# Patient Record
Sex: MALE | Race: WHITE
[De-identification: names, ages, dates, MRNs, and addresses within clinical notes are randomized per-mention and may not be internally consistent; named-entity substitution may affect disease eponyms.]

---

## 2017-01-13 ENCOUNTER — RX RENEWAL (OUTPATIENT)
Age: 82
End: 2017-01-13

## 2017-02-03 ENCOUNTER — APPOINTMENT (OUTPATIENT)
Dept: SURGERY | Facility: CLINIC | Age: 82
End: 2017-02-03

## 2017-02-03 VITALS
HEART RATE: 83 BPM | TEMPERATURE: 98 F | OXYGEN SATURATION: 95 % | HEIGHT: 72 IN | WEIGHT: 195 LBS | SYSTOLIC BLOOD PRESSURE: 127 MMHG | RESPIRATION RATE: 15 BRPM | DIASTOLIC BLOOD PRESSURE: 65 MMHG | BODY MASS INDEX: 26.41 KG/M2

## 2017-02-03 DIAGNOSIS — A04.7 ENTEROCOLITIS DUE TO CLOSTRIDIUM DIFFICILE: ICD-10-CM

## 2017-02-03 DIAGNOSIS — Z82.3 FAMILY HISTORY OF STROKE: ICD-10-CM

## 2017-02-03 RX ORDER — CHROMIUM 200 MCG
TABLET ORAL
Refills: 0 | Status: ACTIVE | COMMUNITY

## 2017-02-21 ENCOUNTER — CHART COPY (OUTPATIENT)
Age: 82
End: 2017-02-21

## 2017-03-30 ENCOUNTER — APPOINTMENT (OUTPATIENT)
Dept: SURGERY | Facility: AMBULATORY MEDICAL SERVICES | Age: 82
End: 2017-03-30

## 2017-04-01 ENCOUNTER — INPATIENT (INPATIENT)
Facility: HOSPITAL | Age: 82
LOS: 1 days | Discharge: HOME | End: 2017-04-03
Attending: HOSPITALIST | Admitting: INTERNAL MEDICINE
Payer: MEDICARE

## 2017-04-01 VITALS
OXYGEN SATURATION: 95 % | DIASTOLIC BLOOD PRESSURE: 70 MMHG | WEIGHT: 184.97 LBS | TEMPERATURE: 98 F | HEIGHT: 69 IN | HEART RATE: 90 BPM | SYSTOLIC BLOOD PRESSURE: 144 MMHG | RESPIRATION RATE: 20 BRPM

## 2017-04-01 DIAGNOSIS — H40.9 UNSPECIFIED GLAUCOMA: ICD-10-CM

## 2017-04-01 DIAGNOSIS — Z95.5 PRESENCE OF CORONARY ANGIOPLASTY IMPLANT AND GRAFT: Chronic | ICD-10-CM

## 2017-04-01 DIAGNOSIS — Z90.89 ACQUIRED ABSENCE OF OTHER ORGANS: Chronic | ICD-10-CM

## 2017-04-01 DIAGNOSIS — Z96.642 PRESENCE OF LEFT ARTIFICIAL HIP JOINT: Chronic | ICD-10-CM

## 2017-04-01 DIAGNOSIS — J44.9 CHRONIC OBSTRUCTIVE PULMONARY DISEASE, UNSPECIFIED: ICD-10-CM

## 2017-04-01 DIAGNOSIS — I10 ESSENTIAL (PRIMARY) HYPERTENSION: ICD-10-CM

## 2017-04-01 DIAGNOSIS — N39.0 URINARY TRACT INFECTION, SITE NOT SPECIFIED: ICD-10-CM

## 2017-04-01 DIAGNOSIS — I25.10 ATHEROSCLEROTIC HEART DISEASE OF NATIVE CORONARY ARTERY WITHOUT ANGINA PECTORIS: ICD-10-CM

## 2017-04-01 DIAGNOSIS — Z98.890 OTHER SPECIFIED POSTPROCEDURAL STATES: Chronic | ICD-10-CM

## 2017-04-01 DIAGNOSIS — K52.9 NONINFECTIVE GASTROENTERITIS AND COLITIS, UNSPECIFIED: ICD-10-CM

## 2017-04-01 DIAGNOSIS — E78.5 HYPERLIPIDEMIA, UNSPECIFIED: ICD-10-CM

## 2017-04-01 LAB
ALBUMIN SERPL ELPH-MCNC: 2.9 G/DL — LOW (ref 3.3–5)
ALP SERPL-CCNC: 78 U/L — SIGNIFICANT CHANGE UP (ref 40–120)
ALT FLD-CCNC: 16 U/L — SIGNIFICANT CHANGE UP (ref 12–78)
ANION GAP SERPL CALC-SCNC: 11 MMOL/L — SIGNIFICANT CHANGE UP (ref 5–17)
APPEARANCE UR: CLEAR — SIGNIFICANT CHANGE UP
AST SERPL-CCNC: 32 U/L — SIGNIFICANT CHANGE UP (ref 15–37)
BACTERIA # UR AUTO: (no result)
BASOPHILS # BLD AUTO: 0.2 K/UL — SIGNIFICANT CHANGE UP (ref 0–0.2)
BASOPHILS NFR BLD AUTO: 0.6 % — SIGNIFICANT CHANGE UP (ref 0–2)
BILIRUB SERPL-MCNC: 1.2 MG/DL — SIGNIFICANT CHANGE UP (ref 0.2–1.2)
BILIRUB UR-MCNC: (no result)
BUN SERPL-MCNC: 23 MG/DL — SIGNIFICANT CHANGE UP (ref 7–23)
CALCIUM SERPL-MCNC: 9.3 MG/DL — SIGNIFICANT CHANGE UP (ref 8.5–10.1)
CHLORIDE SERPL-SCNC: 102 MMOL/L — SIGNIFICANT CHANGE UP (ref 96–108)
CO2 SERPL-SCNC: 22 MMOL/L — SIGNIFICANT CHANGE UP (ref 22–31)
COLOR SPEC: YELLOW — SIGNIFICANT CHANGE UP
CREAT SERPL-MCNC: 1.13 MG/DL — SIGNIFICANT CHANGE UP (ref 0.5–1.3)
DIFF PNL FLD: (no result)
EOSINOPHIL # BLD AUTO: 0 K/UL — SIGNIFICANT CHANGE UP (ref 0–0.5)
EOSINOPHIL NFR BLD AUTO: 0 % — SIGNIFICANT CHANGE UP (ref 0–6)
EPI CELLS # UR: SIGNIFICANT CHANGE UP
GLUCOSE SERPL-MCNC: 137 MG/DL — HIGH (ref 70–99)
GLUCOSE UR QL: NEGATIVE MG/DL — SIGNIFICANT CHANGE UP
HCT VFR BLD CALC: 47.9 % — SIGNIFICANT CHANGE UP (ref 39–50)
HGB BLD-MCNC: 15.7 G/DL — SIGNIFICANT CHANGE UP (ref 13–17)
INR BLD: 1.26 RATIO — HIGH (ref 0.88–1.16)
KETONES UR-MCNC: (no result)
LACTATE SERPL-SCNC: 1.8 MMOL/L — SIGNIFICANT CHANGE UP (ref 0.7–2)
LEUKOCYTE ESTERASE UR-ACNC: (no result)
LYMPHOCYTES # BLD AUTO: 0.6 K/UL — LOW (ref 1–3.3)
LYMPHOCYTES # BLD AUTO: 2.3 % — LOW (ref 13–44)
MAGNESIUM SERPL-MCNC: 2 MG/DL — SIGNIFICANT CHANGE UP (ref 1.8–2.4)
MANUAL DIF COMMENT BLD-IMP: SIGNIFICANT CHANGE UP
MCHC RBC-ENTMCNC: 27.3 PG — SIGNIFICANT CHANGE UP (ref 27–34)
MCHC RBC-ENTMCNC: 32.8 GM/DL — SIGNIFICANT CHANGE UP (ref 32–36)
MCV RBC AUTO: 83.2 FL — SIGNIFICANT CHANGE UP (ref 80–100)
MONOCYTES # BLD AUTO: 2.5 K/UL — HIGH (ref 0–0.9)
MONOCYTES NFR BLD AUTO: 9.7 % — SIGNIFICANT CHANGE UP (ref 2–14)
NEUTROPHILS # BLD AUTO: 22.6 K/UL — HIGH (ref 1.8–7.4)
NEUTROPHILS NFR BLD AUTO: 87.3 % — HIGH (ref 43–77)
NEUTS HYPERSEG # BLD: PRESENT — SIGNIFICANT CHANGE UP
NITRITE UR-MCNC: NEGATIVE — SIGNIFICANT CHANGE UP
PH UR: 5 — SIGNIFICANT CHANGE UP (ref 4.8–8)
PLAT MORPH BLD: NORMAL — SIGNIFICANT CHANGE UP
PLATELET # BLD AUTO: 190 K/UL — SIGNIFICANT CHANGE UP (ref 150–400)
POTASSIUM SERPL-MCNC: 4.6 MMOL/L — SIGNIFICANT CHANGE UP (ref 3.5–5.3)
POTASSIUM SERPL-SCNC: 4.6 MMOL/L — SIGNIFICANT CHANGE UP (ref 3.5–5.3)
PROT SERPL-MCNC: 7.2 GM/DL — SIGNIFICANT CHANGE UP (ref 6–8.3)
PROT UR-MCNC: 30 MG/DL
PROTHROM AB SERPL-ACNC: 13.7 SEC — HIGH (ref 9.8–12.7)
RBC # BLD: 5.76 M/UL — SIGNIFICANT CHANGE UP (ref 4.2–5.8)
RBC # FLD: 14.6 % — HIGH (ref 10.3–14.5)
RBC BLD AUTO: NORMAL — SIGNIFICANT CHANGE UP
RBC CASTS # UR COMP ASSIST: SIGNIFICANT CHANGE UP /HPF (ref 0–4)
SODIUM SERPL-SCNC: 135 MMOL/L — SIGNIFICANT CHANGE UP (ref 135–145)
SP GR SPEC: 1.02 — SIGNIFICANT CHANGE UP (ref 1.01–1.02)
TOXIC GRANULES BLD QL SMEAR: PRESENT — SIGNIFICANT CHANGE UP
TROPONIN I SERPL-MCNC: <0.015 NG/ML — SIGNIFICANT CHANGE UP (ref 0.01–0.04)
UROBILINOGEN FLD QL: 4 MG/DL
WBC # BLD: 25.9 K/UL — HIGH (ref 3.8–10.5)
WBC # FLD AUTO: 25.9 K/UL — HIGH (ref 3.8–10.5)
WBC UR QL: (no result)

## 2017-04-01 PROCEDURE — 99285 EMERGENCY DEPT VISIT HI MDM: CPT

## 2017-04-01 PROCEDURE — 74177 CT ABD & PELVIS W/CONTRAST: CPT | Mod: 26

## 2017-04-01 PROCEDURE — 71010: CPT | Mod: 26

## 2017-04-01 PROCEDURE — 93010 ELECTROCARDIOGRAM REPORT: CPT

## 2017-04-01 RX ORDER — METRONIDAZOLE 500 MG
500 TABLET ORAL ONCE
Qty: 0 | Refills: 0 | Status: COMPLETED | OUTPATIENT
Start: 2017-04-01 | End: 2017-04-01

## 2017-04-01 RX ORDER — TAMSULOSIN HYDROCHLORIDE 0.4 MG/1
0.4 CAPSULE ORAL AT BEDTIME
Qty: 0 | Refills: 0 | Status: DISCONTINUED | OUTPATIENT
Start: 2017-04-01 | End: 2017-04-03

## 2017-04-01 RX ORDER — ATORVASTATIN CALCIUM 80 MG/1
20 TABLET, FILM COATED ORAL AT BEDTIME
Qty: 0 | Refills: 0 | Status: DISCONTINUED | OUTPATIENT
Start: 2017-04-01 | End: 2017-04-03

## 2017-04-01 RX ORDER — TICAGRELOR 90 MG/1
90 TABLET ORAL
Qty: 0 | Refills: 0 | Status: DISCONTINUED | OUTPATIENT
Start: 2017-04-02 | End: 2017-04-03

## 2017-04-01 RX ORDER — METRONIDAZOLE 500 MG
500 TABLET ORAL EVERY 8 HOURS
Qty: 0 | Refills: 0 | Status: DISCONTINUED | OUTPATIENT
Start: 2017-04-01 | End: 2017-04-03

## 2017-04-01 RX ORDER — AMLODIPINE BESYLATE 2.5 MG/1
5 TABLET ORAL DAILY
Qty: 0 | Refills: 0 | Status: DISCONTINUED | OUTPATIENT
Start: 2017-04-01 | End: 2017-04-03

## 2017-04-01 RX ORDER — FAMOTIDINE 10 MG/ML
20 INJECTION INTRAVENOUS DAILY
Qty: 0 | Refills: 0 | Status: DISCONTINUED | OUTPATIENT
Start: 2017-04-01 | End: 2017-04-03

## 2017-04-01 RX ORDER — TIOTROPIUM BROMIDE 18 UG/1
1 CAPSULE ORAL; RESPIRATORY (INHALATION) DAILY
Qty: 0 | Refills: 0 | Status: DISCONTINUED | OUTPATIENT
Start: 2017-04-01 | End: 2017-04-03

## 2017-04-01 RX ORDER — CIPROFLOXACIN LACTATE 400MG/40ML
400 VIAL (ML) INTRAVENOUS ONCE
Qty: 0 | Refills: 0 | Status: COMPLETED | OUTPATIENT
Start: 2017-04-01 | End: 2017-04-01

## 2017-04-01 RX ORDER — LATANOPROST 0.05 MG/ML
1 SOLUTION/ DROPS OPHTHALMIC; TOPICAL AT BEDTIME
Qty: 0 | Refills: 0 | Status: DISCONTINUED | OUTPATIENT
Start: 2017-04-01 | End: 2017-04-03

## 2017-04-01 RX ORDER — CEFTRIAXONE 500 MG/1
1 INJECTION, POWDER, FOR SOLUTION INTRAMUSCULAR; INTRAVENOUS ONCE
Qty: 0 | Refills: 0 | Status: COMPLETED | OUTPATIENT
Start: 2017-04-01 | End: 2017-04-01

## 2017-04-01 RX ORDER — ASPIRIN/CALCIUM CARB/MAGNESIUM 324 MG
81 TABLET ORAL DAILY
Qty: 0 | Refills: 0 | Status: DISCONTINUED | OUTPATIENT
Start: 2017-04-01 | End: 2017-04-01

## 2017-04-01 RX ORDER — ASPIRIN/CALCIUM CARB/MAGNESIUM 324 MG
81 TABLET ORAL DAILY
Qty: 0 | Refills: 0 | Status: DISCONTINUED | OUTPATIENT
Start: 2017-04-01 | End: 2017-04-03

## 2017-04-01 RX ORDER — TICAGRELOR 90 MG/1
90 TABLET ORAL ONCE
Qty: 0 | Refills: 0 | Status: DISCONTINUED | OUTPATIENT
Start: 2017-04-01 | End: 2017-04-03

## 2017-04-01 RX ORDER — ONDANSETRON 8 MG/1
4 TABLET, FILM COATED ORAL EVERY 6 HOURS
Qty: 0 | Refills: 0 | Status: DISCONTINUED | OUTPATIENT
Start: 2017-04-01 | End: 2017-04-03

## 2017-04-01 RX ORDER — CIPROFLOXACIN LACTATE 400MG/40ML
400 VIAL (ML) INTRAVENOUS EVERY 12 HOURS
Qty: 0 | Refills: 0 | Status: DISCONTINUED | OUTPATIENT
Start: 2017-04-01 | End: 2017-04-03

## 2017-04-01 RX ORDER — SODIUM CHLORIDE 9 MG/ML
1000 INJECTION INTRAMUSCULAR; INTRAVENOUS; SUBCUTANEOUS
Qty: 0 | Refills: 0 | Status: DISCONTINUED | OUTPATIENT
Start: 2017-04-01 | End: 2017-04-03

## 2017-04-01 RX ORDER — SODIUM CHLORIDE 9 MG/ML
1750 INJECTION INTRAMUSCULAR; INTRAVENOUS; SUBCUTANEOUS ONCE
Qty: 0 | Refills: 0 | Status: COMPLETED | OUTPATIENT
Start: 2017-04-01 | End: 2017-04-01

## 2017-04-01 RX ORDER — SODIUM CHLORIDE 9 MG/ML
1000 INJECTION INTRAMUSCULAR; INTRAVENOUS; SUBCUTANEOUS ONCE
Qty: 0 | Refills: 0 | Status: COMPLETED | OUTPATIENT
Start: 2017-04-01 | End: 2017-04-01

## 2017-04-01 RX ADMIN — CEFTRIAXONE 100 GRAM(S): 500 INJECTION, POWDER, FOR SOLUTION INTRAMUSCULAR; INTRAVENOUS at 17:15

## 2017-04-01 RX ADMIN — SODIUM CHLORIDE 875 MILLILITER(S): 9 INJECTION INTRAMUSCULAR; INTRAVENOUS; SUBCUTANEOUS at 17:05

## 2017-04-01 RX ADMIN — SODIUM CHLORIDE 150 MILLILITER(S): 9 INJECTION INTRAMUSCULAR; INTRAVENOUS; SUBCUTANEOUS at 23:35

## 2017-04-01 RX ADMIN — Medication 200 MILLIGRAM(S): at 20:36

## 2017-04-01 RX ADMIN — SODIUM CHLORIDE 1000 MILLILITER(S): 9 INJECTION INTRAMUSCULAR; INTRAVENOUS; SUBCUTANEOUS at 16:05

## 2017-04-01 RX ADMIN — Medication 100 MILLIGRAM(S): at 20:36

## 2017-04-01 NOTE — H&P ADULT - NSHPLABSRESULTS_GEN_ALL_CORE
CARDIAC MARKERS ( 2017 16:07 )  <0.015 ng/mL / x     / x     / x     / x                    15.7   25.9  )-----------( 190      ( 2017 16:07 )             47.9     2017 16:07    135    |  102    |  23     ----------------------------<  137    4.6     |  22     |  1.13     Ca    9.3        2017 16:  Mg     2.0       2017 16:07    TPro  7.2    /  Alb  2.9    /  TBili  1.2    /  DBili  x      /  AST  32     /  ALT  16     /  AlkPhos  78     2017 16:07    PT/INR - ( 2017 16: )   PT: 13.7 sec;   INR: 1.26 ratio        CAPILLARY BLOOD GLUCOSE  132 (2017 16:00)    LIVER FUNCTIONS - ( 2017 16:07 )  Alb: 2.9 g/dL / Pro: 7.2 gm/dL / ALK PHOS: 78 U/L / ALT: 16 U/L / AST: 32 U/L / GGT: x           Urinalysis Basic - ( 2017 16:07 )  Color: Yellow / Appearance: Clear / S.020 / pH: x  Gluc: x / Ketone: Trace  / Bili: Small / Urobili: 4 mg/dL   Blood: x / Protein: 30 mg/dL / Nitrite: Negative   Leuk Esterase: Moderate / RBC: 0-2 /HPF / WBC 6-10   Sq Epi: x / Non Sq Epi: Few / Bacteria: Moderate    CXR unofficial by me, no acute infiltrate and no PVC    EKG (1 + 2) SR   RBBB q III, aVF  + bifasc. block    Abd/pelvic CT: +Fluid and gas seen in the bilateral inguinal canals, tracking superiorly along the anterior abdominal wall. Hemorrhage in the left inguinal canal tracking into the scrotum. Correlation with timing of surgery is suggested to evaluate whether these reflect expected postoperative changes. No drainable collection is identified.       Mild inflammatory stranding around the splenic flexure and proximal   descending colon may reflect focal colitis in the appropriate clinical   setting. Given the watershed region distribution, as well as the   patient's age and vascular disease, the possibility of ischemia should be considered. Correlation with serum lactate may be obtained.       Mild rectal wall thickening is also seen, may reflect proctitis    JADEN HANSON   This document has been electronically signed. 2017  6:54PM

## 2017-04-01 NOTE — INPATIENT CERTIFICATION FOR MEDICARE PATIENTS - RISKS OF ADVERSE EVENTS
Concern for worsening infectious process/Concern for renal deterioration/Concern for cardiopulmonary deterioration

## 2017-04-01 NOTE — H&P ADULT - PROBLEM SELECTOR PLAN 1
A) Inflammatory vs infectious focal colitis  less likely ischemic due to age and some vascular calcification but no pain or difficulty voiding; has normal lactate  wife reported to nurse that pt had a hx of c diff in past but did not specify when  B) marked leukocytosis w L shift  1. admit for IVF  2. continue cipro/flagyl  3. diet as clears  4. serial exams  5. anti-nausea meds prn  6. pain meds not ordered as patient has no pain  7. follow up BMP and CBC

## 2017-04-01 NOTE — ED PROVIDER NOTE - MEDICAL DECISION MAKING DETAILS
Pt with UTI, colitis. Pt given IV abx, admitted to medicine service for further evaluation and management.

## 2017-04-01 NOTE — H&P ADULT - PSH
H/O bilateral inguinal hernia repair    History of left hip replacement    History of tonsillectomy    Status post coronary artery stent placement  2014 cath LAD w diffuse irregularity; patent stent; LCx w diffuse irreg patent stent [id number]    70% stenosis prox RCA     EF 55%

## 2017-04-01 NOTE — PATIENT PROFILE ADULT. - VISION (WITH CORRECTIVE LENSES IF THE PATIENT USUALLY WEARS THEM):
wears glasses for reading/Partially impaired: cannot see medication labels or newsprint, but can see obstacles in path, and the surrounding layout; can count fingers at arm's length

## 2017-04-01 NOTE — H&P ADULT - PROBLEM SELECTOR PLAN 2
no hydro or pyelo or bladder abnormalities seen on CT  ABN UA  1. follow up culture results  2. received 1 dose ceftriaxone  3. ABN urine may be covered by cipro  4. ID consulted  5. continue flomax

## 2017-04-01 NOTE — H&P ADULT - PMH
CAD (coronary artery disease)    COPD (chronic obstructive pulmonary disease)    Glaucoma of both eyes, unspecified glaucoma    HTN (hypertension)    Hyperlipidemia

## 2017-04-01 NOTE — H&P ADULT - NSHPPHYSICALEXAM_GEN_ALL_CORE
Vital Signs Last 24 Hrs  T(C): 36.9, Max: 37.4 (04-01 @ 17:00)  T(F): 98.5, Max: 99.4 (04-01 @ 17:00)  HR: 84 (84 - 107)  BP: 100/53 (100/53 - 145/41)  BP(mean): --  RR: 18 (15 - 28)  SpO2: 94% (91% - 98%)    Pleasant 82 yr old male wearing O2, w IV infusing  Gen: appears comfortable, and is cooperative  Neuro  alert, Ox3 w normal speech [place], gait not tested  HEENT: dentures removed, pupils reactive, anicteric sclera  Neck nontender, no bruits  Chest clear = breath sounds  CW/breast not examined as not indicated for current complaint  Cor RR 90 S1 + S2  Abd + BS mildly distended, nontender to palpation and to percussion  Groin + induration L>R; ice pack on L  /scrotum not evaluated (no complaints)  musculoskeletal nontender, symmetric   Skin: old ecchymosis both upper extremities over forearms  Pulses 1+ DP and PT bilateral  Rectal not done as not indicated for current complaint

## 2017-04-01 NOTE — H&P ADULT - PROBLEM SELECTOR PLAN 3
1. continue ASA, Brilinta, ACE inh, statin  2. not on beta blocker likely secondary to COPD  3. no signif changes on current EKG

## 2017-04-01 NOTE — H&P ADULT - HISTORY OF PRESENT ILLNESS
82 yr old male w CAD, hx stents, HTN, HLD, COPD 82 yr old male w CAD, hx stents + hx MI w EF 55% (2014), HTN, HLD, COPD, POD #2 after ambulatory surgery for bilateral inguinal hernia repairs,  presented to  ED by ambulance complaining of chills and sudden generalized weakness today.       Patient reported feeling like his usual self post-operative.  Reported having eaten breakfast this morning, with no other intake.   Had felt +chills once, then generalized weakness. His wife assisted him w lying down.  Neighbors assisted getting him up and to bed as 911 was called.         He denied chest, back or abdominal pain.  Denied nausea and vomiting.       Urinated without difficulty or dysuria.  Had last bowel movement (described as large) this morning.    In the ED received >2 L IVF.  Normal lactate despite leukocytosis 25,900.   Cultured blood and urine.  CXR neg for PNA. Abn U/A: he received Rocephin 1 gram.  Ab/pelvic CT showed focal colitis at splenic flexure and prox descending colon.  May be ischemic in nature.  For colitis he was given cipro/flagyl IV.  Wife and patient were concerned w getting C. diff colitis.    Since above treatment, patient feels better.

## 2017-04-01 NOTE — ED PROVIDER NOTE - OBJECTIVE STATEMENT
81 y/o male s/p recent bilat inguinal hernia repair a few days ago at [facility] by Dr. Garcia, with a h/o HTN, HLD, COPD, CAD with stents, c/o mild fatigue and generalized weakness which began at approximately 12:00 PM today. No N/V/D or decreased appetite. 81 y/o male s/p recent bilat inguinal hernia repair a few days ago at [facility] by Dr. Garcia, with a h/o HTN, HLD, COPD, CAD with stents, c/o mild fatigue and generalized weakness which began at approximately 12:00 PM today. No nausea or vomiting. or decreased appetite.

## 2017-04-01 NOTE — ED PROVIDER NOTE - ENMT, MLM
Airway patent, Nasal mucosa clear. Dry mucous membranes. Throat has no vesicles, no oropharyngeal exudates and uvula is midline.

## 2017-04-02 LAB
ANION GAP SERPL CALC-SCNC: 7 MMOL/L — SIGNIFICANT CHANGE UP (ref 5–17)
BASOPHILS # BLD AUTO: 0.1 K/UL — SIGNIFICANT CHANGE UP (ref 0–0.2)
BASOPHILS NFR BLD AUTO: 1 % — SIGNIFICANT CHANGE UP (ref 0–2)
BUN SERPL-MCNC: 15 MG/DL — SIGNIFICANT CHANGE UP (ref 7–23)
CALCIUM SERPL-MCNC: 7.5 MG/DL — LOW (ref 8.5–10.1)
CHLORIDE SERPL-SCNC: 109 MMOL/L — HIGH (ref 96–108)
CO2 SERPL-SCNC: 26 MMOL/L — SIGNIFICANT CHANGE UP (ref 22–31)
CREAT SERPL-MCNC: 0.89 MG/DL — SIGNIFICANT CHANGE UP (ref 0.5–1.3)
EOSINOPHIL # BLD AUTO: 0.2 K/UL — SIGNIFICANT CHANGE UP (ref 0–0.5)
EOSINOPHIL NFR BLD AUTO: 1.6 % — SIGNIFICANT CHANGE UP (ref 0–6)
GLUCOSE SERPL-MCNC: 98 MG/DL — SIGNIFICANT CHANGE UP (ref 70–99)
HCT VFR BLD CALC: 34.1 % — LOW (ref 39–50)
HGB BLD-MCNC: 11.4 G/DL — LOW (ref 13–17)
LYMPHOCYTES # BLD AUTO: 1.1 K/UL — SIGNIFICANT CHANGE UP (ref 1–3.3)
LYMPHOCYTES # BLD AUTO: 8.3 % — LOW (ref 13–44)
MCHC RBC-ENTMCNC: 27.9 PG — SIGNIFICANT CHANGE UP (ref 27–34)
MCHC RBC-ENTMCNC: 33.4 GM/DL — SIGNIFICANT CHANGE UP (ref 32–36)
MCV RBC AUTO: 83.5 FL — SIGNIFICANT CHANGE UP (ref 80–100)
MONOCYTES # BLD AUTO: 1.3 K/UL — HIGH (ref 0–0.9)
MONOCYTES NFR BLD AUTO: 10 % — SIGNIFICANT CHANGE UP (ref 2–14)
NEUTROPHILS # BLD AUTO: 10.6 K/UL — HIGH (ref 1.8–7.4)
NEUTROPHILS NFR BLD AUTO: 79.1 % — HIGH (ref 43–77)
PLATELET # BLD AUTO: 157 K/UL — SIGNIFICANT CHANGE UP (ref 150–400)
POTASSIUM SERPL-MCNC: 4.3 MMOL/L — SIGNIFICANT CHANGE UP (ref 3.5–5.3)
POTASSIUM SERPL-SCNC: 4.3 MMOL/L — SIGNIFICANT CHANGE UP (ref 3.5–5.3)
RBC # BLD: 4.09 M/UL — LOW (ref 4.2–5.8)
RBC # FLD: 14.2 % — SIGNIFICANT CHANGE UP (ref 10.3–14.5)
SODIUM SERPL-SCNC: 142 MMOL/L — SIGNIFICANT CHANGE UP (ref 135–145)
WBC # BLD: 13.4 K/UL — HIGH (ref 3.8–10.5)
WBC # FLD AUTO: 13.4 K/UL — HIGH (ref 3.8–10.5)

## 2017-04-02 RX ORDER — FLUTICASONE PROPIONATE AND SALMETEROL 50; 250 UG/1; UG/1
1 POWDER ORAL; RESPIRATORY (INHALATION)
Qty: 0 | Refills: 0 | Status: DISCONTINUED | OUTPATIENT
Start: 2017-04-02 | End: 2017-04-03

## 2017-04-02 RX ADMIN — Medication 10 MILLIGRAM(S): at 17:26

## 2017-04-02 RX ADMIN — TAMSULOSIN HYDROCHLORIDE 0.4 MILLIGRAM(S): 0.4 CAPSULE ORAL at 21:16

## 2017-04-02 RX ADMIN — Medication 100 MILLIGRAM(S): at 21:16

## 2017-04-02 RX ADMIN — TIOTROPIUM BROMIDE 1 CAPSULE(S): 18 CAPSULE ORAL; RESPIRATORY (INHALATION) at 11:01

## 2017-04-02 RX ADMIN — SODIUM CHLORIDE 150 MILLILITER(S): 9 INJECTION INTRAMUSCULAR; INTRAVENOUS; SUBCUTANEOUS at 12:19

## 2017-04-02 RX ADMIN — TICAGRELOR 90 MILLIGRAM(S): 90 TABLET ORAL at 17:26

## 2017-04-02 RX ADMIN — Medication 81 MILLIGRAM(S): at 12:16

## 2017-04-02 RX ADMIN — Medication 10 MILLIGRAM(S): at 06:38

## 2017-04-02 RX ADMIN — Medication 200 MILLIGRAM(S): at 17:26

## 2017-04-02 RX ADMIN — Medication 200 MILLIGRAM(S): at 08:48

## 2017-04-02 RX ADMIN — Medication 100 MILLIGRAM(S): at 14:31

## 2017-04-02 RX ADMIN — FAMOTIDINE 20 MILLIGRAM(S): 10 INJECTION INTRAVENOUS at 12:16

## 2017-04-02 RX ADMIN — FLUTICASONE PROPIONATE AND SALMETEROL 1 DOSE(S): 50; 250 POWDER ORAL; RESPIRATORY (INHALATION) at 19:52

## 2017-04-02 RX ADMIN — Medication 100 MILLIGRAM(S): at 06:35

## 2017-04-02 RX ADMIN — TICAGRELOR 90 MILLIGRAM(S): 90 TABLET ORAL at 06:38

## 2017-04-02 RX ADMIN — SODIUM CHLORIDE 150 MILLILITER(S): 9 INJECTION INTRAMUSCULAR; INTRAVENOUS; SUBCUTANEOUS at 21:13

## 2017-04-02 RX ADMIN — LATANOPROST 1 DROP(S): 0.05 SOLUTION/ DROPS OPHTHALMIC; TOPICAL at 22:14

## 2017-04-02 RX ADMIN — ATORVASTATIN CALCIUM 20 MILLIGRAM(S): 80 TABLET, FILM COATED ORAL at 21:16

## 2017-04-02 RX ADMIN — SODIUM CHLORIDE 150 MILLILITER(S): 9 INJECTION INTRAMUSCULAR; INTRAVENOUS; SUBCUTANEOUS at 06:34

## 2017-04-02 RX ADMIN — AMLODIPINE BESYLATE 5 MILLIGRAM(S): 2.5 TABLET ORAL at 06:38

## 2017-04-02 NOTE — PROGRESS NOTE ADULT - ASSESSMENT
82 yr old male w CAD, hx stents + hx MI w EF 55% (2014), HTN, HLD, COPD, POD #2 after ambulatory surgery for bilateral inguinal hernia repairs,  presented to  ED by ambulance complaining of chills and sudden generalized weakness today.Patient reported feeling like his usual self post-operative.  Reported having eaten breakfast this morning, with no other intake.   Had felt +chills once, then generalized weakness. His wife assisted him w lying down.  Neighbors assisted getting him up and to bed as 911 was called.He denied chest, back or abdominal pain.  Denied nausea and vomiting. Urinated without difficulty or dysuria.  Had last bowel movement (described as large) this morning.    In the ED received >2 L IVF.  Normal lactate despite leukocytosis 25,900.   Cultured blood and urine.  CXR neg for PNA. Abn U/A: he received Rocephin 1 gram.  Ab/pelvic CT showed focal colitis at splenic flexure and prox descending colon.  May be ischemic in nature.  For colitis he was given cipro/flagyl IV.  Wife and patient were concerned w getting C. diff colitis.      *Colitis; Inflammatory vs infectious focal colitis  - Less likely ischemic due to age and some vascular calcification but no pain or difficulty voiding; has normal lactate  - wife reported that had a hx of c diff for 4 months, 5 years agho after hip surgery  - ID note appreciated; if pt developed diarrhea, send for C diff    *Marked leukocytosis w L shift; resolving (13.4)  - IVF  - Cipro and Flagy day #2  - Clears  - serial exams  - anti-nausea meds prn      *UTI   - s/pose ceftriaxone  - No hydro or pyelo or bladder abnormalities seen on CT  - ABN UA  - urine cultures pending  - Id note appreciated; continue cipro and metro  - continue flomax.     *Coronary artery disease involving native coronary artery without angina pectoris, unspecified whether native or transplanted heart.    - continue ASA, Brilinta, ACE inh, statin  - not on beta blocker likely secondary to COPD  - no significant changes on current EKG.     *Essential hypertension.    - amlodipine and enalapril.     *Hyperlipidemia  - statin.     *COPD  - BREO cannot order in system  - continue spiriva  - O2 prn.    *Glaucoma of both eyes  - eye drops ordered.     *Dispo: possible d/c tomorrow 82 yr old male w CAD, hx stents + hx MI w EF 55% (2014), HTN, HLD, COPD, POD #2 after ambulatory surgery for bilateral inguinal hernia repairs,  presented to  ED by ambulance complaining of chills and sudden generalized weakness today.Patient reported feeling like his usual self post-operative.  Reported having eaten breakfast this morning, with no other intake.   Had felt +chills once, then generalized weakness. His wife assisted him w lying down.  Neighbors assisted getting him up and to bed as 911 was called.He denied chest, back or abdominal pain.  Denied nausea and vomiting. Urinated without difficulty or dysuria.  Had last bowel movement (described as large) this morning.    In the ED received >2 L IVF.  Normal lactate despite leukocytosis 25,900.   Cultured blood and urine.  CXR neg for PNA. Abn U/A: he received Rocephin 1 gram.  Ab/pelvic CT showed focal colitis at splenic flexure and prox descending colon.  May be ischemic in nature.  For colitis he was given cipro/flagyl IV.  Wife and patient were concerned w getting C. diff colitis.      *Colitis; Inflammatory vs infectious focal colitis  - Less likely ischemic due to age and some vascular calcification but no pain or difficulty voiding; has normal lactate  - wife reported that had a hx of c diff for 4 months, 5 years agho after hip surgery  - ID note appreciated; if pt develops diarrhea, send for C diff    *Marked leukocytosis w L shift; resolving (13.4)  - IVF  - Cipro and Flagy day #2  - Clears  - serial exams  - anti-nausea meds prn      *UTI   - s/pose ceftriaxone  - No hydro or pyelo or bladder abnormalities seen on CT  - ABN UA  - urine cultures pending  - Id note appreciated; continue cipro and metro  - continue flomax.     *Coronary artery disease involving native coronary artery without angina pectoris, unspecified whether native or transplanted heart.    - continue ASA, Brilinta, ACE inh, statin  - not on beta blocker likely secondary to COPD  - no significant changes on current EKG.     *Essential hypertension.    - amlodipine and enalapril.     *Hyperlipidemia  - statin.     *COPD  - BREO cannot order in system  - continue spiriva  - O2 prn.    *Glaucoma of both eyes  - eye drops ordered.     *Dispo: possible d/c tomorrow if no diarhea.

## 2017-04-02 NOTE — CONSULT NOTE ADULT - ASSESSMENT
82 yr old male w CAD, hx stents + hx MI w EF 55% (2014), HTN, HLD, COPD, glaucoma, s/p bilateral hernia repair on 4/30 at [facility] now admitted on 4/1 for evaluation of acute onset weakness; patient was doing fine post op; was home, ate breakfast on day of admission, had large bowel movement, then collapsed down (no syncope) and was so weak he could not get up, EMS was called. He denies any other complaints and is afebrile.   1. Patient admitted with colitis, unclear if infectious versus ischemic  - follow up cultures   - agree with flaygl and ciprofloxacin as ordered  - iv hydration and supportive care  -if diarrhea will send stool for cdiff; patient had cdiff 5 years ago after hip surgery  2. other issues; CAD, hx stents + hx MI w EF 55% (2014), HTN, HLD, COPD, glaucoma  - per medicine

## 2017-04-02 NOTE — CONSULT NOTE ADULT - SUBJECTIVE AND OBJECTIVE BOX
HPI:  82 yr old male w CAD, hx stents + hx MI w EF 55% (), HTN, HLD, COPD, glaucoma, s/p bilateral hernia repair on  at [facility] now admitted on  for evaluation of acute onset weakness; patient was doing fine post op; was home, ate breakfast on day of admission, had large bowel movement, then collapsed down (no syncope) and was so weak he could not get up, EMS was called. He denies any other complaints and is afebrile.                 PMH: as above  PSH: as above  Meds: per reconcilation sheet, noted below  MEDICATIONS  (STANDING):  ciprofloxacin   IVPB 400milliGRAM(s) IV Intermittent every 12 hours  metroNIDAZOLE  IVPB 500milliGRAM(s) IV Intermittent every 8 hours  sodium chloride 0.9%. 1000milliLiter(s) IV Continuous <Continuous>  ticagrelor 90milliGRAM(s) Oral once  enalapril Oral Tab/Cap - Peds 10milliGRAM(s) Oral two times a day  tamsulosin 0.4milliGRAM(s) Oral at bedtime  atorvastatin 20milliGRAM(s) Oral at bedtime  ticagrelor 90milliGRAM(s) Oral two times a day  tiotropium 18 MICROgram(s) Capsule 1Capsule(s) Inhalation daily  amLODIPine   Tablet 5milliGRAM(s) Oral daily  famotidine    Tablet 20milliGRAM(s) Oral daily  latanoprost 0.005% Ophthalmic Solution 1Drop(s) Both EYES at bedtime  aspirin  chewable 81milliGRAM(s) Oral daily    MEDICATIONS  (PRN):  ondansetron Injectable 4milliGRAM(s) IV Push every 6 hours PRN Nausea    Allergies    No Known Allergies    Intolerances      Social: no smoking, no alcohol, no illegal drugs; no recent travel, no exposure to TB  FAMILY HISTORY:  No pertinent family history in first degree relatives    ROS: the patient has no fever, no chills, no HA, no dizziness, no sore throat, no blurry vision, no CP, no palpitations, no SOB, no cough, no abdominal pain, no diarrhea, no N/V, no dysuria, no leg pain, no claudication, no rash, no joint aches, no rectal pain or bleeding, no night sweats  Vital Signs Last 24 Hrs  T(C): 36.9, Max: 37.4 ( @ 17:00)  T(F): 98.4, Max: 99.4 ( @ 17:00)  HR: 72 (72 - 107)  BP: 110/48 (100/53 - 145/41)  BP(mean): --  RR: 20 (15 - 28)  SpO2: 95% (91% - 98%)  Daily Height in cm: 175.26 (2017 15:51)    Daily   Constitutional: frail looking  HEENT: NC/AT, EOMI, PERRLA  Neck: supple  Respiratory: clear  Cardiovascular: S1S2 regular, no murmurs  Abdomen: soft, not tender, not distended, positive BS, surgical dressings intact over groin  Genitourinary: deferred  Rectal: deferred  Musculoskeletal: no muscle tenderness, no joint swelling or tenderness  Neurological: AxOx3, moving all extremities, no focal deficits  Skin: no rashes                          11.4   13.4  )-----------( 157      ( 2017 06:24 )             34.1     2017 06:24    142    |  109    |  15     ----------------------------<  98     4.3     |  26     |  0.89     Ca    7.5        2017 06:24  Mg     2.0       2017 16:07    TPro  7.2    /  Alb  2.9    /  TBili  1.2    /  DBili  x      /  AST  32     /  ALT  16     /  AlkPhos  78     2017 16:07     LIVER FUNCTIONS - ( 2017 16:07 )  Alb: 2.9 g/dL / Pro: 7.2 gm/dL / ALK PHOS: 78 U/L / ALT: 16 U/L / AST: 32 U/L / GGT: x           Urinalysis Basic - ( 2017 16:07 )    Color: Yellow / Appearance: Clear / S.020 / pH: x  Gluc: x / Ketone: Trace  / Bili: Small / Urobili: 4 mg/dL   Blood: x / Protein: 30 mg/dL / Nitrite: Negative   Leuk Esterase: Moderate / RBC: 0-2 /HPF / WBC 6-10   Sq Epi: x / Non Sq Epi: Few / Bacteria: Moderate          Radiology:  EXAM:  CT ABDOMEN AND PELVIS IC                            PROCEDURE DATE:  2017        INTERPRETATION:  CT ABDOMEN AND PELVIS WITH CONTRAST    INDICATION: Leukocytosis, recent bilateral inguinal hernia surgery.    TECHNIQUE: Contrast enhanced CT of the  abdomen and pelvis.      90 mL of Omnipaque 350 contrast material was injected IV.    COMPARISON: None.    FINDINGS:    Abdomen/Pelvis:  Liver: No focal lesion.      Biliary: No dilatation. Cholelithiasis.  Spleen: No focal lesion.      Pancreas: No inflammatory changes or ductal dilatation.      Adrenals: Normal.      Kidneys: No hydronephrosis or solid mass. Right lower pole renal cyst.      Vessels: Normal caliber. Moderate atherosclerotic disease of the aorta   and its branches.    GI tract: No evidence of small bowel obstruction. There is mild wall   thickening and inflammatory stranding around the descending colon   predominantly around the splenic flexure. Mild rectal wall thickening is   also seen.    Peritoneum/retroperitoneum and mesentery: No free air. No hemoperitoneum.     Pelvic organs/Bladder: No pelvic masses. Bladder is normal.        Abdominal wall: There is fluid and gas seen in the bilateral inguinal   canals, with hyperdense material seen in the left inguinal canal. No   significant collection is seen. Gas is seen tracking upward along the   anterior abdominal wall along the subcutaneous tissues.    Bones and soft tissues: No displaced fractures. Status post left femoral   ORIF. Degenerative changes ofthe spine.    IMPRESSION:    Fluid and gas seen in the bilateral inguinal canals, tracking superiorly   along the anterior abdominal wall. Hemorrhage in the left inguinal canal   tracking into the scrotum. Correlation with timing of surgery is   suggested to evaluate whether these reflect expected postoperative   changes. No drainable collection is identified.    Mild inflammatory stranding around the splenic flexure and proximal   descending colon may reflect focal colitis in the appropriate clinical   setting. Given the watershed region distribution, as well as the   patient's age and vascular disease, the possibility of ischemia should be   considered. Correlation with serum lactate may be obtained.    Mild rectal wall thickening is also seen, may reflect proctitis.        Advanced directive addressed: full resuscitation

## 2017-04-03 ENCOUNTER — TRANSCRIPTION ENCOUNTER (OUTPATIENT)
Age: 82
End: 2017-04-03

## 2017-04-03 VITALS
DIASTOLIC BLOOD PRESSURE: 51 MMHG | HEART RATE: 77 BPM | RESPIRATION RATE: 18 BRPM | OXYGEN SATURATION: 96 % | SYSTOLIC BLOOD PRESSURE: 112 MMHG

## 2017-04-03 LAB
-  AMIKACIN: SIGNIFICANT CHANGE UP
-  AMPICILLIN/SULBACTAM: SIGNIFICANT CHANGE UP
-  AMPICILLIN: SIGNIFICANT CHANGE UP
-  AZTREONAM: SIGNIFICANT CHANGE UP
-  CEFAZOLIN: SIGNIFICANT CHANGE UP
-  CEFEPIME: SIGNIFICANT CHANGE UP
-  CEFOXITIN: SIGNIFICANT CHANGE UP
-  CEFTAZIDIME: SIGNIFICANT CHANGE UP
-  CEFTRIAXONE: SIGNIFICANT CHANGE UP
-  CIPROFLOXACIN: SIGNIFICANT CHANGE UP
-  ERTAPENEM: SIGNIFICANT CHANGE UP
-  GENTAMICIN: SIGNIFICANT CHANGE UP
-  LEVOFLOXACIN: SIGNIFICANT CHANGE UP
-  MEROPENEM: SIGNIFICANT CHANGE UP
-  NITROFURANTOIN: SIGNIFICANT CHANGE UP
-  PIPERACILLIN/TAZOBACTAM: SIGNIFICANT CHANGE UP
-  TOBRAMYCIN: SIGNIFICANT CHANGE UP
-  TRIMETHOPRIM/SULFAMETHOXAZOLE: SIGNIFICANT CHANGE UP
CULTURE RESULTS: SIGNIFICANT CHANGE UP
HCT VFR BLD CALC: 35 % — LOW (ref 39–50)
HGB BLD-MCNC: 11.5 G/DL — LOW (ref 13–17)
MCHC RBC-ENTMCNC: 27.2 PG — SIGNIFICANT CHANGE UP (ref 27–34)
MCHC RBC-ENTMCNC: 32.8 GM/DL — SIGNIFICANT CHANGE UP (ref 32–36)
MCV RBC AUTO: 82.9 FL — SIGNIFICANT CHANGE UP (ref 80–100)
METHOD TYPE: SIGNIFICANT CHANGE UP
ORGANISM # SPEC MICROSCOPIC CNT: SIGNIFICANT CHANGE UP
ORGANISM # SPEC MICROSCOPIC CNT: SIGNIFICANT CHANGE UP
PLATELET # BLD AUTO: 176 K/UL — SIGNIFICANT CHANGE UP (ref 150–400)
RBC # BLD: 4.22 M/UL — SIGNIFICANT CHANGE UP (ref 4.2–5.8)
RBC # FLD: 14.5 % — SIGNIFICANT CHANGE UP (ref 10.3–14.5)
SPECIMEN SOURCE: SIGNIFICANT CHANGE UP
WBC # BLD: 13 K/UL — HIGH (ref 3.8–10.5)
WBC # FLD AUTO: 13 K/UL — HIGH (ref 3.8–10.5)

## 2017-04-03 RX ORDER — LACTOBACILLUS ACIDOPHILUS 100MM CELL
2 CAPSULE ORAL
Qty: 120 | Refills: 0
Start: 2017-04-03 | End: 2017-05-03

## 2017-04-03 RX ORDER — MOXIFLOXACIN HYDROCHLORIDE TABLETS, 400 MG 400 MG/1
1 TABLET, FILM COATED ORAL
Qty: 24 | Refills: 0
Start: 2017-04-03 | End: 2017-04-15

## 2017-04-03 RX ORDER — ASPIRIN/CALCIUM CARB/MAGNESIUM 324 MG
1 TABLET ORAL
Qty: 0 | Refills: 0 | DISCHARGE
Start: 2017-04-03

## 2017-04-03 RX ORDER — ATORVASTATIN CALCIUM 80 MG/1
1 TABLET, FILM COATED ORAL
Qty: 0 | Refills: 0 | DISCHARGE
Start: 2017-04-03

## 2017-04-03 RX ORDER — TAMSULOSIN HYDROCHLORIDE 0.4 MG/1
1 CAPSULE ORAL
Qty: 0 | Refills: 0 | COMMUNITY

## 2017-04-03 RX ORDER — TAMSULOSIN HYDROCHLORIDE 0.4 MG/1
1 CAPSULE ORAL
Qty: 0 | Refills: 0 | DISCHARGE
Start: 2017-04-03

## 2017-04-03 RX ORDER — LATANOPROST 0.05 MG/ML
1 SOLUTION/ DROPS OPHTHALMIC; TOPICAL
Qty: 0 | Refills: 0 | DISCHARGE
Start: 2017-04-03

## 2017-04-03 RX ORDER — METRONIDAZOLE 500 MG
1 TABLET ORAL
Qty: 48 | Refills: 0
Start: 2017-04-03 | End: 2017-04-19

## 2017-04-03 RX ADMIN — TIOTROPIUM BROMIDE 1 CAPSULE(S): 18 CAPSULE ORAL; RESPIRATORY (INHALATION) at 08:05

## 2017-04-03 RX ADMIN — FLUTICASONE PROPIONATE AND SALMETEROL 1 DOSE(S): 50; 250 POWDER ORAL; RESPIRATORY (INHALATION) at 08:05

## 2017-04-03 RX ADMIN — Medication 10 MILLIGRAM(S): at 05:37

## 2017-04-03 RX ADMIN — Medication 81 MILLIGRAM(S): at 12:01

## 2017-04-03 RX ADMIN — Medication 200 MILLIGRAM(S): at 06:15

## 2017-04-03 RX ADMIN — SODIUM CHLORIDE 150 MILLILITER(S): 9 INJECTION INTRAMUSCULAR; INTRAVENOUS; SUBCUTANEOUS at 04:00

## 2017-04-03 RX ADMIN — FAMOTIDINE 20 MILLIGRAM(S): 10 INJECTION INTRAVENOUS at 12:01

## 2017-04-03 RX ADMIN — TICAGRELOR 90 MILLIGRAM(S): 90 TABLET ORAL at 05:37

## 2017-04-03 RX ADMIN — Medication 100 MILLIGRAM(S): at 05:10

## 2017-04-03 RX ADMIN — AMLODIPINE BESYLATE 5 MILLIGRAM(S): 2.5 TABLET ORAL at 05:37

## 2017-04-03 NOTE — DISCHARGE NOTE ADULT - HOSPITAL COURSE
82 yr old male w CAD, hx stents + hx MI w EF 55% (2014), HTN, HLD, COPD, POD #2 after ambulatory surgery for bilateral inguinal hernia repairs, presented to  ED by ambulance complaining of chills and sudden generalized weakness -  workup showed colitis and leukocytosis- patient was started on IV Flagyl/ Cipro- leukocytosis has improved since. No diarrhea- plan is for discharge home on oral antibiotic.

## 2017-04-03 NOTE — PROGRESS NOTE ADULT - SUBJECTIVE AND OBJECTIVE BOX
82 yr old male w CAD, hx stents + hx MI w EF 55% (), HTN, HLD, COPD, POD #2 after ambulatory surgery for bilateral inguinal hernia repairs,  presented to  ED by ambulance complaining of chills and sudden generalized weakness today.Patient reported feeling like his usual self post-operative.  Reported having eaten breakfast this morning, with no other intake.   Had felt +chills once, then generalized weakness. His wife assisted him w lying down.  Neighbors assisted getting him up and to bed as 911 was called.He denied chest, back or abdominal pain.  Denied nausea and vomiting. Urinated without difficulty or dysuria.  Had last bowel movement (described as large) this morning.    In the ED received >2 L IVF.  Normal lactate despite leukocytosis 25,900.   Cultured blood and urine.  CXR neg for PNA. Abn U/A: he received Rocephin 1 gram.  Ab/pelvic CT showed focal colitis at splenic flexure and prox descending colon.  May be ischemic in nature.  For colitis he was given cipro/flagyl IV.  Wife and patient were concerned w getting C. diff colitis.    4/2: Pt laying comfortably in bed. No overnight events. No complaints at this time.  4/3: Pt laying comfortably in bed. Anticipating d/c. No overnight complaints. Pt denies fever/chills, diarrhea, CP or SOB.     All other ROS negative    Vital Signs Last 24 Hrs  T(C): 37.4, Max: 37.4 ( @ 21:45)  T(F): 99.3, Max: 99.3 ( @ 21:45)  HR: 75 (73 - 77)  BP: 112/51 (106/46 - 118/55)  RR: 18 (18 - 18)  SpO2: 96% (94% - 96%)Vital Signs Last 24 Hrs  T(C): 36.9, Max: 37.4 (- @ 17:00)  T(F): 98.4, Max: 99.4 ( @ 17:00)  HR: 72 (72 - 107)  BP: 110/48 (100/53 - 145/41)  RR: 20 (15 - 28)  SpO2: 95% (91% - 98%)    PE:  Constitutional: Well developed, well nourished M in no acute distress.   HEENT: Normocephalic, atraumatic  Respiratory: Breath sounds clear to auscultation. No rales, rhonchi or rubs  Cardiovascular: Regular rate and rhythm. S1 and S2 clear. No murmurs, gallops or rubs.  Abdomen: Incision site healing well, no active bleeding, d/c or erythema. Mild tenderness to palpation.  Soft.   Extremities: No peripheral edema or cyanosis  Neuro: A&Ox3. No focal deficits     Labs:                          11.5   13.0  )-----------( 176      ( 2017 10:33 )             35.0     2017 06:24    142    |  109    |  15     ----------------------------<  98     4.3     |  26     |  0.89     Ca    7.5        2017 06:24  Mg     2.0       2017 16:07    TPro  7.2    /  Alb  2.9    /  TBili  1.2    /  DBili  x      /  AST  32     /  ALT  16     /  AlkPhos  78     2017 16:07    CARDIAC MARKERS ( 2017 16:07 )  <0.015 ng/mL / x     / x     / x     / x          LIVER FUNCTIONS - ( 2017 16:07 )  Alb: 2.9 g/dL / Pro: 7.2 gm/dL / ALK PHOS: 78 U/L / ALT: 16 U/L / AST: 32 U/L / GGT: x           PT/INR - ( 2017 16:07 )   PT: 13.7 sec;   INR: 1.26 ratio           Urinalysis Basic - ( 2017 16:07 )    Color: Yellow / Appearance: Clear / S.020 / pH: x  Gluc: x / Ketone: Trace  / Bili: Small / Urobili: 4 mg/dL   Blood: x / Protein: 30 mg/dL / Nitrite: Negative   Leuk Esterase: Moderate / RBC: 0-2 /HPF / WBC 6-10   Sq Epi: x / Non Sq Epi: Few / Bacteria: Moderate      CT Abd/Pelvis:  IMPRESSION:    Fluid and gas seen in the bilateral inguinal canals, tracking superiorly   along the anterior abdominal wall. Hemorrhage in the left inguinal canal   tracking into the scrotum. Correlation with timing of surgery is   suggested to evaluate whether these reflect expected postoperative   changes. No drainable collection is identified.    Mild inflammatory stranding around the splenic flexure and proximal   descending colon may reflect focal colitis in the appropriate clinical   setting. Given the watershed region distribution, as well as the   patient's age and vascular disease, the possibility of ischemia should be   considered. Correlation with serum lactate may be obtained.    Mild rectal wall thickening is also seen, may reflect proctitis.    CXR:   Impression:    No acute disease  MEDICATIONS  (STANDING):  ciprofloxacin   IVPB 400milliGRAM(s) IV Intermittent every 12 hours  metroNIDAZOLE  IVPB 500milliGRAM(s) IV Intermittent every 8 hours  ticagrelor 90milliGRAM(s) Oral once  enalapril Oral Tab/Cap - Peds 10milliGRAM(s) Oral two times a day  tamsulosin 0.4milliGRAM(s) Oral at bedtime  atorvastatin 20milliGRAM(s) Oral at bedtime  ticagrelor 90milliGRAM(s) Oral two times a day  tiotropium 18 MICROgram(s) Capsule 1Capsule(s) Inhalation daily  amLODIPine   Tablet 5milliGRAM(s) Oral daily  famotidine    Tablet 20milliGRAM(s) Oral daily  latanoprost 0.005% Ophthalmic Solution 1Drop(s) Both EYES at bedtime  aspirin  chewable 81milliGRAM(s) Oral daily  fluticasone / salmeterol 250-50 MICROgram(s) Diskus 1Dose(s) Inhalation two times a day    MEDICATIONS  (PRN):  ondansetron Injectable 4milliGRAM(s) IV Push every 6 hours PRN Nausea    MEDICATIONS  (STANDING):  ciprofloxacin   IVPB 400milliGRAM(s) IV Intermittent every 12 hours  metroNIDAZOLE  IVPB 500milliGRAM(s) IV Intermittent every 8 hours  sodium chloride 0.9%. 1000milliLiter(s) IV Continuous <Continuous>  ticagrelor 90milliGRAM(s) Oral once  enalapril Oral Tab/Cap - Peds 10milliGRAM(s) Oral two times a day  tamsulosin 0.4milliGRAM(s) Oral at bedtime  atorvastatin 20milliGRAM(s) Oral at bedtime  ticagrelor 90milliGRAM(s) Oral two times a day  tiotropium 18 MICROgram(s) Capsule 1Capsule(s) Inhalation daily  amLODIPine   Tablet 5milliGRAM(s) Oral daily  famotidine    Tablet 20milliGRAM(s) Oral daily  latanoprost 0.005% Ophthalmic Solution 1Drop(s) Both EYES at bedtime  aspirin  chewable 81milliGRAM(s) Oral daily    MEDICATIONS  (PRN):  ondansetron Injectable 4milliGRAM(s) IV Push every 6 hours PRN Nausea        Assessment and Plan:   · Assessment		  82 yr old male w CAD, hx stents + hx MI w EF 55% (), HTN, HLD, COPD, POD #2 after ambulatory surgery for bilateral inguinal hernia repairs,  presented to  ED by ambulance complaining of chills and sudden generalized weakness today.Patient reported feeling like his usual self post-operative.  Reported having eaten breakfast this morning, with no other intake.   Had felt +chills once, then generalized weakness. His wife assisted him w lying down.  Neighbors assisted getting him up and to bed as 911 was called.He denied chest, back or abdominal pain.  Denied nausea and vomiting. Urinated without difficulty or dysuria.  Had last bowel movement (described as large) this morning.    In the ED received >2 L IVF.  Normal lactate despite leukocytosis 25,900.   Cultured blood and urine.  CXR neg for PNA. Abn U/A: he received Rocephin 1 gram.  Ab/pelvic CT showed focal colitis at splenic flexure and prox descending colon.  May be ischemic in nature.  For colitis he was given cipro/flagyl IV.  Wife and patient were concerned w getting C. diff colitis.      *Colitis; Inflammatory vs infectious focal colitis  - Less likely ischemic due to age and some vascular calcification but no pain or difficulty voiding; has normal lactate  - Wife reported that had a hx of c diff for 4 months, 5 years agho after hip surgery  - ID note appreciated; if pt develops diarrhea, send for C diff  - Continue cipro for 12 more days, continue metro for 16 more days    *Marked leukocytosis w L shift; resolving (13.0)  - IVF  - Cipro and Flagy day #3, Continue cipro for 12 more days, continue metro for 16 more days  - Clears  - serial exams  - anti-nausea meds prn  - blood and urine negative  - f/u CBC showing resolving leukocytosis       *UTI   - s/pose ceftriaxone  - No hydro or pyelo or bladder abnormalities seen on CT  - ABN UA  - urine cultures negative  - Id note appreciated; continue cipro and metro  - continue flomax.     *Coronary artery disease involving native coronary artery without angina pectoris, unspecified whether native or transplanted heart.    - continue ASA, Brilinta, ACE inh, statin  - not on beta blocker likely secondary to COPD  - no significant changes on current EKG.     *Essential hypertension.    - amlodipine and enalapril.     *Hyperlipidemia  - statin.     *COPD  - BREO cannot order in system  - continue spiriva  - O2 prn.    *Glaucoma of both eyes  - eye drops ordered.     *Dispo: plan for d/c home today
82 yr old male w CAD, hx stents + hx MI w EF 55% (), HTN, HLD, COPD, POD #2 after ambulatory surgery for bilateral inguinal hernia repairs,  presented to  ED by ambulance complaining of chills and sudden generalized weakness today.Patient reported feeling like his usual self post-operative.  Reported having eaten breakfast this morning, with no other intake.   Had felt +chills once, then generalized weakness. His wife assisted him w lying down.  Neighbors assisted getting him up and to bed as 911 was called.He denied chest, back or abdominal pain.  Denied nausea and vomiting. Urinated without difficulty or dysuria.  Had last bowel movement (described as large) this morning.    In the ED received >2 L IVF.  Normal lactate despite leukocytosis 25,900.   Cultured blood and urine.  CXR neg for PNA. Abn U/A: he received Rocephin 1 gram.  Ab/pelvic CT showed focal colitis at splenic flexure and prox descending colon.  May be ischemic in nature.  For colitis he was given cipro/flagyl IV.  Wife and patient were concerned w getting C. diff colitis.    : Pt laying comfotably in bed. No overnight events. No complaints at this time.    All other ROS negative    Vital Signs Last 24 Hrs  T(C): 36.9, Max: 37.4 ( @ 17:00)  T(F): 98.4, Max: 99.4 ( @ 17:00)  HR: 72 (72 - 107)  BP: 110/48 (100/53 - 145/41)  RR: 20 (15 - 28)  SpO2: 95% (91% - 98%)    PE:  Constitutional: Well developed, well nourished M in no acute distress.   HEENT: Normocephalic, atraumatic  Respiratory: Breath sounds clear to auscultation. No rales, rhonchi or rubs  Cardiovascular: Regular rate and rhythm. S1 and S2 clear. No murmurs, gallops or rubs.  Abdomen: Incision site healing well, no active bleeding, d/c or erythema. Mild tenderness to palpation.  Soft.   Extremities: No peripheral edema or cyanosis  Neuro: A&Ox3. No focal deficits     Labs:               11.4   13.4  )-----------( 157      ( 2017 06:24 )             34.1     2017 06:24    142    |  109    |  15     ----------------------------<  98     4.3     |  26     |  0.89     Ca    7.5        2017 06:24  Mg     2.0       2017 16:07    TPro  7.2    /  Alb  2.9    /  TBili  1.2    /  DBili  x      /  AST  32     /  ALT  16     /  AlkPhos  78     2017 16:07    CARDIAC MARKERS ( 2017 16:07 )  <0.015 ng/mL / x     / x     / x     / x          LIVER FUNCTIONS - ( 2017 16:07 )  Alb: 2.9 g/dL / Pro: 7.2 gm/dL / ALK PHOS: 78 U/L / ALT: 16 U/L / AST: 32 U/L / GGT: x           PT/INR - ( 2017 16:07 )   PT: 13.7 sec;   INR: 1.26 ratio        Urinalysis Basic - ( 2017 16:07 )    Color: Yellow / Appearance: Clear / S.020 / pH: x  Gluc: x / Ketone: Trace  / Bili: Small / Urobili: 4 mg/dL   Blood: x / Protein: 30 mg/dL / Nitrite: Negative   Leuk Esterase: Moderate / RBC: 0-2 /HPF / WBC 6-10   Sq Epi: x / Non Sq Epi: Few / Bacteria: Moderate    Lactate, Blood: 1.8 mmol/L ( @ 16:48)      CT Abd/Pelvis:  IMPRESSION:    Fluid and gas seen in the bilateral inguinal canals, tracking superiorly   along the anterior abdominal wall. Hemorrhage in the left inguinal canal   tracking into the scrotum. Correlation with timing of surgery is   suggested to evaluate whether these reflect expected postoperative   changes. No drainable collection is identified.    Mild inflammatory stranding around the splenic flexure and proximal   descending colon may reflect focal colitis in the appropriate clinical   setting. Given the watershed region distribution, as well as the   patient's age and vascular disease, the possibility of ischemia should be   considered. Correlation with serum lactate may be obtained.    Mild rectal wall thickening is also seen, may reflect proctitis.    CXR:   Impression:    No acute disease    MEDICATIONS  (STANDING):  ciprofloxacin   IVPB 400milliGRAM(s) IV Intermittent every 12 hours  metroNIDAZOLE  IVPB 500milliGRAM(s) IV Intermittent every 8 hours  sodium chloride 0.9%. 1000milliLiter(s) IV Continuous <Continuous>  ticagrelor 90milliGRAM(s) Oral once  enalapril Oral Tab/Cap - Peds 10milliGRAM(s) Oral two times a day  tamsulosin 0.4milliGRAM(s) Oral at bedtime  atorvastatin 20milliGRAM(s) Oral at bedtime  ticagrelor 90milliGRAM(s) Oral two times a day  tiotropium 18 MICROgram(s) Capsule 1Capsule(s) Inhalation daily  amLODIPine   Tablet 5milliGRAM(s) Oral daily  famotidine    Tablet 20milliGRAM(s) Oral daily  latanoprost 0.005% Ophthalmic Solution 1Drop(s) Both EYES at bedtime  aspirin  chewable 81milliGRAM(s) Oral daily    MEDICATIONS  (PRN):  ondansetron Injectable 4milliGRAM(s) IV Push every 6 hours PRN Nausea
HPI:  82 yr old male w CAD, hx stents + hx MI w EF 55% (), HTN, HLD, COPD, glaucoma, s/p bilateral hernia repair on  at [facility] now admitted on  for evaluation of acute onset weakness; patient was doing fine post op; was home, ate breakfast on day of admission, had large bowel movement, then collapsed down (no syncope) and was so weak he could not get up, EMS was called. He denies any other complaints and is afebrile.   Today 4/3 patient well; no fever overall feeling better      MEDICATIONS  (STANDING):  ciprofloxacin   IVPB 400milliGRAM(s) IV Intermittent every 12 hours  metroNIDAZOLE  IVPB 500milliGRAM(s) IV Intermittent every 8 hours  ticagrelor 90milliGRAM(s) Oral once  enalapril Oral Tab/Cap - Peds 10milliGRAM(s) Oral two times a day  tamsulosin 0.4milliGRAM(s) Oral at bedtime  atorvastatin 20milliGRAM(s) Oral at bedtime  ticagrelor 90milliGRAM(s) Oral two times a day  tiotropium 18 MICROgram(s) Capsule 1Capsule(s) Inhalation daily  amLODIPine   Tablet 5milliGRAM(s) Oral daily  famotidine    Tablet 20milliGRAM(s) Oral daily  latanoprost 0.005% Ophthalmic Solution 1Drop(s) Both EYES at bedtime  aspirin  chewable 81milliGRAM(s) Oral daily  fluticasone / salmeterol 250-50 MICROgram(s) Diskus 1Dose(s) Inhalation two times a day    MEDICATIONS  (PRN):  ondansetron Injectable 4milliGRAM(s) IV Push every 6 hours PRN Nausea      Vital Signs Last 24 Hrs  T(C): 37.4, Max: 37.4 (-02 @ 21:45)  T(F): 99.3, Max: 99.3 (04-02 @ 21:45)  HR: 75 (73 - 77)  BP: 112/51 (106/46 - 118/55)  BP(mean): --  RR: 18 (18 - 18)  SpO2: 96% (94% - 96%)    Physical Exam:            Daily Height in cm: 175.26 (2017 15:51)    Daily   Constitutional: frail looking  HEENT: NC/AT, EOMI, PERRLA  Neck: supple  Respiratory: clear  Cardiovascular: S1S2 regular, no murmurs  Abdomen: soft, not tender, not distended, positive BS, surgical dressings intact over groin  Genitourinary: deferred  Rectal: deferred  Musculoskeletal: no muscle tenderness, no joint swelling or tenderness  Neurological: AxOx3, moving all extremities, no focal deficits  Skin: no rashes  Labs:                        11.5   13.0  )-----------( 176      ( 2017 10:33 )             35.0     2017 06:24    142    |  109    |  15     ----------------------------<  98     4.3     |  26     |  0.89     Ca    7.5        2017 06:24  Mg     2.0       2017 16:07    TPro  7.2    /  Alb  2.9    /  TBili  1.2    /  DBili  x      /  AST  32     /  ALT  16     /  AlkPhos  78     2017 16:07           Cultures:                         11.4   13.4  )-----------( 157      ( 2017 06:24 )             34.1     2017 06:24    142    |  109    |  15     ----------------------------<  98     4.3     |  26     |  0.89     Ca    7.5        2017 06:24  Mg     2.0       2017 16:07    TPro  7.2    /  Alb  2.9    /  TBili  1.2    /  DBili  x      /  AST  32     /  ALT  16     /  AlkPhos  78     2017 16:07     LIVER FUNCTIONS - ( 2017 16:07 )  Alb: 2.9 g/dL / Pro: 7.2 gm/dL / ALK PHOS: 78 U/L / ALT: 16 U/L / AST: 32 U/L / GGT: x           Urinalysis Basic - ( 2017 16:07 )    Color: Yellow / Appearance: Clear / S.020 / pH: x  Gluc: x / Ketone: Trace  / Bili: Small / Urobili: 4 mg/dL   Blood: x / Protein: 30 mg/dL / Nitrite: Negative   Leuk Esterase: Moderate / RBC: 0-2 /HPF / WBC 6-10   Sq Epi: x / Non Sq Epi: Few / Bacteria: Moderate          Radiology:  EXAM:  CT ABDOMEN AND PELVIS IC                            PROCEDURE DATE:  2017        INTERPRETATION:  CT ABDOMEN AND PELVIS WITH CONTRAST    INDICATION: Leukocytosis, recent bilateral inguinal hernia surgery.    TECHNIQUE: Contrast enhanced CT of the  abdomen and pelvis.      90 mL of Omnipaque 350 contrast material was injected IV.    COMPARISON: None.    FINDINGS:    Abdomen/Pelvis:  Liver: No focal lesion.      Biliary: No dilatation. Cholelithiasis.  Spleen: No focal lesion.      Pancreas: No inflammatory changes or ductal dilatation.      Adrenals: Normal.      Kidneys: No hydronephrosis or solid mass. Right lower pole renal cyst.      Vessels: Normal caliber. Moderate atherosclerotic disease of the aorta   and its branches.    GI tract: No evidence of small bowel obstruction. There is mild wall   thickening and inflammatory stranding around the descending colon   predominantly around the splenic flexure. Mild rectal wall thickening is   also seen.    Peritoneum/retroperitoneum and mesentery: No free air. No hemoperitoneum.     Pelvic organs/Bladder: No pelvic masses. Bladder is normal.        Abdominal wall: There is fluid and gas seen in the bilateral inguinal   canals, with hyperdense material seen in the left inguinal canal. No   significant collection is seen. Gas is seen tracking upward along the   anterior abdominal wall along the subcutaneous tissues.    Bones and soft tissues: No displaced fractures. Status post left femoral   ORIF. Degenerative changes ofthe spine.    IMPRESSION:    Fluid and gas seen in the bilateral inguinal canals, tracking superiorly   along the anterior abdominal wall. Hemorrhage in the left inguinal canal   tracking into the scrotum. Correlation with timing of surgery is   suggested to evaluate whether these reflect expected postoperative   changes. No drainable collection is identified.    Mild inflammatory stranding around the splenic flexure and proximal   descending colon may reflect focal colitis in the appropriate clinical   setting. Given the watershed region distribution, as well as the   patient's age and vascular disease, the possibility of ischemia should be   considered. Correlation with serum lactate may be obtained.    Mild rectal wall thickening is also seen, may reflect proctitis.        Advanced directive addressed: full resuscitation

## 2017-04-03 NOTE — DISCHARGE NOTE ADULT - PLAN OF CARE
prevent worsening continue cipro and flagyl- do not skip or miss doses even if you start feeling better  F/u with your pcp in the community in 1-2  week or as needed  continue recommended diet  notify your PCP if you notice worsening diarrhea, fever, chills or shortness of breath prevent exacerbation continue nebulizer treatments  f/u with your PCP in 1 week continue antihypertensive regimen  check your BP at home and keep a log- bring this with you on your next appointment with your PCP  continue low salt, low fat diet continue brillinta  and aspirun continue statins continue cipro and flagyl- do not skip or miss doses even if you start feeling better  F/u with your pcp in the community in 1-2  week or as needed  continue recommended diet  notify your PCP if you notice worsening diarrhea, fever, chills or shortness of breath  s/p Inguinal hernia repair - dressing change to area with DSD once daily

## 2017-04-03 NOTE — DISCHARGE NOTE ADULT - INSTRUCTIONS
low residue diet Follow up with primary physician in 1-2 weeks. Complete full course of antibiotics. Continue low residue diet. Return to hospital if you develop increased abdominal pain or swelling at sites, increased weakness or fatigue, fever >101.

## 2017-04-03 NOTE — DISCHARGE NOTE ADULT - SECONDARY DIAGNOSIS.
Chronic obstructive pulmonary disease, unspecified COPD type Essential hypertension Coronary artery disease without angina pectoris, unspecified vessel or lesion type, unspecified whether native or transplanted heart Hyperlipidemia, unspecified hyperlipidemia type

## 2017-04-03 NOTE — DISCHARGE NOTE ADULT - MEDICATION SUMMARY - MEDICATIONS TO STOP TAKING
I will STOP taking the medications listed below when I get home from the hospital:    Breo Ellipta 200 mcg-25 mcg/inh inhalation powder  -- 1 puff(s) inhaled once a day

## 2017-04-03 NOTE — PROGRESS NOTE ADULT - ASSESSMENT
82 yr old male w CAD, hx stents + hx MI w EF 55% (2014), HTN, HLD, COPD, glaucoma, s/p bilateral hernia repair on 4/30 at [facility] now admitted on 4/1 for evaluation of acute onset weakness; patient was doing fine post op; was home, ate breakfast on day of admission, had large bowel movement, then collapsed down (no syncope) and was so weak he could not get up, EMS was called. He denies any other complaints and is afebrile.   1. Patient admitted with colitis, unclear if infectious versus ischemic  - follow up cultures   - agree with discharge home on po cipro and flagyl  - encouraged patient to have outpatient colonoscopy as never had follow up and has history of colonic polyps  2. other issues; CAD, hx stents + hx MI w EF 55% (2014), HTN, HLD, COPD, glaucoma  - per medicine

## 2017-04-03 NOTE — DISCHARGE NOTE ADULT - CARE PLAN
Principal Discharge DX:	Colitis  Goal:	prevent worsening  Instructions for follow-up, activity and diet:	continue cipro and flagyl- do not skip or miss doses even if you start feeling better  F/u with your pcp in the community in 1-2  week or as needed  continue recommended diet  notify your PCP if you notice worsening diarrhea, fever, chills or shortness of breath  Secondary Diagnosis:	Chronic obstructive pulmonary disease, unspecified COPD type  Goal:	prevent exacerbation  Instructions for follow-up, activity and diet:	continue nebulizer treatments  f/u with your PCP in 1 week  Secondary Diagnosis:	Essential hypertension  Goal:	prevent worsening  Instructions for follow-up, activity and diet:	continue antihypertensive regimen  check your BP at home and keep a log- bring this with you on your next appointment with your PCP  continue low salt, low fat diet  Secondary Diagnosis:	Coronary artery disease without angina pectoris, unspecified vessel or lesion type, unspecified whether native or transplanted heart  Goal:	prevent worsening  Instructions for follow-up, activity and diet:	continue brillinta  and aspirun  Secondary Diagnosis:	Hyperlipidemia, unspecified hyperlipidemia type  Goal:	prevent worsening  Instructions for follow-up, activity and diet:	continue statins Principal Discharge DX:	Colitis  Goal:	prevent worsening  Instructions for follow-up, activity and diet:	continue cipro and flagyl- do not skip or miss doses even if you start feeling better  F/u with your pcp in the community in 1-2  week or as needed  continue recommended diet  notify your PCP if you notice worsening diarrhea, fever, chills or shortness of breath  s/p Inguinal hernia repair - dressing change to area with DSD once daily  Secondary Diagnosis:	Chronic obstructive pulmonary disease, unspecified COPD type  Goal:	prevent exacerbation  Instructions for follow-up, activity and diet:	continue nebulizer treatments  f/u with your PCP in 1 week  Secondary Diagnosis:	Essential hypertension  Goal:	prevent worsening  Instructions for follow-up, activity and diet:	continue antihypertensive regimen  check your BP at home and keep a log- bring this with you on your next appointment with your PCP  continue low salt, low fat diet  Secondary Diagnosis:	Coronary artery disease without angina pectoris, unspecified vessel or lesion type, unspecified whether native or transplanted heart  Goal:	prevent worsening  Instructions for follow-up, activity and diet:	continue brillinta  and aspirun  Secondary Diagnosis:	Hyperlipidemia, unspecified hyperlipidemia type  Goal:	prevent worsening  Instructions for follow-up, activity and diet:	continue statins

## 2017-04-03 NOTE — DISCHARGE NOTE ADULT - MEDICATION SUMMARY - MEDICATIONS TO TAKE
I will START or STAY ON the medications listed below when I get home from the hospital:    Flagyl 500 mg oral tablet  -- 1 tab(s) by mouth every 8 hours  -- Do not drink alcoholic beverages when taking this medication.  Finish all this medication unless otherwise directed by prescriber.  May discolor urine or feces.    -- Indication: For Colitis    aspirin 81 mg oral tablet, chewable  -- 1 tab(s) by mouth once a day  -- Indication: For CAD (coronary artery disease)    enalapril 10 mg oral tablet  -- 1 tab(s) by mouth 2 times a day  -- Indication: For HTN (hypertension)    tamsulosin 0.4 mg oral capsule  -- 1 cap(s) by mouth once a day (at bedtime)  -- Indication: For BPH    atorvastatin 20 mg oral tablet  -- 1 tab(s) by mouth once a day (at bedtime)  -- Indication: For HLD    Brilinta (ticagrelor) 90 mg oral tablet  -- 1 tab(s) by mouth 2 times a day  -- Indication: For CAD (coronary artery disease)    Spiriva 18 mcg inhalation capsule  -- 1 cap(s) inhaled once a day  -- Indication: For COPD (chronic obstructive pulmonary disease)    Breo Ellipta 200 mcg-25 mcg/inh inhalation powder  -- 1 puff(s) inhaled once a day  -- Indication: For COPD (chronic obstructive pulmonary disease)    amLODIPine 5 mg oral tablet  -- 1 tab(s) by mouth once a day  -- Indication: For HTN (hypertension)    famotidine 20 mg oral tablet  --  by mouth once a day  -- Indication: For GERD    latanoprost 0.005% ophthalmic solution  -- 1 drop(s) to each affected eye once a day (at bedtime)  -- Indication: For Glaucoma of both eyes, unspecified glaucoma    Cipro 500 mg oral tablet  -- 1 tab(s) by mouth every 12 hours  -- Avoid prolonged or excessive exposure to direct and/or artificial sunlight while taking this medication.  Check with your doctor before becoming pregnant.  Do not take dairy products, antacids, or iron preparations within one hour of this medication.  Finish all this medication unless otherwise directed by prescriber.  Medication should be taken with plenty of water.    -- Indication: For Colitis I will START or STAY ON the medications listed below when I get home from the hospital:    Flagyl 500 mg oral tablet  -- 1 tab(s) by mouth every 8 hours  -- Do not drink alcoholic beverages when taking this medication.  Finish all this medication unless otherwise directed by prescriber.  May discolor urine or feces.    -- Indication: For Colitis    aspirin 81 mg oral tablet, chewable  -- 1 tab(s) by mouth once a day  -- Indication: For CAD (coronary artery disease)    enalapril 10 mg oral tablet  -- 1 tab(s) by mouth 2 times a day  -- Indication: For HTN (hypertension)    tamsulosin 0.4 mg oral capsule  -- 1 cap(s) by mouth once a day (at bedtime)  -- Indication: For BPH    atorvastatin 20 mg oral tablet  -- 1 tab(s) by mouth once a day (at bedtime)  -- Indication: For HLD    Brilinta (ticagrelor) 90 mg oral tablet  -- 1 tab(s) by mouth 2 times a day  -- Indication: For CAD (coronary artery disease)    Spiriva 18 mcg inhalation capsule  -- 1 cap(s) inhaled once a day  -- Indication: For COPD (chronic obstructive pulmonary disease)    Breo Ellipta 200 mcg-25 mcg/inh inhalation powder  -- 1 puff(s) inhaled once a day  -- Indication: For COPD (chronic obstructive pulmonary disease)    amLODIPine 5 mg oral tablet  -- 1 tab(s) by mouth once a day  -- Indication: For HTN (hypertension)    famotidine 20 mg oral tablet  --  by mouth once a day  -- Indication: For GERD    latanoprost 0.005% ophthalmic solution  -- 1 drop(s) to each affected eye once a day (at bedtime)  -- Indication: For Glaucoma of both eyes, unspecified glaucoma    Acidophilus - oral capsule  -- 2 cap(s) by mouth 2 times a day  -- Indication: For probiotic    Cipro 500 mg oral tablet  -- 1 tab(s) by mouth every 12 hours  -- Avoid prolonged or excessive exposure to direct and/or artificial sunlight while taking this medication.  Check with your doctor before becoming pregnant.  Do not take dairy products, antacids, or iron preparations within one hour of this medication.  Finish all this medication unless otherwise directed by prescriber.  Medication should be taken with plenty of water.    -- Indication: For Colitis

## 2017-04-03 NOTE — DISCHARGE NOTE ADULT - PROVIDER TOKENS
TOKEN:'[id number]',FREE:[LAST:[PCP in the community],PHONE:[(   )    -],FAX:[(   )    -],ADDRESS:[in 1 week]]

## 2017-04-03 NOTE — DISCHARGE NOTE ADULT - CARE PROVIDER_API CALL
Trey Watts), Cardiovascular Disease; Internal Medicine; Nuclear Cardiology  [address] [address]  Phone: [phone]  Fax: [phone]    PCP in the community,   in 1 week  Phone: (   )    -  Fax: (   )    -

## 2017-04-03 NOTE — PROGRESS NOTE ADULT - ATTENDING COMMENTS
Patient seen and examined with RITA López.  Agree with physical exam and assessment and plan.  -  case d/w pt and wife at length  - pt aware to call dr braun if diarrhea recurs given hx of cdiff

## 2017-04-03 NOTE — DISCHARGE NOTE ADULT - PATIENT PORTAL LINK FT
“You can access the [facility] Patient Portal, offered by [facility], by registering with the following website: [email or web address]”

## 2017-04-04 DIAGNOSIS — K52.9 NONINFECTIVE GASTROENTERITIS AND COLITIS, UNSPECIFIED: ICD-10-CM

## 2017-04-04 DIAGNOSIS — I25.10 ATHEROSCLEROTIC HEART DISEASE OF NATIVE CORONARY ARTERY WITHOUT ANGINA PECTORIS: ICD-10-CM

## 2017-04-04 DIAGNOSIS — H40.9 UNSPECIFIED GLAUCOMA: ICD-10-CM

## 2017-04-04 DIAGNOSIS — I25.2 OLD MYOCARDIAL INFARCTION: ICD-10-CM

## 2017-04-04 DIAGNOSIS — R53.1 WEAKNESS: ICD-10-CM

## 2017-04-04 DIAGNOSIS — E78.5 HYPERLIPIDEMIA, UNSPECIFIED: ICD-10-CM

## 2017-04-04 DIAGNOSIS — I10 ESSENTIAL (PRIMARY) HYPERTENSION: ICD-10-CM

## 2017-04-04 DIAGNOSIS — N39.0 URINARY TRACT INFECTION, SITE NOT SPECIFIED: ICD-10-CM

## 2017-04-04 DIAGNOSIS — J44.9 CHRONIC OBSTRUCTIVE PULMONARY DISEASE, UNSPECIFIED: ICD-10-CM

## 2017-04-06 LAB
CULTURE RESULTS: SIGNIFICANT CHANGE UP
CULTURE RESULTS: SIGNIFICANT CHANGE UP
SPECIMEN SOURCE: SIGNIFICANT CHANGE UP
SPECIMEN SOURCE: SIGNIFICANT CHANGE UP

## 2017-04-07 ENCOUNTER — APPOINTMENT (OUTPATIENT)
Dept: SURGERY | Facility: CLINIC | Age: 82
End: 2017-04-07

## 2017-04-07 VITALS
OXYGEN SATURATION: 95 % | HEART RATE: 92 BPM | TEMPERATURE: 98.2 F | RESPIRATION RATE: 16 BRPM | DIASTOLIC BLOOD PRESSURE: 66 MMHG | SYSTOLIC BLOOD PRESSURE: 108 MMHG

## 2017-04-07 DIAGNOSIS — N39.0 URINARY TRACT INFECTION, SITE NOT SPECIFIED: ICD-10-CM

## 2017-04-07 DIAGNOSIS — A49.9 URINARY TRACT INFECTION, SITE NOT SPECIFIED: ICD-10-CM

## 2017-04-07 PROBLEM — I10 ESSENTIAL (PRIMARY) HYPERTENSION: Chronic | Status: ACTIVE | Noted: 2017-04-01

## 2017-04-11 RX ORDER — CIPROFLOXACIN HYDROCHLORIDE 500 MG/1
TABLET, FILM COATED ORAL
Refills: 0 | Status: DISCONTINUED | COMMUNITY
End: 2017-04-11

## 2017-04-11 RX ORDER — METRONIDAZOLE 500 MG/1
500 TABLET, FILM COATED ORAL
Refills: 0 | Status: DISCONTINUED | COMMUNITY
End: 2017-04-11

## 2017-04-12 ENCOUNTER — APPOINTMENT (OUTPATIENT)
Dept: SURGERY | Facility: CLINIC | Age: 82
End: 2017-04-12

## 2017-04-12 VITALS
TEMPERATURE: 97.7 F | RESPIRATION RATE: 15 BRPM | SYSTOLIC BLOOD PRESSURE: 119 MMHG | OXYGEN SATURATION: 95 % | DIASTOLIC BLOOD PRESSURE: 79 MMHG | HEART RATE: 83 BPM

## 2017-04-12 LAB — BACTERIA UR CULT: NORMAL

## 2017-04-12 RX ORDER — NYSTATIN 100000 1/G
100000 POWDER TOPICAL TWICE DAILY
Qty: 30 | Refills: 0 | Status: COMPLETED | COMMUNITY
Start: 2017-04-12

## 2017-04-15 ENCOUNTER — INPATIENT (INPATIENT)
Facility: HOSPITAL | Age: 82
LOS: 2 days | Discharge: HOME | DRG: 863 | End: 2017-04-18
Attending: SURGERY | Admitting: SURGERY
Payer: MEDICARE

## 2017-04-15 VITALS
OXYGEN SATURATION: 97 % | RESPIRATION RATE: 18 BRPM | DIASTOLIC BLOOD PRESSURE: 65 MMHG | SYSTOLIC BLOOD PRESSURE: 101 MMHG | HEART RATE: 77 BPM | TEMPERATURE: 98 F

## 2017-04-15 DIAGNOSIS — Z95.5 PRESENCE OF CORONARY ANGIOPLASTY IMPLANT AND GRAFT: Chronic | ICD-10-CM

## 2017-04-15 DIAGNOSIS — Z90.89 ACQUIRED ABSENCE OF OTHER ORGANS: Chronic | ICD-10-CM

## 2017-04-15 DIAGNOSIS — Z98.890 OTHER SPECIFIED POSTPROCEDURAL STATES: Chronic | ICD-10-CM

## 2017-04-15 DIAGNOSIS — L02.91 CUTANEOUS ABSCESS, UNSPECIFIED: ICD-10-CM

## 2017-04-15 DIAGNOSIS — Z96.642 PRESENCE OF LEFT ARTIFICIAL HIP JOINT: Chronic | ICD-10-CM

## 2017-04-15 DIAGNOSIS — T88.8XXA OTHER SPECIFIED COMPLICATIONS OF SURGICAL AND MEDICAL CARE, NOT ELSEWHERE CLASSIFIED, INITIAL ENCOUNTER: ICD-10-CM

## 2017-04-15 LAB
ALBUMIN SERPL ELPH-MCNC: 2.7 G/DL — LOW (ref 3.3–5)
ALP SERPL-CCNC: 53 U/L — SIGNIFICANT CHANGE UP (ref 40–120)
ALT FLD-CCNC: 12 U/L RC — SIGNIFICANT CHANGE UP (ref 10–45)
ANION GAP SERPL CALC-SCNC: 13 MMOL/L — SIGNIFICANT CHANGE UP (ref 5–17)
APPEARANCE UR: CLEAR — SIGNIFICANT CHANGE UP
APTT BLD: 31.8 SEC — SIGNIFICANT CHANGE UP (ref 27.5–37.4)
AST SERPL-CCNC: 16 U/L — SIGNIFICANT CHANGE UP (ref 10–40)
BASOPHILS # BLD AUTO: 0 K/UL — SIGNIFICANT CHANGE UP (ref 0–0.2)
BASOPHILS NFR BLD AUTO: 0.2 % — SIGNIFICANT CHANGE UP (ref 0–2)
BILIRUB SERPL-MCNC: 0.9 MG/DL — SIGNIFICANT CHANGE UP (ref 0.2–1.2)
BILIRUB UR-MCNC: NEGATIVE — SIGNIFICANT CHANGE UP
BLD GP AB SCN SERPL QL: NEGATIVE — SIGNIFICANT CHANGE UP
BUN SERPL-MCNC: 8 MG/DL — SIGNIFICANT CHANGE UP (ref 7–23)
CALCIUM SERPL-MCNC: 8.6 MG/DL — SIGNIFICANT CHANGE UP (ref 8.4–10.5)
CHLORIDE SERPL-SCNC: 98 MMOL/L — SIGNIFICANT CHANGE UP (ref 96–108)
CO2 SERPL-SCNC: 26 MMOL/L — SIGNIFICANT CHANGE UP (ref 22–31)
COLOR SPEC: YELLOW — SIGNIFICANT CHANGE UP
CREAT SERPL-MCNC: 0.92 MG/DL — SIGNIFICANT CHANGE UP (ref 0.5–1.3)
DIFF PNL FLD: ABNORMAL
EOSINOPHIL # BLD AUTO: 0.1 K/UL — SIGNIFICANT CHANGE UP (ref 0–0.5)
EOSINOPHIL NFR BLD AUTO: 0.6 % — SIGNIFICANT CHANGE UP (ref 0–6)
EPI CELLS # UR: SIGNIFICANT CHANGE UP /HPF
GLUCOSE SERPL-MCNC: 108 MG/DL — HIGH (ref 70–99)
GLUCOSE UR QL: NEGATIVE — SIGNIFICANT CHANGE UP
HCT VFR BLD CALC: 38.1 % — LOW (ref 39–50)
HGB BLD-MCNC: 12.4 G/DL — LOW (ref 13–17)
INR BLD: 1.34 RATIO — HIGH (ref 0.88–1.16)
KETONES UR-MCNC: NEGATIVE — SIGNIFICANT CHANGE UP
LEUKOCYTE ESTERASE UR-ACNC: ABNORMAL
LYMPHOCYTES # BLD AUTO: 0.9 K/UL — LOW (ref 1–3.3)
LYMPHOCYTES # BLD AUTO: 8.5 % — LOW (ref 13–44)
MCHC RBC-ENTMCNC: 27.5 PG — SIGNIFICANT CHANGE UP (ref 27–34)
MCHC RBC-ENTMCNC: 32.4 GM/DL — SIGNIFICANT CHANGE UP (ref 32–36)
MCV RBC AUTO: 85 FL — SIGNIFICANT CHANGE UP (ref 80–100)
MONOCYTES # BLD AUTO: 1.2 K/UL — HIGH (ref 0–0.9)
MONOCYTES NFR BLD AUTO: 10.4 % — SIGNIFICANT CHANGE UP (ref 2–14)
NEUTROPHILS # BLD AUTO: 8.9 K/UL — HIGH (ref 1.8–7.4)
NEUTROPHILS NFR BLD AUTO: 80.3 % — HIGH (ref 43–77)
NITRITE UR-MCNC: NEGATIVE — SIGNIFICANT CHANGE UP
PH UR: 6 — SIGNIFICANT CHANGE UP (ref 4.8–8)
PLATELET # BLD AUTO: 261 K/UL — SIGNIFICANT CHANGE UP (ref 150–400)
POTASSIUM SERPL-MCNC: 3.8 MMOL/L — SIGNIFICANT CHANGE UP (ref 3.5–5.3)
POTASSIUM SERPL-SCNC: 3.8 MMOL/L — SIGNIFICANT CHANGE UP (ref 3.5–5.3)
PROT SERPL-MCNC: 6.7 G/DL — SIGNIFICANT CHANGE UP (ref 6–8.3)
PROT UR-MCNC: 30 MG/DL
PROTHROM AB SERPL-ACNC: 14.6 SEC — HIGH (ref 9.8–12.7)
RBC # BLD: 4.49 M/UL — SIGNIFICANT CHANGE UP (ref 4.2–5.8)
RBC # FLD: 14.8 % — HIGH (ref 10.3–14.5)
RBC CASTS # UR COMP ASSIST: SIGNIFICANT CHANGE UP /HPF (ref 0–2)
RH IG SCN BLD-IMP: POSITIVE — SIGNIFICANT CHANGE UP
SODIUM SERPL-SCNC: 137 MMOL/L — SIGNIFICANT CHANGE UP (ref 135–145)
SP GR SPEC: 1.02 — SIGNIFICANT CHANGE UP (ref 1.01–1.02)
UROBILINOGEN FLD QL: NEGATIVE — SIGNIFICANT CHANGE UP
WBC # BLD: 11.1 K/UL — HIGH (ref 3.8–10.5)
WBC # FLD AUTO: 11.1 K/UL — HIGH (ref 3.8–10.5)
WBC UR QL: SIGNIFICANT CHANGE UP /HPF (ref 0–5)

## 2017-04-15 PROCEDURE — 99285 EMERGENCY DEPT VISIT HI MDM: CPT

## 2017-04-15 PROCEDURE — 76870 US EXAM SCROTUM: CPT | Mod: 26

## 2017-04-15 PROCEDURE — 72193 CT PELVIS W/DYE: CPT | Mod: 26

## 2017-04-15 PROCEDURE — 93976 VASCULAR STUDY: CPT | Mod: 26

## 2017-04-15 RX ORDER — MORPHINE SULFATE 50 MG/1
2 CAPSULE, EXTENDED RELEASE ORAL EVERY 4 HOURS
Qty: 0 | Refills: 0 | Status: DISCONTINUED | OUTPATIENT
Start: 2017-04-15 | End: 2017-04-17

## 2017-04-15 RX ORDER — NYSTATIN CREAM 100000 [USP'U]/G
1 CREAM TOPICAL DAILY
Qty: 0 | Refills: 0 | Status: DISCONTINUED | OUTPATIENT
Start: 2017-04-15 | End: 2017-04-18

## 2017-04-15 RX ORDER — VANCOMYCIN HCL 1 G
VIAL (EA) INTRAVENOUS
Qty: 0 | Refills: 0 | Status: DISCONTINUED | OUTPATIENT
Start: 2017-04-16 | End: 2017-04-17

## 2017-04-15 RX ORDER — VANCOMYCIN HCL 1 G
1000 VIAL (EA) INTRAVENOUS ONCE
Qty: 0 | Refills: 0 | Status: COMPLETED | OUTPATIENT
Start: 2017-04-15 | End: 2017-04-16

## 2017-04-15 RX ORDER — ASPIRIN/CALCIUM CARB/MAGNESIUM 324 MG
81 TABLET ORAL DAILY
Qty: 0 | Refills: 0 | Status: DISCONTINUED | OUTPATIENT
Start: 2017-04-15 | End: 2017-04-18

## 2017-04-15 RX ORDER — PIPERACILLIN AND TAZOBACTAM 4; .5 G/20ML; G/20ML
3.38 INJECTION, POWDER, LYOPHILIZED, FOR SOLUTION INTRAVENOUS EVERY 8 HOURS
Qty: 0 | Refills: 0 | Status: DISCONTINUED | OUTPATIENT
Start: 2017-04-15 | End: 2017-04-17

## 2017-04-15 RX ORDER — AMLODIPINE BESYLATE 2.5 MG/1
5 TABLET ORAL DAILY
Qty: 0 | Refills: 0 | Status: DISCONTINUED | OUTPATIENT
Start: 2017-04-15 | End: 2017-04-18

## 2017-04-15 RX ORDER — PIPERACILLIN AND TAZOBACTAM 4; .5 G/20ML; G/20ML
3.38 INJECTION, POWDER, LYOPHILIZED, FOR SOLUTION INTRAVENOUS ONCE
Qty: 0 | Refills: 0 | Status: COMPLETED | OUTPATIENT
Start: 2017-04-15 | End: 2017-04-15

## 2017-04-15 RX ORDER — CITALOPRAM 10 MG/1
20 TABLET, FILM COATED ORAL DAILY
Qty: 0 | Refills: 0 | Status: DISCONTINUED | OUTPATIENT
Start: 2017-04-15 | End: 2017-04-18

## 2017-04-15 RX ORDER — DEXTROSE MONOHYDRATE, SODIUM CHLORIDE, AND POTASSIUM CHLORIDE 50; .745; 4.5 G/1000ML; G/1000ML; G/1000ML
1000 INJECTION, SOLUTION INTRAVENOUS
Qty: 0 | Refills: 0 | Status: DISCONTINUED | OUTPATIENT
Start: 2017-04-15 | End: 2017-04-16

## 2017-04-15 RX ORDER — VANCOMYCIN HCL 1 G
1000 VIAL (EA) INTRAVENOUS EVERY 12 HOURS
Qty: 0 | Refills: 0 | Status: DISCONTINUED | OUTPATIENT
Start: 2017-04-16 | End: 2017-04-17

## 2017-04-15 RX ORDER — ATORVASTATIN CALCIUM 80 MG/1
20 TABLET, FILM COATED ORAL AT BEDTIME
Qty: 0 | Refills: 0 | Status: DISCONTINUED | OUTPATIENT
Start: 2017-04-15 | End: 2017-04-18

## 2017-04-15 RX ORDER — LATANOPROST 0.05 MG/ML
1 SOLUTION/ DROPS OPHTHALMIC; TOPICAL AT BEDTIME
Qty: 0 | Refills: 0 | Status: DISCONTINUED | OUTPATIENT
Start: 2017-04-15 | End: 2017-04-18

## 2017-04-15 RX ORDER — TIOTROPIUM BROMIDE 18 UG/1
1 CAPSULE ORAL; RESPIRATORY (INHALATION) DAILY
Qty: 0 | Refills: 0 | Status: DISCONTINUED | OUTPATIENT
Start: 2017-04-15 | End: 2017-04-18

## 2017-04-15 RX ORDER — MORPHINE SULFATE 50 MG/1
4 CAPSULE, EXTENDED RELEASE ORAL EVERY 4 HOURS
Qty: 0 | Refills: 0 | Status: DISCONTINUED | OUTPATIENT
Start: 2017-04-15 | End: 2017-04-17

## 2017-04-15 RX ORDER — HEPARIN SODIUM 5000 [USP'U]/ML
5000 INJECTION INTRAVENOUS; SUBCUTANEOUS EVERY 8 HOURS
Qty: 0 | Refills: 0 | Status: DISCONTINUED | OUTPATIENT
Start: 2017-04-15 | End: 2017-04-18

## 2017-04-15 RX ORDER — ACETAMINOPHEN 500 MG
1000 TABLET ORAL ONCE
Qty: 0 | Refills: 0 | Status: COMPLETED | OUTPATIENT
Start: 2017-04-15 | End: 2017-04-15

## 2017-04-15 RX ORDER — BUDESONIDE AND FORMOTEROL FUMARATE DIHYDRATE 160; 4.5 UG/1; UG/1
2 AEROSOL RESPIRATORY (INHALATION)
Qty: 0 | Refills: 0 | Status: DISCONTINUED | OUTPATIENT
Start: 2017-04-15 | End: 2017-04-18

## 2017-04-15 RX ORDER — FAMOTIDINE 10 MG/ML
20 INJECTION INTRAVENOUS DAILY
Qty: 0 | Refills: 0 | Status: DISCONTINUED | OUTPATIENT
Start: 2017-04-15 | End: 2017-04-18

## 2017-04-15 RX ORDER — TAMSULOSIN HYDROCHLORIDE 0.4 MG/1
0.4 CAPSULE ORAL AT BEDTIME
Qty: 0 | Refills: 0 | Status: DISCONTINUED | OUTPATIENT
Start: 2017-04-15 | End: 2017-04-18

## 2017-04-15 RX ORDER — SODIUM CHLORIDE 9 MG/ML
1000 INJECTION INTRAMUSCULAR; INTRAVENOUS; SUBCUTANEOUS ONCE
Qty: 0 | Refills: 0 | Status: COMPLETED | OUTPATIENT
Start: 2017-04-15 | End: 2017-04-15

## 2017-04-15 RX ADMIN — PIPERACILLIN AND TAZOBACTAM 25 GRAM(S): 4; .5 INJECTION, POWDER, LYOPHILIZED, FOR SOLUTION INTRAVENOUS at 22:12

## 2017-04-15 RX ADMIN — Medication 1000 MILLIGRAM(S): at 12:10

## 2017-04-15 RX ADMIN — LATANOPROST 1 DROP(S): 0.05 SOLUTION/ DROPS OPHTHALMIC; TOPICAL at 22:26

## 2017-04-15 RX ADMIN — TAMSULOSIN HYDROCHLORIDE 0.4 MILLIGRAM(S): 0.4 CAPSULE ORAL at 22:11

## 2017-04-15 RX ADMIN — ATORVASTATIN CALCIUM 20 MILLIGRAM(S): 80 TABLET, FILM COATED ORAL at 22:11

## 2017-04-15 RX ADMIN — PIPERACILLIN AND TAZOBACTAM 200 GRAM(S): 4; .5 INJECTION, POWDER, LYOPHILIZED, FOR SOLUTION INTRAVENOUS at 11:42

## 2017-04-15 RX ADMIN — HEPARIN SODIUM 5000 UNIT(S): 5000 INJECTION INTRAVENOUS; SUBCUTANEOUS at 22:12

## 2017-04-15 RX ADMIN — DEXTROSE MONOHYDRATE, SODIUM CHLORIDE, AND POTASSIUM CHLORIDE 40 MILLILITER(S): 50; .745; 4.5 INJECTION, SOLUTION INTRAVENOUS at 22:12

## 2017-04-15 RX ADMIN — Medication 400 MILLIGRAM(S): at 11:41

## 2017-04-15 RX ADMIN — SODIUM CHLORIDE 1000 MILLILITER(S): 9 INJECTION INTRAMUSCULAR; INTRAVENOUS; SUBCUTANEOUS at 11:41

## 2017-04-15 NOTE — ED PROVIDER NOTE - MEDICAL DECISION MAKING DETAILS
howard - denilson ing hernia repair complicated by post op admission for infxn ? uti - now with pain in test - no change in hydrocele -ct as outpt with inguinal abscess - iv abx - test us and surg consult - needs admission

## 2017-04-15 NOTE — H&P ADULT. - ASSESSMENT
76 year old male with scrotal swelling and tenderness s/p BL inguinal hernia repair.  Now with fluid collections.

## 2017-04-15 NOTE — H&P ADULT. - PMH
BPH (benign prostatic hyperplasia)    CAD (coronary artery disease)    COPD (chronic obstructive pulmonary disease)    Glaucoma    HLD (hyperlipidemia)    HTN (hypertension)

## 2017-04-15 NOTE — H&P ADULT. - HISTORY OF PRESENT ILLNESS
Rajan is an 81 y/o male here for first post op visit. 3/30/17- ambulatory OR at [facility]- bilateral inguinal hernia repairs with medium oval Kugel patch x2 and drainage of left hydrocele. RnzacLzakrih672Unk ClpksDieybuw139Rjsja WrnwmOcyuyts518Jjb NqkcwFxbtbrh625Sloyq JixnyErpekcy911Ied HPI:[id number]   [id number]   VyiekRocbqvr463Ixrwf DaeztSmzztwo729Vgj IodjbJkmnvpo575Bnihd   Interval History: Status post bilateral inguinal hernia repairs and drainage of left hydrocele on 3/30/17. 2 days later developed extreme weakness and "collapsed" at home. Went to [facility] and was noted to have white blood count over 20,000 and evidence of UTI. CT of abdomen and pelvis showed postoperative changes in both groins and the left scrotum and some thickening of the splenic flexure and rectal wall, possibly consistent with colitis. Was treated with Cipro and Flagyl and discharged 2 days later with white count down to 13,000. Continues to feel weak and had 2 episodes of diarrhea    Presenting today, his weakness persists and his scrotum is Rajan is an 83 y/o male here for first post op visit. 3/30/17- ambulatory OR at [facility]- bilateral inguinal hernia repairs with medium oval Kugel patch x2 and drainage of left hydrocele. YejdkExrpdng494Kkw FhlubXpcwhdi484Onhom XcbhmWmywkgr091Ryk RcvqhUwyemgs771Etdaa JfjxfOziubns310Awp HPI:[id number]   [id number]   EnufhPthshej571Pjdlk OfrtzMjmhjgn423Aew KsonwJeowkeg724Mdyxx   Interval History: Status post bilateral inguinal hernia repairs and drainage of left hydrocele on 3/30/17. 2 days later developed extreme weakness and "collapsed" at home. Went to [facility] and was noted to have white blood count over 20,000 and evidence of UTI. CT of abdomen and pelvis showed postoperative changes in both groins and the left scrotum and some thickening of the splenic flexure and rectal wall, possibly consistent with colitis. Was treated with Cipro and Flagyl and discharged 2 days later with white count down to 13,000. Continues to feel weak and had 2 episodes of diarrhea    Patient has not felt well for approximately 2 weeks.  He was seen in the office by Dr. Meade for his symptoms and was sent for outpatient CT which revealed a large fluid collection in the left inguinal canal and scrotum, possibly involving the testicles.  Presenting today, his weakness persists and his scrotum is erythematous, swollen and tender, however, he and his wife believe he is getting better overall.  His scrotal symptoms are associated with some nausea and anorexia.      IR consult - Dr. Robert rodriguez who requested repeat CT pelvis and scrotal ultrasound which are in process.  Urology consulted (Dr. Bennett) - On standby if patient requires operative intervention.  Cardiology consult Dr Chu covering Dr. Brush who agreed to hold Brilinta and continue ASA in preparation for IR/operative procedure

## 2017-04-15 NOTE — ED ADULT NURSE NOTE - OBJECTIVE STATEMENT
Patient   is alert  and  oriented   x3.  Color is  good   and  skin warm  to  touch.   Redness  and  swelling  noted  to  inguinal  area.  Patient  has  been  afebrile.

## 2017-04-15 NOTE — H&P ADULT. - PROBLEM SELECTOR PLAN 1
1. Admit to surgery, [facility] team, Dr. LUIS F Meade  2. NPO/IVF  3. IV abx - Vanoc, zosyn (in agreement with urology)  4. Urology consult - on standby if debridement is necessary  5. IR consult - to review imaging , repeat CT and US  6. Cardiology consult - Hold Pedro  7. Seen with chief resident  8. D/W on call attending

## 2017-04-15 NOTE — H&P ADULT. - GENITOURINARY COMMENTS
swelling, tender scrotum, no difficulty urinating enlarged, tense, tender scrotum with cellulitis over lying and on groin.  Swollen left groin.  BL incisions clean dry and intact.

## 2017-04-15 NOTE — ED PROVIDER NOTE - PHYSICAL EXAMINATION
howard aaox3 nad ncat perrl no scleral icterus - abd soft nt hernia site cdi large scrotal swelling ertherma ttp excoriated skin cn2-12 intact

## 2017-04-16 LAB
ANION GAP SERPL CALC-SCNC: 14 MMOL/L — SIGNIFICANT CHANGE UP (ref 5–17)
APTT BLD: 31.6 SEC — SIGNIFICANT CHANGE UP (ref 27.5–37.4)
BUN SERPL-MCNC: 8 MG/DL — SIGNIFICANT CHANGE UP (ref 7–23)
CALCIUM SERPL-MCNC: 8.8 MG/DL — SIGNIFICANT CHANGE UP (ref 8.4–10.5)
CHLORIDE SERPL-SCNC: 99 MMOL/L — SIGNIFICANT CHANGE UP (ref 96–108)
CO2 SERPL-SCNC: 22 MMOL/L — SIGNIFICANT CHANGE UP (ref 22–31)
CREAT SERPL-MCNC: 0.96 MG/DL — SIGNIFICANT CHANGE UP (ref 0.5–1.3)
GLUCOSE SERPL-MCNC: 100 MG/DL — HIGH (ref 70–99)
GRAM STN FLD: SIGNIFICANT CHANGE UP
HCT VFR BLD CALC: 38.6 % — LOW (ref 39–50)
HGB BLD-MCNC: 12.5 G/DL — LOW (ref 13–17)
INR BLD: 1.24 RATIO — HIGH (ref 0.88–1.16)
MAGNESIUM SERPL-MCNC: 1.9 MG/DL — SIGNIFICANT CHANGE UP (ref 1.6–2.6)
MCHC RBC-ENTMCNC: 27.2 PG — SIGNIFICANT CHANGE UP (ref 27–34)
MCHC RBC-ENTMCNC: 32.4 GM/DL — SIGNIFICANT CHANGE UP (ref 32–36)
MCV RBC AUTO: 83.9 FL — SIGNIFICANT CHANGE UP (ref 80–100)
PHOSPHATE SERPL-MCNC: 3.8 MG/DL — SIGNIFICANT CHANGE UP (ref 2.5–4.5)
PLATELET # BLD AUTO: 303 K/UL — SIGNIFICANT CHANGE UP (ref 150–400)
POTASSIUM SERPL-MCNC: 4.1 MMOL/L — SIGNIFICANT CHANGE UP (ref 3.5–5.3)
POTASSIUM SERPL-SCNC: 4.1 MMOL/L — SIGNIFICANT CHANGE UP (ref 3.5–5.3)
PROTHROM AB SERPL-ACNC: 14.1 SEC — HIGH (ref 10–13.1)
RBC # BLD: 4.6 M/UL — SIGNIFICANT CHANGE UP (ref 4.2–5.8)
RBC # FLD: 16.6 % — HIGH (ref 10.3–14.5)
SODIUM SERPL-SCNC: 135 MMOL/L — SIGNIFICANT CHANGE UP (ref 135–145)
SPECIMEN SOURCE: SIGNIFICANT CHANGE UP
WBC # BLD: 10.56 K/UL — HIGH (ref 3.8–10.5)
WBC # FLD AUTO: 10.56 K/UL — HIGH (ref 3.8–10.5)

## 2017-04-16 PROCEDURE — 10030 IMG GID FLU COLL DRG SFT TIS: CPT

## 2017-04-16 RX ORDER — HYDROMORPHONE HYDROCHLORIDE 2 MG/ML
0.25 INJECTION INTRAMUSCULAR; INTRAVENOUS; SUBCUTANEOUS
Qty: 0 | Refills: 0 | Status: DISCONTINUED | OUTPATIENT
Start: 2017-04-16 | End: 2017-04-17

## 2017-04-16 RX ORDER — ONDANSETRON 8 MG/1
4 TABLET, FILM COATED ORAL ONCE
Qty: 0 | Refills: 0 | Status: DISCONTINUED | OUTPATIENT
Start: 2017-04-16 | End: 2017-04-17

## 2017-04-16 RX ADMIN — FAMOTIDINE 20 MILLIGRAM(S): 10 INJECTION INTRAVENOUS at 13:08

## 2017-04-16 RX ADMIN — ATORVASTATIN CALCIUM 20 MILLIGRAM(S): 80 TABLET, FILM COATED ORAL at 21:54

## 2017-04-16 RX ADMIN — PIPERACILLIN AND TAZOBACTAM 25 GRAM(S): 4; .5 INJECTION, POWDER, LYOPHILIZED, FOR SOLUTION INTRAVENOUS at 14:34

## 2017-04-16 RX ADMIN — Medication 250 MILLIGRAM(S): at 12:49

## 2017-04-16 RX ADMIN — TIOTROPIUM BROMIDE 1 CAPSULE(S): 18 CAPSULE ORAL; RESPIRATORY (INHALATION) at 13:09

## 2017-04-16 RX ADMIN — CITALOPRAM 20 MILLIGRAM(S): 10 TABLET, FILM COATED ORAL at 13:08

## 2017-04-16 RX ADMIN — MORPHINE SULFATE 2 MILLIGRAM(S): 50 CAPSULE, EXTENDED RELEASE ORAL at 02:04

## 2017-04-16 RX ADMIN — MORPHINE SULFATE 2 MILLIGRAM(S): 50 CAPSULE, EXTENDED RELEASE ORAL at 02:52

## 2017-04-16 RX ADMIN — HEPARIN SODIUM 5000 UNIT(S): 5000 INJECTION INTRAVENOUS; SUBCUTANEOUS at 21:54

## 2017-04-16 RX ADMIN — TAMSULOSIN HYDROCHLORIDE 0.4 MILLIGRAM(S): 0.4 CAPSULE ORAL at 21:55

## 2017-04-16 RX ADMIN — LATANOPROST 1 DROP(S): 0.05 SOLUTION/ DROPS OPHTHALMIC; TOPICAL at 21:54

## 2017-04-16 RX ADMIN — Medication 250 MILLIGRAM(S): at 00:00

## 2017-04-16 RX ADMIN — PIPERACILLIN AND TAZOBACTAM 25 GRAM(S): 4; .5 INJECTION, POWDER, LYOPHILIZED, FOR SOLUTION INTRAVENOUS at 05:36

## 2017-04-16 RX ADMIN — BUDESONIDE AND FORMOTEROL FUMARATE DIHYDRATE 2 PUFF(S): 160; 4.5 AEROSOL RESPIRATORY (INHALATION) at 05:36

## 2017-04-16 RX ADMIN — HEPARIN SODIUM 5000 UNIT(S): 5000 INJECTION INTRAVENOUS; SUBCUTANEOUS at 16:29

## 2017-04-16 RX ADMIN — HEPARIN SODIUM 5000 UNIT(S): 5000 INJECTION INTRAVENOUS; SUBCUTANEOUS at 05:36

## 2017-04-16 RX ADMIN — NYSTATIN CREAM 1 APPLICATION(S): 100000 CREAM TOPICAL at 13:08

## 2017-04-16 RX ADMIN — Medication 81 MILLIGRAM(S): at 21:55

## 2017-04-16 RX ADMIN — Medication 250 MILLIGRAM(S): at 23:40

## 2017-04-16 RX ADMIN — PIPERACILLIN AND TAZOBACTAM 25 GRAM(S): 4; .5 INJECTION, POWDER, LYOPHILIZED, FOR SOLUTION INTRAVENOUS at 21:55

## 2017-04-16 RX ADMIN — Medication 10 MILLIGRAM(S): at 05:36

## 2017-04-16 RX ADMIN — AMLODIPINE BESYLATE 5 MILLIGRAM(S): 2.5 TABLET ORAL at 05:36

## 2017-04-17 LAB
ANION GAP SERPL CALC-SCNC: 12 MMOL/L — SIGNIFICANT CHANGE UP (ref 5–17)
BUN SERPL-MCNC: 5 MG/DL — LOW (ref 7–23)
CALCIUM SERPL-MCNC: 8 MG/DL — LOW (ref 8.4–10.5)
CHLORIDE SERPL-SCNC: 99 MMOL/L — SIGNIFICANT CHANGE UP (ref 96–108)
CO2 SERPL-SCNC: 22 MMOL/L — SIGNIFICANT CHANGE UP (ref 22–31)
CREAT SERPL-MCNC: 0.95 MG/DL — SIGNIFICANT CHANGE UP (ref 0.5–1.3)
CULTURE RESULTS: SIGNIFICANT CHANGE UP
GLUCOSE SERPL-MCNC: 95 MG/DL — SIGNIFICANT CHANGE UP (ref 70–99)
HCT VFR BLD CALC: 35.5 % — LOW (ref 39–50)
HGB BLD-MCNC: 11.3 G/DL — LOW (ref 13–17)
MAGNESIUM SERPL-MCNC: 1.7 MG/DL — SIGNIFICANT CHANGE UP (ref 1.6–2.6)
MCHC RBC-ENTMCNC: 26.3 PG — LOW (ref 27–34)
MCHC RBC-ENTMCNC: 31.8 GM/DL — LOW (ref 32–36)
MCV RBC AUTO: 82.8 FL — SIGNIFICANT CHANGE UP (ref 80–100)
PHOSPHATE SERPL-MCNC: 3 MG/DL — SIGNIFICANT CHANGE UP (ref 2.5–4.5)
PLATELET # BLD AUTO: 290 K/UL — SIGNIFICANT CHANGE UP (ref 150–400)
POTASSIUM SERPL-MCNC: 4 MMOL/L — SIGNIFICANT CHANGE UP (ref 3.5–5.3)
POTASSIUM SERPL-SCNC: 4 MMOL/L — SIGNIFICANT CHANGE UP (ref 3.5–5.3)
RBC # BLD: 4.29 M/UL — SIGNIFICANT CHANGE UP (ref 4.2–5.8)
RBC # FLD: 16.6 % — HIGH (ref 10.3–14.5)
SODIUM SERPL-SCNC: 133 MMOL/L — LOW (ref 135–145)
SPECIMEN SOURCE: SIGNIFICANT CHANGE UP
WBC # BLD: 11.51 K/UL — HIGH (ref 3.8–10.5)
WBC # FLD AUTO: 11.51 K/UL — HIGH (ref 3.8–10.5)

## 2017-04-17 RX ORDER — MAGNESIUM SULFATE 500 MG/ML
2 VIAL (ML) INJECTION ONCE
Qty: 0 | Refills: 0 | Status: COMPLETED | OUTPATIENT
Start: 2017-04-17 | End: 2017-04-17

## 2017-04-17 RX ORDER — TICAGRELOR 90 MG/1
90 TABLET ORAL
Qty: 0 | Refills: 0 | Status: DISCONTINUED | OUTPATIENT
Start: 2017-04-17 | End: 2017-04-18

## 2017-04-17 RX ADMIN — HEPARIN SODIUM 5000 UNIT(S): 5000 INJECTION INTRAVENOUS; SUBCUTANEOUS at 05:59

## 2017-04-17 RX ADMIN — CITALOPRAM 20 MILLIGRAM(S): 10 TABLET, FILM COATED ORAL at 13:10

## 2017-04-17 RX ADMIN — TICAGRELOR 90 MILLIGRAM(S): 90 TABLET ORAL at 20:09

## 2017-04-17 RX ADMIN — NYSTATIN CREAM 1 APPLICATION(S): 100000 CREAM TOPICAL at 13:10

## 2017-04-17 RX ADMIN — Medication 50 GRAM(S): at 10:47

## 2017-04-17 RX ADMIN — BUDESONIDE AND FORMOTEROL FUMARATE DIHYDRATE 2 PUFF(S): 160; 4.5 AEROSOL RESPIRATORY (INHALATION) at 06:00

## 2017-04-17 RX ADMIN — HEPARIN SODIUM 5000 UNIT(S): 5000 INJECTION INTRAVENOUS; SUBCUTANEOUS at 15:56

## 2017-04-17 RX ADMIN — LATANOPROST 1 DROP(S): 0.05 SOLUTION/ DROPS OPHTHALMIC; TOPICAL at 21:09

## 2017-04-17 RX ADMIN — HEPARIN SODIUM 5000 UNIT(S): 5000 INJECTION INTRAVENOUS; SUBCUTANEOUS at 21:09

## 2017-04-17 RX ADMIN — ATORVASTATIN CALCIUM 20 MILLIGRAM(S): 80 TABLET, FILM COATED ORAL at 21:09

## 2017-04-17 RX ADMIN — TAMSULOSIN HYDROCHLORIDE 0.4 MILLIGRAM(S): 0.4 CAPSULE ORAL at 21:09

## 2017-04-17 RX ADMIN — Medication 81 MILLIGRAM(S): at 20:09

## 2017-04-17 RX ADMIN — FAMOTIDINE 20 MILLIGRAM(S): 10 INJECTION INTRAVENOUS at 13:10

## 2017-04-17 RX ADMIN — PIPERACILLIN AND TAZOBACTAM 25 GRAM(S): 4; .5 INJECTION, POWDER, LYOPHILIZED, FOR SOLUTION INTRAVENOUS at 06:00

## 2017-04-17 NOTE — DISCHARGE NOTE ADULT - MEDICATION SUMMARY - MEDICATIONS TO TAKE
I will START or STAY ON the medications listed below when I get home from the hospital:    aspirin 81 mg oral tablet  -- 1 tab(s) by mouth once a day  -- Indication: For CAD (coronary artery disease)    enalapril 10 mg oral tablet  -- 1 tab(s) by mouth once a day  -- Indication: For HTN (hypertension)    tamsulosin 0.4 mg oral capsule  -- 1 cap(s) by mouth once a day  -- Indication: For BPH (benign prostatic hyperplasia)    citalopram 20 mg oral tablet  -- 1 tab(s) by mouth once a day  -- Indication: For Home med    atorvastatin 20 mg oral tablet  -- 1 tab(s) by mouth once a day  -- Indication: For HLD (hyperlipidemia)    Brilinta (ticagrelor) 90 mg oral tablet  -- 1 tab(s) by mouth 2 times a day  -- Indication: For CAD (coronary artery disease)    Breo Ellipta 100 mcg-25 mcg/inh inhalation powder  -- 1 puff(s) inhaled once a day  -- Indication: For COPD (chronic obstructive pulmonary disease)    Spiriva 18 mcg inhalation capsule  -- 1 cap(s) inhaled once a day  -- Indication: For COPD (chronic obstructive pulmonary disease)    amLODIPine 5 mg oral tablet  -- 1 tab(s) by mouth once a day  -- Indication: For HTN (hypertension)    famotidine 20 mg oral tablet  -- 1  by mouth once a day  -- Indication: For GERD    CoQ10 300 mg oral capsule  -- 1 cap(s) by mouth once a day  -- Indication: For Home med    latanoprost 0.005% ophthalmic solution  -- 1 drop(s) to each affected eye once a day (in the evening)  -- Indication: For Eyes

## 2017-04-17 NOTE — PHYSICAL THERAPY INITIAL EVALUATION ADULT - PERTINENT HX OF CURRENT PROBLEM, REHAB EVAL
76 year old male with scrotal swelling and tenderness s/p BL inguinal hernia repair.  Now with fluid collections. 76 year old male with scrotal swelling and tenderness s/p BL inguinal hernia repair.  Now with fluid collections. Pt s/p drainage of scrotal abscess 4/16. CT pelvis (4/15): large fluid collection L scrotum. US testicles (4/15): B testicular cysts, B scrotal fluid collections.

## 2017-04-17 NOTE — PHYSICAL THERAPY INITIAL EVALUATION ADULT - PLANNED THERAPY INTERVENTIONS, PT EVAL
gait training/postural re-education/strengthening/transfer training/balance training/bed mobility training

## 2017-04-17 NOTE — DISCHARGE NOTE ADULT - CARE PROVIDERS DIRECT ADDRESSES
,[email or web address]

## 2017-04-17 NOTE — DISCHARGE NOTE ADULT - PLAN OF CARE
Return to previous level of functioning Diet:  Regular Diet  Activity:  No heavy lifting or strenuous activity until re-evaluated at follow-up appointment.  Otherwise you may resume previous level of activity as tolerated.   Bathing:  You may shower.  Do not submerge drain incision site underwater or scrub.  Pat drain insertion site to dry.    Follow-up:    1. Please follow-up with Dr. Meade in 1 week.  Please call his office at to make an appointment     Please notify your surgeon immediately or return to [facility] emergency department if you have: 1. fever (over 100.4) or chills 2. pain not controlled by oral pain medication 3. Persistent vomiting or diarrhea 4. inability to tolerate oral intake. Diet:  Regular Diet  Activity:  No heavy lifting or strenuous activity until re-evaluated at follow-up appointment.  Otherwise you may resume previous level of activity as tolerated.   Bathing:  You may shower.  Do not submerge drain incision site underwater or scrub.  Pat drain insertion site to dry.    Follow-up:    1. Please follow-up with Dr. Meade in 1 week.  Please call his office at to make an appointment.  2. Please follow-up with Interventional Radiology regarding your drain.  Please call [phone] as soon as you get home to schedule an appointment.      Please notify your surgeon immediately or return to [facility] emergency department if you have: 1. fever (over 100.4) or chills 2. pain not controlled by oral pain medication 3. Persistent vomiting or diarrhea 4. inability to tolerate oral intake. Diet:  Regular Diet  Activity:  No heavy lifting or strenuous activity until re-evaluated at follow-up appointment.  Otherwise you may resume previous level of activity as tolerated.   Bathing:  You may shower.  Do not submerge drain incision site underwater or scrub.  Pat drain insertion site to dry.  Special instructions:  Please apply over the counter medication Tolnaftate (Brand name-Tinactin) Powder to area of skin breakdown on right inner thigh 2 times daily for 2 weeks.       Follow-up:    1. Please follow-up with Dr. Meade in 1 week.  Please call his office at to make an appointment.  2. Please follow-up with Interventional Radiology regarding your drain.  Please call [phone] as soon as you get home to schedule an appointment.      Please notify your surgeon immediately or return to [facility] emergency department if you have: 1. fever (over 100.4) or chills 2. pain not controlled by oral pain medication 3. Persistent vomiting or diarrhea 4. inability to tolerate oral intake. Diet:  Regular Diet  Activity:  No heavy lifting or strenuous activity until re-evaluated at follow-up appointment.  Otherwise you may resume previous level of activity as tolerated.   Bathing:  You may shower.  Do not submerge drain incision site underwater or scrub.  Pat drain insertion site to dry.  Special instructions:  Please apply over the counter medication Tolnaftate (Brand name-Tinactin) Powder to area of skin breakdown on right inner thigh 2 times daily for 2 weeks.     Pain control:  Patient has Percocet as recently prescribed by Dr. Meade.  Patient may take as directed on bottle for 3-4 days PRN. If patient NOT taking Percocet, patient may take Tylenol (not to exceed 3,000mg/day) for mild pain.  Patient was instructed to NOT take Tylenol if taking Percocet.    Follow-up:    1. Please follow-up with Dr. Meade in 1 week.  Please call his office at to make an appointment.  2. Please follow-up with Interventional Radiology regarding your drain.  Please call [phone] as soon as you get home to schedule an appointment.      Please notify your surgeon immediately or return to [facility] emergency department if you have: 1. fever (over 100.4) or chills 2. pain not controlled by oral pain medication 3. Persistent vomiting or diarrhea 4. inability to tolerate oral intake. Diet:  Regular Diet  Activity:  No heavy lifting or strenuous activity until re-evaluated at follow-up appointment.  Otherwise you may resume previous level of activity as tolerated.   Bathing:  You may shower.  Do not submerge drain incision site underwater or scrub.  Pat drain insertion site to dry.  Special instructions:  Please apply over the counter medication Tolnaftate (Brand name-Tinactin) Powder to area of skin breakdown on right inner thigh 2 times daily for 2 weeks.     Pain control:  Patient has Percocet as recently prescribed by Dr. Meade.  Patient may take as directed on bottle for 3-4 days PRN. If patient NOT taking Percocet, patient may take Tylenol (not to exceed 3,000mg/day) for mild pain.  Patient was instructed to NOT take Tylenol if taking Percocet.    Follow-up:    1. Please follow-up with Dr. Meade in 1 week.  Please call his office to make an appointment.  2. Please follow-up with Dr. Jones an Interventional Radiology regarding your drain.  Please call [phone] as soon as you get home to schedule an appointment.      Please notify your surgeon immediately or return to [facility] emergency department if you have: 1. fever (over 100.4) or chills 2. pain not controlled by oral pain medication 3. Persistent vomiting or diarrhea 4. inability to tolerate oral intake.

## 2017-04-17 NOTE — PHYSICAL THERAPY INITIAL EVALUATION ADULT - IMPAIRMENTS FOUND, PT EVAL
muscle strength/gait, locomotion, and balance/poor safety awareness/cognitive impairment/aerobic capacity/endurance

## 2017-04-17 NOTE — DISCHARGE NOTE ADULT - CARE PLAN
Principal Discharge DX:	Fluid collection at surgical site  Goal:	Return to previous level of functioning  Instructions for follow-up, activity and diet:	Diet:  Regular Diet  Activity:  No heavy lifting or strenuous activity until re-evaluated at follow-up appointment.  Otherwise you may resume previous level of activity as tolerated.   Bathing:  You may shower.  Do not submerge drain incision site underwater or scrub.  Pat drain insertion site to dry.    Follow-up:    1. Please follow-up with Dr. Meade in 1 week.  Please call his office at to make an appointment     Please notify your surgeon immediately or return to [facility] emergency department if you have: 1. fever (over 100.4) or chills 2. pain not controlled by oral pain medication 3. Persistent vomiting or diarrhea 4. inability to tolerate oral intake. Principal Discharge DX:	Fluid collection at surgical site  Goal:	Return to previous level of functioning  Instructions for follow-up, activity and diet:	Diet:  Regular Diet  Activity:  No heavy lifting or strenuous activity until re-evaluated at follow-up appointment.  Otherwise you may resume previous level of activity as tolerated.   Bathing:  You may shower.  Do not submerge drain incision site underwater or scrub.  Pat drain insertion site to dry.    Follow-up:    1. Please follow-up with Dr. Meade in 1 week.  Please call his office at to make an appointment     Please notify your surgeon immediately or return to [facility] emergency department if you have: 1. fever (over 100.4) or chills 2. pain not controlled by oral pain medication 3. Persistent vomiting or diarrhea 4. inability to tolerate oral intake. Principal Discharge DX:	Fluid collection at surgical site  Goal:	Return to previous level of functioning  Instructions for follow-up, activity and diet:	Diet:  Regular Diet  Activity:  No heavy lifting or strenuous activity until re-evaluated at follow-up appointment.  Otherwise you may resume previous level of activity as tolerated.   Bathing:  You may shower.  Do not submerge drain incision site underwater or scrub.  Pat drain insertion site to dry.    Follow-up:    1. Please follow-up with Dr. Meade in 1 week.  Please call his office at to make an appointment.  2. Please follow-up with Interventional Radiology regarding your drain.  Please call [phone] as soon as you get home to schedule an appointment.      Please notify your surgeon immediately or return to [facility] emergency department if you have: 1. fever (over 100.4) or chills 2. pain not controlled by oral pain medication 3. Persistent vomiting or diarrhea 4. inability to tolerate oral intake. Principal Discharge DX:	Fluid collection at surgical site  Goal:	Return to previous level of functioning  Instructions for follow-up, activity and diet:	Diet:  Regular Diet  Activity:  No heavy lifting or strenuous activity until re-evaluated at follow-up appointment.  Otherwise you may resume previous level of activity as tolerated.   Bathing:  You may shower.  Do not submerge drain incision site underwater or scrub.  Pat drain insertion site to dry.  Special instructions:  Please apply over the counter medication Tolnaftate (Brand name-Tinactin) Powder to area of skin breakdown on right inner thigh 2 times daily for 2 weeks.       Follow-up:    1. Please follow-up with Dr. Meade in 1 week.  Please call his office at to make an appointment.  2. Please follow-up with Interventional Radiology regarding your drain.  Please call [phone] as soon as you get home to schedule an appointment.      Please notify your surgeon immediately or return to [facility] emergency department if you have: 1. fever (over 100.4) or chills 2. pain not controlled by oral pain medication 3. Persistent vomiting or diarrhea 4. inability to tolerate oral intake. Principal Discharge DX:	Fluid collection at surgical site  Goal:	Return to previous level of functioning  Instructions for follow-up, activity and diet:	Diet:  Regular Diet  Activity:  No heavy lifting or strenuous activity until re-evaluated at follow-up appointment.  Otherwise you may resume previous level of activity as tolerated.   Bathing:  You may shower.  Do not submerge drain incision site underwater or scrub.  Pat drain insertion site to dry.  Special instructions:  Please apply over the counter medication Tolnaftate (Brand name-Tinactin) Powder to area of skin breakdown on right inner thigh 2 times daily for 2 weeks.     Pain control:  Patient has Percocet as recently prescribed by Dr. Meade.  Patient may take as directed on bottle for 3-4 days PRN. If patient NOT taking Percocet, patient may take Tylenol (not to exceed 3,000mg/day) for mild pain.  Patient was instructed to NOT take Tylenol if taking Percocet.    Follow-up:    1. Please follow-up with Dr. Meade in 1 week.  Please call his office at to make an appointment.  2. Please follow-up with Interventional Radiology regarding your drain.  Please call [phone] as soon as you get home to schedule an appointment.      Please notify your surgeon immediately or return to [facility] emergency department if you have: 1. fever (over 100.4) or chills 2. pain not controlled by oral pain medication 3. Persistent vomiting or diarrhea 4. inability to tolerate oral intake. Principal Discharge DX:	Fluid collection at surgical site  Goal:	Return to previous level of functioning  Instructions for follow-up, activity and diet:	Diet:  Regular Diet  Activity:  No heavy lifting or strenuous activity until re-evaluated at follow-up appointment.  Otherwise you may resume previous level of activity as tolerated.   Bathing:  You may shower.  Do not submerge drain incision site underwater or scrub.  Pat drain insertion site to dry.  Special instructions:  Please apply over the counter medication Tolnaftate (Brand name-Tinactin) Powder to area of skin breakdown on right inner thigh 2 times daily for 2 weeks.     Pain control:  Patient has Percocet as recently prescribed by Dr. Meade.  Patient may take as directed on bottle for 3-4 days PRN. If patient NOT taking Percocet, patient may take Tylenol (not to exceed 3,000mg/day) for mild pain.  Patient was instructed to NOT take Tylenol if taking Percocet.    Follow-up:    1. Please follow-up with Dr. Meade in 1 week.  Please call his office at to make an appointment.  2. Please follow-up with Interventional Radiology regarding your drain.  Please call [phone] as soon as you get home to schedule an appointment.      Please notify your surgeon immediately or return to [facility] emergency department if you have: 1. fever (over 100.4) or chills 2. pain not controlled by oral pain medication 3. Persistent vomiting or diarrhea 4. inability to tolerate oral intake. Principal Discharge DX:	Fluid collection at surgical site  Goal:	Return to previous level of functioning  Instructions for follow-up, activity and diet:	Diet:  Regular Diet  Activity:  No heavy lifting or strenuous activity until re-evaluated at follow-up appointment.  Otherwise you may resume previous level of activity as tolerated.   Bathing:  You may shower.  Do not submerge drain incision site underwater or scrub.  Pat drain insertion site to dry.  Special instructions:  Please apply over the counter medication Tolnaftate (Brand name-Tinactin) Powder to area of skin breakdown on right inner thigh 2 times daily for 2 weeks.     Pain control:  Patient has Percocet as recently prescribed by Dr. Meade.  Patient may take as directed on bottle for 3-4 days PRN. If patient NOT taking Percocet, patient may take Tylenol (not to exceed 3,000mg/day) for mild pain.  Patient was instructed to NOT take Tylenol if taking Percocet.    Follow-up:    1. Please follow-up with Dr. Meade in 1 week.  Please call his office to make an appointment.  2. Please follow-up with Dr. Jones an Interventional Radiology regarding your drain.  Please call [phone] as soon as you get home to schedule an appointment.      Please notify your surgeon immediately or return to [facility] emergency department if you have: 1. fever (over 100.4) or chills 2. pain not controlled by oral pain medication 3. Persistent vomiting or diarrhea 4. inability to tolerate oral intake. Principal Discharge DX:	Fluid collection at surgical site  Goal:	Return to previous level of functioning  Instructions for follow-up, activity and diet:	Diet:  Regular Diet  Activity:  No heavy lifting or strenuous activity until re-evaluated at follow-up appointment.  Otherwise you may resume previous level of activity as tolerated.   Bathing:  You may shower.  Do not submerge drain incision site underwater or scrub.  Pat drain insertion site to dry.  Special instructions:  Please apply over the counter medication Tolnaftate (Brand name-Tinactin) Powder to area of skin breakdown on right inner thigh 2 times daily for 2 weeks.     Pain control:  Patient has Percocet as recently prescribed by Dr. Meade.  Patient may take as directed on bottle for 3-4 days PRN. If patient NOT taking Percocet, patient may take Tylenol (not to exceed 3,000mg/day) for mild pain.  Patient was instructed to NOT take Tylenol if taking Percocet.    Follow-up:    1. Please follow-up with Dr. Meade in 1 week.  Please call his office to make an appointment.  2. Please follow-up with Dr. Jones an Interventional Radiology regarding your drain.  Please call [phone] as soon as you get home to schedule an appointment.      Please notify your surgeon immediately or return to [facility] emergency department if you have: 1. fever (over 100.4) or chills 2. pain not controlled by oral pain medication 3. Persistent vomiting or diarrhea 4. inability to tolerate oral intake. Principal Discharge DX:	Fluid collection at surgical site  Goal:	Return to previous level of functioning  Instructions for follow-up, activity and diet:	Diet:  Regular Diet  Activity:  No heavy lifting or strenuous activity until re-evaluated at follow-up appointment.  Otherwise you may resume previous level of activity as tolerated.   Bathing:  You may shower.  Do not submerge drain incision site underwater or scrub.  Pat drain insertion site to dry.  Special instructions:  Please apply over the counter medication Tolnaftate (Brand name-Tinactin) Powder to area of skin breakdown on right inner thigh 2 times daily for 2 weeks.     Pain control:  Patient has Percocet as recently prescribed by Dr. Meade.  Patient may take as directed on bottle for 3-4 days PRN. If patient NOT taking Percocet, patient may take Tylenol (not to exceed 3,000mg/day) for mild pain.  Patient was instructed to NOT take Tylenol if taking Percocet.    Follow-up:    1. Please follow-up with Dr. Meade in 1 week.  Please call his office to make an appointment.  2. Please follow-up with Dr. Jones an Interventional Radiology regarding your drain.  Please call [phone] as soon as you get home to schedule an appointment.      Please notify your surgeon immediately or return to [facility] emergency department if you have: 1. fever (over 100.4) or chills 2. pain not controlled by oral pain medication 3. Persistent vomiting or diarrhea 4. inability to tolerate oral intake. Principal Discharge DX:	Fluid collection at surgical site  Goal:	Return to previous level of functioning  Instructions for follow-up, activity and diet:	Diet:  Regular Diet  Activity:  No heavy lifting or strenuous activity until re-evaluated at follow-up appointment.  Otherwise you may resume previous level of activity as tolerated.   Bathing:  You may shower.  Do not submerge drain incision site underwater or scrub.  Pat drain insertion site to dry.  Special instructions:  Please apply over the counter medication Tolnaftate (Brand name-Tinactin) Powder to area of skin breakdown on right inner thigh 2 times daily for 2 weeks.     Pain control:  Patient has Percocet as recently prescribed by Dr. Meade.  Patient may take as directed on bottle for 3-4 days PRN. If patient NOT taking Percocet, patient may take Tylenol (not to exceed 3,000mg/day) for mild pain.  Patient was instructed to NOT take Tylenol if taking Percocet.    Follow-up:    1. Please follow-up with Dr. Meade in 1 week.  Please call his office to make an appointment.  2. Please follow-up with Dr. Jones an Interventional Radiology regarding your drain.  Please call [phone] as soon as you get home to schedule an appointment.      Please notify your surgeon immediately or return to [facility] emergency department if you have: 1. fever (over 100.4) or chills 2. pain not controlled by oral pain medication 3. Persistent vomiting or diarrhea 4. inability to tolerate oral intake. Principal Discharge DX:	Fluid collection at surgical site  Goal:	Return to previous level of functioning  Instructions for follow-up, activity and diet:	Diet:  Regular Diet  Activity:  No heavy lifting or strenuous activity until re-evaluated at follow-up appointment.  Otherwise you may resume previous level of activity as tolerated.   Bathing:  You may shower.  Do not submerge drain incision site underwater or scrub.  Pat drain insertion site to dry.  Special instructions:  Please apply over the counter medication Tolnaftate (Brand name-Tinactin) Powder to area of skin breakdown on right inner thigh 2 times daily for 2 weeks.     Pain control:  Patient has Percocet as recently prescribed by Dr. Meade.  Patient may take as directed on bottle for 3-4 days PRN. If patient NOT taking Percocet, patient may take Tylenol (not to exceed 3,000mg/day) for mild pain.  Patient was instructed to NOT take Tylenol if taking Percocet.    Follow-up:    1. Please follow-up with Dr. Meade in 1 week.  Please call his office to make an appointment.  2. Please follow-up with Dr. Jones an Interventional Radiology regarding your drain.  Please call [phone] as soon as you get home to schedule an appointment.      Please notify your surgeon immediately or return to [facility] emergency department if you have: 1. fever (over 100.4) or chills 2. pain not controlled by oral pain medication 3. Persistent vomiting or diarrhea 4. inability to tolerate oral intake.

## 2017-04-17 NOTE — DISCHARGE NOTE ADULT - HOSPITAL COURSE
Rajan is an 81 y/o male here for first post op visit. 3/30/17- ambulatory OR at [facility]- bilateral inguinal hernia repairs with medium oval Kugel patch x2 and drainage of left hydrocele. QtanxSzbphhi142Rjk MugjdMzlvvox061Zsqcb XfnkiUesqiif468Sfu QaocoSgavkxq076Zrvvw YrfmsStcvsqh657Qgt HPI:[id number]   GdmmlhghYobfjmb6g16h97h-5vjp-[id number]   MtexySdpyhuy360Gyftv EcodrJqrkagx727Nkz KqlvlDozdftm958Xuqmp   Interval History: Status post bilateral inguinal hernia repairs and drainage of left hydrocele on 3/30/17. 2 days later developed extreme weakness and "collapsed" at home. Went to [facility] and was noted to have white blood count over 20,000 and evidence of UTI. CT of abdomen and pelvis showed postoperative changes in both groins and the left scrotum and some thickening of the splenic flexure and rectal wall, possibly consistent with colitis. Was treated with Cipro and Flagyl and discharged 2 days later with white count down to 13,000. Continues to feel weak and had 2 episodes of diarrhea    81 y/o M s/p b/l inguinal herna repair and drainage of left hydrocele 3/30/17.  POD #2 patient developed weakness at [facility] and went to [facility] where he was found to have a WBC of 20,000.  CT at that time demonstrated post-op changes in both groins in the left scrotum with some thickening fo the splenic flexure and rectal wall, possibly c/w colitis.  patient was treated with cipro/flagyl and discharged 2 days later with WBC of 13,000. Patient continued to not feel well for approximately 2 weeks.  He was seen in the office by Dr. Meade for his symptoms and was sent for outpatient CT which revealed a large fluid collection in the left inguinal canal and scrotum, possibly involving the testicles.  Presenting today, his weakness persists and his scrotum is erythematous, swollen and tender, however, he and his wife believe he is getting better overall.  His scrotal symptoms are associated with some nausea and anorexia.      IR consult - Dr. Putterman placed who requested repeat CT pelvis and scrotal ultrasound which are in process.  Urology consulted (Dr. Bennett) - On standby if patient requires operative intervention.  Cardiology consult Dr Chu covering Dr. Brush who agreed to hold Brilinta and continue ASA in preparation for IR/operative procedure.    On 4/16/17, patient underwent Left scrotal drainage by IR (Dr. Jones) and 120cc of dark red/brown  fluid was aspirated.  Patient tolerated the procedure well and was transferred to the floor in stable condition.  Preliminary fluid cultures grew no organisms and patient's groin pain improved.  Patient's drain continued to produce red/brown output c/w liquified old hematoma.  Patient's Swift catheter was removed on 4/17 and he passed his trial of void.  Patient received ZARI drain teaching. Patient continued to recover well and was discharged on 4/17/17 with instructions to follow-up with Dr. Meade in 1 week. Rajan is an 81 y/o male here for first post op visit. 3/30/17- ambulatory OR at [facility]- bilateral inguinal hernia repairs with medium oval Kugel patch x2 and drainage of left hydrocele. NcmlxLhngisl374Kke JxxpuSywlmew832Rxgac LndqnNfecrrs407Amy LkbzeFtfdkek337Vovbv JpnilQtzbjxo289Rsy HPI:[id number]   XtoclkiwMreclvi9q93x47o-4mbj-[id number]   UzrvhKdlawpu743Rzhea KofjvSoojomd936Qnp XfykmZylxxwp024Nugkq   Interval History: Status post bilateral inguinal hernia repairs and drainage of left hydrocele on 3/30/17. 2 days later developed extreme weakness and "collapsed" at home. Went to [facility] and was noted to have white blood count over 20,000 and evidence of UTI. CT of abdomen and pelvis showed postoperative changes in both groins and the left scrotum and some thickening of the splenic flexure and rectal wall, possibly consistent with colitis. Was treated with Cipro and Flagyl and discharged 2 days later with white count down to 13,000. Continues to feel weak and had 2 episodes of diarrhea    81 y/o M s/p b/l inguinal herna repair and drainage of left hydrocele 3/30/17.  POD #2 patient developed weakness at home and went to [facility] where he was found to have a WBC of 20,000.  CT at that time demonstrated post-op changes in both groins in the left scrotum with some thickening of the splenic flexure and rectal wall, possibly c/w colitis.  Patient was treated with cipro/flagyl and discharged 2 days later with WBC of 13,000. Patient continued to not feel well for approximately 2 weeks.  He was seen in the office by Dr. Meade for his symptoms and was sent for outpatient CT which revealed a large fluid collection in the left inguinal canal and scrotum, possibly involving the testicles.  Presenting today, his weakness persists and his scrotum is erythematous, swollen and tender, however, he and his wife believe he is getting better overall.  His scrotal symptoms are associated with some nausea and anorexia.      IR consult - Dr. Robert rodriguez who requested repeat CT pelvis and scrotal ultrasound which are in process.  Urology consulted (Dr. Bennett) - On standby if patient requires operative intervention.  Cardiology consult Dr Chu covering Dr. Brush who agreed to hold Brilinta and continue ASA in preparation for IR/operative procedure.    On 4/16/17, patient underwent Left scrotal drainage by IR (Dr. Jonse) and 120cc of dark red/brown  fluid was aspirated.  Patient tolerated the procedure well and was transferred to the floor in stable condition.  Preliminary fluid cultures grew no organisms and patient's groin pain improved.  Patient's drain continued to produce red/brown output c/w liquified old hematoma.  Patient's Swift catheter was removed on 4/17 and he passed his trial of void.  Patient received ZARI drain teaching. Patient continued to recover well and was discharged on 4/17/17 with instructions to follow-up with Dr. Meaed in 1 week. Rajan is an 83 y/o male here for first post op visit. 3/30/17- ambulatory OR at [facility]- bilateral inguinal hernia repairs with medium oval Kugel patch x2 and drainage of left hydrocele. NvyqhXfkmrwk498Srt RnorhNgonmey524Qgjxw AffgcYglwyzq327Isc DonwyClmcsgf219Aybrl RzkpoVhlzvkj793Stk HPI:[id number]   VsmbfhdqSvcydgp6u56i41v-[id number]   WupbeQfkhqqp564Pulgj JyxrwBommwiz858Dvv TjnaaZpvkliy832Jfewa   Interval History: Status post bilateral inguinal hernia repairs and drainage of left hydrocele on 3/30/17. 2 days later developed extreme weakness and "collapsed" at home. Went to [facility] and was noted to have white blood count over 20,000 and evidence of UTI. CT of abdomen and pelvis showed postoperative changes in both groins and the left scrotum and some thickening of the splenic flexure and rectal wall, possibly consistent with colitis. Was treated with Cipro and Flagyl and discharged 2 days later with white count down to 13,000. Continues to feel weak and had 2 episodes of diarrhea    83 y/o M s/p b/l inguinal herna repair and drainage of left hydrocele 3/30/17.  POD #2 patient developed weakness at home and went to [facility] where he was found to have a WBC of 20,000.  CT at that time demonstrated post-op changes in both groins in the left scrotum with some thickening of the splenic flexure and rectal wall, possibly c/w colitis.  Patient was treated with cipro/flagyl and discharged 2 days later with WBC of 13,000. Patient continued to not feel well for approximately 2 weeks.  He was seen in the office by Dr. Meade for his symptoms and was sent for outpatient CT which revealed a large fluid collection in the left inguinal canal and scrotum, possibly involving the testicles.  Presenting today, his weakness persists and his scrotum is erythematous, swollen and tender, however, he and his wife believe he is getting better overall.  His scrotal symptoms are associated with some nausea and anorexia.      IR consult - Dr. Robert rodriguez who requested repeat CT pelvis and scrotal ultrasound revealing an abscess and hydrocele.  Urology consulted (Dr. Bennett) - On standby if patient requires operative intervention.  Cardiology consult Dr Chu covering Dr. Brush who agreed to hold Brilinta and continue ASA in preparation for IR/operative procedure.    On 4/16/17, patient underwent Left scrotal drainage by IR (Dr. Jones) and 120cc of dark red/brown  fluid was aspirated.  Patient tolerated the procedure well and was transferred to the floor in stable condition.  Preliminary fluid cultures grew no organisms and patient's groin pain improved.  Patient's drain continued to produce red/brown output c/w liquified old hematoma.  Patient's Swift catheter was removed on 4/17 and he passed his trial of void.  Patient received ZARI drain teaching. Patient continued to recover well and was discharged on 4/18/17 with instructions to follow-up with Dr. Meade in 1 week and Dr. Jones in 1 week.

## 2017-04-17 NOTE — PHYSICAL THERAPY INITIAL EVALUATION ADULT - ADDITIONAL COMMENTS
Pt lives with spouse in private home with full flight to enter (+) B HR, no stairs within. Pt owns cane, has rolling walker borrowed from neighbor.

## 2017-04-17 NOTE — DISCHARGE NOTE ADULT - HOME CARE AGENCY
referral to  [facility]  [phone]   for  SN eval,  reinforcement of  IR drain,   PT eval,   HHA  eval.       AWAIT CONFIRMATION. [place] [phone]   scheduled for   start of care   4/19/17    SN eval,  reinforcement of  drain care,  PT eval,  HHA eval.    Agency will contact you to set up time of visit.  IF you have  questions to  same  you may contact agency.

## 2017-04-17 NOTE — DISCHARGE NOTE ADULT - CARE PROVIDER_API CALL
Cesar Meade (MD), Surgery  [address] [facility] [address]  Phone: [phone]  Fax: [phone] Cesar Meade), Surgery  [address] [place] [address]  Phone: [phone]  Fax: [phone]    Aleksandr Jones), Diagnostic Radiology; [facility]  [address] [place] [id number]  Phone: [phone]  Fax: [phone]

## 2017-04-17 NOTE — DISCHARGE NOTE ADULT - PATIENT PORTAL LINK FT
“You can access the [employer] Patient Portal, offered by [facility], by registering with the following website: [email or web address]”

## 2017-04-17 NOTE — DISCHARGE NOTE ADULT - MEDICATION SUMMARY - MEDICATIONS TO STOP TAKING
I will STOP taking the medications listed below when I get home from the hospital:  None I will STOP taking the medications listed below when I get home from the hospital:    citalopram 20 mg oral tablet  -- 1 tab(s) by mouth once a day

## 2017-04-17 NOTE — PHYSICAL THERAPY INITIAL EVALUATION ADULT - GENERAL OBSERVATIONS, REHAB EVAL
Pt received reclined in gerichair, (+) ZARI drain. Pt's spouse at bedside. Pt alert and agreeable to PT eval.

## 2017-04-18 VITALS
DIASTOLIC BLOOD PRESSURE: 68 MMHG | RESPIRATION RATE: 18 BRPM | OXYGEN SATURATION: 100 % | SYSTOLIC BLOOD PRESSURE: 112 MMHG | TEMPERATURE: 98 F | HEART RATE: 81 BPM

## 2017-04-18 LAB
HCT VFR BLD CALC: 37.3 % — LOW (ref 39–50)
HGB BLD-MCNC: 11.8 G/DL — LOW (ref 13–17)
MCHC RBC-ENTMCNC: 26.3 PG — LOW (ref 27–34)
MCHC RBC-ENTMCNC: 31.6 GM/DL — LOW (ref 32–36)
MCV RBC AUTO: 83.1 FL — SIGNIFICANT CHANGE UP (ref 80–100)
PLATELET # BLD AUTO: 263 K/UL — SIGNIFICANT CHANGE UP (ref 150–400)
RBC # BLD: 4.49 M/UL — SIGNIFICANT CHANGE UP (ref 4.2–5.8)
RBC # FLD: 16.4 % — HIGH (ref 10.3–14.5)
WBC # BLD: 9.69 K/UL — SIGNIFICANT CHANGE UP (ref 3.8–10.5)
WBC # FLD AUTO: 9.69 K/UL — SIGNIFICANT CHANGE UP (ref 3.8–10.5)

## 2017-04-18 PROCEDURE — 99285 EMERGENCY DEPT VISIT HI MDM: CPT | Mod: 25

## 2017-04-18 PROCEDURE — 96375 TX/PRO/DX INJ NEW DRUG ADDON: CPT

## 2017-04-18 PROCEDURE — C1894: CPT

## 2017-04-18 PROCEDURE — 93976 VASCULAR STUDY: CPT

## 2017-04-18 PROCEDURE — 10030 IMG GID FLU COLL DRG SFT TIS: CPT

## 2017-04-18 PROCEDURE — 87086 URINE CULTURE/COLONY COUNT: CPT

## 2017-04-18 PROCEDURE — 83735 ASSAY OF MAGNESIUM: CPT

## 2017-04-18 PROCEDURE — 84100 ASSAY OF PHOSPHORUS: CPT

## 2017-04-18 PROCEDURE — 80048 BASIC METABOLIC PNL TOTAL CA: CPT

## 2017-04-18 PROCEDURE — 81001 URINALYSIS AUTO W/SCOPE: CPT

## 2017-04-18 PROCEDURE — 76870 US EXAM SCROTUM: CPT

## 2017-04-18 PROCEDURE — 94640 AIRWAY INHALATION TREATMENT: CPT

## 2017-04-18 PROCEDURE — 85027 COMPLETE CBC AUTOMATED: CPT

## 2017-04-18 PROCEDURE — 85730 THROMBOPLASTIN TIME PARTIAL: CPT

## 2017-04-18 PROCEDURE — 87070 CULTURE OTHR SPECIMN AEROBIC: CPT

## 2017-04-18 PROCEDURE — 97110 THERAPEUTIC EXERCISES: CPT

## 2017-04-18 PROCEDURE — 86900 BLOOD TYPING SEROLOGIC ABO: CPT

## 2017-04-18 PROCEDURE — C1729: CPT

## 2017-04-18 PROCEDURE — 86901 BLOOD TYPING SEROLOGIC RH(D): CPT

## 2017-04-18 PROCEDURE — 97161 PT EVAL LOW COMPLEX 20 MIN: CPT

## 2017-04-18 PROCEDURE — C1769: CPT

## 2017-04-18 PROCEDURE — 72193 CT PELVIS W/DYE: CPT

## 2017-04-18 PROCEDURE — 96374 THER/PROPH/DIAG INJ IV PUSH: CPT

## 2017-04-18 PROCEDURE — 85610 PROTHROMBIN TIME: CPT

## 2017-04-18 PROCEDURE — 86850 RBC ANTIBODY SCREEN: CPT

## 2017-04-18 PROCEDURE — 80053 COMPREHEN METABOLIC PANEL: CPT

## 2017-04-18 PROCEDURE — 97116 GAIT TRAINING THERAPY: CPT

## 2017-04-18 PROCEDURE — 87205 SMEAR GRAM STAIN: CPT

## 2017-04-18 PROCEDURE — 87075 CULTR BACTERIA EXCEPT BLOOD: CPT

## 2017-04-18 RX ADMIN — TIOTROPIUM BROMIDE 1 CAPSULE(S): 18 CAPSULE ORAL; RESPIRATORY (INHALATION) at 12:24

## 2017-04-18 RX ADMIN — HEPARIN SODIUM 5000 UNIT(S): 5000 INJECTION INTRAVENOUS; SUBCUTANEOUS at 05:15

## 2017-04-18 RX ADMIN — FAMOTIDINE 20 MILLIGRAM(S): 10 INJECTION INTRAVENOUS at 12:24

## 2017-04-18 RX ADMIN — BUDESONIDE AND FORMOTEROL FUMARATE DIHYDRATE 2 PUFF(S): 160; 4.5 AEROSOL RESPIRATORY (INHALATION) at 05:16

## 2017-04-18 RX ADMIN — NYSTATIN CREAM 1 APPLICATION(S): 100000 CREAM TOPICAL at 12:24

## 2017-04-18 RX ADMIN — CITALOPRAM 20 MILLIGRAM(S): 10 TABLET, FILM COATED ORAL at 12:24

## 2017-04-18 RX ADMIN — Medication 81 MILLIGRAM(S): at 12:24

## 2017-04-18 RX ADMIN — TICAGRELOR 90 MILLIGRAM(S): 90 TABLET ORAL at 05:15

## 2017-04-18 RX ADMIN — AMLODIPINE BESYLATE 5 MILLIGRAM(S): 2.5 TABLET ORAL at 05:15

## 2017-04-18 RX ADMIN — Medication 10 MILLIGRAM(S): at 05:15

## 2017-04-21 ENCOUNTER — APPOINTMENT (OUTPATIENT)
Dept: SURGERY | Facility: CLINIC | Age: 82
End: 2017-04-21

## 2017-04-21 VITALS
HEART RATE: 76 BPM | RESPIRATION RATE: 15 BRPM | OXYGEN SATURATION: 96 % | DIASTOLIC BLOOD PRESSURE: 72 MMHG | TEMPERATURE: 97.4 F | SYSTOLIC BLOOD PRESSURE: 121 MMHG

## 2017-04-21 LAB
CULTURE RESULTS: SIGNIFICANT CHANGE UP
SPECIMEN SOURCE: SIGNIFICANT CHANGE UP

## 2017-04-26 ENCOUNTER — APPOINTMENT (OUTPATIENT)
Dept: CT IMAGING | Facility: HOSPITAL | Age: 82
End: 2017-04-26

## 2017-04-26 ENCOUNTER — APPOINTMENT (OUTPATIENT)
Dept: SURGERY | Facility: CLINIC | Age: 82
End: 2017-04-26

## 2017-05-04 ENCOUNTER — FORM ENCOUNTER (OUTPATIENT)
Age: 82
End: 2017-05-04

## 2017-05-05 ENCOUNTER — APPOINTMENT (OUTPATIENT)
Dept: SURGERY | Facility: CLINIC | Age: 82
End: 2017-05-05

## 2017-05-05 ENCOUNTER — OUTPATIENT (OUTPATIENT)
Dept: OUTPATIENT SERVICES | Facility: HOSPITAL | Age: 82
LOS: 1 days | End: 2017-05-05
Payer: MEDICARE

## 2017-05-05 DIAGNOSIS — Z90.89 ACQUIRED ABSENCE OF OTHER ORGANS: Chronic | ICD-10-CM

## 2017-05-05 DIAGNOSIS — Z96.642 PRESENCE OF LEFT ARTIFICIAL HIP JOINT: Chronic | ICD-10-CM

## 2017-05-05 DIAGNOSIS — K40.20 BILATERAL INGUINAL HERNIA, WITHOUT OBSTRUCTION OR GANGRENE, NOT SPECIFIED AS RECURRENT: ICD-10-CM

## 2017-05-05 DIAGNOSIS — Z98.890 OTHER SPECIFIED POSTPROCEDURAL STATES: Chronic | ICD-10-CM

## 2017-05-05 DIAGNOSIS — N43.3 HYDROCELE, UNSPECIFIED: ICD-10-CM

## 2017-05-05 DIAGNOSIS — N39.0 URINARY TRACT INFECTION, SITE NOT SPECIFIED: ICD-10-CM

## 2017-05-05 DIAGNOSIS — Z95.5 PRESENCE OF CORONARY ANGIOPLASTY IMPLANT AND GRAFT: Chronic | ICD-10-CM

## 2017-05-05 PROCEDURE — 74177 CT ABD & PELVIS W/CONTRAST: CPT | Mod: 26

## 2017-05-05 PROCEDURE — 74177 CT ABD & PELVIS W/CONTRAST: CPT

## 2017-05-12 ENCOUNTER — APPOINTMENT (OUTPATIENT)
Dept: SURGERY | Facility: CLINIC | Age: 82
End: 2017-05-12

## 2017-05-17 ENCOUNTER — APPOINTMENT (OUTPATIENT)
Dept: SURGERY | Facility: CLINIC | Age: 82
End: 2017-05-17

## 2017-05-17 VITALS
TEMPERATURE: 97.9 F | HEART RATE: 75 BPM | SYSTOLIC BLOOD PRESSURE: 125 MMHG | DIASTOLIC BLOOD PRESSURE: 73 MMHG | RESPIRATION RATE: 16 BRPM | OXYGEN SATURATION: 95 %

## 2017-07-07 ENCOUNTER — APPOINTMENT (OUTPATIENT)
Dept: SURGERY | Facility: CLINIC | Age: 82
End: 2017-07-07

## 2017-07-07 VITALS
TEMPERATURE: 98.2 F | DIASTOLIC BLOOD PRESSURE: 74 MMHG | RESPIRATION RATE: 15 BRPM | SYSTOLIC BLOOD PRESSURE: 138 MMHG | OXYGEN SATURATION: 95 % | HEART RATE: 76 BPM

## 2017-07-07 DIAGNOSIS — N43.3 HYDROCELE, UNSPECIFIED: ICD-10-CM

## 2017-07-07 DIAGNOSIS — K40.20 BILATERAL INGUINAL HERNIA, W/OUT OBSTRUCTION OR GANGRENE, NOT SPECIFIED AS RECURRENT: ICD-10-CM

## 2018-06-20 ENCOUNTER — RX RENEWAL (OUTPATIENT)
Age: 83
End: 2018-06-20

## 2018-06-20 RX ORDER — NYSTATIN 100000 [USP'U]/G
100000 POWDER TOPICAL
Qty: 60 | Refills: 0 | Status: ACTIVE | COMMUNITY
Start: 2018-06-20 | End: 1900-01-01

## 2018-09-18 NOTE — ED ADULT NURSE NOTE - DISCHARGE DATE/TIME
[facility]  Brief Operative Note     SUMMARY     Surgery Date: 9/18/2018     Surgeon(s) and Role:     * Dilshad Bal MD - Primary    First Assistant: YA Faye, NP  2nd Assistant: DANIA Ross MS IV    Pre-op Diagnosis:  Labral tear of shoulder, left, subsequent encounter [S43.432D]    Post-op Diagnosis:  Labral tear of shoulder, left, subsequent encounter [S43.432D]    PROCEDURE: Arthroscopy right shoulder with inferior labral repair.    Anesthesia: General    Description of the findings of the procedure: See dictated Op Note.    Findings/Key Components: See dictated Op Note.    Estimated Blood Loss: * No values recorded between 9/18/2018  7:35 AM and 9/18/2018  8:43 AM *         Specimens:   Specimen (12h ago, onward)    None          Discharge Note    SUMMARY     Admit Date: 9/18/2018    Discharge Date and Time: No discharge date for patient encounter.    Attending Physician: Dilshad Bal MD     Discharge Provider: Dilshad Bal    Final Diagnosis: Labral tear of shoulder, left, subsequent encounter [S43.432D]    Outcome of Hospitalization, Treatment, Procedure, or Surgery:    Patient admitted for outpatient procedure, procedure tolerated well, patient discharged home.    Disposition: Home or Self Care    Follow Up/Patient Instructions:       Medications:  Reconciled Home Medications:      Medication List      START taking these medications    oxyCODONE-acetaminophen  mg per tablet  Commonly known as:  PERCOCET  Take 1 tablet by mouth every 4 to 6 hours as needed for Pain.        CONTINUE taking these medications    busPIRone 7.5 MG tablet  Commonly known as:  BUSPAR  Take 7.5 mg by mouth.          Discharge Procedure Orders   SLING ORTHOPEDIC LARGE FOR HOME USE     Diet general     Ice to affected area     Lifting restrictions     No driving, operating heavy equipment or signing legal documents while taking pain medication     Call MD for:  temperature >100.4      Call MD for:  persistent nausea and vomiting     Call MD for:  severe uncontrolled pain     Call MD for:  difficulty breathing, headache or visual disturbances     Call MD for:  redness, tenderness, or signs of infection (pain, swelling, redness, odor or green/yellow discharge around incision site)     Call MD for:  hives     Call MD for:  persistent dizziness or light-headedness     Call MD for:  extreme fatigue     Remove dressing in 48 hours     Shower on day dressing removed (No bath)       Diet: Resume pre-op diet     01-Apr-2017 21:48

## 2019-01-07 NOTE — PATIENT PROFILE ADULT. - PMH
CAD (coronary artery disease)    COPD (chronic obstructive pulmonary disease)    Glaucoma of both eyes, unspecified glaucoma    HTN (hypertension)    Hyperlipidemia Seen in [facility] and sent for blood work. Pt has been feeling "down" for about one month. Denies any suicidal or homicidal ideation.

## 2020-02-24 ENCOUNTER — EMERGENCY (EMERGENCY)
Facility: HOSPITAL | Age: 85
LOS: 0 days | Discharge: HOME | End: 2020-02-24
Attending: EMERGENCY MEDICINE
Payer: MEDICARE

## 2020-02-24 VITALS
DIASTOLIC BLOOD PRESSURE: 85 MMHG | OXYGEN SATURATION: 97 % | HEART RATE: 86 BPM | SYSTOLIC BLOOD PRESSURE: 169 MMHG | RESPIRATION RATE: 18 BRPM

## 2020-02-24 VITALS — HEIGHT: 72 IN | WEIGHT: 190.04 LBS

## 2020-02-24 DIAGNOSIS — Z98.890 OTHER SPECIFIED POSTPROCEDURAL STATES: Chronic | ICD-10-CM

## 2020-02-24 DIAGNOSIS — E78.5 HYPERLIPIDEMIA, UNSPECIFIED: ICD-10-CM

## 2020-02-24 DIAGNOSIS — J44.9 CHRONIC OBSTRUCTIVE PULMONARY DISEASE, UNSPECIFIED: ICD-10-CM

## 2020-02-24 DIAGNOSIS — S42.301A UNSPECIFIED FRACTURE OF SHAFT OF HUMERUS, RIGHT ARM, INITIAL ENCOUNTER FOR CLOSED FRACTURE: ICD-10-CM
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
## 2020-02-24 DIAGNOSIS — N40.0 BENIGN PROSTATIC HYPERPLASIA WITHOUT LOWER URINARY TRACT SYMPTOMS: ICD-10-CM

## 2020-02-24 DIAGNOSIS — Z96.642 PRESENCE OF LEFT ARTIFICIAL HIP JOINT: Chronic | ICD-10-CM

## 2020-02-24 DIAGNOSIS — H40.9 UNSPECIFIED GLAUCOMA: ICD-10-CM

## 2020-02-24 DIAGNOSIS — I10 ESSENTIAL (PRIMARY) HYPERTENSION: ICD-10-CM

## 2020-02-24 DIAGNOSIS — I25.10 ATHEROSCLEROTIC HEART DISEASE OF NATIVE CORONARY ARTERY WITHOUT ANGINA PECTORIS: ICD-10-CM

## 2020-02-24 DIAGNOSIS — Z98.61 CORONARY ANGIOPLASTY STATUS: ICD-10-CM

## 2020-02-24 DIAGNOSIS — Z90.89 ACQUIRED ABSENCE OF OTHER ORGANS: Chronic | ICD-10-CM

## 2020-02-24 DIAGNOSIS — M25.511 PAIN IN RIGHT SHOULDER: ICD-10-CM

## 2020-02-24 DIAGNOSIS — Z79.82 LONG TERM (CURRENT) USE OF ASPIRIN: ICD-10-CM
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
## 2020-02-24 DIAGNOSIS — K40.20 BILATERAL INGUINAL HERNIA, WITHOUT OBSTRUCTION OR GANGRENE, NOT SPECIFIED AS RECURRENT: ICD-10-CM

## 2020-02-24 DIAGNOSIS — Z90.89 ACQUIRED ABSENCE OF OTHER ORGANS: ICD-10-CM

## 2020-02-24 DIAGNOSIS — Z95.5 PRESENCE OF CORONARY ANGIOPLASTY IMPLANT AND GRAFT: Chronic | ICD-10-CM
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
## 2020-02-24 DIAGNOSIS — Z96.642 PRESENCE OF LEFT ARTIFICIAL HIP JOINT: ICD-10-CM

## 2020-02-24 PROCEDURE — 99285 EMERGENCY DEPT VISIT HI MDM: CPT | Mod: 25

## 2020-02-24 PROCEDURE — 73200 CT UPPER EXTREMITY W/O DYE: CPT | Mod: 26,RT

## 2020-02-24 PROCEDURE — 76376 3D RENDER W/INTRP POSTPROCES: CPT

## 2020-02-24 PROCEDURE — 73200 CT UPPER EXTREMITY W/O DYE: CPT | Mod: RT

## 2020-02-24 PROCEDURE — 76376 3D RENDER W/INTRP POSTPROCES: CPT | Mod: 26

## 2020-02-24 PROCEDURE — 99283 EMERGENCY DEPT VISIT LOW MDM: CPT

## 2020-02-24 PROCEDURE — 99281 EMR DPT VST MAYX REQ PHY/QHP: CPT

## 2020-02-24 NOTE — ED ADULT NURSE NOTE - OBJECTIVE STATEMENT
Pt sent from Urgent care s/p fall this am. Imaging at urgent care showed a right humeral head fracture. Pt here for follow up of fracture.

## 2020-02-24 NOTE — CONSULT NOTE ADULT - ASSESSMENT
A/P: 85y Male with Right proximal humerus fracture  Pain control  NWB R/L UE   Sling at all times  ELbow ROM to avoid stiffness (pt and his wife were shown how to do this in ED)  Ice plenty  Active movement of fingers/elbow encouraged  See DR. Vega in 1-2 weeks  Possible need for surgical intervention discussed with patient. They will discuss further w DR. Vega.  Ortho stable  for DC A/P: 85y Male with Right proximal humerus fracture  Pain control  NWB Right UE   Sling at all times  ELbow ROM to avoid stiffness (pt and his wife were shown how to do this in ED)  Ice plenty  Active movement of fingers/elbow encouraged  See DR. Vega in 1-2 weeks  Possible need for surgical intervention discussed with patient. They will discuss further w DR. Vega.  Ortho stable  for DC

## 2020-02-24 NOTE — ED STATDOCS - PMH
BPH (benign prostatic hyperplasia)    CAD (coronary artery disease)    CAD (coronary artery disease)    COPD (chronic obstructive pulmonary disease)    COPD (chronic obstructive pulmonary disease)    Glaucoma    Glaucoma of both eyes, unspecified glaucoma    HLD (hyperlipidemia)    HTN (hypertension)    HTN (hypertension)    Hyperlipidemia

## 2020-02-24 NOTE — ED STATDOCS - CARE PROVIDER_API CALL
Abhishek Vega)  Orthopaedic Surgery  [address] [place] [address] [place] [address]  Phone: [phone]  Fax: [phone]  Follow Up Time:

## 2020-02-24 NOTE — ED STATDOCS - NSFOLLOWUPINSTRUCTIONS_ED_ALL_ED_FT
Proximal Humerus Fracture    WHAT YOU NEED TO KNOW:    What is a proximal humerus fracture? A proximal humerus fracture is a crack or break in the top of your upper arm bone. The proximal humerus is one of the bones in your shoulder joint.Shoulder Anatomy         What are the signs and symptoms of a proximal humerus fracture?     Pain when you move your arm      Swelling and bruising      Trouble moving your shoulder      Abnormal arm position or shape      Weakness or numbness in your arm, hand, or fingers    How is a proximal humerus fracture diagnosed? Your healthcare provider will ask about your injury and examine you. An x-ray may show the type of fracture you have. You may need more than 1 x-ray, or another x-ray after several days have passed.    How is an arm fracture treated? Treatment depends on the type of fracture you have. You may need any of the following:    A sling may be needed to hold your broken bones in place. It will decrease your arm movement and allow the bones to heal.Shoulder Sling           Prescription pain medicine may be given. Ask your healthcare provider how to take this medicine safely. Some prescription pain medicines contain acetaminophen. Do not take other medicines that contain acetaminophen without talking to your healthcare provider. Too much acetaminophen may cause liver damage. Prescription pain medicine may cause constipation. Ask your healthcare provider how to prevent or treat constipation.       NSAIDs, such as ibuprofen, help decrease swelling, pain, and fever. This medicine is available with or without a doctor's order. NSAIDs can cause stomach bleeding or kidney problems in certain people. If you take blood thinner medicine, always ask your healthcare provider if NSAIDs are safe for you. Always read the medicine label and follow directions.      Acetaminophen decreases pain and fever. It is available without a doctor's order. Ask how much to take and how often to take it. Follow directions. Read the labels of all other medicines you are using to see if they also contain acetaminophen, or ask your doctor or pharmacist. Acetaminophen can cause liver damage if not taken correctly. Do not use more than 4 grams (4,000 milligrams) total of acetaminophen in one day.       Closed reduction may be done to put your bones back into the correct position without surgery.      Surgery may be needed to put your bones back into the correct position. This is called open reduction. An incision is made and the bones are put back in the correct position. Wires, pins, plates, or screws may be used to hold the bones in place. For severe fractures, surgery to replace part of the shoulder joint with an artificial implant may be needed.    How can I manage my symptoms?     Rest your arm as much as possible. Ask your healthcare provider when you can move your arm. Also ask when you can return to sports or vigorous exercises.      Apply ice on your arm for 15 to 20 minutes every hour or as directed. Use an ice pack, or put crushed ice in a plastic bag. Cover it with a towel before you put it on your arm. Ice helps prevent tissue damage and decreases swelling and pain.      Go to physical therapy as directed. A physical therapist teaches you exercises to help improve movement and strength, and to decrease pain.    When should I seek immediate care?     Your pain does not get better or gets worse, even after you rest and take medicine.      Your arm, hand, or fingers feel numb.      The skin over your fracture is swollen, cold, or pale.      You cannot move your arm, hand, or fingers.    When should I call my doctor?     You have a fever.      Your sling gets wet, damaged, or falls off.      You have questions or concerns about your condition or care.    CARE AGREEMENT:    You have the right to help plan your care. Learn about your health condition and how it may be treated. Discuss treatment options with your healthcare providers to decide what care you want to receive. You always have the right to refuse treatment.     FOLLOW UP WITH DR GANT IN HIS OFFICE IN 1-2 WEEKS. RETURN TO ER FOR ANY WORSENING SYMPTOMS OR NEW CONCERNS.

## 2020-02-24 NOTE — CONSULT NOTE ADULT - SUBJECTIVE AND OBJECTIVE BOX
85y Male RHD presents c/o Right shoulder pain sp mechanical fall. He tripped over the ottman today at home, falling onto a carpeted surface. His wife was nearby and came to his assistance. Pt had immediate pain and swelling. He initially went to [facility] and was imaged, and then sent here to the ED where he had a CT. His xrays are being uploaded here. Denies HS/LOC. Denies numbness/tingling. Denies fever/chills. Denies pain/injury elsewhere. No other complaints. He was able to walk after the fall. The pt son called DR. Vega office regarding the injury and DR. Vega called and we are seeing the pt on his behalf.    HEALTH ISSUES - PROBLEM Dx:  CAD stents (See, Dr. Stefan Watts)  LEft hip fx s/p ORIF 8 years ago        MEDICATIONS  (STANDING):    Allergies    No Known Allergies    Intolerances                Vital Signs Last 24 Hrs  T(C): 36.7 (02-24-20 @ 14:59), Max: 36.7 (02-24-20 @ 14:59)  T(F): 98 (02-24-20 @ 14:59), Max: 98 (02-24-20 @ 14:59)  HR: 86 (02-24-20 @ 14:59) (86 - 86)  BP: 174/77 (02-24-20 @ 14:59) (174/77 - 174/77)  BP(mean): --  RR: 18 (02-24-20 @ 14:59) (18 - 18)  SpO2: 95% (02-24-20 @ 14:59) (95% - 95%)  Imaging: XR demonstrates Right impacted proximal humerus fracture    Physical Exam  Gen: NAD, Alert and Awake, Follows Commands  Right UE: Skin intact, Moderate Swelling to shoulder noted. Cannot AROM shoulder due to pain. r/u/m/ain/pin function intact. SILT over deltoid. SILT digits 1-5, warm to touch. 2+ radial pulse. Compartments soft and compressible.   LUE: FAROM, painless, baseline  Bilat e LE: FAROM at hips knees and ankles, painless, baseline ROM. 85y Male RHD presents c/o Right shoulder pain sp mechanical fall. He tripped over the ottman today at home, falling onto a carpeted surface. His wife was nearby and came to his assistance. Pt had immediate pain and swelling. He initially went to [facility] and was imaged, and then sent here to the ED where he had a CT. His xrays are being uploaded here. Denies HS/LOC. Denies numbness/tingling. Denies fever/chills. Denies pain/injury elsewhere. No other complaints. He was able to walk after the fall. The pt son called DR. Vega office regarding the injury and DR. Vega called and we are seeing the pt on his behalf.    HEALTH ISSUES - PROBLEM Dx:  CAD stents (See, Dr. Stefan Watts)  LEft hip fx s/p ORIF 8 years ago        MEDICATIONS  (STANDING):    Allergies    No Known Allergies    Intolerances                Vital Signs Last 24 Hrs  T(C): 36.7 (02-24-20 @ 14:59), Max: 36.7 (02-24-20 @ 14:59)  T(F): 98 (02-24-20 @ 14:59), Max: 98 (02-24-20 @ 14:59)  HR: 86 (02-24-20 @ 14:59) (86 - 86)  BP: 174/77 (02-24-20 @ 14:59) (174/77 - 174/77)  BP(mean): --  RR: 18 (02-24-20 @ 14:59) (18 - 18)  SpO2: 95% (02-24-20 @ 14:59) (95% - 95%)  Imaging: XR and CT demonstrates Right impacted proximal humerus fracture    Physical Exam  Gen: NAD, Alert and Awake, Follows Commands  Right UE: Skin intact, Moderate Swelling to shoulder noted. Cannot AROM shoulder due to pain. r/u/m/ain/pin function intact. SILT over deltoid. SILT digits 1-5, warm to touch. 2+ radial pulse. Compartments soft and compressible.   LUE: FAROM, painless, baseline  Bilat LE: FAROM at hips knees and ankles, painless, baseline ROM. 85y Male RHD presents c/o Right shoulder pain sp mechanical fall. He tripped over the ottman today at home, falling onto a carpeted surface. His wife was nearby and came to his assistance. Pt had immediate pain and swelling. He initially went to [facility] and was imaged, and then sent here to the ED where he had a CT. His xrays are being uploaded here. Denies HS/LOC. Denies numbness/tingling. Denies fever/chills. Denies pain/injury elsewhere. No other complaints. He was able to walk after the fall. The pt son called DR. Vega office regarding the injury and DR. Vega called and we are seeing the pt on his behalf.    HEALTH ISSUES - PROBLEM Dx:  CAD stents (See, Dr. Stefan Watts)  LEft hip fx s/p ORIF 8 years ago        MEDICATIONS  (STANDING):    Allergies    No Known Allergies    Intolerances                Vital Signs Last 24 Hrs  T(C): 36.7 (02-24-20 @ 14:59), Max: 36.7 (02-24-20 @ 14:59)  T(F): 98 (02-24-20 @ 14:59), Max: 98 (02-24-20 @ 14:59)  HR: 86 (02-24-20 @ 14:59) (86 - 86)  BP: 174/77 (02-24-20 @ 14:59) (174/77 - 174/77)  BP(mean): --  RR: 18 (02-24-20 @ 14:59) (18 - 18)  SpO2: 95% (02-24-20 @ 14:59) (95% - 95%)  Imaging: XR and CT demonstrates Right impacted proximal humerus fracture    Physical Exam  Gen: NAD, Alert and Awake, Follows Commands  Right UE: Skin intact, Moderate Swelling to shoulder noted. Cannot AROM shoulder due to pain. No bony TTP to wrist or elbow. r/u/m/ain/pin function intact. SILT over deltoid. SILT digits 1-5, warm to touch. 2+ radial pulse. Compartments soft and compressible.   LUE: FAROM, painless, baseline  Bilat LE: FAROM at hips knees and ankles, painless, baseline ROM.

## 2020-02-24 NOTE — ED ADULT TRIAGE NOTE - CHIEF COMPLAINT QUOTE
Patient presents with family. Reports mechanical fall this morning at 11 AM. Patient went to urgent care and was told he has a Right humerus fracture. Patient denies hitting head. Denies blood thinners.

## 2020-02-24 NOTE — ED STATDOCS - CARDIAC, MLM
Pulse regular. Extremities warm. Respirations regular and quiet. Mucous membranes pink & moist. Alert and oriented times 3. No nausea or vomiting. Range of motion within patient's limits. Skin pink, warm and dry. Calm and cooperative. Vial A 0.3 ml given SQ to LORENA. Pt tolerated well  Vial B 0.3 ml given SQ to ERNA. Pt tolerated well.
normal rate, regular rhythm, and no murmur.

## 2020-02-24 NOTE — ED STATDOCS - CHPI ED CONTEXT
2/13/2018       RE: Fallon Johnson  [address] [place] [address]     Dear Colleague,    Thank you for referring your patient, Fallon Johnson, to the [facility] at [facility]. Please see a copy of my visit note below.    CHIEF COMPLAINT:  Left lower leg pain. Status post left tibia IM nailing performed on 10/18/2016.      HISTORY OF PRESENT ILLNESS:  Ms. Johnson is a 35-year-old female who presents today for evaluation of her left lower leg.  The patient reports to have sustained a seizure and lost consciousness and fallen down stairs when she sustained a tibia and fibula fracture.  Eventually, she was addressed by an outside surgeon who performed an IM nailing of the left tibia.  Since then, she reports to have pain and discomfort in the lower leg in the form of some diffuse achiness, both proximally and distally as well as some across the shaft.  The achiness seems to be activity dependent.  She also reports to have some swelling diffusely through the lower leg.      The patient reports to have compression stockings but something that she has not used lately for reasons that still are not clear.      She reports to also have gained 80 pounds secondary to her inability to do any significant exercise and walking.  Reports to be a fairly avid former runner.      The patient has a history of a brain tumor and therefore history of recurrent seizures though currently they are under relatively good control with the medication.      She presents today for discussion of treatment options.      PAST MEDICAL HISTORY:  Brain tumor as mentioned above as well as history of seizures, history of thyroid cancer, high cholesterol, among others.      PAST SURGICAL HISTORY:  Quite extensive, which was reviewed on today's visit.      DRUG ALLERGIES:  Fentanyl and sulfa drugs.      MEDICATIONS:  Please refer to encounter form.      PHYSICAL EXAMINATION:  On today's visit, she  presents as a pleasant female in no apparent distress with a height of 5 feet 7 inches and a weight of 194 pounds.  Denies to have any constitutional symptoms.      On today's visit, she presents with full range of motion of the left knee and ankle joints.  CMS is intact.  Skin is intact.  She presents with palpable screws proximally and distally with further locking screws of the nail.  She presents with a surgical incision along the most distal portion of the midline for the patellar tendon.      RADIOGRAPHIC EVALUATION:  Plain x-rays of the tib-fib were reviewed today which were significant for showing a healed fracture with the presence of an IM nail across the canal for the tibia with 2 proximal and distal locking screws.      ASSESSMENT:  Painful retained hardware, left lower leg.      PLAN:  I discussed with the patient that I think it is reasonable to proceed with hardware removal from the left lower leg.  I discussed with her the most likely postoperative course and complications which include but are not limited to infection, bleeding, nerve damage, residual pain, stiffness, among others.      All questions were answered.  The patient was pleased with the discussion.  The patient will proceed with hardware removal at the best of her convenience.  This will include the 2 proximal and distal locking screws as well as the IM nail.      TT 30 minutes, CT 20 minutes.       Sincerely,    Vernon Bernabe MD       recent trauma

## 2020-02-24 NOTE — ED ADULT NURSE NOTE - NSIMPLEMENTINTERV_GEN_ALL_ED
Implemented All Fall with Harm Risk Interventions:  [place] to call system. Call bell, personal items and telephone within reach. Instruct patient to call for assistance. Room bathroom lighting operational. Non-slip footwear when patient is off stretcher. Physically safe environment: no spills, clutter or unnecessary equipment. Stretcher in lowest position, wheels locked, appropriate side rails in place. Provide visual cue, wrist band, yellow gown, etc. Monitor gait and stability. Monitor for mental status changes and reorient to person, place, and time. Review medications for side effects contributing to fall risk. Reinforce activity limits and safety measures with patient and family. Provide visual clues: red socks.

## 2020-02-24 NOTE — ED STATDOCS - PATIENT PORTAL LINK FT
You can access the FollowMyHealth Patient Portal offered by [facility] by registering at the following website: [email or web address]. By joining [facility]’s FollowMyHealth portal, you will also be able to view your health information using other applications (apps) compatible with our system.

## 2020-02-24 NOTE — ED STATDOCS - OBJECTIVE STATEMENT
86 y/o male with a PMHx of BPH, CAD with stents x6, COPD, glaucoma, HTN, HLD presents to the ED s/p mechanical fall at 11am today. No head trauma. No LOC. On 81mg ASA. Pt went to [facility] and had imaging showing a fracture of the right humeral head. Pt sent to ED for further evaluation. No other complaints at this time.

## 2020-02-24 NOTE — ED STATDOCS - PROGRESS NOTE DETAILS
signed Leena Caldwell PA-C Pt seen initially in intake by Dr. Levine  85M sent from urgent care for fx of right prox humerus after a trip and fall today. pt sen in ED by ortho EVELYN Centeno, pt should f/u outpt Dr Vega 1-2 weeks. Pt in sling in ED. Tylenol PRN. Pt feeling well, pt and family agree with DC and plan of care. signed Leena Caldwell PA-C Pt seen initially in intake by Dr. Levine  85M sent from urgent care for fx of right prox humerus after a trip and fall today. Pt in sling from urgent care.  pt seen in ED by ortho EVELYN Centeno, pt should f/u outpt Dr Vega 1-2 weeks (pts son called Dr Nash office). Pt in sling in ED. Tylenol PRN. Pt feeling well, pt and family agree with DC and plan of care.

## 2020-03-09 ENCOUNTER — APPOINTMENT (OUTPATIENT)
Dept: ORTHOPEDIC SURGERY | Facility: CLINIC | Age: 85
End: 2020-03-09

## 2020-03-09 ENCOUNTER — APPOINTMENT (OUTPATIENT)
Age: 85
End: 2020-03-09
Payer: MEDICARE

## 2020-03-09 VITALS
BODY MASS INDEX: 26.41 KG/M2 | SYSTOLIC BLOOD PRESSURE: 130 MMHG | HEART RATE: 65 BPM | DIASTOLIC BLOOD PRESSURE: 72 MMHG | WEIGHT: 195 LBS | HEIGHT: 72 IN

## 2020-03-09 DIAGNOSIS — Z78.9 OTHER SPECIFIED HEALTH STATUS: ICD-10-CM

## 2020-03-09 PROCEDURE — 99203 OFFICE O/P NEW LOW 30 MIN: CPT

## 2020-03-09 NOTE — PHYSICAL EXAM
[Normal RUE] : Right Upper Extremity: No scars, rashes, lesions, ulcers, skin intact [Normal Finger/nose] : finger to nose coordination [Normal] : no peripheral adenopathy appreciated [de-identified] : right shoulder:\par Skin intactmoderate ecchymosis mild swelling\par Range of motion of the digits and wrists intact without pain, patient positioned in sling\par neurovascular intact distally [de-identified] : sarahir [de-identified] : x-rays of the rightproximal humerus are reviewed there is a comminuted displaced fracture of the proximal humerus

## 2020-03-09 NOTE — ASSESSMENT
[FreeTextEntry1] : 85-year-old male 2 weeks status post fall with a right proximal humerus fracture. Given the nature of the injury I believe will reverseshoulder replacement is indicated, the patient and his wife are in agreement. I will refer him to a colleague to book this procedure, he will remain nonweightbearing in the sling until that time.

## 2020-03-09 NOTE — HISTORY OF PRESENT ILLNESS
[FreeTextEntry1] : 03/09/2020: The patient is an 85-year-old right-hand-dominant male who presents to the office today 2 weeks removed from a right shoulder injury. He had a mechanical fall in his living room landing on his right shoulder. He was seen the next day at an urgent care and x-rays showed a proximal humerus fracture. He was sent immediately to the [facility] ER where x-rays and a CT scan showed a comminuted fracture of the proximal humerus. He had another fall approximately 5 days later. He presents to the office today in a shoulder immobilizer being sent from a colleague for further treatment. His x-rays today from the other office showed further displacement of the humeral head. He denies paresthesias.

## 2020-03-11 ENCOUNTER — NON-APPOINTMENT (OUTPATIENT)
Age: 85
End: 2020-03-11

## 2020-03-17 ENCOUNTER — APPOINTMENT (OUTPATIENT)
Dept: ORTHOPEDIC SURGERY | Facility: CLINIC | Age: 85
End: 2020-03-17

## 2020-03-18 ENCOUNTER — APPOINTMENT (OUTPATIENT)
Dept: ORTHOPEDIC SURGERY | Facility: HOSPITAL | Age: 85
End: 2020-03-18

## 2020-03-30 ENCOUNTER — APPOINTMENT (OUTPATIENT)
Dept: ORTHOPEDIC SURGERY | Facility: CLINIC | Age: 85
End: 2020-03-30

## 2020-06-02 ENCOUNTER — APPOINTMENT (OUTPATIENT)
Dept: ORTHOPEDIC SURGERY | Facility: CLINIC | Age: 85
End: 2020-06-02
Payer: MEDICARE

## 2020-06-02 VITALS
TEMPERATURE: 97.9 F | HEIGHT: 72 IN | DIASTOLIC BLOOD PRESSURE: 84 MMHG | BODY MASS INDEX: 26.41 KG/M2 | SYSTOLIC BLOOD PRESSURE: 127 MMHG | HEART RATE: 62 BPM | WEIGHT: 195 LBS

## 2020-06-02 PROCEDURE — 73030 X-RAY EXAM OF SHOULDER: CPT | Mod: RT

## 2020-06-02 PROCEDURE — 99214 OFFICE O/P EST MOD 30 MIN: CPT

## 2020-06-02 NOTE — PHYSICAL EXAM
[de-identified] : Physical Exam:\par General: Well appearing, no acute distress, A&O\par Neurologic: A&Ox3, No focal deficits\par Head: NCAT without abrasions, lacerations, or ecchymosis to head, face, or scalp\par Eyes: No scleral icterus, no gross abnormalities\par Respiratory: Equal chest wall expansion bilaterally, no accessory muscle use\par Lymphatic: No lymphadenopathy palpated\par Skin: Warm and dry\par Psychiatric: Normal mood and affect\par \par Right Shoulder\par ·	Inspection/Palpation: no tenderness, swelling or deformities\par ·	Range of Motion: no crepitus with ROM; Active FF 25; ER at side 15; IR to belly; Passive FF 45; ER at side 15; IR to belly\par ·	Strength: forward elevation in scapular plane [4/5], internal rotation [4/5], external rotation [4/5], adduction [4/5] and abduction [4/5]\par ·	Stability: Not tested today\par ·	Tests: Unable to test secondary to significant pain and decreased range of motion\par \par Left Shoulder\par ·	Inspection/Palpation: no tenderness, swelling or deformities\par ·	Range of Motion: full and painless in all planes, no crepitus\par ·	Strength: forward elevation in scapular plane 5/5, internal rotation 5/5, external rotation 5/5, adduction 5/5 and abduction 5/5\par ·	Stability: no joint instability on provocative testing\par Tests: Maria test negative, Neer sign negative, negative drop arm test secondary to pain, bear hug test negative, Napolean sign negative, cross arm adduction negative, lift off sign positive, hornblowers sign negative, speeds test negative, Yergason's test negative, no bicipital groove tenderness, Kohli's Active Compression test negative [de-identified] : 3 views of the right shoulder performed today available for me to review.  They demonstrates a approximately was fracture malunion.  No dislocation noted.

## 2020-06-02 NOTE — DISCUSSION/SUMMARY
[de-identified] : Rajan is a 89-year-old male who sustained injury to his right shoulder in February.  He was seen by 2 orthopedic surgeons and discussion was had regarding operative versus nonoperative management.  He was supposed to see me for possible reverse shoulder replacement in early March but due to the coronavirus was unable to see me in the office nor receive surgery secondary to his health risks as well as the pandemic.  After thorough discussion today with both him and his wife they would like to move forward with surgical intervention.  All risks, benefits and alternatives to the proposed surgical procedure, right reverse shoulder arthroplasty, were discussed in great detail with the patient. Risks include but are not limited to pain, bleeding, infection, stiffness, medical complications (including DVT, PE, MI), and risks of anesthesia. The patient expressed understanding and all questions were answered. The patient is electing to proceed, and will have the patient scheduled accordingly.

## 2020-06-02 NOTE — HISTORY OF PRESENT ILLNESS
[de-identified] : Rajan is a 85-year-old male sustained a right proximal humerus fracture 3 months ago.  He was seen by another orthopedic surgeon who referred to me for reverse shoulder replacement.  Due to the coronavirus his surgery had to be canceled and he comes in today for follow-up.  His shoulder pain is progressively worsened.  He has minimal motion in his shoulder secondary to pain and stiffness.  He denies injury or trauma to other extremities. [Worsening] : worsening [___ mths] : [unfilled] month(s) ago [2] : an average pain level of 2/10 [1] : a minimum pain level of 1/10 [4] : a maximum pain level of 4/10 [Lifting] : lifting [Rest] : relieved by rest

## 2020-06-11 ENCOUNTER — OUTPATIENT (OUTPATIENT)
Dept: OUTPATIENT SERVICES | Facility: HOSPITAL | Age: 85
LOS: 1 days | End: 2020-06-11
Payer: MEDICARE

## 2020-06-11 VITALS
SYSTOLIC BLOOD PRESSURE: 149 MMHG | OXYGEN SATURATION: 98 % | HEIGHT: 71 IN | DIASTOLIC BLOOD PRESSURE: 65 MMHG | WEIGHT: 190.04 LBS | RESPIRATION RATE: 14 BRPM | TEMPERATURE: 99 F | HEART RATE: 72 BPM

## 2020-06-11 DIAGNOSIS — Z98.49 CATARACT EXTRACTION STATUS, UNSPECIFIED EYE: Chronic | ICD-10-CM

## 2020-06-11 DIAGNOSIS — Z98.890 OTHER SPECIFIED POSTPROCEDURAL STATES: Chronic | ICD-10-CM

## 2020-06-11 DIAGNOSIS — Z96.642 PRESENCE OF LEFT ARTIFICIAL HIP JOINT: Chronic | ICD-10-CM

## 2020-06-11 DIAGNOSIS — Z95.5 PRESENCE OF CORONARY ANGIOPLASTY IMPLANT AND GRAFT: Chronic | ICD-10-CM

## 2020-06-11 DIAGNOSIS — Z29.9 ENCOUNTER FOR PROPHYLACTIC MEASURES, UNSPECIFIED: ICD-10-CM

## 2020-06-11 DIAGNOSIS — Z01.818 ENCOUNTER FOR OTHER PREPROCEDURAL EXAMINATION: ICD-10-CM

## 2020-06-11 DIAGNOSIS — Z90.89 ACQUIRED ABSENCE OF OTHER ORGANS: Chronic | ICD-10-CM

## 2020-06-11 LAB
ANION GAP SERPL CALC-SCNC: 4 MMOL/L — LOW (ref 5–17)
APTT BLD: 31.5 SEC — SIGNIFICANT CHANGE UP (ref 27.5–36.3)
BASOPHILS # BLD AUTO: 0.07 K/UL — SIGNIFICANT CHANGE UP (ref 0–0.2)
BASOPHILS NFR BLD AUTO: 0.6 % — SIGNIFICANT CHANGE UP (ref 0–2)
BUN SERPL-MCNC: 15 MG/DL — SIGNIFICANT CHANGE UP (ref 7–23)
CALCIUM SERPL-MCNC: 9.4 MG/DL — SIGNIFICANT CHANGE UP (ref 8.5–10.1)
CHLORIDE SERPL-SCNC: 103 MMOL/L — SIGNIFICANT CHANGE UP (ref 96–108)
CO2 SERPL-SCNC: 28 MMOL/L — SIGNIFICANT CHANGE UP (ref 22–31)
CREAT SERPL-MCNC: 0.94 MG/DL — SIGNIFICANT CHANGE UP (ref 0.5–1.3)
EOSINOPHIL # BLD AUTO: 0.16 K/UL — SIGNIFICANT CHANGE UP (ref 0–0.5)
EOSINOPHIL NFR BLD AUTO: 1.4 % — SIGNIFICANT CHANGE UP (ref 0–6)
GLUCOSE SERPL-MCNC: 100 MG/DL — HIGH (ref 70–99)
HCT VFR BLD CALC: 49.6 % — SIGNIFICANT CHANGE UP (ref 39–50)
HGB BLD-MCNC: 15.3 G/DL — SIGNIFICANT CHANGE UP (ref 13–17)
IMM GRANULOCYTES NFR BLD AUTO: 0.4 % — SIGNIFICANT CHANGE UP (ref 0–1.5)
INR BLD: 1.24 RATIO — HIGH (ref 0.88–1.16)
LYMPHOCYTES # BLD AUTO: 1.74 K/UL — SIGNIFICANT CHANGE UP (ref 1–3.3)
LYMPHOCYTES # BLD AUTO: 15 % — SIGNIFICANT CHANGE UP (ref 13–44)
MCHC RBC-ENTMCNC: 25.2 PG — LOW (ref 27–34)
MCHC RBC-ENTMCNC: 30.8 GM/DL — LOW (ref 32–36)
MCV RBC AUTO: 81.8 FL — SIGNIFICANT CHANGE UP (ref 80–100)
MONOCYTES # BLD AUTO: 1.27 K/UL — HIGH (ref 0–0.9)
MONOCYTES NFR BLD AUTO: 10.9 % — SIGNIFICANT CHANGE UP (ref 2–14)
NEUTROPHILS # BLD AUTO: 8.32 K/UL — HIGH (ref 1.8–7.4)
NEUTROPHILS NFR BLD AUTO: 71.7 % — SIGNIFICANT CHANGE UP (ref 43–77)
PLATELET # BLD AUTO: 190 K/UL — SIGNIFICANT CHANGE UP (ref 150–400)
POTASSIUM SERPL-MCNC: 4.9 MMOL/L — SIGNIFICANT CHANGE UP (ref 3.5–5.3)
POTASSIUM SERPL-SCNC: 4.9 MMOL/L — SIGNIFICANT CHANGE UP (ref 3.5–5.3)
PROTHROM AB SERPL-ACNC: 13.8 SEC — HIGH (ref 10–12.9)
RBC # BLD: 6.06 M/UL — HIGH (ref 4.2–5.8)
RBC # FLD: 16.8 % — HIGH (ref 10.3–14.5)
SODIUM SERPL-SCNC: 135 MMOL/L — SIGNIFICANT CHANGE UP (ref 135–145)
WBC # BLD: 11.61 K/UL — HIGH (ref 3.8–10.5)
WBC # FLD AUTO: 11.61 K/UL — HIGH (ref 3.8–10.5)

## 2020-06-11 PROCEDURE — 86900 BLOOD TYPING SEROLOGIC ABO: CPT

## 2020-06-11 PROCEDURE — 80048 BASIC METABOLIC PNL TOTAL CA: CPT

## 2020-06-11 PROCEDURE — 83036 HEMOGLOBIN GLYCOSYLATED A1C: CPT

## 2020-06-11 PROCEDURE — 85730 THROMBOPLASTIN TIME PARTIAL: CPT

## 2020-06-11 PROCEDURE — 36415 COLL VENOUS BLD VENIPUNCTURE: CPT

## 2020-06-11 PROCEDURE — 86850 RBC ANTIBODY SCREEN: CPT

## 2020-06-11 PROCEDURE — 85025 COMPLETE CBC W/AUTO DIFF WBC: CPT

## 2020-06-11 PROCEDURE — G0463: CPT | Mod: 25

## 2020-06-11 PROCEDURE — 86901 BLOOD TYPING SEROLOGIC RH(D): CPT

## 2020-06-11 PROCEDURE — 87641 MR-STAPH DNA AMP PROBE: CPT

## 2020-06-11 PROCEDURE — 87640 STAPH A DNA AMP PROBE: CPT

## 2020-06-11 PROCEDURE — 85610 PROTHROMBIN TIME: CPT

## 2020-06-11 RX ORDER — FAMOTIDINE 10 MG/ML
1 INJECTION INTRAVENOUS
Qty: 0 | Refills: 0 | DISCHARGE

## 2020-06-11 RX ORDER — TAMSULOSIN HYDROCHLORIDE 0.4 MG/1
1 CAPSULE ORAL
Qty: 0 | Refills: 0 | DISCHARGE

## 2020-06-11 RX ORDER — FLUTICASONE FUROATE AND VILANTEROL TRIFENATATE 100; 25 UG/1; UG/1
1 POWDER RESPIRATORY (INHALATION)
Qty: 0 | Refills: 0 | DISCHARGE

## 2020-06-11 RX ORDER — CITALOPRAM 10 MG/1
1 TABLET, FILM COATED ORAL
Qty: 0 | Refills: 0 | DISCHARGE

## 2020-06-11 RX ORDER — ATORVASTATIN CALCIUM 80 MG/1
1 TABLET, FILM COATED ORAL
Qty: 0 | Refills: 0 | DISCHARGE

## 2020-06-11 RX ORDER — AMLODIPINE BESYLATE 2.5 MG/1
1 TABLET ORAL
Qty: 0 | Refills: 0 | DISCHARGE

## 2020-06-11 RX ORDER — TICAGRELOR 90 MG/1
1 TABLET ORAL
Qty: 0 | Refills: 0 | DISCHARGE

## 2020-06-11 RX ORDER — TIOTROPIUM BROMIDE 18 UG/1
1 CAPSULE ORAL; RESPIRATORY (INHALATION)
Qty: 0 | Refills: 0 | DISCHARGE

## 2020-06-11 RX ORDER — UBIDECARENONE 100 MG
1 CAPSULE ORAL
Qty: 0 | Refills: 0 | DISCHARGE

## 2020-06-11 RX ORDER — LATANOPROST 0.05 MG/ML
1 SOLUTION/ DROPS OPHTHALMIC; TOPICAL
Qty: 0 | Refills: 0 | DISCHARGE

## 2020-06-11 NOTE — H&P PST ADULT - NSALCOHOLFREQ_GEN_A_CORE_SD
Pain much improved.  Patient resting comfortably.  Oxygen applied for decreased SpO2.  Patient speaking to daughter on phone.  Radiology called for CT scan.  Patient encouraged to call with needs.   monthly or less

## 2020-06-11 NOTE — H&P PST ADULT - NSICDXPASTSURGICALHX_GEN_ALL_CORE_FT
PAST SURGICAL HISTORY:  H/O bilateral inguinal hernia repair 2016    H/O colonoscopy     History of tonsillectomy     History of total hip replacement, left 2011    S/P cataract surgery b/l    Status post coronary artery stent placement reports stents x 4?

## 2020-06-11 NOTE — H&P PST ADULT - NSANTHOSAYNRD_GEN_A_CORE
No. COSME screening performed.  STOP BANG Legend: 0-2 = LOW Risk; 3-4 = INTERMEDIATE Risk; 5-8 = HIGH Risk

## 2020-06-11 NOTE — H&P PST ADULT - HISTORY OF PRESENT ILLNESS
84 yo  male presents to PST. c/o shoulder fracture s/p fall in his living room 3/2020. Was brought to [facility] ED by wife Yoselin. Reports having broken humerus. States "They wanted to see if they could heal it without surgery". c/o right shoulder pain & decreased ROM. Now coming in for surgical fixation of right shoulder with replacement. Denies taking pain medication.

## 2020-06-11 NOTE — H&P PST ADULT - ATTENDING COMMENTS
Patient seen and examined. Discussed the risks and benefits of the procedure. Patient understands and would like to move forward with surgical intervention  Ortho Attending:  Agree with above PA note.  Note edited where necessary.      Virgilio Nuñez, DO  Orthopaedic Surgery

## 2020-06-11 NOTE — H&P PST ADULT - MUSCULOSKELETAL COMMENTS
Reports walking with cane s/p left THR. Reports right shoulder/arm pain right shoulder decreased ROM, right arm strength unable to assess d/t right humerus fracture

## 2020-06-11 NOTE — H&P PST ADULT - NSICDXPASTMEDICALHX_GEN_ALL_CORE_FT
PAST MEDICAL HISTORY:  BPH (benign prostatic hyperplasia)     CAD (coronary artery disease)     COPD (chronic obstructive pulmonary disease)     Glaucoma     Glaucoma of both eyes, unspecified glaucoma     H/O Clostridium difficile infection s/p left THR    Hard of hearing     HTN (hypertension)     Humerus fracture proximal, right    Hyperlipidemia     Shoulder pain, right     Swelling of ankle b/l

## 2020-06-11 NOTE — H&P PST ADULT - ASSESSMENT
86 yo male scheduled for right reverse shoulder replacement vs total shoulder repalcement  with Dr. Nuñez    1. UA, CBC w diff, BMP, T&S, PT/INR  2. EKG  3. Medical optimization with  4. discussed EZ sponges & PST day of procedure instructions. NPO as per instructions from [facility]  5. Instructed to increase po fluids the day before surgery. Instructed to hold aspirin, NSAIDs, fish oil, vitamins & herbal supplements 7 days prior to surgery  6. CXR  7.COVID-19 PCR swab to be done in hospital on , pt aware. consent signed & on chart. order entered in computer    CAPRINI SCORE    AGE RELATED RISK FACTORS                                                       MOBILITY RELATED FACTORS  [ ] Age 41-60 years                                            (1 Point)                  [ ] Bed rest                                                        (1 Point)  [ ] Age: 61-74 years                                           (2 Points)                [ ] Plaster cast                                                   (2 Points)  [ ] Age= 75 years                                              (3 Points)                 [ ] Bed bound for more than 72 hours                   (2 Points)    DISEASE RELATED RISK FACTORS                                               GENDER SPECIFIC FACTORS  [ ] Edema in the lower extremities                       (1 Point)                  [ ] Pregnancy                                                     (1 Point)  [ ] Varicose veins                                               (1 Point)                  [ ] Post-partum < 6 weeks                                   (1 Point)             [ ] BMI > 25 Kg/m2                                            (1 Point)                  [ ] Hormonal therapy  or oral contraception            (1 Point)                 [ ] Sepsis (in the previous month)                        (1 Point)                  [ ] History of pregnancy complications  [ ] Pneumonia or serious lung disease                                               [ ] Unexplained or recurrent                       (1 Point)           (in the previous month)                               (1 Point)  [ ] Abnormal pulmonary function test                     (1 Point)                 SURGERY RELATED RISK FACTORS  [ ] Acute myocardial infarction                              (1 Point)                 [ ]  Section                                            (1 Point)  [ ] Congestive heart failure (in the previous month)  (1 Point)                 [ ] Minor surgery                                                 (1 Point)   [ ] Inflammatory bowel disease                             (1 Point)                 [ ] Arthroscopic surgery                                        (2 Points)  [ ] Central venous access                                    (2 Points)                [ ] General surgery lasting more than 45 minutes   (2 Points)       [ ] Stroke (in the previous month)                          (5 Points)               [ ] Elective arthroplasty                                        (5 Points)            [  ] cancer/malignancy                                        (2 points)                                                                                                HEMATOLOGY RELATED FACTORS                                                 TRAUMA RELATED RISK FACTORS  [ ] Prior episodes of VTE                                     (3 Points)                 [ ] Fracture of the hip, pelvis, or leg                       (5 Points)  [ ] Positive family history for VTE                         (3 Points)                 [ ] Acute spinal cord injury (in the previous month)  (5 Points)  [ ] Prothrombin 34267 A                                      (3 Points)                 [ ] Paralysis  (less than 1 month)                          (5 Points)  [ ] Factor V Leiden                                             (3 Points)                 [ ] Multiple Trauma within 1 month                         (5 Points)  [ ] Lupus anticoagulants                                     (3 Points)                                                           [ ] Anticardiolipin antibodies                                (3 Points)                                                       [ ] High homocysteine in the blood                      (3 Points)                                             [ ] Other congenital or acquired thrombophilia       (3 Points)                                                [ ] Heparin induced thrombocytopenia                  (3 Points)                                          Total Score [          ]    The Caprini score indicates that this patient is at high risk for a VTE event (score => 6).    Ssurgical patients in this group will benefit from both pharmacologic prophylaxis and intermittent compression devices.    The surgical team will determine the balance between VTE risk and bleeding risk, and other clinical considerations. 84 yo male scheduled for right reverse shoulder replacement vs total shoulder repalcement  with Dr. Nuñez    1 CBC w diff, BMP, T&S, PT/INR, PTT, MRSA nares. pt unable to void d/t incontinence  2. EKG on chart from Dr Watts  3. Medical optimization with PCP/cardio Dr Trey Watts  4. discussed EZ sponges, mupirocin & PST day of procedure instructions. NPO as per instructions from [facility]  5. Instructed to increase po fluids the day before surgery. Instructed to hold NSAIDs, fish oil, vitamins & herbal supplements 7 days prior to surgery  6. CXR  7.COVID-19 PCR swab to be done in hospital on , pt & wife Yoselin made aware. consent signed & on chart. order entered in computer  8. Pt states he was instructed by Dr Watts not to stop baby aspirin for surgery    CAPRINI SCORE    AGE RELATED RISK FACTORS                                                       MOBILITY RELATED FACTORS  [ ] Age 41-60 years                                            (1 Point)                  [ ] Bed rest                                                        (1 Point)  [ ] Age: 61-74 years                                           (2 Points)                [ ] Plaster cast                                                   (2 Points)  [x ] Age= 75 years                                              (3 Points)                 [ ] Bed bound for more than 72 hours                   (2 Points)    DISEASE RELATED RISK FACTORS                                               GENDER SPECIFIC FACTORS  [x ] Edema in the lower extremities                       (1 Point)                  [ ] Pregnancy                                                     (1 Point)  [ ] Varicose veins                                               (1 Point)                  [ ] Post-partum < 6 weeks                                   (1 Point)             [x ] BMI > 25 Kg/m2                                            (1 Point)                  [ ] Hormonal therapy  or oral contraception            (1 Point)                 [ ] Sepsis (in the previous month)                        (1 Point)                  [ ] History of pregnancy complications  [ ] Pneumonia or serious lung disease                                               [ ] Unexplained or recurrent                       (1 Point)           (in the previous month)                               (1 Point)  [ ] Abnormal pulmonary function test                     (1 Point)                 SURGERY RELATED RISK FACTORS  [ ] Acute myocardial infarction                              (1 Point)                 [ ]  Section                                            (1 Point)  [ ] Congestive heart failure (in the previous month)  (1 Point)                 [ ] Minor surgery                                                 (1 Point)   [ ] Inflammatory bowel disease                             (1 Point)                 [ ] Arthroscopic surgery                                        (2 Points)  [ ] Central venous access                                    (2 Points)                [ ] General surgery lasting more than 45 minutes   (2 Points)       [ ] Stroke (in the previous month)                          (5 Points)               [x ] Elective arthroplasty                                        (5 Points)            [  ] cancer/malignancy                                        (2 points)                                                                                                HEMATOLOGY RELATED FACTORS                                                 TRAUMA RELATED RISK FACTORS  [ ] Prior episodes of VTE                                     (3 Points)                 [ ] Fracture of the hip, pelvis, or leg                       (5 Points)  [ ] Positive family history for VTE                         (3 Points)                 [ ] Acute spinal cord injury (in the previous month)  (5 Points)  [ ] Prothrombin 47773 A                                      (3 Points)                 [ ] Paralysis  (less than 1 month)                          (5 Points)  [ ] Factor V Leiden                                             (3 Points)                 [ ] Multiple Trauma within 1 month                         (5 Points)  [ ] Lupus anticoagulants                                     (3 Points)                                                           [ ] Anticardiolipin antibodies                                (3 Points)                                                       [ ] High homocysteine in the blood                      (3 Points)                                             [ ] Other congenital or acquired thrombophilia       (3 Points)                                                [ ] Heparin induced thrombocytopenia                  (3 Points)                                          Total Score [  9        ]    The Caprini score indicates that this patient is at high risk for a VTE event (score => 6).    Ssurgical patients in this group will benefit from both pharmacologic prophylaxis and intermittent compression devices.    The surgical team will determine the balance between VTE risk and bleeding risk, and other clinical considerations.

## 2020-06-12 DIAGNOSIS — Z01.818 ENCOUNTER FOR OTHER PREPROCEDURAL EXAMINATION: ICD-10-CM

## 2020-06-12 LAB
A1C WITH ESTIMATED AVERAGE GLUCOSE RESULT: 5.8 % — HIGH (ref 4–5.6)
ESTIMATED AVERAGE GLUCOSE: 120 MG/DL — HIGH (ref 68–114)
MRSA PCR RESULT.: SIGNIFICANT CHANGE UP
S AUREUS DNA NOSE QL NAA+PROBE: SIGNIFICANT CHANGE UP

## 2020-06-15 ENCOUNTER — APPOINTMENT (OUTPATIENT)
Dept: DISASTER EMERGENCY | Facility: CLINIC | Age: 85
End: 2020-06-15

## 2020-06-16 ENCOUNTER — OUTPATIENT (OUTPATIENT)
Dept: OUTPATIENT SERVICES | Facility: HOSPITAL | Age: 85
LOS: 1 days | End: 2020-06-16
Payer: MEDICARE

## 2020-06-16 DIAGNOSIS — Z98.890 OTHER SPECIFIED POSTPROCEDURAL STATES: Chronic | ICD-10-CM

## 2020-06-16 DIAGNOSIS — Z96.642 PRESENCE OF LEFT ARTIFICIAL HIP JOINT: Chronic | ICD-10-CM

## 2020-06-16 DIAGNOSIS — Z95.5 PRESENCE OF CORONARY ANGIOPLASTY IMPLANT AND GRAFT: Chronic | ICD-10-CM

## 2020-06-16 DIAGNOSIS — Z11.59 ENCOUNTER FOR SCREENING FOR OTHER VIRAL DISEASES: ICD-10-CM

## 2020-06-16 DIAGNOSIS — Z90.89 ACQUIRED ABSENCE OF OTHER ORGANS: Chronic | ICD-10-CM

## 2020-06-16 DIAGNOSIS — Z98.49 CATARACT EXTRACTION STATUS, UNSPECIFIED EYE: Chronic | ICD-10-CM

## 2020-06-16 PROCEDURE — U0003: CPT

## 2020-06-17 DIAGNOSIS — Z11.59 ENCOUNTER FOR SCREENING FOR OTHER VIRAL DISEASES: ICD-10-CM

## 2020-06-17 LAB — SARS-COV-2 RNA SPEC QL NAA+PROBE: SIGNIFICANT CHANGE UP

## 2020-06-18 ENCOUNTER — APPOINTMENT (OUTPATIENT)
Dept: ORTHOPEDIC SURGERY | Facility: HOSPITAL | Age: 85
End: 2020-06-18

## 2020-06-18 ENCOUNTER — INPATIENT (INPATIENT)
Facility: HOSPITAL | Age: 85
LOS: 0 days | Discharge: HOME | DRG: 483 | End: 2020-06-19
Attending: ORTHOPAEDIC SURGERY | Admitting: ORTHOPAEDIC SURGERY
Payer: MEDICARE

## 2020-06-18 ENCOUNTER — TRANSCRIPTION ENCOUNTER (OUTPATIENT)
Age: 85
End: 2020-06-18

## 2020-06-18 ENCOUNTER — RESULT REVIEW (OUTPATIENT)
Age: 85
End: 2020-06-18

## 2020-06-18 VITALS
OXYGEN SATURATION: 98 % | HEIGHT: 71 IN | WEIGHT: 190.04 LBS | TEMPERATURE: 97 F | DIASTOLIC BLOOD PRESSURE: 78 MMHG | SYSTOLIC BLOOD PRESSURE: 164 MMHG | RESPIRATION RATE: 18 BRPM | HEART RATE: 74 BPM

## 2020-06-18 DIAGNOSIS — Z95.5 PRESENCE OF CORONARY ANGIOPLASTY IMPLANT AND GRAFT: Chronic | ICD-10-CM

## 2020-06-18 DIAGNOSIS — Z96.642 PRESENCE OF LEFT ARTIFICIAL HIP JOINT: Chronic | ICD-10-CM

## 2020-06-18 DIAGNOSIS — Z98.49 CATARACT EXTRACTION STATUS, UNSPECIFIED EYE: Chronic | ICD-10-CM

## 2020-06-18 DIAGNOSIS — Z98.890 OTHER SPECIFIED POSTPROCEDURAL STATES: Chronic | ICD-10-CM

## 2020-06-18 DIAGNOSIS — Z90.89 ACQUIRED ABSENCE OF OTHER ORGANS: Chronic | ICD-10-CM

## 2020-06-18 DIAGNOSIS — S42.291D OTHER DISPLACED FRACTURE OF UPPER END OF RIGHT HUMERUS, SUBSEQUENT ENCOUNTER FOR FRACTURE WITH ROUTINE HEALING: ICD-10-CM

## 2020-06-18 LAB
ANION GAP SERPL CALC-SCNC: 4 MMOL/L — LOW (ref 5–17)
BUN SERPL-MCNC: 12 MG/DL — SIGNIFICANT CHANGE UP (ref 7–23)
CALCIUM SERPL-MCNC: 8.5 MG/DL — SIGNIFICANT CHANGE UP (ref 8.5–10.1)
CHLORIDE SERPL-SCNC: 108 MMOL/L — SIGNIFICANT CHANGE UP (ref 96–108)
CO2 SERPL-SCNC: 27 MMOL/L — SIGNIFICANT CHANGE UP (ref 22–31)
CREAT SERPL-MCNC: 0.87 MG/DL — SIGNIFICANT CHANGE UP (ref 0.5–1.3)
GLUCOSE SERPL-MCNC: 141 MG/DL — HIGH (ref 70–99)
HCT VFR BLD CALC: 42.8 % — SIGNIFICANT CHANGE UP (ref 39–50)
HGB BLD-MCNC: 13.2 G/DL — SIGNIFICANT CHANGE UP (ref 13–17)
MCHC RBC-ENTMCNC: 25.7 PG — LOW (ref 27–34)
MCHC RBC-ENTMCNC: 30.8 GM/DL — LOW (ref 32–36)
MCV RBC AUTO: 83.4 FL — SIGNIFICANT CHANGE UP (ref 80–100)
PLATELET # BLD AUTO: 169 K/UL — SIGNIFICANT CHANGE UP (ref 150–400)
POTASSIUM SERPL-MCNC: 4.7 MMOL/L — SIGNIFICANT CHANGE UP (ref 3.5–5.3)
POTASSIUM SERPL-SCNC: 4.7 MMOL/L — SIGNIFICANT CHANGE UP (ref 3.5–5.3)
RBC # BLD: 5.13 M/UL — SIGNIFICANT CHANGE UP (ref 4.2–5.8)
RBC # FLD: 16.3 % — HIGH (ref 10.3–14.5)
SODIUM SERPL-SCNC: 139 MMOL/L — SIGNIFICANT CHANGE UP (ref 135–145)
WBC # BLD: 12.75 K/UL — HIGH (ref 3.8–10.5)
WBC # FLD AUTO: 12.75 K/UL — HIGH (ref 3.8–10.5)

## 2020-06-18 PROCEDURE — 85027 COMPLETE CBC AUTOMATED: CPT

## 2020-06-18 PROCEDURE — 80048 BASIC METABOLIC PNL TOTAL CA: CPT

## 2020-06-18 PROCEDURE — 73030 X-RAY EXAM OF SHOULDER: CPT | Mod: 26,RT

## 2020-06-18 PROCEDURE — 23472 RECONSTRUCT SHOULDER JOINT: CPT | Mod: 22,RT

## 2020-06-18 PROCEDURE — 20902 REMOVAL OF BONE FOR GRAFT: CPT | Mod: RT

## 2020-06-18 PROCEDURE — 36415 COLL VENOUS BLD VENIPUNCTURE: CPT

## 2020-06-18 PROCEDURE — 97530 THERAPEUTIC ACTIVITIES: CPT | Mod: GP

## 2020-06-18 PROCEDURE — 88311 DECALCIFY TISSUE: CPT | Mod: 26

## 2020-06-18 PROCEDURE — C1713: CPT

## 2020-06-18 PROCEDURE — 88311 DECALCIFY TISSUE: CPT

## 2020-06-18 PROCEDURE — C1776: CPT

## 2020-06-18 PROCEDURE — 97116 GAIT TRAINING THERAPY: CPT | Mod: GP

## 2020-06-18 PROCEDURE — 73030 X-RAY EXAM OF SHOULDER: CPT | Mod: RT

## 2020-06-18 PROCEDURE — 71045 X-RAY EXAM CHEST 1 VIEW: CPT

## 2020-06-18 PROCEDURE — 88305 TISSUE EXAM BY PATHOLOGIST: CPT | Mod: 26

## 2020-06-18 PROCEDURE — 97162 PT EVAL MOD COMPLEX 30 MIN: CPT | Mod: GP

## 2020-06-18 PROCEDURE — 88305 TISSUE EXAM BY PATHOLOGIST: CPT

## 2020-06-18 RX ORDER — SODIUM CHLORIDE 9 MG/ML
1000 INJECTION, SOLUTION INTRAVENOUS
Refills: 0 | Status: DISCONTINUED | OUTPATIENT
Start: 2020-06-18 | End: 2020-06-19

## 2020-06-18 RX ORDER — ONDANSETRON 8 MG/1
4 TABLET, FILM COATED ORAL ONCE
Refills: 0 | Status: DISCONTINUED | OUTPATIENT
Start: 2020-06-18 | End: 2020-06-19

## 2020-06-18 RX ORDER — PROCHLORPERAZINE MALEATE 5 MG
10 TABLET ORAL ONCE
Refills: 0 | Status: DISCONTINUED | OUTPATIENT
Start: 2020-06-18 | End: 2020-06-19

## 2020-06-18 RX ORDER — ATORVASTATIN CALCIUM 80 MG/1
1 TABLET, FILM COATED ORAL
Qty: 0 | Refills: 0 | DISCHARGE

## 2020-06-18 RX ORDER — CEFAZOLIN SODIUM 1 G
2000 VIAL (EA) INJECTION EVERY 8 HOURS
Refills: 0 | Status: COMPLETED | OUTPATIENT
Start: 2020-06-18 | End: 2020-06-19

## 2020-06-18 RX ORDER — OXYCODONE HYDROCHLORIDE 5 MG/1
5 TABLET ORAL ONCE
Refills: 0 | Status: DISCONTINUED | OUTPATIENT
Start: 2020-06-18 | End: 2020-06-19

## 2020-06-18 RX ORDER — LATANOPROST 0.05 MG/ML
1 SOLUTION/ DROPS OPHTHALMIC; TOPICAL AT BEDTIME
Refills: 0 | Status: DISCONTINUED | OUTPATIENT
Start: 2020-06-18 | End: 2020-06-19

## 2020-06-18 RX ORDER — OXYCODONE HYDROCHLORIDE 5 MG/1
10 TABLET ORAL EVERY 4 HOURS
Refills: 0 | Status: DISCONTINUED | OUTPATIENT
Start: 2020-06-18 | End: 2020-06-19

## 2020-06-18 RX ORDER — FAMOTIDINE 10 MG/ML
0 INJECTION INTRAVENOUS
Qty: 0 | Refills: 0 | DISCHARGE

## 2020-06-18 RX ORDER — HYDROMORPHONE HYDROCHLORIDE 2 MG/ML
0.5 INJECTION INTRAMUSCULAR; INTRAVENOUS; SUBCUTANEOUS EVERY 4 HOURS
Refills: 0 | Status: DISCONTINUED | OUTPATIENT
Start: 2020-06-18 | End: 2020-06-19

## 2020-06-18 RX ORDER — ASPIRIN/CALCIUM CARB/MAGNESIUM 324 MG
1 TABLET ORAL
Qty: 0 | Refills: 0 | DISCHARGE

## 2020-06-18 RX ORDER — FAMOTIDINE 10 MG/ML
20 INJECTION INTRAVENOUS DAILY
Refills: 0 | Status: DISCONTINUED | OUTPATIENT
Start: 2020-06-18 | End: 2020-06-19

## 2020-06-18 RX ORDER — ATORVASTATIN CALCIUM 80 MG/1
20 TABLET, FILM COATED ORAL AT BEDTIME
Refills: 0 | Status: DISCONTINUED | OUTPATIENT
Start: 2020-06-18 | End: 2020-06-19

## 2020-06-18 RX ORDER — ONDANSETRON 8 MG/1
4 TABLET, FILM COATED ORAL EVERY 6 HOURS
Refills: 0 | Status: DISCONTINUED | OUTPATIENT
Start: 2020-06-18 | End: 2020-06-19

## 2020-06-18 RX ORDER — SODIUM CHLORIDE 9 MG/ML
1000 INJECTION INTRAMUSCULAR; INTRAVENOUS; SUBCUTANEOUS
Refills: 0 | Status: DISCONTINUED | OUTPATIENT
Start: 2020-06-18 | End: 2020-06-19

## 2020-06-18 RX ORDER — FENTANYL CITRATE 50 UG/ML
50 INJECTION INTRAVENOUS
Refills: 0 | Status: DISCONTINUED | OUTPATIENT
Start: 2020-06-18 | End: 2020-06-19

## 2020-06-18 RX ORDER — LATANOPROST 0.05 MG/ML
1 SOLUTION/ DROPS OPHTHALMIC; TOPICAL
Qty: 0 | Refills: 0 | DISCHARGE

## 2020-06-18 RX ORDER — TAMSULOSIN HYDROCHLORIDE 0.4 MG/1
0.4 CAPSULE ORAL AT BEDTIME
Refills: 0 | Status: DISCONTINUED | OUTPATIENT
Start: 2020-06-18 | End: 2020-06-19

## 2020-06-18 RX ORDER — ASPIRIN/CALCIUM CARB/MAGNESIUM 324 MG
325 TABLET ORAL DAILY
Refills: 0 | Status: DISCONTINUED | OUTPATIENT
Start: 2020-06-18 | End: 2020-06-19

## 2020-06-18 RX ORDER — SENNA PLUS 8.6 MG/1
2 TABLET ORAL AT BEDTIME
Refills: 0 | Status: DISCONTINUED | OUTPATIENT
Start: 2020-06-18 | End: 2020-06-19

## 2020-06-18 RX ORDER — MAGNESIUM HYDROXIDE 400 MG/1
30 TABLET, CHEWABLE ORAL DAILY
Refills: 0 | Status: DISCONTINUED | OUTPATIENT
Start: 2020-06-18 | End: 2020-06-19

## 2020-06-18 RX ORDER — AMLODIPINE BESYLATE 2.5 MG/1
1 TABLET ORAL
Qty: 0 | Refills: 0 | DISCHARGE

## 2020-06-18 RX ORDER — HYDROMORPHONE HYDROCHLORIDE 2 MG/ML
0.5 INJECTION INTRAMUSCULAR; INTRAVENOUS; SUBCUTANEOUS
Refills: 0 | Status: DISCONTINUED | OUTPATIENT
Start: 2020-06-18 | End: 2020-06-19

## 2020-06-18 RX ORDER — OXYCODONE HYDROCHLORIDE 5 MG/1
5 TABLET ORAL EVERY 4 HOURS
Refills: 0 | Status: DISCONTINUED | OUTPATIENT
Start: 2020-06-18 | End: 2020-06-19

## 2020-06-18 RX ORDER — FLUTICASONE FUROATE, UMECLIDINIUM BROMIDE AND VILANTEROL TRIFENATATE 200; 62.5; 25 UG/1; UG/1; UG/1
1 POWDER RESPIRATORY (INHALATION)
Qty: 0 | Refills: 0 | DISCHARGE

## 2020-06-18 RX ORDER — POLYETHYLENE GLYCOL 3350 17 G/17G
17 POWDER, FOR SOLUTION ORAL DAILY
Refills: 0 | Status: DISCONTINUED | OUTPATIENT
Start: 2020-06-18 | End: 2020-06-19

## 2020-06-18 RX ADMIN — ATORVASTATIN CALCIUM 20 MILLIGRAM(S): 80 TABLET, FILM COATED ORAL at 22:25

## 2020-06-18 RX ADMIN — TAMSULOSIN HYDROCHLORIDE 0.4 MILLIGRAM(S): 0.4 CAPSULE ORAL at 22:25

## 2020-06-18 RX ADMIN — SODIUM CHLORIDE 100 MILLILITER(S): 9 INJECTION INTRAMUSCULAR; INTRAVENOUS; SUBCUTANEOUS at 20:58

## 2020-06-18 RX ADMIN — LATANOPROST 1 DROP(S): 0.05 SOLUTION/ DROPS OPHTHALMIC; TOPICAL at 22:25

## 2020-06-18 RX ADMIN — Medication 325 MILLIGRAM(S): at 22:25

## 2020-06-18 RX ADMIN — Medication 10 MILLIGRAM(S): at 22:25

## 2020-06-18 RX ADMIN — Medication 100 MILLIGRAM(S): at 22:25

## 2020-06-18 NOTE — DISCHARGE NOTE PROVIDER - HOSPITAL COURSE
The patient is an 85 year old male status post reverse total shoulder arthroplasty to the right shoulder after suffering proximal humerus fracture and failing outpatient non-operative conservative management. Patient presented to [facility] after being medically cleared for an elective surgical procedure. The patient was taken to the operating room on date mentioned above. Prophylactic antibiotics were started before the procedure and continued for 24 hours.  There were no complications during the procedure and patient tolerated the procedure well. The patient was transferred to recovery room in stable condition and subsequently to surgical floor. Patient was placed on aspirin for anticoagulation.  All home medications were continued. The patient received physical therapy daily and daily labs were followed. The dressing was kept clean, dry, intact. The rest of the hospital stay was unremarkable.

## 2020-06-18 NOTE — DISCHARGE NOTE PROVIDER - NSDCMRMEDTOKEN_GEN_ALL_CORE_FT
aspirin 81 mg oral tablet: 1 tab(s) orally once a day  atorvastatin 20 mg oral tablet: 1 tab(s) orally once a day (at bedtime)  enalapril 10 mg oral tablet: 1 tab(s) orally 2 times a day  famotidine 20 mg oral tablet:  orally once a day  latanoprost 0.005% ophthalmic solution: 1 drop(s) to each affected eye once a day (at bedtime)  oxycodone-acetaminophen 5 mg-325 mg oral tablet: 1 tab(s) orally every 4 to 6 hours, As Needed -for moderate pain - for severe pain MDD:4   tamsulosin 0.4 mg oral capsule: 1 cap(s) orally once a day (at bedtime)  Vitamin B12: orally once a day  Vitamin D3: orally once a day

## 2020-06-18 NOTE — DISCHARGE NOTE PROVIDER - NSDCFUADDINST_GEN_ALL_CORE_FT
1.	Pain Control  2.	Walking with full weight bearing as tolerated, with assistive devices (walker/Cane as Needed); Non weight bearing right upper extremity  3.	DVT Prophylaxis for 30 days per anticoagulation team  4.	PT as needed  5.	Follow up with Dr. Nuñez as outpatient in 10-14 Days after discharge from the hospital or rehab. Call office for appointment.  6.	Remove Staples Post-Op Day 14, and Remove Dressing Post-Op Day 7, with Daily Dressing Changes as Need.  7.	Ice affected area as Needed  8.	Keep Dressing clean and dry. 1.	Pain Control  2.	Walking with full weight bearing as tolerated, with assistive devices (walker/Cane as Needed); Non weight bearing right upper extremity  3.	DVT Prophylaxis for 30 days per anticoagulation team  4.	PT as needed  5.	Follow up with Dr. Nuñez as outpatient in 10-14 Days after discharge from the hospital or rehab. Call office for appointment.  6.	Dr Nuñez to Remove Staples Post-Op Day 14 in office, and Remove Dressing Post-Op Day 7, with Daily Dressing Changes as Need.  7.	Ice affected area as Needed  8.	Keep Dressing clean and dry.

## 2020-06-18 NOTE — DISCHARGE NOTE PROVIDER - NSDCFUSCHEDAPPT_GEN_ALL_CORE_FT
TENA MCCARTY ; 06/29/2020 ; [facility] TENA MCCARTY ; 06/29/2020 ; [facility]

## 2020-06-18 NOTE — ASU PATIENT PROFILE, ADULT - PSH
H/O bilateral inguinal hernia repair  2016  H/O colonoscopy    History of tonsillectomy    History of total hip replacement, left  2011  S/P cataract surgery  b/l  Status post coronary artery stent placement  reports stents x 4?

## 2020-06-18 NOTE — ASU PATIENT PROFILE, ADULT - PMH
BPH (benign prostatic hyperplasia)    CAD (coronary artery disease)    COPD (chronic obstructive pulmonary disease)    Glaucoma    Glaucoma of both eyes, unspecified glaucoma    H/O Clostridium difficile infection  s/p left THR  Hard of hearing    HTN (hypertension)    Humerus fracture  proximal, right  Hyperlipidemia    Shoulder pain, right    Swelling of ankle  b/l

## 2020-06-18 NOTE — BRIEF OPERATIVE NOTE - NSICDXBRIEFPREOP_GEN_ALL_CORE_FT
PRE-OP DIAGNOSIS:  Closed 4-part fracture of proximal end of right humerus 18-Jun-2020 12:44:25  Virgilio Nuñez

## 2020-06-18 NOTE — BRIEF OPERATIVE NOTE - NSICDXBRIEFPOSTOP_GEN_ALL_CORE_FT
POST-OP DIAGNOSIS:  Closed 4-part fracture of proximal end of right humerus 18-Jun-2020 12:44:28  Virgilio Nuñez

## 2020-06-18 NOTE — DISCHARGE NOTE PROVIDER - NSDCCPCAREPLAN_GEN_ALL_CORE_FT
PRINCIPAL DISCHARGE DIAGNOSIS  Diagnosis: Other displaced fracture of proximal end of right humerus  Assessment and Plan of Treatment:

## 2020-06-18 NOTE — PROGRESS NOTE ADULT - SUBJECTIVE AND OBJECTIVE BOX
Ortho post-op check    Pt seen and examined at bedside, resting comfortably. Tolerated procedure well without complications. Denies numbness/tingling, chest pain, SOB.    T(C): 36.2 (06-18-20 @ 21:01), Max: 36.4 (06-18-20 @ 20:25)  HR: 78 (06-18-20 @ 21:01) (65 - 84)  BP: 141/64 (06-18-20 @ 21:01) (73/53 - 164/78)  RR: 15 (06-18-20 @ 21:01) (13 - 20)  SpO2: 97% (06-18-20 @ 21:01) (95% - 98%)                          13.2   12.75 )-----------( 169      ( 18 Jun 2020 18:43 )             42.8     18 Jun 2020 18:43    139    |  108    |  12     ----------------------------<  141    4.7     |  27     |  0.87     Ca    8.5        18 Jun 2020 18:43        Physical Exam:  Gen: NAD, A&Ox3    RUE:  In shoulder immobilizer  Dressing C/D/I  SILT C5-T1  + Musc/Axillary/Median/Ulnar/Radial/AIN/PIN motor intact  + Radial pulse  Compartments soft, compressible      A/P: 85yMale s/p R rTSA POD0    Tolerated procedure well without complications    - Pain control  - PT/OT  - NWB RUE in shoulder immobilizer  - DVT prophylaxis to start tomorrow per anticoagulation team  - Perioperative antibiotics per protocol  - Encourage incentive spirometry, deep breathing exercises  - Will discuss with attending, Dr. Nuñez and advise if plan changes    Erasmo Modi, DO PGY1  Orthopaedic Surgery Ortho post-op check    Pt seen and examined at bedside, resting comfortably. Tolerated procedure well without complications. Denies numbness/tingling, chest pain, SOB.    T(C): 36.2 (06-18-20 @ 21:01), Max: 36.4 (06-18-20 @ 20:25)  HR: 78 (06-18-20 @ 21:01) (65 - 84)  BP: 141/64 (06-18-20 @ 21:01) (73/53 - 164/78)  RR: 15 (06-18-20 @ 21:01) (13 - 20)  SpO2: 97% (06-18-20 @ 21:01) (95% - 98%)                          13.2   12.75 )-----------( 169      ( 18 Jun 2020 18:43 )             42.8     18 Jun 2020 18:43    139    |  108    |  12     ----------------------------<  141    4.7     |  27     |  0.87     Ca    8.5        18 Jun 2020 18:43        Physical Exam:  Gen: NAD, A&Ox3    RUE:  In shoulder immobilizer  Dressing C/D/I  SILT C5-T1  + Musc/Axillary/Median/Ulnar/Radial/PIN motor intact  - AIN  + Radial pulse  Compartments soft, compressible      A/P: 85yMale s/p R rTSA POD0    Tolerated procedure well without complications    - AIN dysfunction likely from nerve block; will monitor and re-evaluate AM  - Pain control  - PT/OT  - NWB RUE in shoulder immobilizer  - DVT prophylaxis to start tomorrow per anticoagulation team  - Perioperative antibiotics per protocol  - Encourage incentive spirometry, deep breathing exercises  - Will discuss with attending, Dr. Nuñez and advise if plan changes    Erasmo Modi,  PGY1  Orthopaedic Surgery Ortho post-op check    Pt seen and examined at bedside, resting comfortably. Tolerated procedure well without complications. Denies numbness/tingling, chest pain, SOB.    T(C): 36.2 (06-18-20 @ 21:01), Max: 36.4 (06-18-20 @ 20:25)  HR: 78 (06-18-20 @ 21:01) (65 - 84)  BP: 141/64 (06-18-20 @ 21:01) (73/53 - 164/78)  RR: 15 (06-18-20 @ 21:01) (13 - 20)  SpO2: 97% (06-18-20 @ 21:01) (95% - 98%)                          13.2   12.75 )-----------( 169      ( 18 Jun 2020 18:43 )             42.8     18 Jun 2020 18:43    139    |  108    |  12     ----------------------------<  141    4.7     |  27     |  0.87     Ca    8.5        18 Jun 2020 18:43        Physical Exam:  Gen: NAD, A&Ox3    RUE:  In shoulder immobilizer  Dressing C/D/I  SILT C5-T1  + Musc/Axillary/Median/Ulnar/Radial motor intact  Thumb extension/2nd finger flexion weak  + Radial pulse  Compartments soft, compressible      A/P: 85yMale s/p R rTSA POD0    Tolerated procedure well without complications    - AIN/PIN dysfunction likely from nerve block; will monitor and re-evaluate AM  - Pain control  - PT/OT  - NWB RUE in shoulder immobilizer  - DVT prophylaxis to start tomorrow per anticoagulation team  - Perioperative antibiotics per protocol  - Encourage incentive spirometry, deep breathing exercises  - Will discuss with attending, Dr. Nuñez and advise if plan changes    Erasmo Modi,  PGY1  Orthopaedic Surgery

## 2020-06-19 ENCOUNTER — TRANSCRIPTION ENCOUNTER (OUTPATIENT)
Age: 85
End: 2020-06-19

## 2020-06-19 ENCOUNTER — FORM ENCOUNTER (OUTPATIENT)
Age: 85
End: 2020-06-19

## 2020-06-19 VITALS — OXYGEN SATURATION: 93 %

## 2020-06-19 LAB
ANION GAP SERPL CALC-SCNC: 6 MMOL/L — SIGNIFICANT CHANGE UP (ref 5–17)
BUN SERPL-MCNC: 14 MG/DL — SIGNIFICANT CHANGE UP (ref 7–23)
CALCIUM SERPL-MCNC: 8.1 MG/DL — LOW (ref 8.5–10.1)
CHLORIDE SERPL-SCNC: 108 MMOL/L — SIGNIFICANT CHANGE UP (ref 96–108)
CO2 SERPL-SCNC: 25 MMOL/L — SIGNIFICANT CHANGE UP (ref 22–31)
CREAT SERPL-MCNC: 0.89 MG/DL — SIGNIFICANT CHANGE UP (ref 0.5–1.3)
GLUCOSE SERPL-MCNC: 141 MG/DL — HIGH (ref 70–99)
HCT VFR BLD CALC: 39.8 % — SIGNIFICANT CHANGE UP (ref 39–50)
HGB BLD-MCNC: 12.4 G/DL — LOW (ref 13–17)
MCHC RBC-ENTMCNC: 25.6 PG — LOW (ref 27–34)
MCHC RBC-ENTMCNC: 31.2 GM/DL — LOW (ref 32–36)
MCV RBC AUTO: 82.1 FL — SIGNIFICANT CHANGE UP (ref 80–100)
PLATELET # BLD AUTO: 166 K/UL — SIGNIFICANT CHANGE UP (ref 150–400)
POTASSIUM SERPL-MCNC: 4.6 MMOL/L — SIGNIFICANT CHANGE UP (ref 3.5–5.3)
POTASSIUM SERPL-SCNC: 4.6 MMOL/L — SIGNIFICANT CHANGE UP (ref 3.5–5.3)
RBC # BLD: 4.85 M/UL — SIGNIFICANT CHANGE UP (ref 4.2–5.8)
RBC # FLD: 15.9 % — HIGH (ref 10.3–14.5)
SODIUM SERPL-SCNC: 139 MMOL/L — SIGNIFICANT CHANGE UP (ref 135–145)
WBC # BLD: 11.68 K/UL — HIGH (ref 3.8–10.5)
WBC # FLD AUTO: 11.68 K/UL — HIGH (ref 3.8–10.5)

## 2020-06-19 PROCEDURE — 71045 X-RAY EXAM CHEST 1 VIEW: CPT | Mod: 26

## 2020-06-19 PROCEDURE — 99223 1ST HOSP IP/OBS HIGH 75: CPT

## 2020-06-19 PROCEDURE — 99233 SBSQ HOSP IP/OBS HIGH 50: CPT

## 2020-06-19 RX ORDER — ASPIRIN/CALCIUM CARB/MAGNESIUM 324 MG
1 TABLET ORAL
Qty: 14 | Refills: 0
Start: 2020-06-19 | End: 2020-07-02

## 2020-06-19 RX ORDER — ASPIRIN/CALCIUM CARB/MAGNESIUM 324 MG
1 TABLET ORAL
Qty: 0 | Refills: 0 | DISCHARGE

## 2020-06-19 RX ADMIN — Medication 325 MILLIGRAM(S): at 10:29

## 2020-06-19 RX ADMIN — Medication 10 MILLIGRAM(S): at 10:29

## 2020-06-19 RX ADMIN — Medication 100 MILLIGRAM(S): at 05:27

## 2020-06-19 RX ADMIN — FAMOTIDINE 20 MILLIGRAM(S): 10 INJECTION INTRAVENOUS at 10:29

## 2020-06-19 NOTE — DISCHARGE NOTE NURSING/CASE MANAGEMENT/SOCIAL WORK - PATIENT PORTAL LINK FT
You can access the FollowMyHealth Patient Portal offered by [facility] by registering at the following website: [email or web address]. By joining [facility]’s FollowMyHealth portal, you will also be able to view your health information using other applications (apps) compatible with our system.

## 2020-06-19 NOTE — CONSULT NOTE ADULT - SUBJECTIVE AND OBJECTIVE BOX
c/c: low spo2.    HPI: 85M, pmh of COPD (not o2 dependent), CAD/Stents, BPH, HTN, HLD OA who is admitted for reverse Right TSA. he underwent procedure 6/18. today spo2 noted to be hypoxic on routine vital check. Hospitalist service consulted. Pt seen and examined on 2N. Felt well. Right shoulder pain controlled. Denies sob/chest pain. Not using the spirometer much. Has cough, intermittently productive of clear sputum. No fevers/chills rigors. Uses trelogy at home which he has not been using here.     ROS: all 10 systems reviewed and is as above otherwise negative    PMH: as above  PSH: b/l inguinal hernia repair, tonsillectomy, left THR, b/l cataract  F/H: mother-HTN:  Social: tobacco-10 year smoking history, quit many years ago, occasional ETOH  Allergies; NKDA  Home meds: See med rec    Vital Signs Last 24 Hrs  T(C): 36.3 (19 Jun 2020 09:53), Max: 36.4 (18 Jun 2020 20:25)  T(F): 97.3 (19 Jun 2020 09:53), Max: 97.6 (18 Jun 2020 20:25)  HR: 91 (19 Jun 2020 09:53) (65 - 91)  BP: 106/42 (19 Jun 2020 09:53) (73/53 - 141/64)  BP(mean): 57 (19 Jun 2020 09:53) (57 - 57)  RR: 16 (19 Jun 2020 09:53) (13 - 20)  SpO2: 93% (19 Jun 2020 11:18) (91% - 98%)    PHYSICAL EXAM:    GENERAL: Comfortable, no acute distress   HEAD:  Normocephalic, atraumatic  EYES: EOMI, PERRLA  HEENT: Moist mucous membranes  NECK: Supple, No JVD  NERVOUS SYSTEM:  Alert & Oriented X3, grossly non focal.   CHEST/LUNG: decreased bs right base,no wheeze.  HEART: Regular rate and rhythm  ABDOMEN: Soft, Nontender, Nondistended, Bowel sounds present  GENITOURINARY: Voiding, no palpable bladder  EXTREMITIES:   No clubbing, cyanosis, or edema  MUSCULOSKELETAL- RUE in sling, dressing intact  SKIN-no rash    LABS:                        12.4   11.68 )-----------( 166      ( 19 Jun 2020 06:36 )             39.8     06-19    139  |  108  |  14  ----------------------------<  141<H>  4.6   |  25  |  0.89    Ca    8.1<L>      19 Jun 2020 06:36      MEDICATIONS  (STANDING):  aspirin enteric coated 325 milliGRAM(s) Oral daily  atorvastatin 20 milliGRAM(s) Oral at bedtime  enalapril 10 milliGRAM(s) Oral two times a day  famotidine    Tablet 20 milliGRAM(s) Oral daily  latanoprost 0.005% Ophthalmic Solution 1 Drop(s) Both EYES at bedtime  polyethylene glycol 3350 17 Gram(s) Oral daily  sodium chloride 0.9%. 1000 milliLiter(s) (100 mL/Hr) IV Continuous <Continuous>  tamsulosin Oral Tab/Cap - Peds 0.4 milliGRAM(s) Oral at bedtime    MEDICATIONS  (PRN):  aluminum hydroxide/magnesium hydroxide/simethicone Suspension 30 milliLiter(s) Oral four times a day PRN Indigestion  HYDROmorphone  Injectable 0.5 milliGRAM(s) IV Push every 4 hours PRN Severe Pain (7 - 10)  magnesium hydroxide Suspension 30 milliLiter(s) Oral daily PRN Constipation  ondansetron Injectable 4 milliGRAM(s) IV Push every 6 hours PRN Nausea and/or Vomiting  oxyCODONE    IR 5 milliGRAM(s) Oral every 4 hours PRN Mild Pain (1 - 3)  oxyCODONE    IR 10 milliGRAM(s) Oral every 4 hours PRN Moderate Pain (4 - 6)  prochlorperazine   Injectable 10 milliGRAM(s) IV Push once PRN Nausea  senna 2 Tablet(s) Oral at bedtime PRN Constipation    ASSESSMENT AND PLAN   85M, PMH as above a/w:    1. Atelectasis Right base:  -doubt pna.   -encourage incentive spirometry use.   -resume trelogy on dc.  -spo2 improved with deep breaths.     2. COPD:  -no s/o exacerbation  -resume home meds on dc as above.     3. Right shoulder TSA:  -pain control  -ambulate  -bowel regimen.     4. HTN:  -contninue ace-i    5. HLD:  -statin    6. BPH:  -flomax.     7. DVT px    8. Dispo:  no contraindications for dc.   d/w RN/Ortho

## 2020-06-19 NOTE — PHYSICAL THERAPY INITIAL EVALUATION ADULT - PERTINENT HX OF CURRENT PROBLEM, REHAB EVAL
84 yo  male presents to PST. c/o shoulder fracture s/p fall in his living room 3/2020. Was brought to [facility] ED by wife Yoselin. Reports having broken humerus. States "They wanted to see if they could heal it without surgery". c/o right shoulder pain & decreased ROM. In for surgical fixation of right shoulder with replacement.

## 2020-06-19 NOTE — CONSULT NOTE ADULT - SUBJECTIVE AND OBJECTIVE BOX
HPI:   This is a 85 year old male with hx of  shoulder fracture s/p fall in his living room 3/2020. Was brought to [facility] ED by wife Yoselin. Reports having broken humerus. States "They wanted to see if they could heal it without surgery". c/o right shoulder pain & decreased ROM..     Patient is a 85y old  Male who presents with a chief complaint of Right reverse TSA  on 2020      Consulted by Dr. Virgilio Nuñez  for VTE prophylaxis, risk stratification, and anticoagulation management.    PAST MEDICAL & SURGICAL HISTORY:  H/O Clostridium difficile infection: s/p left THR  Hard of hearing  Humerus fracture: proximal, right  Shoulder pain, right  Swelling of ankle: b/l  Glaucoma  BPH (benign prostatic hyperplasia)  CAD (coronary artery disease)  Glaucoma of both eyes, unspecified glaucoma  COPD (chronic obstructive pulmonary disease)  Hyperlipidemia  HTN (hypertension)  S/P cataract surgery: b/l  H/O colonoscopy  History of total hip replacement, left:   Status post coronary artery stent placement: reports stents x 4?  History of tonsillectomy  H/O bilateral inguinal hernia repair: 2016    Interval Note  2020 Pt seen on 2north oob in chair.  Discussed him stopping his 81mg of aspirin and taking 325mg of enteric coated aspirin once a day for two weeks then resume his 81mg after that.   Pt v/u of the above instructions.     FAMILY HISTORY:  Family history of hypertension in mother    CAPRINI SCORE  AGE RELATED RISK FACTORS                                                       MOBILITY RELATED FACTORS  [ ] Age 41-60 years                                            (1 Point)                  [ ] Bed rest                                                        (1 Point)  [ ] Age: 61-74 years                                           (2 Points)                [ ] Plaster cast                                                   (2 Points)  [x ] Age= 75 years                                              (3 Points)                 [ ] Bed bound for more than 72 hours                   (2 Points)    DISEASE RELATED RISK FACTORS                                               GENDER SPECIFIC FACTORS  [ ] Edema in the lower extremities                       (1 Point)           [ ] Pregnancy                                                            (1 Point)  [ ] Varicose veins                                               (1 Point)                  [ ] Post-partum < 6 weeks                                      (1 Point)             [x ] BMI > 25 Kg/m2                                            (1 Point)                  [ ] Hormonal therapy or oral contraception       (1 Point)                 [ ] Sepsis (in the previous month)                        (1 Point)             [ ] History of pregnancy complications                (1Point)  [ ] Pneumonia or serious lung disease                                             [ ] Unexplained or recurrent  (=/>3), premature                                 (In the previous month)                               (1 Point)                birth with toxemia or growth-restricted infant (1 Point)  [ ] Abnormal pulmonary function test            (1 Point)                                   SURGERY RELATED RISK FACTORS  [ ] Acute myocardial infarction                       (1 Point)                  [ ]  Section                                         (1 Point)  [ ] Congestive heart failure (in the previous month) (1 Point)   [ ] Minor surgery   lasting <45 minutes       (1 Point)   [ ] Inflammatory bowel disease                             (1 Point)          [ ] Arthroscopic surgery                                  (2 Points)  [ ] Central venous access                                    (2 Points)            [ x] General surgery lasting >45 minutes      (2 Points)       [ ] Stroke (in the previous month)                  (5 Points)            [ ] Elective major lower extremity arthroplasty (5 Points)                                   [  ] Malignancy (present or past include skin melanoma                                          but exclude  basal skin cell)    (2 points)                                      TRAUMA RELATED RISK FACTORS                HEMATOLOGY RELATED FACTORS                                  [ ] Fracture of the hip, pelvis, or leg                       (5 Points)  [ ] Prior episodes of VTE                                     (3 Points)          [ ] Acute spinal cord injury (in the previous month)  (5 Points)  [ ] Positive family history for VTE                         (3 Points)       [ ] Paralysis (less than 1 month)                          (5 Points)  [ ] Prothrombin 86908 A                                      (3 Points)         [ ] Multiple Trauma (within 1month)                 (5Points)                                                                                                                                                                [ ] Factor V Leiden                                          (3 Points)                                OTHER RISK FACTORS                          [ ] Lupus anticoagulants                                     (3 Points)                       [ ] BMI > 40                          (1 Point)                                                         [ ] Anticardiolipin antibodies                                (3 Points)                   [ ] Smoking                              (1Point)                                                [ ] High homocysteine in the blood                      (3 Points)                [  ] Diabetes requiring insulin (1point)                         [ ] Other congenital or acquired thrombophilia       (3 Points)          [  ] Chemotherapy                   (1 Point)  [ ] Heparin induced thrombocytopenia                  (3 Points)             [  ] Blood Transfusion                (1 point)                                                                                                             Total Score [ 6 ]                                                                                                                     IMPROVE Bleeding Risk Score: 1.5    Falls Risk:   High (  )  Mod ( x )  Low (  )        EBL:   150ml  cr cl:72.7   Time procedure    : starts:  14:02             :ends17:54          Denies any personal or familial history of clotting or bleeding disorders.    Allergies    No Known Allergies    Intolerances        REVIEW OF SYSTEMS    (  )Fever	     (  )Constipation	(  )SOB				(  )Headache	(  )Dysuria  (  )Chills	     (  )Melena	(  )Dyspnea present on exertion	                    (  )Dizziness                    (  )Polyuria  (  )Nausea	     (  )Hematochezia	(  )Cough			                    (  )Syncope   	(  )Hematuria  (  )Vomiting    (  )Chest Pain	(  )Wheezing			(  )Weakness  (  )Diarrhea     (  )Palpitations	(  )Anorexia			(  )Myalgia    Pertinent positives in HPI and daily subjective.  All other ROS negative.    Unable to obtain review of systems due to:  Vital Signs Last 24 Hrs  T(C): 36.3 (2020 09:53), Max: 36.4 (2020 20:25)  T(F): 97.3 (2020 09:53), Max: 97.6 (2020 20:25)  HR: 91 (2020 09:53) (65 - 91)  BP: 106/42 (2020 09:53) (73/53 - 141/64)  BP(mean): 57 (2020 09:53) (57 - 57)  RR: 16 (2020 09:53) (13 - 20)  SpO2: 93% (2020 11:18) (91% - 98%)    PHYSICAL EXAM:    Constitutional: Appears Well    Neurological: A& O x 3    Skin: Warm    Respiratory and Thorax: normal effort; Breath sounds: normal; No rales/wheezing/rhonchi  	  Cardiovascular: S1, S2, regular, NMBR	    Gastrointestinal: BS + x 4Q, nontender	    Genitourinary:  Bladder nondistended, nontender    Musculoskeletal:   General Right:   no muscle/joint tenderness,   normal tone, no joint swelling,   ROM: limited/full	    General Left:   no muscle/joint tenderness,   normal tone, no joint swelling,   ROM: limited/full      Shoulder:  Rt: Drsing CDI; Sling/immob. noted; Cap refill good; Radial pulse +; Sensation intact                      Lower extrems:   Right: no calf tenderness              negative geovanni's sign               + pedal pulses    Left:   no calf tenderness              negative geovanni's sign               + pedal pulses                          12.4   11.68 )-----------( 166      ( 2020 06:36 )             39.8       06-19    139  |  108  |  14  ----------------------------<  141<H>  4.6   |  25  |  0.89    Ca    8.1<L>      2020 06:36        				    MEDICATIONS  (STANDING):  aspirin enteric coated 325 milliGRAM(s) Oral daily  atorvastatin 20 milliGRAM(s) Oral at bedtime  enalapril 10 milliGRAM(s) Oral two times a day  famotidine    Tablet 20 milliGRAM(s) Oral daily  latanoprost 0.005% Ophthalmic Solution 1 Drop(s) Both EYES at bedtime  polyethylene glycol 3350 17 Gram(s) Oral daily  sodium chloride 0.9%. 1000 milliLiter(s) IV Continuous <Continuous>  tamsulosin Oral Tab/Cap - Peds 0.4 milliGRAM(s) Oral at bedtime          DVT Prophylaxis:  LMWH                   (  )  Heparin SQ           (  )  Coumadin             (  )  Xarelto                  (  )  Eliquis                   (  )  Venodynes           (x  )  Ambulation          (  x)  UFH                       (  )  Contraindicated  (  )  EC Aspirin             ( x )

## 2020-06-19 NOTE — PHYSICAL THERAPY INITIAL EVALUATION ADULT - MODALITIES TREATMENT COMMENTS
Pt encountered supine in bed with immobilizer. Pt educated on shoulder precautions. Pt performed therapeutic exercise in b Pt ambulated 25 feet with straight cane. Pt returned to bedside chair with chair alarm active and call bell within reach. ed

## 2020-06-19 NOTE — CONSULT NOTE ADULT - ASSESSMENT
This is a 85 year old male with hx of  shoulder fracture s/p fall in his living room 3/2020. Was brought to [facility] ED by wife Yoselin. Reports having broken humerus. States "They wanted to see if they could heal it without surgery". c/o right shoulder pain & decreased ROM. Now s/p rt  reverse shoulder replacement  on 6-..  Pt has moderate Thrombosis risk.      plan:  Plan:  :Aspirin 325mg enteric coated one tab once a day for  2 weeks.  :daily cbc/bmp  :le Venodynes  :increase mobility as tolerated  :thanks for consult will sign off please reconsult as needed.

## 2020-06-22 DIAGNOSIS — I25.10 ATHEROSCLEROTIC HEART DISEASE OF NATIVE CORONARY ARTERY WITHOUT ANGINA PECTORIS: ICD-10-CM

## 2020-06-22 DIAGNOSIS — M19.90 UNSPECIFIED OSTEOARTHRITIS, UNSPECIFIED SITE: ICD-10-CM

## 2020-06-22 DIAGNOSIS — S42.201P UNSPECIFIED FRACTURE OF UPPER END OF RIGHT HUMERUS, SUBSEQUENT ENCOUNTER FOR FRACTURE WITH MALUNION: ICD-10-CM

## 2020-06-22 DIAGNOSIS — Z95.5 PRESENCE OF CORONARY ANGIOPLASTY IMPLANT AND GRAFT: ICD-10-CM

## 2020-06-22 DIAGNOSIS — R26.81 UNSTEADINESS ON FEET: ICD-10-CM

## 2020-06-22 DIAGNOSIS — N40.0 BENIGN PROSTATIC HYPERPLASIA WITHOUT LOWER URINARY TRACT SYMPTOMS: ICD-10-CM
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
## 2020-06-22 DIAGNOSIS — I10 ESSENTIAL (PRIMARY) HYPERTENSION: ICD-10-CM

## 2020-06-22 DIAGNOSIS — E78.5 HYPERLIPIDEMIA, UNSPECIFIED: ICD-10-CM

## 2020-06-29 ENCOUNTER — APPOINTMENT (OUTPATIENT)
Dept: ORTHOPEDIC SURGERY | Facility: CLINIC | Age: 85
End: 2020-06-29
Payer: MEDICARE

## 2020-06-29 PROCEDURE — 73030 X-RAY EXAM OF SHOULDER: CPT | Mod: RT

## 2020-06-29 PROCEDURE — 99024 POSTOP FOLLOW-UP VISIT: CPT

## 2020-06-29 NOTE — HISTORY OF PRESENT ILLNESS
[___ Days Post Op] : post op day #[unfilled] [Clean/Dry/Intact] : clean, dry and intact [Neuro Intact] : an unremarkable neurological exam [Negative Amanad's] : maneuvers demonstrated a negative Amanda's sign [Vascular Intact] : ~T peripheral vascular exam normal [Chills] : no chills [Fever] : no fever [Nausea] : no nausea [Vomiting] : no vomiting [Healed] : not healed [Erythema] : not erythematous [Discharge] : absent of discharge [Swelling] : not swollen [Dehiscence] : not dehisced [de-identified] : S/P right reverse shoulder replacement. DOS: 06/18/2020. [de-identified] : TENA is a 86 year old male who presents today, 11 days S/P right reverse shoulder replacement.\par \par Patient admits to not feeling well today although he has a history of COPD. His cough is stable and he is afebrile.\par  [de-identified] : Physical Exam:\par General: Well appearing, no acute distress\par Neurologic: A&Ox3, No focal deficits\par Head: NCAT without abrasions, lacerations, or ecchymosis to head, face, or scalp\par Eyes: No scleral icterus, no gross abnormalities\par Respiratory: Equal chest wall expansion bilaterally, no accessory muscle use\par Lymphatic: No lymphadenopathy palpated\par Skin: Warm and dry\par Psychiatric: Normal mood and affect\par \par Right Shoulder\par Incisions are clean, dry and intact. No surrounding erythema. No drainage. Wound appears to be healing well.   Unable to test ROM due to recent surgical procedure. Motor and sensation are intact distally. He has full range of motion of all fingers with capillary refill of <2 seconds throughout. He has good nerve function and median nerve, ulnar, radial, PIN, AIN. He has no sensory deficits over the C5-T1 nerve roots. Radial pulses 2+. [de-identified] : 2 view xrays of pts right shoulder were performed today and available for me to review. They demonstrate adequate position of hardware. No fracture. No dislocation. No other deformities. [de-identified] : TENA is a 86 year old male who presents today, 11 days S/P right reverse shoulder replacement. He presents to day in a sling with a pillow. He denies fever/ chills or discharge from incision sites.\par \par Surgical sites are clean and dry without warmth or erythema, pt denies fever or chills.  He is to remain in the sling until we see him again at his next post-op nicci and continue with being fully nonweightbearing to this extremity. He will start PT in 1 week from today 2-3x/week alongside HEP until we see them again in 4 weeks for clinical reassessment. He may use tylenol prn for pain. \par \par Patient admits to not feeling well today although he has a history of COPD. His cough is stable and he is afebrile. With recent surgery in the hospital he is to call his PCP and be seen by him today due to not feeling well. He agrees to the latter plan and does not have any further questions or concerns.

## 2020-07-01 ENCOUNTER — NON-APPOINTMENT (OUTPATIENT)
Age: 85
End: 2020-07-01

## 2020-07-03 PROBLEM — Z86.19 PERSONAL HISTORY OF OTHER INFECTIOUS AND PARASITIC DISEASES: Chronic | Status: ACTIVE | Noted: 2020-06-11

## 2020-07-03 PROBLEM — M25.511 PAIN IN RIGHT SHOULDER: Chronic | Status: ACTIVE | Noted: 2020-06-11

## 2020-07-03 PROBLEM — H91.90 UNSPECIFIED HEARING LOSS, UNSPECIFIED EAR: Chronic | Status: ACTIVE | Noted: 2020-06-11

## 2020-07-03 PROBLEM — M25.473 EFFUSION, UNSPECIFIED ANKLE: Chronic | Status: ACTIVE | Noted: 2020-06-11

## 2020-07-03 PROBLEM — S42.309A UNSPECIFIED FRACTURE OF SHAFT OF HUMERUS, UNSPECIFIED ARM, INITIAL ENCOUNTER FOR CLOSED FRACTURE: Chronic | Status: ACTIVE | Noted: 2020-06-11

## 2020-07-21 ENCOUNTER — APPOINTMENT (OUTPATIENT)
Dept: ORTHOPEDIC SURGERY | Facility: CLINIC | Age: 85
End: 2020-07-21
Payer: MEDICARE

## 2020-07-21 PROCEDURE — 99024 POSTOP FOLLOW-UP VISIT: CPT

## 2020-07-21 PROCEDURE — 73030 X-RAY EXAM OF SHOULDER: CPT | Mod: RT

## 2020-07-23 NOTE — HISTORY OF PRESENT ILLNESS
[___ Months Post Op] : [unfilled] months post op [___ Days Post Op] : post op day #[unfilled] [Clean/Dry/Intact] : clean, dry and intact [Neuro Intact] : an unremarkable neurological exam [Negative Amanda's] : maneuvers demonstrated a negative Amanda's sign [Vascular Intact] : ~T peripheral vascular exam normal [Chills] : no chills [Fever] : no fever [Nausea] : no nausea [Vomiting] : no vomiting [Healed] : not healed [Erythema] : not erythematous [Discharge] : absent of discharge [Swelling] : not swollen [Dehiscence] : not dehisced [de-identified] : S/P right reverse shoulder replacement. DOS: 06/18/2020. [de-identified] : TENA is a 86 year old male who presents today, 1 month ago S/P right reverse shoulder replacement.\par \par Patient admits to feeling better since last seeing us and he is afebrile with a stable cough with chronic hx of COPD.\par  [de-identified] : Physical Exam:\par General: Well appearing, no acute distress\par Neurologic: A&Ox3, No focal deficits\par Head: NCAT without abrasions, lacerations, or ecchymosis to head, face, or scalp\par Eyes: No scleral icterus, no gross abnormalities\par Respiratory: Equal chest wall expansion bilaterally, no accessory muscle use\par Lymphatic: No lymphadenopathy palpated\par Skin: Warm and dry\par Psychiatric: Normal mood and affect\par \par Right Shoulder\par Incisions are clean, dry and intact. No surrounding erythema. No drainage. Wound appears to be healing well.   Unable to test ROM due to recent surgical procedure. Motor and sensation are intact distally. He has full range of motion of all fingers with capillary refill of <2 seconds throughout. He has good nerve function and median nerve, ulnar, radial, PIN, AIN. He has no sensory deficits over the C5-T1 nerve roots. Radial pulses 2+. [de-identified] : Use of the right shoulder performed today available for me to review.  They demonstrate acquisition of the hardware.  No dislocation noted.  No fracture. [de-identified] : TENA is a 86 year old male who presents today, 1 month ago S/P right reverse shoulder replacement.\par \par Patient admits to feeling better since last seeing us and he is afebrile with a stable cough with chronic hx of COPD.\par  \par He presents to day in a sling with a pillow. He denies fever/ chills or discharge from incision sites.\par \par Surgical sites are clean and dry without warmth or erythema, pt denies fever or chills.  He is to remain in the sling until we see him again at his next post-op nicci and continue with being fully nonweightbearing to this extremity. He will continue PT  2-3x/week alongside HEP until we see them again in 6 weeks for clinical reassessment. He may use tylenol prn for pain. He agrees to the latter plan and does not have any further questions or concerns.

## 2020-09-01 ENCOUNTER — APPOINTMENT (OUTPATIENT)
Age: 85
End: 2020-09-01
Payer: MEDICARE

## 2020-09-01 DIAGNOSIS — Z98.890 OTHER SPECIFIED POSTPROCEDURAL STATES: ICD-10-CM

## 2020-09-01 PROCEDURE — 73030 X-RAY EXAM OF SHOULDER: CPT | Mod: RT

## 2020-09-01 PROCEDURE — 99024 POSTOP FOLLOW-UP VISIT: CPT

## 2020-09-01 NOTE — HISTORY OF PRESENT ILLNESS
[___ Days Post Op] : post op day #[unfilled] [Clean/Dry/Intact] : clean, dry and intact [Neuro Intact] : an unremarkable neurological exam [Vascular Intact] : ~T peripheral vascular exam normal [Negative Amanda's] : maneuvers demonstrated a negative Amanda's sign [Chills] : no chills [Fever] : no fever [Nausea] : no nausea [Vomiting] : no vomiting [Healed] : not healed [Erythema] : not erythematous [Discharge] : absent of discharge [Swelling] : not swollen [Dehiscence] : not dehisced [de-identified] : S/P right reverse shoulder replacement. DOS: 06/18/2020. [de-identified] : TENA is a 86 year old male who presents today, 2 1/2 months ago S/P right reverse shoulder replacement.\par \par Patient admits to feeling better since last seeing us and he is afebrile with a stable cough with chronic hx of COPD. [de-identified] : Physical Exam:\par General: Well appearing, no acute distress\par Neurologic: A&Ox3, No focal deficits\par Head: NCAT without abrasions, lacerations, or ecchymosis to head, face, or scalp\par Eyes: No scleral icterus, no gross abnormalities\par Respiratory: Equal chest wall expansion bilaterally, no accessory muscle use\par Lymphatic: No lymphadenopathy palpated\par Skin: Warm and dry\par Psychiatric: Normal mood and affect\par \par Right Shoulder\par Incisions are clean, dry and intact. No surrounding erythema. No drainage. Wound appears to be healing well.   Unable to test ROM due to recent surgical procedure. Motor and sensation are intact distally. He has full range of motion of all fingers with capillary refill of <2 seconds throughout. He has good nerve function and median nerve, ulnar, radial, PIN, AIN. He has no sensory deficits over the C5-T1 nerve roots. Radial pulses 2+. [de-identified] : Xrays of the right shoulder performed today available for me to review.  They demonstrate acquisition of the hardware.  No dislocation noted.  No fracture. [de-identified] : TENA is a 86 year old male who presents today, 2 1/2 months ago S/P right reverse shoulder replacement.\par \par Patient admits to feeling better since last seeing us and he is afebrile with a stable cough with chronic hx of COPD.\par  \par He presents today without his sling on.He denies fever/ chills or discharge from incision sites.\par \par Surgical sites are clean and dry without warmth or erythema, pt denies fever or chills.  He may continue out of his sling and increase weightbearing status and his ROM with PT. He will perform PT  2-3x/week alongside HEP until we see him again in 2 months for clinical reassessment. He may use tylenol prn for pain. He agrees to the latter plan and does not have any further questions or concerns.

## 2020-10-21 NOTE — PHYSICAL THERAPY INITIAL EVALUATION ADULT - ASSISTIVE DEVICE FOR TRANSFER: GAIT, REHAB EVAL
Patient and responsible adult verbalized understanding of discharge instructions, sedation medication, and potential complications including pain. Patient instructed to call Doctor if complications occur.
piercing's on the day of surgery. All jewelry must be removed. If you have dentures, they will be removed before going to operating room. For your convenience, we will provide you with a container. If you wear contact lenses or glasses, they will be removed, please bring a case for them. If you have a living will and a durable power of  for healthcare, please bring in a copy. As part of our patient safety program to minimize surgical site infections, we ask you to do the following:    · Please notify your surgeon if you develop any illness between         now and the  day of your surgery. · This includes a cough, cold, fever, sore throat, nausea,         or vomiting, and diarrhea, etc.  ·  Please notify your surgeon if you experience dizziness, shortness         of breath or blurred vision between now and the time of your surgery. Do not shave your operative site 96 hours prior to surgery. For face and neck surgery, men may use an electric razor 48 hours   prior to surgery. You may shower the night before surgery or the morning of   your surgery with an antibacterial soap. You will need to bring a photo ID and insurance card    [facility] has an onsite pharmacy, would you like to utilize our pharmacy     If you will be staying overnight and use a C-pap machine, please bring   your C-pap to hospital     Our goal is to provide you with excellent care, therefore, visitors will be limited to two(2) in the room at a time so that we may focus on providing this care for you. Please contact pre-admission testing if you have any further questions. [facility] phone number:  [phone]  Please note these are generalized instructions for all surgical cases, you may be provided with more specific instructions according to your surgery.
straight cane

## 2020-12-15 PROBLEM — N39.0 URINARY TRACT INFECTION, BACTERIAL: Status: RESOLVED | Noted: 2017-04-07 | Resolved: 2020-12-15

## 2021-07-03 ENCOUNTER — TRANSCRIPTION ENCOUNTER (OUTPATIENT)
Age: 86
End: 2021-07-03

## 2021-07-08 ENCOUNTER — APPOINTMENT (OUTPATIENT)
Dept: UROLOGY | Facility: CLINIC | Age: 86
End: 2021-07-08
Payer: MEDICARE

## 2021-07-08 VITALS
DIASTOLIC BLOOD PRESSURE: 67 MMHG | HEIGHT: 72 IN | HEART RATE: 80 BPM | WEIGHT: 185 LBS | OXYGEN SATURATION: 92 % | BODY MASS INDEX: 25.06 KG/M2 | SYSTOLIC BLOOD PRESSURE: 130 MMHG | TEMPERATURE: 97.7 F

## 2021-07-08 PROCEDURE — 99204 OFFICE O/P NEW MOD 45 MIN: CPT

## 2021-07-08 RX ORDER — FAMOTIDINE 10 MG/1
TABLET, FILM COATED ORAL
Refills: 0 | Status: ACTIVE | COMMUNITY

## 2021-07-08 NOTE — HISTORY OF PRESENT ILLNESS
[FreeTextEntry1] : 87 year old man  with complaint of gross hematuria. This began weeks ago, with 2 episodes of pink urine. He was started on bactrim by urgent care.\par It is mild in severity as the patient denies any further gross hematuria or LUTS. Nothing makes symptoms occur. \par It is associated with nothing. No family history contributory to Gross hematuria.

## 2021-07-08 NOTE — ASSESSMENT
[FreeTextEntry1] : 87 year old man with gross hematuria. We discussed that the differential diagnosis includes both benign and malignant conditions including renal stones, BPH, urinary tract infections, and cancer of the bladder or ureter or kidney. Cystoscopy was recommended to rule out pathology in the bladder. CT Urogram was recommended to evaluate for presence of nephroureteral stones or malignancies. Urinalysis and urine culture were recommended to check for urinary tract infection. Pt agrees and understands.

## 2021-07-08 NOTE — REVIEW OF SYSTEMS
[Feeling Tired] : feeling tired [Eyesight Problems] : eyesight problems [Shortness Of Breath] : shortness of breath [Constipation] : constipation [No erections] : no erections [Slow urine stream] : slow urine stream [Interrupted urine stream] : interrupted urine stream [Skin Lesions] : skin lesion [Skin Wound] : skin wound [Limb Weakness] : limb weakness [Difficulty Walking] : difficulty walking [see HPI] : see HPI [Easy Bleeding] : a tendency for easy bleeding [Easy Bruising] : a tendency for easy bruising [Negative] : Endocrine

## 2021-07-12 LAB
APPEARANCE: ABNORMAL
BACTERIA UR CULT: NORMAL
BACTERIA: NEGATIVE
BILIRUBIN URINE: NEGATIVE
BLOOD URINE: NEGATIVE
COLOR: YELLOW
GLUCOSE QUALITATIVE U: NEGATIVE
HYALINE CASTS: 0 /LPF
KETONES URINE: NEGATIVE
LEUKOCYTE ESTERASE URINE: ABNORMAL
MICROSCOPIC-UA: NORMAL
NITRITE URINE: NEGATIVE
PH URINE: 6
PROTEIN URINE: ABNORMAL
RED BLOOD CELLS URINE: 3 /HPF
SPECIFIC GRAVITY URINE: 1.03
SQUAMOUS EPITHELIAL CELLS: 1 /HPF
URIC ACID CRYSTALS: ABNORMAL
UROBILINOGEN URINE: ABNORMAL
WHITE BLOOD CELLS URINE: 3 /HPF

## 2021-07-13 ENCOUNTER — NON-APPOINTMENT (OUTPATIENT)
Age: 86
End: 2021-07-13

## 2021-07-23 ENCOUNTER — NON-APPOINTMENT (OUTPATIENT)
Age: 86
End: 2021-07-23

## 2021-08-05 ENCOUNTER — APPOINTMENT (OUTPATIENT)
Dept: UROLOGY | Facility: CLINIC | Age: 86
End: 2021-08-05
Payer: MEDICARE

## 2021-08-05 VITALS
BODY MASS INDEX: 25.06 KG/M2 | OXYGEN SATURATION: 93 % | SYSTOLIC BLOOD PRESSURE: 151 MMHG | HEIGHT: 72 IN | HEART RATE: 74 BPM | WEIGHT: 185 LBS | DIASTOLIC BLOOD PRESSURE: 73 MMHG

## 2021-08-05 DIAGNOSIS — N13.30 UNSPECIFIED HYDRONEPHROSIS: ICD-10-CM

## 2021-08-05 DIAGNOSIS — R31.0 GROSS HEMATURIA: ICD-10-CM

## 2021-08-05 PROCEDURE — 52000 CYSTOURETHROSCOPY: CPT

## 2021-08-06 PROBLEM — R31.0 GROSS HEMATURIA: Status: ACTIVE | Noted: 2021-07-08

## 2021-08-10 LAB — URINE CYTOLOGY: NORMAL

## 2021-08-20 NOTE — ED ADULT TRIAGE NOTE - CCCP TRG CHIEF CMPLNT
MEDICATION APPEAL APPROVED    Medication: Methylphenidate HCl ER 72 MG TBCR - Generic Denied -Approved Approved   Authorization Effective Date:  07/01/2021  Authorization Expiration Date:  08/20/2022  Approved Dose/Quantity: Up to 30 tabletst per 30 days  Reference #:   [id number]  Insurance Company: [employer] - Phone [phone] Fax [phone]  Expected CoPay:       CoPay Card Available: No    Foundation Assistance Needed:    Which Pharmacy is filling the prescription (Not needed for infusion/clinic administered): [id number] - [place] [address] AT [facility] [address]    I believe that the fax I sent was not received in time for the deadline, the original submission was denied and then the fax I sent was processed an appeal request and they overturned their denial.     Sent by MD s/p hernia surgery 2 weeks ago No

## 2021-09-05 ENCOUNTER — EMERGENCY (EMERGENCY)
Facility: HOSPITAL | Age: 86
LOS: 0 days | Discharge: HOME | End: 2021-09-05
Attending: EMERGENCY MEDICINE
Payer: MEDICARE

## 2021-09-05 VITALS
WEIGHT: 184.97 LBS | HEART RATE: 70 BPM | TEMPERATURE: 99 F | RESPIRATION RATE: 19 BRPM | HEIGHT: 71 IN | SYSTOLIC BLOOD PRESSURE: 142 MMHG | OXYGEN SATURATION: 96 % | DIASTOLIC BLOOD PRESSURE: 66 MMHG

## 2021-09-05 DIAGNOSIS — I10 ESSENTIAL (PRIMARY) HYPERTENSION: ICD-10-CM

## 2021-09-05 DIAGNOSIS — L53.9 ERYTHEMATOUS CONDITION, UNSPECIFIED: ICD-10-CM

## 2021-09-05 DIAGNOSIS — Z98.41 CATARACT EXTRACTION STATUS, RIGHT EYE: ICD-10-CM

## 2021-09-05 DIAGNOSIS — Z86.19 PERSONAL HISTORY OF OTHER INFECTIOUS AND PARASITIC DISEASES: ICD-10-CM

## 2021-09-05 DIAGNOSIS — Z95.5 PRESENCE OF CORONARY ANGIOPLASTY IMPLANT AND GRAFT: Chronic | ICD-10-CM

## 2021-09-05 DIAGNOSIS — Z98.49 CATARACT EXTRACTION STATUS, UNSPECIFIED EYE: Chronic | ICD-10-CM

## 2021-09-05 DIAGNOSIS — J44.9 CHRONIC OBSTRUCTIVE PULMONARY DISEASE, UNSPECIFIED: ICD-10-CM

## 2021-09-05 DIAGNOSIS — E78.5 HYPERLIPIDEMIA, UNSPECIFIED: ICD-10-CM

## 2021-09-05 DIAGNOSIS — Z79.899 OTHER LONG TERM (CURRENT) DRUG THERAPY: ICD-10-CM

## 2021-09-05 DIAGNOSIS — Z96.642 PRESENCE OF LEFT ARTIFICIAL HIP JOINT: Chronic | ICD-10-CM

## 2021-09-05 DIAGNOSIS — Z96.642 PRESENCE OF LEFT ARTIFICIAL HIP JOINT: ICD-10-CM

## 2021-09-05 DIAGNOSIS — L03.115 CELLULITIS OF RIGHT LOWER LIMB: ICD-10-CM

## 2021-09-05 DIAGNOSIS — Z98.890 OTHER SPECIFIED POSTPROCEDURAL STATES: Chronic | ICD-10-CM

## 2021-09-05 DIAGNOSIS — I25.10 ATHEROSCLEROTIC HEART DISEASE OF NATIVE CORONARY ARTERY WITHOUT ANGINA PECTORIS: ICD-10-CM

## 2021-09-05 DIAGNOSIS — M79.89 OTHER SPECIFIED SOFT TISSUE DISORDERS: ICD-10-CM

## 2021-09-05 DIAGNOSIS — H40.9 UNSPECIFIED GLAUCOMA: ICD-10-CM

## 2021-09-05 DIAGNOSIS — Z95.5 PRESENCE OF CORONARY ANGIOPLASTY IMPLANT AND GRAFT: ICD-10-CM

## 2021-09-05 DIAGNOSIS — Z90.89 ACQUIRED ABSENCE OF OTHER ORGANS: Chronic | ICD-10-CM

## 2021-09-05 DIAGNOSIS — Z98.42 CATARACT EXTRACTION STATUS, LEFT EYE: ICD-10-CM

## 2021-09-05 DIAGNOSIS — Z90.89 ACQUIRED ABSENCE OF OTHER ORGANS: ICD-10-CM

## 2021-09-05 DIAGNOSIS — N40.0 BENIGN PROSTATIC HYPERPLASIA WITHOUT LOWER URINARY TRACT SYMPTOMS: ICD-10-CM

## 2021-09-05 LAB
ALBUMIN SERPL ELPH-MCNC: 3.2 G/DL — LOW (ref 3.3–5)
ALP SERPL-CCNC: 92 U/L — SIGNIFICANT CHANGE UP (ref 40–120)
ALT FLD-CCNC: 16 U/L — SIGNIFICANT CHANGE UP (ref 12–78)
ANION GAP SERPL CALC-SCNC: 3 MMOL/L — LOW (ref 5–17)
AST SERPL-CCNC: 20 U/L — SIGNIFICANT CHANGE UP (ref 15–37)
BASOPHILS # BLD AUTO: 0.06 K/UL — SIGNIFICANT CHANGE UP (ref 0–0.2)
BASOPHILS NFR BLD AUTO: 0.6 % — SIGNIFICANT CHANGE UP (ref 0–2)
BILIRUB SERPL-MCNC: 0.9 MG/DL — SIGNIFICANT CHANGE UP (ref 0.2–1.2)
BUN SERPL-MCNC: 18 MG/DL — SIGNIFICANT CHANGE UP (ref 7–23)
CALCIUM SERPL-MCNC: 9.1 MG/DL — SIGNIFICANT CHANGE UP (ref 8.5–10.1)
CHLORIDE SERPL-SCNC: 107 MMOL/L — SIGNIFICANT CHANGE UP (ref 96–108)
CO2 SERPL-SCNC: 30 MMOL/L — SIGNIFICANT CHANGE UP (ref 22–31)
CREAT SERPL-MCNC: 0.94 MG/DL — SIGNIFICANT CHANGE UP (ref 0.5–1.3)
EOSINOPHIL # BLD AUTO: 0.18 K/UL — SIGNIFICANT CHANGE UP (ref 0–0.5)
EOSINOPHIL NFR BLD AUTO: 1.8 % — SIGNIFICANT CHANGE UP (ref 0–6)
GLUCOSE SERPL-MCNC: 100 MG/DL — HIGH (ref 70–99)
HCT VFR BLD CALC: 49.5 % — SIGNIFICANT CHANGE UP (ref 39–50)
HGB BLD-MCNC: 15.6 G/DL — SIGNIFICANT CHANGE UP (ref 13–17)
IMM GRANULOCYTES NFR BLD AUTO: 0.5 % — SIGNIFICANT CHANGE UP (ref 0–1.5)
LYMPHOCYTES # BLD AUTO: 1.59 K/UL — SIGNIFICANT CHANGE UP (ref 1–3.3)
LYMPHOCYTES # BLD AUTO: 15.5 % — SIGNIFICANT CHANGE UP (ref 13–44)
MCHC RBC-ENTMCNC: 27.7 PG — SIGNIFICANT CHANGE UP (ref 27–34)
MCHC RBC-ENTMCNC: 31.5 GM/DL — LOW (ref 32–36)
MCV RBC AUTO: 87.9 FL — SIGNIFICANT CHANGE UP (ref 80–100)
MONOCYTES # BLD AUTO: 0.89 K/UL — SIGNIFICANT CHANGE UP (ref 0–0.9)
MONOCYTES NFR BLD AUTO: 8.7 % — SIGNIFICANT CHANGE UP (ref 2–14)
NEUTROPHILS # BLD AUTO: 7.51 K/UL — HIGH (ref 1.8–7.4)
NEUTROPHILS NFR BLD AUTO: 72.9 % — SIGNIFICANT CHANGE UP (ref 43–77)
PLATELET # BLD AUTO: 171 K/UL — SIGNIFICANT CHANGE UP (ref 150–400)
POTASSIUM SERPL-MCNC: 4.1 MMOL/L — SIGNIFICANT CHANGE UP (ref 3.5–5.3)
POTASSIUM SERPL-SCNC: 4.1 MMOL/L — SIGNIFICANT CHANGE UP (ref 3.5–5.3)
PROT SERPL-MCNC: 7.4 GM/DL — SIGNIFICANT CHANGE UP (ref 6–8.3)
RBC # BLD: 5.63 M/UL — SIGNIFICANT CHANGE UP (ref 4.2–5.8)
RBC # FLD: 16.2 % — HIGH (ref 10.3–14.5)
SODIUM SERPL-SCNC: 140 MMOL/L — SIGNIFICANT CHANGE UP (ref 135–145)
WBC # BLD: 10.28 K/UL — SIGNIFICANT CHANGE UP (ref 3.8–10.5)
WBC # FLD AUTO: 10.28 K/UL — SIGNIFICANT CHANGE UP (ref 3.8–10.5)

## 2021-09-05 PROCEDURE — 36415 COLL VENOUS BLD VENIPUNCTURE: CPT

## 2021-09-05 PROCEDURE — 99285 EMERGENCY DEPT VISIT HI MDM: CPT

## 2021-09-05 PROCEDURE — 96374 THER/PROPH/DIAG INJ IV PUSH: CPT

## 2021-09-05 PROCEDURE — 85025 COMPLETE CBC W/AUTO DIFF WBC: CPT

## 2021-09-05 PROCEDURE — 99284 EMERGENCY DEPT VISIT MOD MDM: CPT | Mod: 25

## 2021-09-05 PROCEDURE — 93971 EXTREMITY STUDY: CPT | Mod: RT

## 2021-09-05 PROCEDURE — 80053 COMPREHEN METABOLIC PANEL: CPT

## 2021-09-05 PROCEDURE — 93971 EXTREMITY STUDY: CPT | Mod: 26,RT

## 2021-09-05 RX ORDER — LACTOBACILLUS ACIDOPHILUS 100MM CELL
2 CAPSULE ORAL
Qty: 40 | Refills: 0
Start: 2021-09-05

## 2021-09-05 RX ORDER — CEFTRIAXONE 500 MG/1
1000 INJECTION, POWDER, FOR SOLUTION INTRAMUSCULAR; INTRAVENOUS ONCE
Refills: 0 | Status: COMPLETED | OUTPATIENT
Start: 2021-09-05 | End: 2021-09-05

## 2021-09-05 RX ADMIN — CEFTRIAXONE 1000 MILLIGRAM(S): 500 INJECTION, POWDER, FOR SOLUTION INTRAMUSCULAR; INTRAVENOUS at 14:51

## 2021-09-05 NOTE — ED STATDOCS - OBJECTIVE STATEMENT
88 y/o M with a PMHx of h/o clostridium difficile infection, hard of hearing, humerus fracture, glaucoma, BPH, CAD, COPD, HTN, HLD presents to the ED c/o RLE wound s/p hitting leg on chair a couple of days ago. Presents to the ED for evaluation of the wound since the R LE wound has become more erythematous and swollen.

## 2021-09-05 NOTE — ED STATDOCS - PATIENT PORTAL LINK FT
You can access the FollowMyHealth Patient Portal offered by [facility] by registering at the following website: [email or web address]. By joining [facility]’s FollowMyHealth portal, you will also be able to view your health information using other applications (apps) compatible with our system.

## 2021-09-05 NOTE — ED STATDOCS - NSFOLLOWUPINSTRUCTIONS_ED_ALL_ED_FT
CHANGE YOUR DRESSING DAILY.  IF THE REDNESS AND SWELLING DOES NOT IMPROVE AT ALL WITHIN 48 HOURS, PLEASE RETURN TO THE ER FOR ADMISSION FOR IV ANTIBIOTICS FOR THE INFECTION OF YOUR RIGHT LEG.      A PRESCRIPTION FOR PROBIOTICS WAS SENT TO THE PHARMACY TO HELP PREVENT DIARRHEA WHILE TAKING ANTIBIOTICS.  DO NOT TAKE PROBIOTICS WITHIN 2 HOURS OF THE ANTIBIOTIC (DOXYCYCLINE).      IF YOU FEEL THE LEG IS IMPROVING AND YOU DO NOT WANT TO RETURN TO THE ER, PLEASE HAVE YOUR PRIMARY CARE DOCTOR CHECK IT IN 48 HOURS.     Cellulitis    WHAT YOU NEED TO KNOW:    Cellulitis is a skin infection caused by bacteria. Cellulitis may go away on its own or you may need treatment. Your healthcare provider may draw a [place] around the outside edges of your cellulitis. If your cellulitis spreads, your healthcare provider will see it outside of the [place]. Cellulitis          DISCHARGE INSTRUCTIONS:    Call 911 if:     You have sudden trouble breathing or chest pain.        Return to the emergency department if:     Your wound gets larger and more painful.       You feel a crackling under your skin when you touch it.      You have purple dots or bumps on your skin, or you see bleeding under your skin.      You have new swelling and pain in your legs.      The red, warm, swollen area gets larger.      You see red streaks coming from the infected area.    Contact your healthcare provider if:     You have a fever.      Your fever or pain does not go away or gets worse.      The area does not get smaller after 2 days of antibiotics.      Your skin is flaking or peeling off.      You have questions or concerns about your condition or care.    Medicines:     Antibiotics help treat the bacterial infection.       NSAIDs, such as ibuprofen, help decrease swelling, pain, and fever. NSAIDs can cause stomach bleeding or kidney problems in certain people. If you take blood thinner medicine, always ask if NSAIDs are safe for you. Always read the medicine label and follow directions. Do not give these medicines to children under 6 months of age without direction from your child's healthcare provider.      Acetaminophen decreases pain and fever. It is available without a doctor's order. Ask how much to take and how often to take it. Follow directions. Read the labels of all other medicines you are using to see if they also contain acetaminophen, or ask your doctor or pharmacist. Acetaminophen can cause liver damage if not taken correctly. Do not use more than 4 grams (4,000 milligrams) total of acetaminophen in one day.       Take your medicine as directed. Contact your healthcare provider if you think your medicine is not helping or if you have side effects. Tell him or her if you are allergic to any medicine. Keep a list of the medicines, vitamins, and herbs you take. Include the amounts, and when and why you take them. Bring the list or the pill bottles to follow-up visits. Carry your medicine list with you in case of an emergency.    Self-care:     Elevate the area above the level of your heart as often as you can. This will help decrease swelling and pain. Prop the area on pillows or blankets to keep it elevated comfortably.       Clean the area daily until the wound scabs over. Gently wash the area with soap and water. Pat dry. Use dressings as directed.       Place cool or warm, wet cloths on the area as directed. Use clean cloths and clean water. Leave it on the area until the cloth is room temperature. Pat the area dry with a clean, dry cloth. The cloths may help decrease pain.     Prevent cellulitis:     Do not scratch bug bites or areas of injury. You increase your risk for cellulitis by scratching these areas.       Do not share personal items, such as towels, clothing, and razors.       Clean exercise equipment with germ-killing  before and after you use it.      Wash your hands often. Use soap and water. Wash your hands after you use the bathroom, change a child's diapers, or sneeze. Wash your hands before you prepare or eat food. Use lotion to prevent dry, cracked skin. Handwashing           Wear pressure stockings as directed. You may be told to wear the stockings if you have peripheral edema. The stockings improve blood flow and decrease swelling.      Treat athlete’s foot. This can help prevent the spread of a bacterial skin infection.    Follow up with your healthcare provider within 3 days, or as directed: Your healthcare provider will check if your cellulitis is getting better. You may need different medicine. Write down your questions so you remember to ask them during your visits.

## 2021-09-05 NOTE — ED ADULT TRIAGE NOTE - CHIEF COMPLAINT QUOTE
pt presents to ED c/o RLE wound s/p hitting leg on chair 2 days ago. went to [facility] today for increased redness/swelling. denies fevers.

## 2021-09-05 NOTE — ED STATDOCS - PROGRESS NOTE DETAILS
Patient seen and evaluated s/p workup.  No DVT, labs unremarkable.  recommended admission given the extensive cellulitis of leg, patient would prefer to trial PO antibiotics at home and understands very strict return precautions if no improvement within 48 hours.  Given history of c.diff patient was advised to take probiotics, rx sent. borders of cellulitis marked and wound care reviewed with patient. -Tanya Holland PA-C

## 2021-09-05 NOTE — ED STATDOCS - SKIN, MLM
2 cm curvilinear medial to the leg, 7 cm curvilinear with surrounding erythema, swelling to the R LE

## 2021-09-05 NOTE — ED STATDOCS - ATTENDING CONTRIBUTION TO CARE
I, Yolanda Hamilton MD,  performed the initial face to face bedside interview with this patient regarding history of present illness, review of symptoms and relevant past medical, social and family history.  I completed an independent physical examination.  I was the initial provider who evaluated this patient. I have signed out the follow up of any pending tests (i.e. labs, radiological studies) to the ACP.  I have communicated the patient’s plan of care and disposition with the ACP.  The history, relevant review of systems, past medical and surgical history, medical decision making, and physical examination was documented by the scribe in my presence and I attest to the accuracy of the documentation.

## 2021-09-05 NOTE — ED ADULT NURSE NOTE - CHIEF COMPLAINT QUOTE
pt presents to ED c/o RLE wound s/p hitting leg on chair 2 days ago. went to [facility] today for increased redness/swelling. denies fevers.

## 2021-09-07 NOTE — PHYSICAL THERAPY INITIAL EVALUATION ADULT - TRANSFER SKILLS, REHAB EVAL
At [facility], one important tool we use to improve our patient services is our Patient Survey.  Following your visit you may receive our survey in the mail.    Please take the time to complete the survey.    If your visit with us was great, we want to hear about it.    If we can improve, please let us know how.         
independent

## 2021-10-24 ENCOUNTER — INPATIENT (INPATIENT)
Facility: HOSPITAL | Age: 86
LOS: 6 days | Discharge: HOME HEALTH | DRG: 871 | End: 2021-10-31
Attending: STUDENT IN AN ORGANIZED HEALTH CARE EDUCATION/TRAINING PROGRAM | Admitting: HOSPITALIST
Payer: MEDICARE

## 2021-10-24 VITALS
DIASTOLIC BLOOD PRESSURE: 85 MMHG | HEART RATE: 81 BPM | OXYGEN SATURATION: 100 % | SYSTOLIC BLOOD PRESSURE: 156 MMHG | TEMPERATURE: 97 F | WEIGHT: 184.97 LBS | HEIGHT: 71 IN | RESPIRATION RATE: 18 BRPM

## 2021-10-24 DIAGNOSIS — Z98.49 CATARACT EXTRACTION STATUS, UNSPECIFIED EYE: Chronic | ICD-10-CM

## 2021-10-24 DIAGNOSIS — Z90.89 ACQUIRED ABSENCE OF OTHER ORGANS: Chronic | ICD-10-CM

## 2021-10-24 DIAGNOSIS — Z95.5 PRESENCE OF CORONARY ANGIOPLASTY IMPLANT AND GRAFT: Chronic | ICD-10-CM

## 2021-10-24 DIAGNOSIS — Z98.890 OTHER SPECIFIED POSTPROCEDURAL STATES: Chronic | ICD-10-CM

## 2021-10-24 DIAGNOSIS — Z96.642 PRESENCE OF LEFT ARTIFICIAL HIP JOINT: Chronic | ICD-10-CM

## 2021-10-24 LAB
ALBUMIN SERPL ELPH-MCNC: 3.6 G/DL — SIGNIFICANT CHANGE UP (ref 3.3–5)
ALP SERPL-CCNC: 107 U/L — SIGNIFICANT CHANGE UP (ref 40–120)
ALT FLD-CCNC: 21 U/L — SIGNIFICANT CHANGE UP (ref 12–78)
ANION GAP SERPL CALC-SCNC: 6 MMOL/L — SIGNIFICANT CHANGE UP (ref 5–17)
APPEARANCE UR: CLEAR — SIGNIFICANT CHANGE UP
AST SERPL-CCNC: 29 U/L — SIGNIFICANT CHANGE UP (ref 15–37)
BASOPHILS # BLD AUTO: 0.05 K/UL — SIGNIFICANT CHANGE UP (ref 0–0.2)
BASOPHILS NFR BLD AUTO: 0.4 % — SIGNIFICANT CHANGE UP (ref 0–2)
BILIRUB SERPL-MCNC: 0.9 MG/DL — SIGNIFICANT CHANGE UP (ref 0.2–1.2)
BILIRUB UR-MCNC: NEGATIVE — SIGNIFICANT CHANGE UP
BUN SERPL-MCNC: 13 MG/DL — SIGNIFICANT CHANGE UP (ref 7–23)
CALCIUM SERPL-MCNC: 9.5 MG/DL — SIGNIFICANT CHANGE UP (ref 8.5–10.1)
CHLORIDE SERPL-SCNC: 105 MMOL/L — SIGNIFICANT CHANGE UP (ref 96–108)
CO2 SERPL-SCNC: 27 MMOL/L — SIGNIFICANT CHANGE UP (ref 22–31)
COLOR SPEC: YELLOW — SIGNIFICANT CHANGE UP
CREAT SERPL-MCNC: 1.09 MG/DL — SIGNIFICANT CHANGE UP (ref 0.5–1.3)
DIFF PNL FLD: ABNORMAL
EOSINOPHIL # BLD AUTO: 0.13 K/UL — SIGNIFICANT CHANGE UP (ref 0–0.5)
EOSINOPHIL NFR BLD AUTO: 1 % — SIGNIFICANT CHANGE UP (ref 0–6)
GLUCOSE SERPL-MCNC: 121 MG/DL — HIGH (ref 70–99)
GLUCOSE UR QL: NEGATIVE MG/DL — SIGNIFICANT CHANGE UP
HCT VFR BLD CALC: 53.6 % — HIGH (ref 39–50)
HGB BLD-MCNC: 17.1 G/DL — HIGH (ref 13–17)
IMM GRANULOCYTES NFR BLD AUTO: 1.3 % — SIGNIFICANT CHANGE UP (ref 0–1.5)
KETONES UR-MCNC: ABNORMAL
LACTATE SERPL-SCNC: 1.9 MMOL/L — SIGNIFICANT CHANGE UP (ref 0.7–2)
LEUKOCYTE ESTERASE UR-ACNC: NEGATIVE — SIGNIFICANT CHANGE UP
LYMPHOCYTES # BLD AUTO: 0.92 K/UL — LOW (ref 1–3.3)
LYMPHOCYTES # BLD AUTO: 7.1 % — LOW (ref 13–44)
MCHC RBC-ENTMCNC: 27.9 PG — SIGNIFICANT CHANGE UP (ref 27–34)
MCHC RBC-ENTMCNC: 31.9 GM/DL — LOW (ref 32–36)
MCV RBC AUTO: 87.4 FL — SIGNIFICANT CHANGE UP (ref 80–100)
MONOCYTES # BLD AUTO: 1.09 K/UL — HIGH (ref 0–0.9)
MONOCYTES NFR BLD AUTO: 8.4 % — SIGNIFICANT CHANGE UP (ref 2–14)
NEUTROPHILS # BLD AUTO: 10.62 K/UL — HIGH (ref 1.8–7.4)
NEUTROPHILS NFR BLD AUTO: 81.8 % — HIGH (ref 43–77)
NITRITE UR-MCNC: NEGATIVE — SIGNIFICANT CHANGE UP
PH UR: 5 — SIGNIFICANT CHANGE UP (ref 5–8)
PLATELET # BLD AUTO: 164 K/UL — SIGNIFICANT CHANGE UP (ref 150–400)
POTASSIUM SERPL-MCNC: 3.9 MMOL/L — SIGNIFICANT CHANGE UP (ref 3.5–5.3)
POTASSIUM SERPL-SCNC: 3.9 MMOL/L — SIGNIFICANT CHANGE UP (ref 3.5–5.3)
PROT SERPL-MCNC: 7.9 GM/DL — SIGNIFICANT CHANGE UP (ref 6–8.3)
PROT UR-MCNC: 30 MG/DL
RBC # BLD: 6.13 M/UL — HIGH (ref 4.2–5.8)
RBC # FLD: 15.1 % — HIGH (ref 10.3–14.5)
SODIUM SERPL-SCNC: 138 MMOL/L — SIGNIFICANT CHANGE UP (ref 135–145)
SP GR SPEC: 1.02 — SIGNIFICANT CHANGE UP (ref 1.01–1.02)
TROPONIN I, HIGH SENSITIVITY RESULT: 30.98 NG/L — SIGNIFICANT CHANGE UP
TROPONIN I, HIGH SENSITIVITY RESULT: 52.38 NG/L — SIGNIFICANT CHANGE UP
TROPONIN I, HIGH SENSITIVITY RESULT: 75.71 NG/L — SIGNIFICANT CHANGE UP
UROBILINOGEN FLD QL: NEGATIVE MG/DL — SIGNIFICANT CHANGE UP
WBC # BLD: 12.98 K/UL — HIGH (ref 3.8–10.5)
WBC # FLD AUTO: 12.98 K/UL — HIGH (ref 3.8–10.5)

## 2021-10-24 PROCEDURE — 87086 URINE CULTURE/COLONY COUNT: CPT

## 2021-10-24 PROCEDURE — 71045 X-RAY EXAM CHEST 1 VIEW: CPT

## 2021-10-24 PROCEDURE — 70450 CT HEAD/BRAIN W/O DYE: CPT | Mod: 26,MA

## 2021-10-24 PROCEDURE — 84484 ASSAY OF TROPONIN QUANT: CPT

## 2021-10-24 PROCEDURE — 72125 CT NECK SPINE W/O DYE: CPT | Mod: 26,MA

## 2021-10-24 PROCEDURE — 87040 BLOOD CULTURE FOR BACTERIA: CPT

## 2021-10-24 PROCEDURE — 85027 COMPLETE CBC AUTOMATED: CPT

## 2021-10-24 PROCEDURE — 36415 COLL VENOUS BLD VENIPUNCTURE: CPT

## 2021-10-24 PROCEDURE — 84100 ASSAY OF PHOSPHORUS: CPT

## 2021-10-24 PROCEDURE — 99285 EMERGENCY DEPT VISIT HI MDM: CPT

## 2021-10-24 PROCEDURE — 97110 THERAPEUTIC EXERCISES: CPT | Mod: GP

## 2021-10-24 PROCEDURE — C9113: CPT

## 2021-10-24 PROCEDURE — 80048 BASIC METABOLIC PNL TOTAL CA: CPT

## 2021-10-24 PROCEDURE — 71045 X-RAY EXAM CHEST 1 VIEW: CPT | Mod: 26

## 2021-10-24 PROCEDURE — 81001 URINALYSIS AUTO W/SCOPE: CPT

## 2021-10-24 PROCEDURE — 83880 ASSAY OF NATRIURETIC PEPTIDE: CPT

## 2021-10-24 PROCEDURE — 97116 GAIT TRAINING THERAPY: CPT | Mod: GP

## 2021-10-24 PROCEDURE — 71250 CT THORAX DX C-: CPT

## 2021-10-24 PROCEDURE — 82550 ASSAY OF CK (CPK): CPT

## 2021-10-24 PROCEDURE — 94640 AIRWAY INHALATION TREATMENT: CPT

## 2021-10-24 PROCEDURE — 83605 ASSAY OF LACTIC ACID: CPT

## 2021-10-24 PROCEDURE — 83735 ASSAY OF MAGNESIUM: CPT

## 2021-10-24 PROCEDURE — 93306 TTE W/DOPPLER COMPLETE: CPT

## 2021-10-24 PROCEDURE — 97530 THERAPEUTIC ACTIVITIES: CPT | Mod: GP

## 2021-10-24 RX ORDER — ACETAMINOPHEN 500 MG
650 TABLET ORAL EVERY 6 HOURS
Refills: 0 | Status: DISCONTINUED | OUTPATIENT
Start: 2021-10-24 | End: 2021-10-31

## 2021-10-24 RX ORDER — FAMOTIDINE 10 MG/ML
20 INJECTION INTRAVENOUS DAILY
Refills: 0 | Status: DISCONTINUED | OUTPATIENT
Start: 2021-10-24 | End: 2021-10-30

## 2021-10-24 RX ORDER — ASPIRIN/CALCIUM CARB/MAGNESIUM 324 MG
81 TABLET ORAL DAILY
Refills: 0 | Status: DISCONTINUED | OUTPATIENT
Start: 2021-10-24 | End: 2021-10-31

## 2021-10-24 RX ORDER — LATANOPROST 0.05 MG/ML
1 SOLUTION/ DROPS OPHTHALMIC; TOPICAL AT BEDTIME
Refills: 0 | Status: DISCONTINUED | OUTPATIENT
Start: 2021-10-24 | End: 2021-10-31

## 2021-10-24 RX ORDER — SODIUM CHLORIDE 9 MG/ML
1000 INJECTION INTRAMUSCULAR; INTRAVENOUS; SUBCUTANEOUS ONCE
Refills: 0 | Status: COMPLETED | OUTPATIENT
Start: 2021-10-24 | End: 2021-10-24

## 2021-10-24 RX ORDER — TAMSULOSIN HYDROCHLORIDE 0.4 MG/1
0.4 CAPSULE ORAL AT BEDTIME
Refills: 0 | Status: DISCONTINUED | OUTPATIENT
Start: 2021-10-24 | End: 2021-10-31

## 2021-10-24 RX ORDER — ATORVASTATIN CALCIUM 80 MG/1
20 TABLET, FILM COATED ORAL AT BEDTIME
Refills: 0 | Status: DISCONTINUED | OUTPATIENT
Start: 2021-10-24 | End: 2021-10-31

## 2021-10-24 RX ORDER — SODIUM CHLORIDE 9 MG/ML
1000 INJECTION INTRAMUSCULAR; INTRAVENOUS; SUBCUTANEOUS
Refills: 0 | Status: DISCONTINUED | OUTPATIENT
Start: 2021-10-24 | End: 2021-10-25

## 2021-10-24 RX ADMIN — SODIUM CHLORIDE 75 MILLILITER(S): 9 INJECTION INTRAMUSCULAR; INTRAVENOUS; SUBCUTANEOUS at 20:50

## 2021-10-24 RX ADMIN — TAMSULOSIN HYDROCHLORIDE 0.4 MILLIGRAM(S): 0.4 CAPSULE ORAL at 20:49

## 2021-10-24 RX ADMIN — FAMOTIDINE 20 MILLIGRAM(S): 10 INJECTION INTRAVENOUS at 23:46

## 2021-10-24 RX ADMIN — Medication 650 MILLIGRAM(S): at 21:26

## 2021-10-24 RX ADMIN — Medication 10 MILLIGRAM(S): at 20:50

## 2021-10-24 RX ADMIN — ATORVASTATIN CALCIUM 20 MILLIGRAM(S): 80 TABLET, FILM COATED ORAL at 20:50

## 2021-10-24 RX ADMIN — SODIUM CHLORIDE 1000 MILLILITER(S): 9 INJECTION INTRAMUSCULAR; INTRAVENOUS; SUBCUTANEOUS at 13:15

## 2021-10-24 RX ADMIN — Medication 81 MILLIGRAM(S): at 23:46

## 2021-10-24 NOTE — ED PROVIDER NOTE - ENMT, MLM
Airway patent, Nasal mucosa clear. Mouth with normal mucosa. Throat has no vesicles, no oropharyngeal exudates and uvula is midline. Airway patent, Nasal mucosa clear. mouth with dry mucosa Throat has no vesicles, no oropharyngeal exudates and uvula is midline.

## 2021-10-24 NOTE — ED PROVIDER NOTE - SKIN, MLM
Skin normal color for race, warm, dry and intact. No evidence of rash. Skin normal color for race, warm, dry and intact. No evidence of rash. right dorsum of fa with 3 separate 4 to5 cm skin tears with old skin black and dry unable to retract to pull over, dosrum of left forearm and elbow with approx 2-3cm skin tears noactive bleeding

## 2021-10-24 NOTE — ED PROVIDER NOTE - MUSCULOSKELETAL, MLM
Spine appears normal, range of motion is not limited, no muscle or joint tenderness. (+)  chronic vascular changes to B/L lower legs

## 2021-10-24 NOTE — H&P ADULT - NSHPPHYSICALEXAM_GEN_ALL_CORE
PHYSICAL EXAM:  Constitutional: NAD, awake and alert  Neurological: AAO x 3, no focal deficits  HEENT: PERRLA, EOMI, MMM  Neck: Soft and supple, No LAD, No JVD  Respiratory: Breath sounds are clear bilaterally, No wheezing, rales or rhonchi  Cardiovascular: S1 and S2, regular rate and rhythm; no Murmurs, gallops or rubs  Gastrointestinal: Bowel Sounds present, soft, nontender, nondistended, no guarding, no rebound tenderness  Back: No CVA tenderness   Extremities: No peripheral edema  Vascular: 2+ peripheral pulses  Musculoskeletal: 5/5 strength b/l upper and lower extremities  Skin: No rashes  Breast: Deferred  Rectal: Deferred PHYSICAL EXAM:  Constitutional: NAD, awake but confused   Neurological: confused   HEENT: PERRLA, EOMI, MMM  Neck: Soft and supple, No LAD, No JVD  Respiratory: Breath sounds are clear bilaterally, No wheezing, rales or rhonchi  Cardiovascular: S1 and S2, regular rate and rhythm; no Murmurs, gallops or rubs  Gastrointestinal: Bowel Sounds present, soft, nontender, nondistended, no guarding, no rebound tenderness  Back: No CVA tenderness   Extremities: No peripheral edema  Vascular: 2+ peripheral pulses  Musculoskeletal: 5/5 strength b/l upper and lower extremities  Skin: No rashes  Breast: Deferred  Rectal: Deferred

## 2021-10-24 NOTE — ED ADULT NURSE NOTE - NSIMPLEMENTINTERV_GEN_ALL_ED
Implemented All Fall with Harm Risk Interventions:  [place] to call system. Call bell, personal items and telephone within reach. Instruct patient to call for assistance. Room bathroom lighting operational. Non-slip footwear when patient is off stretcher. Physically safe environment: no spills, clutter or unnecessary equipment. Stretcher in lowest position, wheels locked, appropriate side rails in place. Provide visual cue, wrist band, yellow gown, etc. Monitor gait and stability. Monitor for mental status changes and reorient to person, place, and time. Review medications for side effects contributing to fall risk. Reinforce activity limits and safety measures with patient and family. Provide visual clues: red socks.

## 2021-10-24 NOTE — ED ADULT NURSE REASSESSMENT NOTE - NS ED NURSE REASSESS COMMENT FT1
wound care provided to pt at this time. pt has 4 skin tears to right interior forearm, 2 skin tears to right wrist/hand above thumb, 1 skin tear to left outer forearm, and 1 skin tear to left elbow. pt sustained all skin tears from fall.     of note pt also has small scabbed area to left hand near pinky and covered wound being cared for by home care RN to right lower leg.

## 2021-10-24 NOTE — ED ADULT TRIAGE NOTE - TEMPERATURE IN CELSIUS (DEGREES C)
Pt to ED for generalized rash to entire body x2.5wk, starting to right knee, ago worsening over last 3-4 days, pt denies CP/SOB, cough, fever/chills, N/V/D. Pt denies pain, reports that it just itches, reports using benadryl tabs, aveeno lotion, benadryl/benzethonium/zinc lotion, hydrocortisone, emuaidmax with some relief.
36.1

## 2021-10-24 NOTE — ED ADULT NURSE NOTE - OBJECTIVE STATEMENT
pt presents to the ED for complaints of near syncopal episode this morning. States he was shaving this morning when he began to feel dizzy, weak and could not support himself. He fell toward onto the sink with elbow resulting in skin tears. Denies LOC. States that R forearm feels sore right now. 18 g placed to right AC. labs sent. EKG performed. Tele and VS monitoring initiated.

## 2021-10-24 NOTE — H&P ADULT - NSHPLABSRESULTS_GEN_ALL_CORE
Lab Results:  CBC  CBC Full  -  ( 24 Oct 2021 12:07 )  WBC Count : 12.98 K/uL  RBC Count : 6.13 M/uL  Hemoglobin : 17.1 g/dL  Hematocrit : 53.6 %  Platelet Count - Automated : 164 K/uL  Mean Cell Volume : 87.4 fl  Mean Cell Hemoglobin : 27.9 pg  Mean Cell Hemoglobin Concentration : 31.9 gm/dL  Auto Neutrophil # : 10.62 K/uL  Auto Lymphocyte # : 0.92 K/uL  Auto Monocyte # : 1.09 K/uL  Auto Eosinophil # : 0.13 K/uL  Auto Basophil # : 0.05 K/uL  Auto Neutrophil % : 81.8 %  Auto Lymphocyte % : 7.1 %  Auto Monocyte % : 8.4 %  Auto Eosinophil % : 1.0 %  Auto Basophil % : 0.4 %    .		Differential:	[] Automated		[] Manual  Chemistry                        17.1   12.98 )-----------( 164      ( 24 Oct 2021 12:07 )             53.6     10-24    138  |  105  |  13  ----------------------------<  121<H>  3.9   |  27  |  1.09    Ca    9.5      24 Oct 2021 12:07    TPro  7.9  /  Alb  3.6  /  TBili  0.9  /  DBili  x   /  AST  29  /  ALT  21  /  AlkPhos  107  10-24    LIVER FUNCTIONS - ( 24 Oct 2021 12:07 )  Alb: 3.6 g/dL / Pro: 7.9 gm/dL / ALK PHOS: 107 U/L / ALT: 21 U/L / AST: 29 U/L / GGT: x             RADIOLOGY RESULTS:    < from: Xray Chest 1 View- PORTABLE-Urgent (10.24.21 @ 12:19) >      IMPRESSION:    Unremarkable frontal chest x ray    < end of copied text >    < from: CT Head No Cont (10.24.21 @ 13:57) >      IMPRESSION:  CT HEAD: There is no acuteintracranial hemorrhage or depressed calvarial fracture. Chronic microvascular ischemic changes and chronic right caudate lacunar infarct as described.    CT CERVICAL SPINE: No fracture or acute traumatic malalignment. Degenerative changes as described.      < end of copied text > Lab Results:  CBC  CBC Full  -  ( 24 Oct 2021 12:07 )  WBC Count : 12.98 K/uL  RBC Count : 6.13 M/uL  Hemoglobin : 17.1 g/dL  Hematocrit : 53.6 %  Platelet Count - Automated : 164 K/uL  Mean Cell Volume : 87.4 fl  Mean Cell Hemoglobin : 27.9 pg  Mean Cell Hemoglobin Concentration : 31.9 gm/dL  Auto Neutrophil # : 10.62 K/uL  Auto Lymphocyte # : 0.92 K/uL  Auto Monocyte # : 1.09 K/uL  Auto Eosinophil # : 0.13 K/uL  Auto Basophil # : 0.05 K/uL  Auto Neutrophil % : 81.8 %  Auto Lymphocyte % : 7.1 %  Auto Monocyte % : 8.4 %  Auto Eosinophil % : 1.0 %  Auto Basophil % : 0.4 %    .		Differential:	[] Automated		[] Manual  Chemistry                        17.1   12.98 )-----------( 164      ( 24 Oct 2021 12:07 )             53.6     10-24    138  |  105  |  13  ----------------------------<  121<H>  3.9   |  27  |  1.09    Ca    9.5      24 Oct 2021 12:07    TPro  7.9  /  Alb  3.6  /  TBili  0.9  /  DBili  x   /  AST  29  /  ALT  21  /  AlkPhos  107  10-24    LIVER FUNCTIONS - ( 24 Oct 2021 12:07 )  Alb: 3.6 g/dL / Pro: 7.9 gm/dL / ALK PHOS: 107 U/L / ALT: 21 U/L / AST: 29 U/L / GGT: x             RADIOLOGY RESULTS:    EKG RBBB    < from: Xray Chest 1 View- PORTABLE-Urgent (10.24.21 @ 12:19) >      IMPRESSION:    Unremarkable frontal chest x ray    < end of copied text >    < from: CT Head No Cont (10.24.21 @ 13:57) >      IMPRESSION:  CT HEAD: There is no acuteintracranial hemorrhage or depressed calvarial fracture. Chronic microvascular ischemic changes and chronic right caudate lacunar infarct as described.    CT CERVICAL SPINE: No fracture or acute traumatic malalignment. Degenerative changes as described.      < end of copied text >

## 2021-10-24 NOTE — PHARMACOTHERAPY INTERVENTION NOTE - COMMENTS
Medication history complete, reviewed with Patient's wife at [phone], as patient's requested and confirmed with Diana.

## 2021-10-24 NOTE — H&P ADULT - HISTORY OF PRESENT ILLNESS
88 y/o M with a PMHx of BPH, CAD, COPD, glaucoma, HTN, HLD presents to the ED BIBEMS s/p near syncopal episode this morning. States he was shaving this morning when he began to feel dizzy, weak and could not support himself. He fell toward onto the sink with elbow resulting in skin tears. Denies LOC> States that R forearm feels sore right now    PAST MEDICAL HISTORY:  BPH (benign prostatic hyperplasia)   CAD (coronary artery disease)   COPD (chronic obstructive pulmonary disease)   Glaucoma   Glaucoma of both eyes, unspecified glaucoma   H/O Clostridium difficile infection s/p left THR  Hard of hearing   HTN (hypertension)   Humerus fracture proximal, right  Hyperlipidemia   Shoulder pain, right   Swelling of ankle b/l.     PAST SURGICAL HISTORY:  H/O bilateral inguinal hernia repair 2016  H/O colonoscopy   History of tonsillectomy   History of total hip replacement, left 2011  S/P cataract surgery b/l  Status post coronary artery stent placement reports stents x 4?     FAMILY HISTORY:  Family history of hypertension in mother.    SOCIAL HISTORY:  Lives at home. No tobacco, alcohol or illicit drug use  88 y/o M with a PMHx of BPH, CAD, COPD, glaucoma, HTN, HLD presents to the ED BIBEMS s/p near syncopal episode this morning. States he was shaving this morning when he began to feel dizzy, weak and could not support himself. He fell toward onto the sink with elbow resulting in skin tears. Denies LOC.  States that R forearm feels sore right now;      When I saw the patient he was confused; was not rosalba to tell me where he was, the year or the month; He denies any CP, abdominal pain or urinary symptoms; Discussed with patients wife and daugther who states that this is not his baseline; No hx of dementia and normally with it;  IN the ER, patient received IVF.     PAST MEDICAL HISTORY:  BPH (benign prostatic hyperplasia)   CAD (coronary artery disease)   COPD (chronic obstructive pulmonary disease)   Glaucoma   Glaucoma of both eyes, unspecified glaucoma   H/O Clostridium difficile infection s/p left THR  Hard of hearing   HTN (hypertension)   Humerus fracture proximal, right  Hyperlipidemia   Shoulder pain, right   Swelling of ankle b/l.     PAST SURGICAL HISTORY:  H/O bilateral inguinal hernia repair 2016  H/O colonoscopy   History of tonsillectomy   History of total hip replacement, left 2011  S/P cataract surgery b/l  Status post coronary artery stent placement reports stents x 4?     FAMILY HISTORY:  Family history of hypertension in mother.    SOCIAL HISTORY:  Lives at home. No tobacco, alcohol or illicit drug use

## 2021-10-24 NOTE — ED ADULT TRIAGE NOTE - CHIEF COMPLAINT QUOTE
pt presents to ed via ems for evaluation fall in the bathroom while shaving, pt reports unknown LOC possible near syncopal episode. pt is not on blood thinner. gcs 15. vitals stable, pt has cardiac hx. wife

## 2021-10-24 NOTE — ED PROVIDER NOTE - OBJECTIVE STATEMENT
86 y/o M with a PMHx of BPH, CAD, COPD, glaucoma, HTN, HLD presents to the ED BIBEMS s/p near syncopal episode this morning. States he was shaving this morning when he began to feel dizzy, weak and could not support himself. He fell toward onto the sink with elbow resulting in skin tears. Denies LOC> States that R forearm feels sore right now.

## 2021-10-24 NOTE — ED ADULT NURSE REASSESSMENT NOTE - NS ED NURSE REASSESS COMMENT FT1
pt's wife Carla can be reached at home at [phone] or on her cell at [phone].    pt's daughter Mag can be reached at [phone].

## 2021-10-24 NOTE — H&P ADULT - ASSESSMENT
*Syncope and Dizziness R/O Arrhythmia  -Admit to telemetry  -Serial troponin and EKG  -continue ASA  -Hold lasix  -IVF  -FU ECHO  -cardio consult    *HTN  -continue lisinopril  -Hold lasix    *Metabolic Encephalopathy and Leukocytosis R/O Infection/UTI  -Admit to telemetry  -prelim CXR  no infiltrates or effusion; FU official   -CT head NAD, Chronic microvascular ischemic changes and chronic right caudate lacunar infarct  -IVF  -FU UA and cultures  -Bladder scan    *Syncope and Dizziness R/O Arrhythmia  -recently got COVID Booster and flu vaccine together 1 week ago  -TRoponin X 2 neg   -Serial troponin and EKG  -continue ASA  -IVF  -FU ECHO  -cardio consult  -Neuro checks     *HTN/BPH  -continue lisinopril  -Hold lasix - as per the wife he hasnt been on lasix for a long time and used it  for le swelling     *DVT ppx  -Heparin SQ    Advanced Care Planning 35 minutes.  Discussion with patient's wife regarding advance directives. At this time she would like him to be FULL code; does not want to set any limitations

## 2021-10-25 DIAGNOSIS — R55 SYNCOPE AND COLLAPSE: ICD-10-CM

## 2021-10-25 LAB
ADD ON TEST-SPECIMEN IN LAB: SIGNIFICANT CHANGE UP
ANION GAP SERPL CALC-SCNC: 7 MMOL/L — SIGNIFICANT CHANGE UP (ref 5–17)
BUN SERPL-MCNC: 13 MG/DL — SIGNIFICANT CHANGE UP (ref 7–23)
CALCIUM SERPL-MCNC: 8.4 MG/DL — LOW (ref 8.5–10.1)
CHLORIDE SERPL-SCNC: 108 MMOL/L — SIGNIFICANT CHANGE UP (ref 96–108)
CO2 SERPL-SCNC: 24 MMOL/L — SIGNIFICANT CHANGE UP (ref 22–31)
CREAT SERPL-MCNC: 0.94 MG/DL — SIGNIFICANT CHANGE UP (ref 0.5–1.3)
GLUCOSE SERPL-MCNC: 115 MG/DL — HIGH (ref 70–99)
HCT VFR BLD CALC: 46.6 % — SIGNIFICANT CHANGE UP (ref 39–50)
HGB BLD-MCNC: 14.7 G/DL — SIGNIFICANT CHANGE UP (ref 13–17)
MAGNESIUM SERPL-MCNC: 1.9 MG/DL — SIGNIFICANT CHANGE UP (ref 1.6–2.6)
MCHC RBC-ENTMCNC: 27.4 PG — SIGNIFICANT CHANGE UP (ref 27–34)
MCHC RBC-ENTMCNC: 31.5 GM/DL — LOW (ref 32–36)
MCV RBC AUTO: 86.8 FL — SIGNIFICANT CHANGE UP (ref 80–100)
PHOSPHATE SERPL-MCNC: 2.9 MG/DL — SIGNIFICANT CHANGE UP (ref 2.5–4.5)
PLATELET # BLD AUTO: 135 K/UL — LOW (ref 150–400)
POTASSIUM SERPL-MCNC: 4 MMOL/L — SIGNIFICANT CHANGE UP (ref 3.5–5.3)
POTASSIUM SERPL-SCNC: 4 MMOL/L — SIGNIFICANT CHANGE UP (ref 3.5–5.3)
RBC # BLD: 5.37 M/UL — SIGNIFICANT CHANGE UP (ref 4.2–5.8)
RBC # FLD: 15.1 % — HIGH (ref 10.3–14.5)
SODIUM SERPL-SCNC: 139 MMOL/L — SIGNIFICANT CHANGE UP (ref 135–145)
WBC # BLD: 12.28 K/UL — HIGH (ref 3.8–10.5)
WBC # FLD AUTO: 12.28 K/UL — HIGH (ref 3.8–10.5)

## 2021-10-25 PROCEDURE — 99233 SBSQ HOSP IP/OBS HIGH 50: CPT

## 2021-10-25 PROCEDURE — 93306 TTE W/DOPPLER COMPLETE: CPT | Mod: 26

## 2021-10-25 RX ORDER — CEFTRIAXONE 500 MG/1
1000 INJECTION, POWDER, FOR SOLUTION INTRAMUSCULAR; INTRAVENOUS EVERY 24 HOURS
Refills: 0 | Status: DISCONTINUED | OUTPATIENT
Start: 2021-10-25 | End: 2021-10-25

## 2021-10-25 RX ORDER — METOPROLOL TARTRATE 50 MG
25 TABLET ORAL DAILY
Refills: 0 | Status: DISCONTINUED | OUTPATIENT
Start: 2021-10-25 | End: 2021-10-31

## 2021-10-25 RX ADMIN — Medication 10 MILLIGRAM(S): at 22:25

## 2021-10-25 RX ADMIN — LATANOPROST 1 DROP(S): 0.05 SOLUTION/ DROPS OPHTHALMIC; TOPICAL at 22:25

## 2021-10-25 RX ADMIN — Medication 10 MILLIGRAM(S): at 11:07

## 2021-10-25 RX ADMIN — TAMSULOSIN HYDROCHLORIDE 0.4 MILLIGRAM(S): 0.4 CAPSULE ORAL at 22:25

## 2021-10-25 RX ADMIN — FAMOTIDINE 20 MILLIGRAM(S): 10 INJECTION INTRAVENOUS at 11:07

## 2021-10-25 RX ADMIN — ATORVASTATIN CALCIUM 20 MILLIGRAM(S): 80 TABLET, FILM COATED ORAL at 22:25

## 2021-10-25 RX ADMIN — CEFTRIAXONE 1000 MILLIGRAM(S): 500 INJECTION, POWDER, FOR SOLUTION INTRAMUSCULAR; INTRAVENOUS at 11:07

## 2021-10-25 RX ADMIN — Medication 81 MILLIGRAM(S): at 11:07

## 2021-10-25 RX ADMIN — Medication 25 MILLIGRAM(S): at 11:07

## 2021-10-25 NOTE — PHYSICAL THERAPY INITIAL EVALUATION ADULT - MODALITIES TREATMENT COMMENTS
Pt OOB in chair, +alarm, R UE elevated on pillow-RN addressing wound care for R UE. NAD, denies pain, coughing during session at times , ambulated on RA w/ mild SOB noted at end of session, NC+ and pt recovered. RN Zulema in room whole session.

## 2021-10-25 NOTE — PROGRESS NOTE ADULT - SUBJECTIVE AND OBJECTIVE BOX
HOSPITALIST ATTENDING PROGRESS NOTE    Chart and meds reviewed.  Patient seen and examined.    CC: syncope    Subjective: Pt reports breathing ok but does have some cough with phlegm. Initially confused, asking for wheelchair to go to lobby so wife could pick him up.     All 10 systems reviewed and found to be negative with the exception of what has been described above.    MEDICATIONS  (STANDING):  aspirin enteric coated 81 milliGRAM(s) Oral daily  atorvastatin 20 milliGRAM(s) Oral at bedtime  enalapril 10 milliGRAM(s) Oral two times a day  famotidine    Tablet 20 milliGRAM(s) Oral daily  latanoprost 0.005% Ophthalmic Solution 1 Drop(s) Both EYES at bedtime  metoprolol succinate ER 25 milliGRAM(s) Oral daily  tamsulosin 0.4 milliGRAM(s) Oral at bedtime    MEDICATIONS  (PRN):  acetaminophen     Tablet .. 650 milliGRAM(s) Oral every 6 hours PRN Temp greater or equal to 38C (100.4F)      VITALS:  T(F): 98.5 (10-25-21 @ 08:40), Max: 101.6 (10-24-21 @ 21:15)  HR: 101 (10-25-21 @ 08:40) (90 - 106)  BP: 156/80 (10-25-21 @ 08:40) (153/66 - 165/82)  RR: 18 (10-25-21 @ 08:40) (18 - 18)  SpO2: 91% (10-25-21 @ 08:40) (91% - 98%)  Wt(kg): --    I&O's Summary      CAPILLARY BLOOD GLUCOSE          PHYSICAL EXAM:  Gen: NAD  HEENT:  pupils equal and reactive, EOMI, no oropharyngeal lesions, erythema, exudates, oral thrush  NECK:   supple, no carotid bruits, no palpable lymph nodes, no thyromegaly  CV:  +S1, +S2, regular, no murmurs or rubs  RESP:   lungs clear to auscultation bilaterally, no wheezing, rales, good air entry bilaterally. + Scattered rhonchi  BREAST:  not examined  GI:  abdomen soft, non-tender, non-distended, normal BS, no bruits, no abdominal masses, no palpable masses  RECTAL:  not examined  :  not examined  MSK:   normal muscle tone, no atrophy, no rigidity, no contractions  EXT:  no clubbing, no cyanosis, no edema, no calf pain, swelling or erythema, +skin tears and venous ulcer on RLE  VASCULAR:  pulses equal and symmetric in the upper and lower extremities  NEURO:  AAOX3 but confused, no focal neurological deficits, follows all commands, able to move extremities spontaneously  SKIN:  no ulcers, lesions or rashes    LABS:                            14.7   12.28 )-----------( 135      ( 25 Oct 2021 08:11 )             46.6     10-25    139  |  108  |  13  ----------------------------<  115<H>  4.0   |  24  |  0.94    Ca    8.4<L>      25 Oct 2021 08:11  Phos  2.9     10-25  Mg     1.9     10-25    TPro  7.9  /  Alb  3.6  /  TBili  0.9  /  DBili  x   /  AST  29  /  ALT  21  /  AlkPhos  107  10-24    CARDIAC MARKERS ( 25 Oct 2021 08:11 )  x     / x     / 674 U/L / x     / x          LIVER FUNCTIONS - ( 24 Oct 2021 12:07 )  Alb: 3.6 g/dL / Pro: 7.9 gm/dL / ALK PHOS: 107 U/L / ALT: 21 U/L / AST: 29 U/L / GGT: x             Urinalysis Basic - ( 24 Oct 2021 21:14 )    Color: Yellow / Appearance: Clear / S.020 / pH: x  Gluc: x / Ketone: Moderate  / Bili: Negative / Urobili: Negative mg/dL   Blood: x / Protein: 30 mg/dL / Nitrite: Negative   Leuk Esterase: Negative / RBC: 3-5 /HPF / WBC Negative   Sq Epi: x / Non Sq Epi: Few / Bacteria: Moderate        Lactate, Blood: 1.9 mmol/L (10-24 @ 22:01)    Blood, Urine: Moderate (10-24 @ 21:14)    CULTURES:  Blood, Urine: Moderate (10-24 @ 21:14)  UA w blood, bacteria but is LE, nitrite and WBC neg  RSV +  BCx pending  Repeat UA, UCx ordered    Additional results/Imaging, I have personally reviewed:  CXR 10/24/21: Unremarkable frontal chest x ray    CTH, CT cspine 10/24/21: CT HEAD: There is no acute intracranial hemorrhage or depressed calvarial fracture. Chronic microvascular ischemic changes and chronic right caudate lacunar infarct as described. CT CERVICAL SPINE: No fracture or acute traumatic malalignment. Degenerative changes as described.    Echo 10/25/21:  The left ventricle is normal in size, wall motion and contractility. Mild concentric left ventricular hypertrophy is present. Estimated left ventricular ejection fraction is 55-60 %.  Normal appearing left atrium. Normal appearing right atrium. Normal appearing right ventricle structure and function. Aortic valve not well seen.  Fibrocalcific changes noted to the Aortic valve leaflets with preserved leaflet excursion. Trace aortic regurgitation is present. Fibrocalcific changes noted to the mitral valve leaflets with preserved leaflet excursion.  EA reversal of the mitral inflow consistent with reduced compliance of the left ventricle. Mild mitral annular calcification is present. Trace to mild mitral regurgitation is present. No evidence of pericardial effusion.    Telemetry, personally reviewed:  10/25/21: NSVT in ED, sinus

## 2021-10-25 NOTE — CONSULT NOTE ADULT - ASSESSMENT
87 M with CAD, COPD presents with fall and dizziness       Symptoms most likley due to UTI and RSV    UTI recommend abx-- gave dose of ceftraaxone-- recommend hydration    Minimal elevation in Trop- recommend checking CPK to correlate levels- had NSVT on tele- recommend low dose BB- will check echocardiogram- contineu asa and statin for CAD-- he denies current chest pain or chest pain during episode-- will treat medically    supportivecare

## 2021-10-25 NOTE — PHYSICAL THERAPY INITIAL EVALUATION ADULT - PERTINENT HX OF CURRENT PROBLEM, REHAB EVAL
86 y/o M, s/p near syncopal episode. Found to have abnormal UA and RSV +  PMHx of BPH, CAD, COPD, glaucoma, HTN, HLD presents to the ED BIBEMS

## 2021-10-25 NOTE — PHYSICAL THERAPY INITIAL EVALUATION ADULT - GENERAL OBSERVATIONS, REHAB EVAL
Pt seen on 3N, on Droplet and contact isolation +RSV, Pt alert and oriented to person and place, willing and cooperative, appears confused at times. Pt's body very ridged and decreased fluidity of movement noted. +NC, +IV, RN Zulema assisted during session. R UE multiple wounds on forearm RN changing dressing at end of session.

## 2021-10-25 NOTE — PROGRESS NOTE ADULT - ASSESSMENT
87M hx BPH, CAD, COPD, gluacoma, HTN, HLD p/w presyncope, found to have severe sepsis with acute hypoxic resp failure and acute metabolic encephalopathy 2/2 RSV PNA.     #Severe sepsis with acute hypoxic resp failure and acute metabolic encephalopathy 2/2 RSV PNA  -Now afebrile  -trend WBC  -weaning O2 need, now on 4L (from 5L)  -RSV +  -UA not largely concerning for UTI and no sx, f/u repeat UA and UCx  -BCx pending  -stop abx as not c/f UTI currently  -continue supportive care for RSV  -hold further IVF as taking po  -MS improved from admission (now A&Ox3 but still slightly confused)    #thrombocytopenia  -likely in setting of sepsis, trend    #Presyncope  -tele w NSVT in ED  -orthostatics ordered  -trops neg  -echo with only diastolic dysfunction as above  -CTH w no acute findings but with chronic lacunar infarcts and chronic microvascular ischemic changes    #NSVT  -watch lytes  -tele  -echo as above  -low dose BB started  -f/u further cards recs    #CAD, HTN, HLD  -ASA, statin, ACEi, BB  -pt has not taken lasix in many months and was prior only on it for LE swelling, will hold    #DVT ppx- SCDs    #full code    #dispo pending improvement in MS, hypoxia

## 2021-10-25 NOTE — PHYSICAL THERAPY INITIAL EVALUATION ADULT - DIAGNOSIS, PT EVAL
s/p near syncopal episode, Found to have abnormal UA and RSV +. Metabolic Encephalopathy and Leukocytosis R/O Infection/UTI, Syncope and Dizziness R/O Arrhythmia,

## 2021-10-25 NOTE — PHYSICAL THERAPY INITIAL EVALUATION ADULT - SITTING BALANCE: STATIC
Pt needed time to adjust to sitting at EOB, needed PT to orient his feet to the floor and hands to hold on bed rail and RW./fair minus

## 2021-10-25 NOTE — PHYSICAL THERAPY INITIAL EVALUATION ADULT - BALANCE DISTURBANCE, IDENTIFIED IMPAIRMENT CONTRIBUTE, REHAB EVAL
[address] [facility]    [address] [place] [address]    Phone:  [phone]                                       After Visit Summary   2/26/2018    Kimi Ulloa    MRN: [id number]           After Visit Summary Signature Page     I have received my discharge instructions, and my questions have been answered. I have discussed any challenges I see with this plan with the nurse or doctor.    ..........................................................................................................................................  Patient/Patient Representative Signature      ..........................................................................................................................................  Patient Representative Print Name and Relationship to Patient    ..................................................               ................................................  Date                                            Time    ..........................................................................................................................................  Reviewed by Signature/Title    ...................................................              ..............................................  Date                                                            Time           impaired postural control

## 2021-10-25 NOTE — CONSULT NOTE ADULT - SUBJECTIVE AND OBJECTIVE BOX
CHIEF COMPLAINT: Patient is a 87y old  Male who presents with a chief complaint of syncope (24 Oct 2021 15:23)      HPI:  86 y/o M with a PMHx of BPH, CAD, COPD, glaucoma, HTN, HLD presents to the ED BIBEMS s/p near syncopal episode this morning. States he was shaving this morning when he began to feel dizzy, weak and could not support himself. He fell toward onto the sink with elbow resulting in skin tears. Denies LOC.  States that R forearm feels sore right now;      FOubnd to have abnormal UA and RSV +      PAST MEDICAL HISTORY:  BPH (benign prostatic hyperplasia)   CAD (coronary artery disease)   COPD (chronic obstructive pulmonary disease)   Glaucoma   Glaucoma of both eyes, unspecified glaucoma   H/O Clostridium difficile infection s/p left THR  Hard of hearing   HTN (hypertension)   Humerus fracture proximal, right  Hyperlipidemia   Shoulder pain, right   Swelling of ankle b/l.     PAST SURGICAL HISTORY:  H/O bilateral inguinal hernia repair 2016  H/O colonoscopy   History of tonsillectomy   History of total hip replacement, left 2011  S/P cataract surgery b/l  Status post coronary artery stent placement reports stents x 4?     FAMILY HISTORY:  Family history of hypertension in mother.    SOCIAL HISTORY:  Lives at home. No tobacco, alcohol or illicit drug use  (24 Oct 2021 15:23)      PMHx: PAST MEDICAL & SURGICAL HISTORY:  HTN (hypertension)    Hyperlipidemia    COPD (chronic obstructive pulmonary disease)    Glaucoma of both eyes, unspecified glaucoma    CAD (coronary artery disease)    BPH (benign prostatic hyperplasia)    Glaucoma    Swelling of ankle  b/l    Shoulder pain, right    Humerus fracture  proximal, right    Hard of hearing    H/O Clostridium difficile infection  s/p left THR    H/O bilateral inguinal hernia repair  2016    History of tonsillectomy    Status post coronary artery stent placement  reports stents x 4?    History of total hip replacement, left  2011    H/O colonoscopy    S/P cataract surgery  b/l          Soc Hx: no toxic habits, lives with wife      Allergies: Allergies    No Known Allergies    Intolerances          REVIEW OF SYSTEMS:    CONSTITUTIONAL: No weakness, fevers or chills  EYES/ENT: No visual changes;  No vertigo or throat pain   NECK: No pain or stiffness  RESPIRATORY: No cough, wheezing, hemoptysis; No shortness of breath  CARDIOVASCULAR: No chest pain or palpitations  GASTROINTESTINAL: No abdominal or epigastric pain. No nausea, vomiting, or hematemesis; No diarrhea or constipation. No melena or hematochezia.  GENITOURINARY: No dysuria, frequency or hematuria  NEUROLOGICAL: No numbness or weakness  SKIN: No itching, burning, rashes, or lesions   All other review of systems is negative unless indicated above    Vital Signs Last 24 Hrs  T(C): 36.8 (24 Oct 2021 23:30), Max: 38.7 (24 Oct 2021 21:15)  T(F): 98.2 (24 Oct 2021 23:30), Max: 101.6 (24 Oct 2021 21:15)  HR: 106 (24 Oct 2021 23:30) (81 - 114)  BP: 153/66 (24 Oct 2021 23:30) (153/66 - 188/84)  BP(mean): --  RR: 18 (24 Oct 2021 23:30) (18 - 18)  SpO2: 91% (24 Oct 2021 23:30) (91% - 100%)    I&O's Summary          PHYSICAL EXAM:   Constitutional: NAD, awake and alert, well-developed  HEENT: PERR, EOMI, Normal Hearing, MMM  Neck: Soft and supple, No LAD, No JVD  Respiratory: Breath sounds are clear bilaterally, No wheezing, rales or rhonchi  Cardiovascular: S1 and S2, regular rate and rhythm, no Murmurs, gallops or rubs  Gastrointestinal: Bowel Sounds present, soft, nontender, nondistended, no guarding, no rebound  Extremities: No peripheral edema  Vascular: 2+ peripheral pulses  Neurological: A/O x 3, no focal deficits  Musculoskeletal: 5/5 strength b/l upper and lower extremities  Skin: No rashes    MEDICATIONS:  MEDICATIONS  (STANDING):  aspirin enteric coated 81 milliGRAM(s) Oral daily  atorvastatin 20 milliGRAM(s) Oral at bedtime  cefTRIAXone Injectable. 1000 milliGRAM(s) IV Push every 24 hours  enalapril 10 milliGRAM(s) Oral two times a day  famotidine    Tablet 20 milliGRAM(s) Oral daily  latanoprost 0.005% Ophthalmic Solution 1 Drop(s) Both EYES at bedtime  sodium chloride 0.9%. 1000 milliLiter(s) (75 mL/Hr) IV Continuous <Continuous>  tamsulosin 0.4 milliGRAM(s) Oral at bedtime      LABS: All Labs Reviewed:                        17.1   12.98 )-----------( 164      ( 24 Oct 2021 12:07 )             53.6     10-24    138  |  105  |  13  ----------------------------<  121<H>  3.9   |  27  |  1.09    Ca    9.5      24 Oct 2021 12:07    TPro  7.9  /  Alb  3.6  /  TBili  0.9  /  DBili  x   /  AST  29  /  ALT  21  /  AlkPhos  107  10-24          RADIOLOGY:    EKG:ek< from: 12 Lead ECG (10.24.21 @ 11:56) >    Diagnosis Line Normal sinus rhythm Premature atrial complexes  Right bundle branch block  Left anterior fascicular block  *** Bifascicular block ***  Minimal voltage criteria for LVH, may be normal variant  Abnormal ECG  When compared with ECG of 01-APR-2017 19:08,  T wave inversionnow evident in Anterior leads  Confirmed by KEVIN GARCÍA, LAZARUS ([id number]) on 10/24/2021 3:31:54 PM    < end of copied text >      Telemetry: SR, PAC, PVC , NSVT    ECHO:pending

## 2021-10-25 NOTE — PHYSICAL THERAPY INITIAL EVALUATION ADULT - DISCHARGE DISPOSITION, PT EVAL
MUNIR vs. HPT TBD on progress (at this time MUNIR) (Pt is already on Homecare and HPT program at this time as per pt)

## 2021-10-25 NOTE — PHYSICAL THERAPY INITIAL EVALUATION ADULT - LEVEL OF INDEPENDENCE: SUPINE/SIT, REHAB EVAL
Pt very stiff, tried to teach Log Rolling but pt not following w/ directives for this task at this session./moderate assist (50% patients effort)

## 2021-10-25 NOTE — PHYSICAL THERAPY INITIAL EVALUATION ADULT - MANUAL MUSCLE TESTING RESULTS, REHAB EVAL
except JOSE LUU RN working on wound care on that side at this session/no strength deficits were identified

## 2021-10-26 LAB
ANION GAP SERPL CALC-SCNC: 8 MMOL/L — SIGNIFICANT CHANGE UP (ref 5–17)
BUN SERPL-MCNC: 17 MG/DL — SIGNIFICANT CHANGE UP (ref 7–23)
CALCIUM SERPL-MCNC: 8.1 MG/DL — LOW (ref 8.5–10.1)
CHLORIDE SERPL-SCNC: 107 MMOL/L — SIGNIFICANT CHANGE UP (ref 96–108)
CO2 SERPL-SCNC: 20 MMOL/L — LOW (ref 22–31)
CREAT SERPL-MCNC: 0.88 MG/DL — SIGNIFICANT CHANGE UP (ref 0.5–1.3)
GLUCOSE SERPL-MCNC: 108 MG/DL — HIGH (ref 70–99)
HCT VFR BLD CALC: 44.4 % — SIGNIFICANT CHANGE UP (ref 39–50)
HGB BLD-MCNC: 14 G/DL — SIGNIFICANT CHANGE UP (ref 13–17)
MCHC RBC-ENTMCNC: 27.6 PG — SIGNIFICANT CHANGE UP (ref 27–34)
MCHC RBC-ENTMCNC: 31.5 GM/DL — LOW (ref 32–36)
MCV RBC AUTO: 87.4 FL — SIGNIFICANT CHANGE UP (ref 80–100)
PLATELET # BLD AUTO: 114 K/UL — LOW (ref 150–400)
POTASSIUM SERPL-MCNC: 4.4 MMOL/L — SIGNIFICANT CHANGE UP (ref 3.5–5.3)
POTASSIUM SERPL-SCNC: 4.4 MMOL/L — SIGNIFICANT CHANGE UP (ref 3.5–5.3)
RBC # BLD: 5.08 M/UL — SIGNIFICANT CHANGE UP (ref 4.2–5.8)
RBC # FLD: 15.1 % — HIGH (ref 10.3–14.5)
SODIUM SERPL-SCNC: 135 MMOL/L — SIGNIFICANT CHANGE UP (ref 135–145)
WBC # BLD: 14.25 K/UL — HIGH (ref 3.8–10.5)
WBC # FLD AUTO: 14.25 K/UL — HIGH (ref 3.8–10.5)

## 2021-10-26 PROCEDURE — 71250 CT THORAX DX C-: CPT | Mod: 26

## 2021-10-26 PROCEDURE — 99233 SBSQ HOSP IP/OBS HIGH 50: CPT

## 2021-10-26 RX ORDER — ALBUTEROL 90 UG/1
2 AEROSOL, METERED ORAL EVERY 4 HOURS
Refills: 0 | Status: DISCONTINUED | OUTPATIENT
Start: 2021-10-26 | End: 2021-10-31

## 2021-10-26 RX ORDER — BUDESONIDE AND FORMOTEROL FUMARATE DIHYDRATE 160; 4.5 UG/1; UG/1
2 AEROSOL RESPIRATORY (INHALATION)
Refills: 0 | Status: DISCONTINUED | OUTPATIENT
Start: 2021-10-26 | End: 2021-10-31

## 2021-10-26 RX ORDER — TIOTROPIUM BROMIDE 18 UG/1
1 CAPSULE ORAL; RESPIRATORY (INHALATION) DAILY
Refills: 0 | Status: DISCONTINUED | OUTPATIENT
Start: 2021-10-26 | End: 2021-10-31

## 2021-10-26 RX ORDER — MAGNESIUM OXIDE 400 MG ORAL TABLET 241.3 MG
400 TABLET ORAL
Refills: 0 | Status: COMPLETED | OUTPATIENT
Start: 2021-10-26 | End: 2021-10-27

## 2021-10-26 RX ADMIN — TAMSULOSIN HYDROCHLORIDE 0.4 MILLIGRAM(S): 0.4 CAPSULE ORAL at 21:38

## 2021-10-26 RX ADMIN — TIOTROPIUM BROMIDE 1 CAPSULE(S): 18 CAPSULE ORAL; RESPIRATORY (INHALATION) at 20:28

## 2021-10-26 RX ADMIN — LATANOPROST 1 DROP(S): 0.05 SOLUTION/ DROPS OPHTHALMIC; TOPICAL at 21:39

## 2021-10-26 RX ADMIN — BUDESONIDE AND FORMOTEROL FUMARATE DIHYDRATE 2 PUFF(S): 160; 4.5 AEROSOL RESPIRATORY (INHALATION) at 20:28

## 2021-10-26 RX ADMIN — ATORVASTATIN CALCIUM 20 MILLIGRAM(S): 80 TABLET, FILM COATED ORAL at 21:39

## 2021-10-26 RX ADMIN — Medication 25 MILLIGRAM(S): at 13:57

## 2021-10-26 RX ADMIN — Medication 10 MILLIGRAM(S): at 21:39

## 2021-10-26 RX ADMIN — Medication 40 MILLIGRAM(S): at 18:50

## 2021-10-26 RX ADMIN — Medication 1200 MILLIGRAM(S): at 18:51

## 2021-10-26 RX ADMIN — MAGNESIUM OXIDE 400 MG ORAL TABLET 400 MILLIGRAM(S): 241.3 TABLET ORAL at 21:38

## 2021-10-26 RX ADMIN — Medication 81 MILLIGRAM(S): at 10:49

## 2021-10-26 RX ADMIN — FAMOTIDINE 20 MILLIGRAM(S): 10 INJECTION INTRAVENOUS at 10:49

## 2021-10-26 RX ADMIN — Medication 10 MILLIGRAM(S): at 10:49

## 2021-10-26 NOTE — PROGRESS NOTE ADULT - ASSESSMENT
87M hx BPH, CAD, COPD, gluacoma, HTN, HLD p/w presyncope, found to have severe sepsis with acute hypoxic resp failure and acute metabolic encephalopathy 2/2 RSV PNA.     #Severe sepsis with acute hypoxic resp failure and acute metabolic encephalopathy 2/2 RSV PNA  -Now afebrile  -trend WBC  -weaning O2 need, now on 4L (from 5L)  -RSV +  -UA not largely concerning for UTI and no sx, f/u repeat UA and UCx  -BCx pending  -stop abx as not c/f UTI currently  -continue supportive care for RSV  -hold further IVF as taking po  -MS improved from admission (now A&Ox3 but still slightly confused)    #thrombocytopenia  -likely in setting of sepsis, trend    #Presyncope  -tele w NSVT in ED  -orthostatics ordered  -trops neg  -echo with only diastolic dysfunction as above  -CTH w no acute findings but with chronic lacunar infarcts and chronic microvascular ischemic changes    #NSVT  -watch lytes  -tele  -echo as above  -low dose BB started  -f/u further cards recs    #CAD, HTN, HLD  -ASA, statin, ACEi, BB  -pt has not taken lasix in many months and was prior only on it for LE swelling, will hold    #DVT ppx- SCDs    #full code    #dispo pending improvement in MS, hypoxia 87M hx BPH, CAD, COPD, glaucoma HTN, HLD admitted for:         # sepsis with acute hypoxic resp failure and acute metabolic encephalopathy 2/2 RSV infection, COPD exacerbation   -Now afebrile  - WBCs still trending up   - BCX NGTD  - F/u UCX   - Still hypoxic on RA, will Order CT chest, BNP.  -Start inhalers: albuterol, Symbicort, Spiriva ( Pt is on trelegy at home)   - mental status improved     # Abnormal UA   - asymptomatic but WBcs elevated  - F/u UCX       #thrombocytopenia  -likely in setting of sepsis, trend    # Presyncope, likely related to acute Dz and dehydration   -tele:  w NSVT in ED, no further events   -trops neg  -echo with only diastolic dysfunction, preserved EF, no significant valvular Dz  -CTH w no acute findings but with chronic lacunar infarcts and chronic microvascular ischemic changes    # NSVT  -watch lytes  -tele  -echo as above  -low dose BB started  - Cardio f/u appreciated     #CAD, HTN, HLD  -ASA, statin, ACEi, BB  -pt has not taken lasix in many months and was prior only on it for LE swelling, will hold for now     #DVT ppx- SCDs    #full code

## 2021-10-26 NOTE — PROGRESS NOTE ADULT - SUBJECTIVE AND OBJECTIVE BOX
CC: syncope (26 Oct 2021 06:46)    HPI:  86 y/o M with a PMHx of BPH, CAD, COPD, glaucoma, HTN, HLD presents to the ED BIBEMS s/p near syncopal episode this morning. States he was shaving this morning when he began to feel dizzy, weak and could not support himself. He fell toward onto the sink with elbow resulting in skin tears. Denies LOC.  States that R forearm feels sore right now;      When I saw the patient he was confused; was not rosalba to tell me where he was, the year or the month; He denies any CP, abdominal pain or urinary symptoms; Discussed with patients wife and daugther who states that this is not his baseline; No hx of dementia and normally with it;  IN the ER, patient received IVF.     PAST MEDICAL HISTORY:  BPH (benign prostatic hyperplasia)   CAD (coronary artery disease)   COPD (chronic obstructive pulmonary disease)   Glaucoma   Glaucoma of both eyes, unspecified glaucoma   H/O Clostridium difficile infection s/p left THR  Hard of hearing   HTN (hypertension)   Humerus fracture proximal, right  Hyperlipidemia   Shoulder pain, right   Swelling of ankle b/l.     PAST SURGICAL HISTORY:  H/O bilateral inguinal hernia repair   H/O colonoscopy   History of tonsillectomy   History of total hip replacement, left   S/P cataract surgery b/l  Status post coronary artery stent placement reports stents x 4?     FAMILY HISTORY:  Family history of hypertension in mother.    SOCIAL HISTORY:  Lives at home. No tobacco, alcohol or illicit drug use  (24 Oct 2021 15:23)    INTERVAL HPI/OVERNIGHT EVENTS:    Vital Signs Last 24 Hrs  T(C): 36.7 (26 Oct 2021 08:37), Max: 36.7 (26 Oct 2021 08:37)  T(F): 98 (26 Oct 2021 08:37), Max: 98 (26 Oct 2021 08:37)  HR: 87 (26 Oct 2021 08:37) (83 - 91)  BP: 145/57 (26 Oct 2021 08:37) (145/57 - 149/90)  BP(mean): --  RR: 18 (26 Oct 2021 08:37) (18 - 18)  SpO2: 91% (26 Oct 2021 08:37) (90% - 92%)  I&O's Detail    25 Oct 2021 07:01  -  26 Oct 2021 07:00  --------------------------------------------------------  IN:  Total IN: 0 mL    OUT:    Voided (mL): 200 mL  Total OUT: 200 mL    Total NET: -200 mL        REVIEW OF SYSTEMS:    CONSTITUTIONAL: No weakness, fevers or chills  EYES/ENT: No visual changes;  No vertigo or throat pain   NECK: No pain or stiffness  RESPIRATORY: No cough, wheezing, hemoptysis; No shortness of breath  CARDIOVASCULAR: No chest pain or palpitations  GASTROINTESTINAL: No abdominal or epigastric pain. No nausea, vomiting, or hematemesis; No diarrhea or constipation. No melena or hematochezia.  GENITOURINARY: No dysuria, frequency or hematuria  NEUROLOGICAL: No numbness or weakness  SKIN: No itching, burning, rashes, or lesions   All other review of systems is negative unless indicated above.  PHYSICAL EXAM:    General: Well developed; well nourished; in no acute distress  Eyes: PERRLA, EOMI; conjunctiva and sclera clear  Head: Normocephalic; atraumatic  ENMT: No nasal discharge; airway clear  Neck: Supple; non tender; no masses  Respiratory: No wheezes, rales or rhonchi  Cardiovascular: Regular rate and rhythm. S1 and S2 Normal; No murmurs, gallops or rubs  Gastrointestinal: Soft non-tender non-distended; Normal bowel sounds  Genitourinary: No  suprapubic  tenderness  Extremities: Normal range of motion, No clubbing, cyanosis or edema  Vascular: Peripheral pulses palpable 2+ bilaterally  Neurological: Alert and oriented x4  Skin: Warm and dry. No acute rash  Lymph Nodes: No acute cervical adenopathy  Musculoskeletal: Normal muscle tone, without deformities  Psychiatric: Cooperative and appropriate  CARDIAC MARKERS ( 25 Oct 2021 08:11 )  x     / x     / 674 U/L / x     / x                                14.0   14.25 )-----------( 114      ( 26 Oct 2021 08:03 )             44.4     26 Oct 2021 08:03    135    |  107    |  17     ----------------------------<  108    4.4     |  20     |  0.88     Ca    8.1        26 Oct 2021 08:03  Phos  2.9       25 Oct 2021 08:11  Mg     1.9       25 Oct 2021 08:11        CAPILLARY BLOOD GLUCOSE          Urinalysis Basic - ( 24 Oct 2021 21:14 )    Color: Yellow / Appearance: Clear / S.020 / pH: x  Gluc: x / Ketone: Moderate  / Bili: Negative / Urobili: Negative mg/dL   Blood: x / Protein: 30 mg/dL / Nitrite: Negative   Leuk Esterase: Negative / RBC: 3-5 /HPF / WBC Negative   Sq Epi: x / Non Sq Epi: Few / Bacteria: Moderate        MEDICATIONS  (STANDING):  aspirin enteric coated 81 milliGRAM(s) Oral daily  atorvastatin 20 milliGRAM(s) Oral at bedtime  enalapril 10 milliGRAM(s) Oral two times a day  famotidine    Tablet 20 milliGRAM(s) Oral daily  latanoprost 0.005% Ophthalmic Solution 1 Drop(s) Both EYES at bedtime  metoprolol succinate ER 25 milliGRAM(s) Oral daily  tamsulosin 0.4 milliGRAM(s) Oral at bedtime    MEDICATIONS  (PRN):  acetaminophen     Tablet .. 650 milliGRAM(s) Oral every 6 hours PRN Temp greater or equal to 38C (100.4F)      RADIOLOGY & ADDITIONAL TESTS: CC: syncope (26 Oct 2021 06:46)    HPI: 86 y/o M with a PMHx of BPH, CAD, COPD, glaucoma, HTN, HLD presented  to the ED BIBEMS s/p near syncopal episode this morning. Stated  he was shaving this morning when he began to feel dizzy, weak and could not support himself. He fell toward onto the sink with elbow resulting in skin tears. Denies LOC.  C/o  R forearm  soreness   Upon evaluation in ED Pt was  confused; was not able  tell the date; No  CP, abdominal pain or urinary symptoms;  Pt was given IVF     INTERVAL HPI/ OVERNIGHT EVENTS: chart reviewed, Pt was seen and examined, just got OOB to chair, reports feeling weal, some SOB. Still with cough, + yellow phlegm. no Fevers, no CP    Vital Signs Last 24 Hrs  T(C): 36.7 (26 Oct 2021 08:37), Max: 36.7 (26 Oct 2021 08:37)  T(F): 98 (26 Oct 2021 08:37), Max: 98 (26 Oct 2021 08:37)  HR: 87 (26 Oct 2021 08:37) (83 - 91)  BP: 145/57 (26 Oct 2021 08:37) (145/57 - 149/90)  RR: 18 (26 Oct 2021 08:37) (18 - 18)  SpO2: 91% (26 Oct 2021 08:37) (90% - 92%)      REVIEW OF SYSTEMS:  All other review of systems is negative unless indicated above.        PHYSICAL EXAM:  General: Well developed; in no acute distress, on NC   Eyes:  EOMI; conjunctiva and sclera clear  Head: Normocephalic; atraumatic  ENMT: No nasal discharge; airway clear  Neck: Supple; non tender; no masses  Respiratory: Diminished air movement, + scattered wheezing    Cardiovascular: Regular rate and rhythm. S1 and S2 Normal;   Gastrointestinal: Soft non-tender non-distended; Normal bowel sounds  Genitourinary: No  suprapubic  tenderness  Extremities: No edema  Vascular: Peripheral pulses palpable 2+ bilaterally  Neurological: Alert and oriented x 2-3, non focal, forgetful   Musculoskeletal: Normal muscle tone  Psychiatric: Cooperative       LABS:   CARDIAC MARKERS ( 25 Oct 2021 08:11 )  x     / x     / 674 U/L / x     / x                                14.0   14.25 )-----------( 114      ( 26 Oct 2021 08:03 )             44.4     26 Oct 2021 08:03    135    |  107    |  17     ----------------------------<  108    4.4     |  20     |  0.88     Ca    8.1        26 Oct 2021 08:03  Phos  2.9       25 Oct 2021 08:11  Mg     1.9       25 Oct 2021 08:11    Urinalysis Basic - ( 24 Oct 2021 21:14 )  Color: Yellow / Appearance: Clear / S.020 / pH: x  Gluc: x / Ketone: Moderate  / Bili: Negative / Urobili: Negative mg/dL   Blood: x / Protein: 30 mg/dL / Nitrite: Negative   Leuk Esterase: Negative / RBC: 3-5 /HPF / WBC Negative   Sq Epi: x / Non Sq Epi: Few / Bacteria: Moderate        MEDICATIONS  (STANDING):  aspirin enteric coated 81 milliGRAM(s) Oral daily  atorvastatin 20 milliGRAM(s) Oral at bedtime  enalapril 10 milliGRAM(s) Oral two times a day  famotidine    Tablet 20 milliGRAM(s) Oral daily  latanoprost 0.005% Ophthalmic Solution 1 Drop(s) Both EYES at bedtime  metoprolol succinate ER 25 milliGRAM(s) Oral daily  tamsulosin 0.4 milliGRAM(s) Oral at bedtime    MEDICATIONS  (PRN):  acetaminophen     Tablet .. 650 milliGRAM(s) Oral every 6 hours PRN Temp greater or equal to 38C (100.4F)      RADIOLOGY & ADDITIONAL TESTS:      EXAM:  CT CERVICAL SPINE                        EXAM:  CT BRAIN                        PROCEDURE DATE:  10/24/2021        < from: Xray Chest 1 View- PORTABLE-Urgent (10.24.21 @ 12:19) >    EXAM:  XR CHEST PORTABLE URGENT 1V                            PROCEDURE DATE:  10/24/2021          INTERPRETATION:  CLINICAL STATEMENT: Chest Pain.    TECHNIQUE: AP view of the chest.      COMPARISON: 2020    Patient is rotated towards the right.    The cardiomediastinal silhouette is normal and the adelaide are not enlarged. Aortic calcification. There is no focal infiltrate or sizable pleural effusion. The osseous structures are intact. Degenerative changes of the spine and AC joints. History of reverse right shoulder arthroplasty.    IMPRESSION:    Unremarkable frontal chest x ray         EXAM:  ECHO TTE WO CON COMP W DOPP    PROCEDURE DATE:  10/25/2021     Summary     The left ventricle is normal in size, wall motion and contractility.   Mild concentric left ventricular hypertrophy is present.   Estimated left ventricular ejection fraction is 55-60 %.   Normal appearing left atrium.   Normal appearing right atrium.   Normal appearing right ventricle structure and function.   Aortic valve not well seen.   Fibrocalcific changes noted to the Aortic valve leaflets with preserved   leaflet excursion.   Trace aortic regurgitation is present.   Fibrocalcific changes noted to the mitral valve leaflets with preserved   leaflet excursion.   EA reversal of the mitral inflow consistent with reduced compliance of the   left ventricle.   Mild mitral annular calcification is present.   Trace to mild mitral regurgitation is present.   No evidence of pericardial effusion.

## 2021-10-26 NOTE — PROGRESS NOTE ADULT - SUBJECTIVE AND OBJECTIVE BOX
CHIEF COMPLAINT: Patient is a 87y old  Male who presents with a chief complaint of syncope (24 Oct 2021 15:23)      HPI:  86 y/o M with a PMHx of BPH, CAD, COPD, glaucoma, HTN, HLD presents to the ED BIBEMS s/p near syncopal episode this morning. States he was shaving this morning when he began to feel dizzy, weak and could not support himself. He fell toward onto the sink with elbow resulting in skin tears. Denies LOC.  States that R forearm feels sore right now;      FOubnd to have abnormal UA and RSV +    10/26/21- no acute issues overnight       PAST MEDICAL HISTORY:  BPH (benign prostatic hyperplasia)   CAD (coronary artery disease)   COPD (chronic obstructive pulmonary disease)   Glaucoma   Glaucoma of both eyes, unspecified glaucoma   H/O Clostridium difficile infection s/p left THR  Hard of hearing   HTN (hypertension)   Humerus fracture proximal, right  Hyperlipidemia   Shoulder pain, right   Swelling of ankle b/l.     PAST SURGICAL HISTORY:  H/O bilateral inguinal hernia repair 2016  H/O colonoscopy   History of tonsillectomy   History of total hip replacement, left 2011  S/P cataract surgery b/l  Status post coronary artery stent placement reports stents x 4?     FAMILY HISTORY:  Family history of hypertension in mother.    SOCIAL HISTORY:  Lives at home. No tobacco, alcohol or illicit drug use  (24 Oct 2021 15:23)      PMHx: PAST MEDICAL & SURGICAL HISTORY:  HTN (hypertension)    Hyperlipidemia    COPD (chronic obstructive pulmonary disease)    Glaucoma of both eyes, unspecified glaucoma    CAD (coronary artery disease)    BPH (benign prostatic hyperplasia)    Glaucoma    Swelling of ankle  b/l    Shoulder pain, right    Humerus fracture  proximal, right    Hard of hearing    H/O Clostridium difficile infection  s/p left THR    H/O bilateral inguinal hernia repair  2016    History of tonsillectomy    Status post coronary artery stent placement  reports stents x 4?    History of total hip replacement, left  2011    H/O colonoscopy    S/P cataract surgery  b/l    Vital Signs Last 24 Hrs  T(C): 36.9 (25 Oct 2021 08:40), Max: 36.9 (25 Oct 2021 08:40)  T(F): 98.5 (25 Oct 2021 08:40), Max: 98.5 (25 Oct 2021 08:40)  HR: 83 (26 Oct 2021 06:25) (83 - 101)  BP: 147/79 (26 Oct 2021 06:25) (147/79 - 156/80)  BP(mean): --  RR: 18 (26 Oct 2021 06:25) (18 - 18)  SpO2: 92% (26 Oct 2021 06:25) (90% - 92%)      PHYSICAL EXAM:   Constitutional: NAD, awake and alert, well-developed  Neck: Soft and supple, No LAD, No JVD  Respiratory: occasional basilar crackles  Cardiovascular: S1 and S2, regular rate and rhythm, no Murmurs, gallops or rubs  Extremities: No peripheral edema  Vascular: 2+ peripheral pulses  Neurological: A/O x 3, no focal deficits        MEDICATIONS  (STANDING):  aspirin enteric coated 81 milliGRAM(s) Oral daily  atorvastatin 20 milliGRAM(s) Oral at bedtime  enalapril 10 milliGRAM(s) Oral two times a day  famotidine    Tablet 20 milliGRAM(s) Oral daily  latanoprost 0.005% Ophthalmic Solution 1 Drop(s) Both EYES at bedtime  metoprolol succinate ER 25 milliGRAM(s) Oral daily  tamsulosin 0.4 milliGRAM(s) Oral at bedtime    MEDICATIONS  (PRN):  acetaminophen     Tablet .. 650 milliGRAM(s) Oral every 6 hours PRN Temp greater or equal to 38C (100.4F)    LABS: All Labs Reviewed:                                 14.7   12.28 )-----------( 135      ( 25 Oct 2021 08:11 )             46.6   10-25    139  |  108  |  13  ----------------------------<  115<H>  4.0   |  24  |  0.94    Ca    8.4<L>      25 Oct 2021 08:11  Phos  2.9     10-25  Mg     1.9     10-25    TPro  7.9  /  Alb  3.6  /  TBili  0.9  /  DBili  x   /  AST  29  /  ALT  21  /  AlkPhos  107  10-24      RADIOLOGY:    EKG:ek< from: 12 Lead ECG (10.24.21 @ 11:56) >    Diagnosis Line Normal sinus rhythm Premature atrial complexes  Right bundle branch block  Left anterior fascicular block  *** Bifascicular block ***  Minimal voltage criteria for LVH, may be normal variant  Abnormal ECG  When compared with ECG of 01-APR-2017 19:08,  T wave inversionnow evident in Anterior leads  Confirmed by KIKO WHITE MD ([id number]) on 10/24/2021 3:31:54 PM    < end of copied text >      Telemetry: SR, PAC, PVC , NSVT---no further NSVT overnight    ECHO:< from: TTE Echo Complete w/o Contrast w/ Doppler (10.25.21 @ 10:44) >   Impression     Summary     The left ventricle is normal in size, wall motion and contractility.   Mild concentric left ventricular hypertrophy is present.   Estimated left ventricular ejection fraction is 55-60 %.   Normal appearing left atrium.   Normal appearing right atrium.   Normal appearing right ventricle structure and function.   Aortic valve not well seen.   Fibrocalcific changes noted to the Aortic valve leaflets with preserved   leaflet excursion.   Trace aortic regurgitation is present.   Fibrocalcific changes noted to the mitral valve leaflets with preserved   leaflet excursion.   EA reversal of the mitral inflow consistent with reduced compliance of the   left ventricle.   Mild mitral annular calcification is present.   Trace to mild mitral regurgitation is present.   No evidence of pericardial effusion.     Signature     ----------------------------------------------------------------   Electronically signed by Kiko White MD(Interpreting physician)   on 10/25/2021 05:46 PM   ----------------------------------------------------------------    < end of copied text >

## 2021-10-26 NOTE — PROGRESS NOTE ADULT - ASSESSMENT
87 M with CAD, COPD presents with fall and dizziness       Symptoms most likley due to RSV    Minimal elevation in Trop-- had NSVT on tele- continue low dose BB- echocardiogram reveals preserved EF- continue asa and statin for CAD-- he denies current chest pain or chest pain during episode-- will treat medically    supportive care- please call as needed

## 2021-10-27 LAB
ANION GAP SERPL CALC-SCNC: 5 MMOL/L — SIGNIFICANT CHANGE UP (ref 5–17)
BUN SERPL-MCNC: 22 MG/DL — SIGNIFICANT CHANGE UP (ref 7–23)
CALCIUM SERPL-MCNC: 8.4 MG/DL — LOW (ref 8.5–10.1)
CHLORIDE SERPL-SCNC: 105 MMOL/L — SIGNIFICANT CHANGE UP (ref 96–108)
CO2 SERPL-SCNC: 28 MMOL/L — SIGNIFICANT CHANGE UP (ref 22–31)
CREAT SERPL-MCNC: 0.88 MG/DL — SIGNIFICANT CHANGE UP (ref 0.5–1.3)
CULTURE RESULTS: SIGNIFICANT CHANGE UP
GLUCOSE SERPL-MCNC: 138 MG/DL — HIGH (ref 70–99)
HCT VFR BLD CALC: 44.9 % — SIGNIFICANT CHANGE UP (ref 39–50)
HGB BLD-MCNC: 14 G/DL — SIGNIFICANT CHANGE UP (ref 13–17)
MCHC RBC-ENTMCNC: 27.5 PG — SIGNIFICANT CHANGE UP (ref 27–34)
MCHC RBC-ENTMCNC: 31.2 GM/DL — LOW (ref 32–36)
MCV RBC AUTO: 88 FL — SIGNIFICANT CHANGE UP (ref 80–100)
NT-PROBNP SERPL-SCNC: 1968 PG/ML — HIGH (ref 0–450)
PLATELET # BLD AUTO: 120 K/UL — LOW (ref 150–400)
POTASSIUM SERPL-MCNC: 4.3 MMOL/L — SIGNIFICANT CHANGE UP (ref 3.5–5.3)
POTASSIUM SERPL-SCNC: 4.3 MMOL/L — SIGNIFICANT CHANGE UP (ref 3.5–5.3)
RBC # BLD: 5.1 M/UL — SIGNIFICANT CHANGE UP (ref 4.2–5.8)
RBC # FLD: 14.6 % — HIGH (ref 10.3–14.5)
SODIUM SERPL-SCNC: 138 MMOL/L — SIGNIFICANT CHANGE UP (ref 135–145)
SPECIMEN SOURCE: SIGNIFICANT CHANGE UP
WBC # BLD: 11.07 K/UL — HIGH (ref 3.8–10.5)
WBC # FLD AUTO: 11.07 K/UL — HIGH (ref 3.8–10.5)

## 2021-10-27 PROCEDURE — 99233 SBSQ HOSP IP/OBS HIGH 50: CPT

## 2021-10-27 RX ORDER — CEFTRIAXONE 500 MG/1
INJECTION, POWDER, FOR SOLUTION INTRAMUSCULAR; INTRAVENOUS
Refills: 0 | Status: DISCONTINUED | OUTPATIENT
Start: 2021-10-27 | End: 2021-10-27

## 2021-10-27 RX ORDER — CEFTRIAXONE 500 MG/1
1000 INJECTION, POWDER, FOR SOLUTION INTRAMUSCULAR; INTRAVENOUS ONCE
Refills: 0 | Status: COMPLETED | OUTPATIENT
Start: 2021-10-27 | End: 2021-10-27

## 2021-10-27 RX ORDER — CEFTRIAXONE 500 MG/1
1000 INJECTION, POWDER, FOR SOLUTION INTRAMUSCULAR; INTRAVENOUS EVERY 24 HOURS
Refills: 0 | Status: DISCONTINUED | OUTPATIENT
Start: 2021-10-28 | End: 2021-10-30

## 2021-10-27 RX ORDER — AZITHROMYCIN 500 MG/1
500 TABLET, FILM COATED ORAL ONCE
Refills: 0 | Status: COMPLETED | OUTPATIENT
Start: 2021-10-27 | End: 2021-10-27

## 2021-10-27 RX ORDER — AZITHROMYCIN 500 MG/1
500 TABLET, FILM COATED ORAL EVERY 24 HOURS
Refills: 0 | Status: DISCONTINUED | OUTPATIENT
Start: 2021-10-28 | End: 2021-10-31

## 2021-10-27 RX ORDER — ENOXAPARIN SODIUM 100 MG/ML
40 INJECTION SUBCUTANEOUS DAILY
Refills: 0 | Status: DISCONTINUED | OUTPATIENT
Start: 2021-10-27 | End: 2021-10-31

## 2021-10-27 RX ORDER — AZITHROMYCIN 500 MG/1
TABLET, FILM COATED ORAL
Refills: 0 | Status: DISCONTINUED | OUTPATIENT
Start: 2021-10-27 | End: 2021-10-31

## 2021-10-27 RX ORDER — CEFTRIAXONE 500 MG/1
INJECTION, POWDER, FOR SOLUTION INTRAMUSCULAR; INTRAVENOUS
Refills: 0 | Status: DISCONTINUED | OUTPATIENT
Start: 2021-10-27 | End: 2021-10-30

## 2021-10-27 RX ADMIN — Medication 40 MILLIGRAM(S): at 16:50

## 2021-10-27 RX ADMIN — TIOTROPIUM BROMIDE 1 CAPSULE(S): 18 CAPSULE ORAL; RESPIRATORY (INHALATION) at 07:46

## 2021-10-27 RX ADMIN — Medication 1200 MILLIGRAM(S): at 16:50

## 2021-10-27 RX ADMIN — CEFTRIAXONE 1000 MILLIGRAM(S): 500 INJECTION, POWDER, FOR SOLUTION INTRAMUSCULAR; INTRAVENOUS at 16:51

## 2021-10-27 RX ADMIN — Medication 100 MILLIGRAM(S): at 22:10

## 2021-10-27 RX ADMIN — BUDESONIDE AND FORMOTEROL FUMARATE DIHYDRATE 2 PUFF(S): 160; 4.5 AEROSOL RESPIRATORY (INHALATION) at 07:46

## 2021-10-27 RX ADMIN — Medication 100 MILLIGRAM(S): at 14:08

## 2021-10-27 RX ADMIN — Medication 40 MILLIGRAM(S): at 05:39

## 2021-10-27 RX ADMIN — LATANOPROST 1 DROP(S): 0.05 SOLUTION/ DROPS OPHTHALMIC; TOPICAL at 22:15

## 2021-10-27 RX ADMIN — ATORVASTATIN CALCIUM 20 MILLIGRAM(S): 80 TABLET, FILM COATED ORAL at 22:10

## 2021-10-27 RX ADMIN — Medication 10 MILLIGRAM(S): at 22:10

## 2021-10-27 RX ADMIN — Medication 10 MILLIGRAM(S): at 10:21

## 2021-10-27 RX ADMIN — ENOXAPARIN SODIUM 40 MILLIGRAM(S): 100 INJECTION SUBCUTANEOUS at 14:07

## 2021-10-27 RX ADMIN — TAMSULOSIN HYDROCHLORIDE 0.4 MILLIGRAM(S): 0.4 CAPSULE ORAL at 22:10

## 2021-10-27 RX ADMIN — FAMOTIDINE 20 MILLIGRAM(S): 10 INJECTION INTRAVENOUS at 10:20

## 2021-10-27 RX ADMIN — MAGNESIUM OXIDE 400 MG ORAL TABLET 400 MILLIGRAM(S): 241.3 TABLET ORAL at 14:08

## 2021-10-27 RX ADMIN — BUDESONIDE AND FORMOTEROL FUMARATE DIHYDRATE 2 PUFF(S): 160; 4.5 AEROSOL RESPIRATORY (INHALATION) at 20:02

## 2021-10-27 RX ADMIN — AZITHROMYCIN 500 MILLIGRAM(S): 500 TABLET, FILM COATED ORAL at 14:08

## 2021-10-27 RX ADMIN — Medication 1200 MILLIGRAM(S): at 05:39

## 2021-10-27 RX ADMIN — Medication 81 MILLIGRAM(S): at 10:20

## 2021-10-27 RX ADMIN — Medication 25 MILLIGRAM(S): at 10:20

## 2021-10-27 NOTE — PROGRESS NOTE ADULT - SUBJECTIVE AND OBJECTIVE BOX
CC: syncope (26 Oct 2021 06:46)    HPI: 88 y/o M with a PMHx of BPH, CAD, COPD, glaucoma, HTN, HLD presented  to the ED BIBEMS s/p near syncopal episode this morning. Stated  he was shaving this morning when he began to feel dizzy, weak and could not support himself. He fell toward onto the sink with elbow resulting in skin tears. Denies LOC.  C/o  R forearm  soreness   Upon evaluation in ED Pt was  confused; was not able  tell the date; No  CP, abdominal pain or urinary symptoms;  Pt was given IVF     INTERVAL HPI/ OVERNIGHT EVENTS: chart reviewed, Pt was seen and examined, just got OOB to chair, reports feeling weal, some SOB. Still with cough, + yellow phlegm. no Fevers, no CP    Vital Signs Last 24 Hrs  T(C): 36.9 (27 Oct 2021 09:00), Max: 36.9 (27 Oct 2021 09:00)  T(F): 98.4 (27 Oct 2021 09:00), Max: 98.4 (27 Oct 2021 09:00)  HR: 73 (27 Oct 2021 09:00) (71 - 89)  BP: 130/60 (27 Oct 2021 09:00) (125/60 - 131/65)  RR: 18 (27 Oct 2021 09:00) (18 - 18)  SpO2: 95% (27 Oct 2021 09:00) (95% - 96%)    REVIEW OF SYSTEMS:  All other review of systems is negative unless indicated above.        PHYSICAL EXAM:  General: Well developed; in no acute distress, on NC   Eyes:  EOMI; conjunctiva and sclera clear  Head: Normocephalic; atraumatic  ENMT: No nasal discharge; airway clear  Neck: Supple; non tender; no masses  Respiratory: Diminished air movement, + scattered wheezing    Cardiovascular: Regular rate and rhythm. S1 and S2 Normal;   Gastrointestinal: Soft non-tender non-distended; Normal bowel sounds  Genitourinary: No  suprapubic  tenderness  Extremities: No edema  Vascular: Peripheral pulses palpable 2+ bilaterally  Neurological: Alert and oriented x 2-3, non focal, forgetful   Musculoskeletal: Normal muscle tone  Psychiatric: Cooperative       LABS:   CARDIAC MARKERS ( 25 Oct 2021 08:11 )  x     / x     / 674 U/L / x     / x                                14.0   14.25 )-----------( 114      ( 26 Oct 2021 08:03 )             44.4     26 Oct 2021 08:03    135    |  107    |  17     ----------------------------<  108    4.4     |  20     |  0.88     Ca    8.1        26 Oct 2021 08:03  Phos  2.9       25 Oct 2021 08:11  Mg     1.9       25 Oct 2021 08:11    Urinalysis Basic - ( 24 Oct 2021 21:14 )  Color: Yellow / Appearance: Clear / S.020 / pH: x  Gluc: x / Ketone: Moderate  / Bili: Negative / Urobili: Negative mg/dL   Blood: x / Protein: 30 mg/dL / Nitrite: Negative   Leuk Esterase: Negative / RBC: 3-5 /HPF / WBC Negative   Sq Epi: x / Non Sq Epi: Few / Bacteria: Moderate        MEDICATIONS  (STANDING):  aspirin enteric coated 81 milliGRAM(s) Oral daily  atorvastatin 20 milliGRAM(s) Oral at bedtime  enalapril 10 milliGRAM(s) Oral two times a day  famotidine    Tablet 20 milliGRAM(s) Oral daily  latanoprost 0.005% Ophthalmic Solution 1 Drop(s) Both EYES at bedtime  metoprolol succinate ER 25 milliGRAM(s) Oral daily  tamsulosin 0.4 milliGRAM(s) Oral at bedtime    MEDICATIONS  (PRN):  acetaminophen     Tablet .. 650 milliGRAM(s) Oral every 6 hours PRN Temp greater or equal to 38C (100.4F)      RADIOLOGY & ADDITIONAL TESTS:      EXAM:  CT CERVICAL SPINE                        EXAM:  CT BRAIN                        PROCEDURE DATE:  10/24/2021        < from: Xray Chest 1 View- PORTABLE-Urgent (10.24.21 @ 12:19) >    EXAM:  XR CHEST PORTABLE URGENT 1V                            PROCEDURE DATE:  10/24/2021          INTERPRETATION:  CLINICAL STATEMENT: Chest Pain.    TECHNIQUE: AP view of the chest.      COMPARISON: 2020    Patient is rotated towards the right.    The cardiomediastinal silhouette is normal and the [place] are not enlarged. Aortic calcification. There is no focal infiltrate or sizable pleural effusion. The osseous structures are intact. Degenerative changes of the spine and AC joints. History of reverse right shoulder arthroplasty.    IMPRESSION:    Unremarkable frontal chest x ray         EXAM:  ECHO TTE WO CON COMP W DOPP    PROCEDURE DATE:  10/25/2021     Summary     The left ventricle is normal in size, wall motion and contractility.   Mild concentric left ventricular hypertrophy is present.   Estimated left ventricular ejection fraction is 55-60 %.   Normal appearing left atrium.   Normal appearing right atrium.   Normal appearing right ventricle structure and function.   Aortic valve not well seen.   Fibrocalcific changes noted to the Aortic valve leaflets with preserved   leaflet excursion.   Trace aortic regurgitation is present.   Fibrocalcific changes noted to the mitral valve leaflets with preserved   leaflet excursion.   EA reversal of the mitral inflow consistent with reduced compliance of the   left ventricle.   Mild mitral annular calcification is present.   Trace to mild mitral regurgitation is present.   No evidence of pericardial effusion.             CC: syncope (26 Oct 2021 06:46)    HPI: 86 y/o M with a PMHx of BPH, CAD, COPD, glaucoma, HTN, HLD presented  to the ED BIBEMS s/p near syncopal episode this morning. Stated  he was shaving this morning when he began to feel dizzy, weak and could not support himself. He fell toward onto the sink with elbow resulting in skin tears. Denies LOC.  C/o  R forearm  soreness   Upon evaluation in ED Pt was  confused; was not able  tell the date; No  CP, abdominal pain or urinary symptoms;  Pt was given IVF     INTERVAL HPI/ OVERNIGHT EVENTS: Pt was seen and examined,  OOB to chair, reports feeling  better today, less SOB, + cough. Was able to ambulate. POC discussed       Vital Signs Last 24 Hrs  T(C): 36.9 (27 Oct 2021 09:00), Max: 36.9 (27 Oct 2021 09:00)  T(F): 98.4 (27 Oct 2021 09:00), Max: 98.4 (27 Oct 2021 09:00)  HR: 73 (27 Oct 2021 09:00) (71 - 89)  BP: 130/60 (27 Oct 2021 09:00) (125/60 - 131/65)  RR: 18 (27 Oct 2021 09:00) (18 - 18)  SpO2: 95% (27 Oct 2021 09:00) (95% - 96%)    REVIEW OF SYSTEMS:  All other review of systems is negative unless indicated above.        PHYSICAL EXAM:  General: Well developed; in no acute distress, on NC   Eyes:  EOMI; conjunctiva and sclera clear  Head: Normocephalic; atraumatic  ENMT: No nasal discharge; airway clear  Neck: Supple; non tender; no masses  Respiratory: Improved air entry, no  wheezing    Cardiovascular: Regular rate and rhythm. S1 and S2 Normal;   Gastrointestinal: Soft non-tender non-distended; Normal bowel sounds  Genitourinary: No  suprapubic  tenderness  Extremities: No edema  Vascular: Peripheral pulses palpable 2+ bilaterally  Neurological: Alert and oriented x 2-3, non focal, forgetful   Musculoskeletal: Normal muscle tone  Psychiatric: Cooperative       LABS:                           14.0   11.07 )-----------( 120      ( 27 Oct 2021 08:21 )             44.9     10-27    138  |  105  |  22  ----------------------------<  138<H>  4.3   |  28  |  0.88    Ca    8.4<L>      27 Oct 2021 08:21      CARDIAC MARKERS ( 25 Oct 2021 08:11 )  x     / x     / 674 U/L / x     / x                                14.0   14.25 )-----------( 114      ( 26 Oct 2021 08:03 )             44.4     26 Oct 2021 08:03    135    |  107    |  17     ----------------------------<  108    4.4     |  20     |  0.88     Ca    8.1        26 Oct 2021 08:03  Phos  2.9       25 Oct 2021 08:11  Mg     1.9       25 Oct 2021 08:11    Urinalysis Basic - ( 24 Oct 2021 21:14 )  Color: Yellow / Appearance: Clear / S.020 / pH: x  Gluc: x / Ketone: Moderate  / Bili: Negative / Urobili: Negative mg/dL   Blood: x / Protein: 30 mg/dL / Nitrite: Negative   Leuk Esterase: Negative / RBC: 3-5 /HPF / WBC Negative   Sq Epi: x / Non Sq Epi: Few / Bacteria: Moderate        MEDICATIONS  (STANDING):  aspirin enteric coated 81 milliGRAM(s) Oral daily  atorvastatin 20 milliGRAM(s) Oral at bedtime  azithromycin  IVPB      budesonide 160 MICROgram(s)/formoterol 4.5 MICROgram(s) Inhaler 2 Puff(s) Inhalation two times a day  cefTRIAXone Injectable.      enalapril 10 milliGRAM(s) Oral two times a day  enoxaparin Injectable 40 milliGRAM(s) SubCutaneous daily  famotidine    Tablet 20 milliGRAM(s) Oral daily  guaiFENesin ER 1200 milliGRAM(s) Oral every 12 hours  latanoprost 0.005% Ophthalmic Solution 1 Drop(s) Both EYES at bedtime  methylPREDNISolone sodium succinate Injectable 40 milliGRAM(s) IV Push two times a day  metoprolol succinate ER 25 milliGRAM(s) Oral daily  tamsulosin 0.4 milliGRAM(s) Oral at bedtime  tiotropium 18 MICROgram(s) Capsule 1 Capsule(s) Inhalation daily    MEDICATIONS  (PRN):  acetaminophen     Tablet .. 650 milliGRAM(s) Oral every 6 hours PRN Temp greater or equal to 38C (100.4F)  ALBUTerol    90 MICROgram(s) HFA Inhaler 2 Puff(s) Inhalation every 4 hours PRN Shortness of Breath and/or Wheezing  benzonatate 100 milliGRAM(s) Oral every 8 hours PRN Cough      RADIOLOGY & ADDITIONAL TESTS:      EXAM:  CT CERVICAL SPINE                        EXAM:  CT BRAIN                        PROCEDURE DATE:  10/24/2021        < from: Xray Chest 1 View- PORTABLE-Urgent (10.24.21 @ 12:19) >    EXAM:  XR CHEST PORTABLE URGENT 1V                            PROCEDURE DATE:  10/24/2021          INTERPRETATION:  CLINICAL STATEMENT: Chest Pain.    TECHNIQUE: AP view of the chest.      COMPARISON: 2020    Patient is rotated towards the right.    The cardiomediastinal silhouette is normal and the bernice are not enlarged. Aortic calcification. There is no focal infiltrate or sizable pleural effusion. The osseous structures are intact. Degenerative changes of the spine and AC joints. History of reverse right shoulder arthroplasty.    IMPRESSION:    Unremarkable frontal chest x ray         EXAM:  ECHO TTE WO CON COMP W DOPP    PROCEDURE DATE:  10/25/2021     Summary     The left ventricle is normal in size, wall motion and contractility.   Mild concentric left ventricular hypertrophy is present.   Estimated left ventricular ejection fraction is 55-60 %.   Normal appearing left atrium.   Normal appearing right atrium.   Normal appearing right ventricle structure and function.   Aortic valve not well seen.   Fibrocalcific changes noted to the Aortic valve leaflets with preserved   leaflet excursion.   Trace aortic regurgitation is present.   Fibrocalcific changes noted to the mitral valve leaflets with preserved   leaflet excursion.   EA reversal of the mitral inflow consistent with reduced compliance of the   left ventricle.   Mild mitral annular calcification is present.   Trace to mild mitral regurgitation is present.   No evidence of pericardial effusion.      < from: CT Chest No Cont (10.26.21 @ 19:41) >    EXAM:  CT CHEST                            PROCEDURE DATE:  10/26/2021          INTERPRETATION:  CLINICAL INFORMATION: Shortness of breath and hypoxia    COMPARISON: 3/10/2014.    CONTRAST/COMPLICATIONS:  IV Contrast: NONE  Oral Contrast: NONE  Complications: None reported at time of study completion    PROCEDURE:  CT of the Chest was performed.  Sagittal and coronal reformats were performed.    FINDINGS:    LUNGS AND AIRWAYS: Patent central airways.  Lungs are remarkable for linear atelectasisat the right lung base with linear atelectasis versus infiltrate at the left lung base  PLEURA: No pleural effusion.  MEDIASTINUM AND BERNICE: No lymphadenopathy.  VESSELS: There is atherosclerotic calcification of the aorta and coronary arteries. Thereis dilatation of the origin of the pulmonary arteries suggestive of pulmonary arterial hypertension  HEART: Heart size is normal. No pericardial effusion.  CHEST WALL AND LOWER NECK: Within normal limits.  VISUALIZED UPPER ABDOMEN: Within normal limits.  BONES: Degenerative changes and osteopenia. Right shoulder prosthesis. Moderate compression fracture midthoracic spine unchanged from the prior study    IMPRESSION: Left basilar infiltrate versus atelectasis. Linear atelectasis in the posterior right lower lobe  Enlarged main pulmonary arterial segment suggesting pulmonary arterial hypertension also present on study from 2014

## 2021-10-27 NOTE — PROGRESS NOTE ADULT - ASSESSMENT
87M hx BPH, CAD, COPD, glaucoma HTN, HLD admitted for:         # sepsis with acute hypoxic resp failure and acute metabolic encephalopathy 2/2 RSV infection, COPD exacerbation   -Now afebrile  - WBCs still trending up   - BCX NGTD  - F/u UCX   - Still hypoxic on RA, will Order CT chest, BNP.  -Start inhalers: albuterol, Symbicort, Spiriva ( Pt is on trelegy at home)   - mental status improved     # Abnormal UA   - asymptomatic but WBcs elevated  - F/u UCX       #thrombocytopenia  -likely in setting of sepsis, trend    # Presyncope, likely related to acute Dz and dehydration   -tele:  w NSVT in ED, no further events   -trops neg  -echo with only diastolic dysfunction, preserved EF, no significant valvular Dz  -CTH w no acute findings but with chronic lacunar infarcts and chronic microvascular ischemic changes    # NSVT  -watch lytes  -tele  -echo as above  -low dose BB started  - Cardio f/u appreciated     #CAD, HTN, HLD  -ASA, statin, ACEi, BB  -pt has not taken lasix in many months and was prior only on it for LE swelling, will hold for now     #DVT ppx- SCDs    #full code     87M hx BPH, CAD, COPD, glaucoma HTN, HLD admitted for:         # sepsis with acute hypoxic resp failure and acute metabolic encephalopathy 2/2 RSV infection, CAD and COPD exacerbation   -Now afebrile  - WBCs elevated   - BCX NGTD  - F/u UCX   - C/w NC. monitor pulse ox   - C/w  albuterol, Symbicort, Spiriva   -C/w solumedrol IV  - Started on IV ceftriaxone and Azithromycin as CT with RLL infiltrate   - mental status improved     # Abnormal UA   - asymptomatic but WBcs elevated  - F/u UCX: neg       #thrombocytopenia  -likely in setting of sepsis, trend    # Presyncope, likely related to acute Dz and dehydration   -tele:  w NSVT in ED, no further events   -trops neg  -echo with only diastolic dysfunction, preserved EF, no significant valvular Dz  -CTH w no acute findings but with chronic lacunar infarcts and chronic microvascular ischemic changes    # NSVT  -monitor  lytes  -tele  -echo as above  -low dose BB   - Cardio f/u appreciated     #CAD, HTN, HLD  -ASA, statin, ACEi, BB  -pt has not taken lasix in many months and was prior only on it for LE swelling, will hold for now     #DVT ppx- SCDs    #full code      Dispo: C/w IV Abxs, tele,  labs in am

## 2021-10-28 LAB
ANION GAP SERPL CALC-SCNC: 5 MMOL/L — SIGNIFICANT CHANGE UP (ref 5–17)
BUN SERPL-MCNC: 34 MG/DL — HIGH (ref 7–23)
CALCIUM SERPL-MCNC: 8.5 MG/DL — SIGNIFICANT CHANGE UP (ref 8.5–10.1)
CHLORIDE SERPL-SCNC: 105 MMOL/L — SIGNIFICANT CHANGE UP (ref 96–108)
CO2 SERPL-SCNC: 26 MMOL/L — SIGNIFICANT CHANGE UP (ref 22–31)
CREAT SERPL-MCNC: 0.99 MG/DL — SIGNIFICANT CHANGE UP (ref 0.5–1.3)
GLUCOSE SERPL-MCNC: 149 MG/DL — HIGH (ref 70–99)
HCT VFR BLD CALC: 45 % — SIGNIFICANT CHANGE UP (ref 39–50)
HGB BLD-MCNC: 14.1 G/DL — SIGNIFICANT CHANGE UP (ref 13–17)
MCHC RBC-ENTMCNC: 27.6 PG — SIGNIFICANT CHANGE UP (ref 27–34)
MCHC RBC-ENTMCNC: 31.3 GM/DL — LOW (ref 32–36)
MCV RBC AUTO: 88.1 FL — SIGNIFICANT CHANGE UP (ref 80–100)
PLATELET # BLD AUTO: 135 K/UL — LOW (ref 150–400)
POTASSIUM SERPL-MCNC: 4.7 MMOL/L — SIGNIFICANT CHANGE UP (ref 3.5–5.3)
POTASSIUM SERPL-SCNC: 4.7 MMOL/L — SIGNIFICANT CHANGE UP (ref 3.5–5.3)
RBC # BLD: 5.11 M/UL — SIGNIFICANT CHANGE UP (ref 4.2–5.8)
RBC # FLD: 14.7 % — HIGH (ref 10.3–14.5)
SODIUM SERPL-SCNC: 136 MMOL/L — SIGNIFICANT CHANGE UP (ref 135–145)
WBC # BLD: 14.45 K/UL — HIGH (ref 3.8–10.5)
WBC # FLD AUTO: 14.45 K/UL — HIGH (ref 3.8–10.5)

## 2021-10-28 PROCEDURE — 99232 SBSQ HOSP IP/OBS MODERATE 35: CPT

## 2021-10-28 RX ADMIN — Medication 10 MILLIGRAM(S): at 22:12

## 2021-10-28 RX ADMIN — TIOTROPIUM BROMIDE 1 CAPSULE(S): 18 CAPSULE ORAL; RESPIRATORY (INHALATION) at 08:03

## 2021-10-28 RX ADMIN — Medication 81 MILLIGRAM(S): at 10:22

## 2021-10-28 RX ADMIN — FAMOTIDINE 20 MILLIGRAM(S): 10 INJECTION INTRAVENOUS at 10:22

## 2021-10-28 RX ADMIN — Medication 1200 MILLIGRAM(S): at 06:10

## 2021-10-28 RX ADMIN — BUDESONIDE AND FORMOTEROL FUMARATE DIHYDRATE 2 PUFF(S): 160; 4.5 AEROSOL RESPIRATORY (INHALATION) at 20:59

## 2021-10-28 RX ADMIN — Medication 40 MILLIGRAM(S): at 17:57

## 2021-10-28 RX ADMIN — BUDESONIDE AND FORMOTEROL FUMARATE DIHYDRATE 2 PUFF(S): 160; 4.5 AEROSOL RESPIRATORY (INHALATION) at 08:05

## 2021-10-28 RX ADMIN — ENOXAPARIN SODIUM 40 MILLIGRAM(S): 100 INJECTION SUBCUTANEOUS at 10:22

## 2021-10-28 RX ADMIN — TAMSULOSIN HYDROCHLORIDE 0.4 MILLIGRAM(S): 0.4 CAPSULE ORAL at 22:11

## 2021-10-28 RX ADMIN — Medication 40 MILLIGRAM(S): at 06:10

## 2021-10-28 RX ADMIN — ATORVASTATIN CALCIUM 20 MILLIGRAM(S): 80 TABLET, FILM COATED ORAL at 22:12

## 2021-10-28 RX ADMIN — Medication 1200 MILLIGRAM(S): at 17:56

## 2021-10-28 RX ADMIN — CEFTRIAXONE 1000 MILLIGRAM(S): 500 INJECTION, POWDER, FOR SOLUTION INTRAMUSCULAR; INTRAVENOUS at 12:30

## 2021-10-28 RX ADMIN — Medication 100 MILLIGRAM(S): at 15:46

## 2021-10-28 RX ADMIN — Medication 25 MILLIGRAM(S): at 10:22

## 2021-10-28 RX ADMIN — AZITHROMYCIN 255 MILLIGRAM(S): 500 TABLET, FILM COATED ORAL at 12:32

## 2021-10-28 RX ADMIN — Medication 10 MILLIGRAM(S): at 10:22

## 2021-10-28 RX ADMIN — LATANOPROST 1 DROP(S): 0.05 SOLUTION/ DROPS OPHTHALMIC; TOPICAL at 22:12

## 2021-10-28 NOTE — PROGRESS NOTE ADULT - SUBJECTIVE AND OBJECTIVE BOX
CC: syncope (26 Oct 2021 06:46)    HPI: 88 y/o M with a PMHx of BPH, CAD, COPD, glaucoma, HTN, HLD presented  to the ED BIBEMS s/p near syncopal episode this morning. Stated  he was shaving this morning when he began to feel dizzy, weak and could not support himself. He fell toward onto the sink with elbow resulting in skin tears. Denies LOC.  C/o  R forearm  soreness   Upon evaluation in ED Pt was  confused; was not able  tell the date; No  CP, abdominal pain or urinary symptoms;  Pt was given IVF     INTERVAL HPI/ OVERNIGHT EVENTS: Pt was seen and examined,  OOB to chair,  Tolerates RA, still cough, reports feels stronger.  wants to go home, explained that still needs another 24h of Tx. as per PT  stronger, ambulated better but is still contact guard     Vital Signs Last 24 Hrs  T(C): 36.7 (28 Oct 2021 09:49), Max: 36.7 (28 Oct 2021 09:49)  T(F): 98.1 (28 Oct 2021 09:49), Max: 98.1 (28 Oct 2021 09:49)  HR: 76 (28 Oct 2021 16:25) (64 - 84)  BP: 133/65 (28 Oct 2021 16:25) (103/52 - 133/65)  BP(mean): 81 (28 Oct 2021 16:25) (81 - 81)  RR: 18 (28 Oct 2021 09:49) (18 - 18)  SpO2: 96% (28 Oct 2021 09:49) (95% - 96%)      REVIEW OF SYSTEMS:  All other review of systems is negative unless indicated above.        PHYSICAL EXAM:  General: Well developed; in no acute distress, on RA  Eyes:  EOMI; conjunctiva and sclera clear  Head: Normocephalic; atraumatic  ENMT: No nasal discharge; airway clear  Neck: Supple; non tender; no masses  Respiratory: Improved air entry, still decreased BS ar RLL, no  wheezing    Cardiovascular: Regular rate and rhythm. S1 and S2 Normal;   Gastrointestinal: Soft non-tender non-distended; Normal bowel sounds  Genitourinary: No  suprapubic  tenderness  Extremities: No edema  Vascular: Peripheral pulses palpable 2+ bilaterally  Neurological: Alert and oriented x 2-3, non focal, forgetful   Musculoskeletal: Normal muscle tone  Psychiatric: Cooperative       LABS:                           14.1   14.45 )-----------( 135      ( 28 Oct 2021 08:37 )             45.0     10-28    136  |  105  |  34<H>  ----------------------------<  149<H>  4.7   |  26  |  0.99    Ca    8.5      28 Oct 2021 08:37                            14.0   11.07 )-----------( 120      ( 27 Oct 2021 08:21 )             44.9     10    138  |  105  |  22  ----------------------------<  138<H>  4.3   |  28  |  0.88    Ca    8.4<L>      27 Oct 2021 08:21      CARDIAC MARKERS ( 25 Oct 2021 08:11 )  x     / x     / 674 U/L / x     / x                                14.0   14.25 )-----------( 114      ( 26 Oct 2021 08:03 )             44.4     26 Oct 2021 08:03    135    |  107    |  17     ----------------------------<  108    4.4     |  20     |  0.88     Ca    8.1        26 Oct 2021 08:03  Phos  2.9       25 Oct 2021 08:11  Mg     1.9       25 Oct 2021 08:11    Urinalysis Basic - ( 24 Oct 2021 21:14 )  Color: Yellow / Appearance: Clear / S.020 / pH: x  Gluc: x / Ketone: Moderate  / Bili: Negative / Urobili: Negative mg/dL   Blood: x / Protein: 30 mg/dL / Nitrite: Negative   Leuk Esterase: Negative / RBC: 3-5 /HPF / WBC Negative   Sq Epi: x / Non Sq Epi: Few / Bacteria: Moderate    Culture - Urine (10.25.21 @ 13:11)   Specimen Source: Clean Catch Clean Catch (Midstream)   Culture Results:   <10,000 CFU/mL Normal Urogenital Diana     MEDICATIONS  (STANDING):  aspirin enteric coated 81 milliGRAM(s) Oral daily  atorvastatin 20 milliGRAM(s) Oral at bedtime  azithromycin  IVPB      azithromycin  IVPB 500 milliGRAM(s) IV Intermittent every 24 hours  budesonide 160 MICROgram(s)/formoterol 4.5 MICROgram(s) Inhaler 2 Puff(s) Inhalation two times a day  cefTRIAXone Injectable. 1000 milliGRAM(s) IV Push every 24 hours  cefTRIAXone Injectable.      enalapril 10 milliGRAM(s) Oral two times a day  enoxaparin Injectable 40 milliGRAM(s) SubCutaneous daily  famotidine    Tablet 20 milliGRAM(s) Oral daily  guaiFENesin ER 1200 milliGRAM(s) Oral every 12 hours  latanoprost 0.005% Ophthalmic Solution 1 Drop(s) Both EYES at bedtime  methylPREDNISolone sodium succinate Injectable 40 milliGRAM(s) IV Push two times a day  metoprolol succinate ER 25 milliGRAM(s) Oral daily  tamsulosin 0.4 milliGRAM(s) Oral at bedtime  tiotropium 18 MICROgram(s) Capsule 1 Capsule(s) Inhalation daily    MEDICATIONS  (PRN):  acetaminophen     Tablet .. 650 milliGRAM(s) Oral every 6 hours PRN Temp greater or equal to 38C (100.4F)  ALBUTerol    90 MICROgram(s) HFA Inhaler 2 Puff(s) Inhalation every 4 hours PRN Shortness of Breath and/or Wheezing  benzonatate 100 milliGRAM(s) Oral every 8 hours PRN Cough      RADIOLOGY & ADDITIONAL TESTS:      EXAM:  CT CERVICAL SPINE                        EXAM:  CT BRAIN                        PROCEDURE DATE:  10/24/2021        < from: Xray Chest 1 View- PORTABLE-Urgent (10.24.21 @ 12:19) >    EXAM:  XR CHEST PORTABLE URGENT 1V                            PROCEDURE DATE:  10/24/2021          INTERPRETATION:  CLINICAL STATEMENT: Chest Pain.    TECHNIQUE: AP view of the chest.      COMPARISON: 2020    Patient is rotated towards the right.    The cardiomediastinal silhouette is normal and the bernice are not enlarged. Aortic calcification. There is no focal infiltrate or sizable pleural effusion. The osseous structures are intact. Degenerative changes of the spine and AC joints. History of reverse right shoulder arthroplasty.    IMPRESSION:    Unremarkable frontal chest x ray         EXAM:  ECHO TTE WO CON COMP W DOPP    PROCEDURE DATE:  10/25/2021     Summary     The left ventricle is normal in size, wall motion and contractility.   Mild concentric left ventricular hypertrophy is present.   Estimated left ventricular ejection fraction is 55-60 %.   Normal appearing left atrium.   Normal appearing right atrium.   Normal appearing right ventricle structure and function.   Aortic valve not well seen.   Fibrocalcific changes noted to the Aortic valve leaflets with preserved   leaflet excursion.   Trace aortic regurgitation is present.   Fibrocalcific changes noted to the mitral valve leaflets with preserved   leaflet excursion.   EA reversal of the mitral inflow consistent with reduced compliance of the   left ventricle.   Mild mitral annular calcification is present.   Trace to mild mitral regurgitation is present.   No evidence of pericardial effusion.      < from: CT Chest No Cont (10.26.21 @ 19:41) >    EXAM:  CT CHEST                            PROCEDURE DATE:  10/26/2021          INTERPRETATION:  CLINICAL INFORMATION: Shortness of breath and hypoxia    COMPARISON: 3/10/2014.    CONTRAST/COMPLICATIONS:  IV Contrast: NONE  Oral Contrast: NONE  Complications: None reported at time of study completion    PROCEDURE:  CT of the Chest was performed.  Sagittal and coronal reformats were performed.    FINDINGS:    LUNGS AND AIRWAYS: Patent central airways.  Lungs are remarkable for linear atelectasis at the right lung base with linear atelectasis versus infiltrate at the left lung base  PLEURA: No pleural effusion.  MEDIASTINUM AND BERNICE: No lymphadenopathy.  VESSELS: There is atherosclerotic calcification of the aorta and coronary arteries. There is dilatation of the origin of the pulmonary arteries suggestive of pulmonary arterial hypertension  HEART: Heart size is normal. No pericardial effusion.  CHEST WALL AND LOWER NECK: Within normal limits.  VISUALIZED UPPER ABDOMEN: Within normal limits.  BONES: Degenerative changes and osteopenia. Right shoulder prosthesis. Moderate compression fracture midthoracic spine unchanged from the prior study    IMPRESSION: Left basilar infiltrate versus atelectasis. Linear atelectasis in the posterior right lower lobe  Enlarged main pulmonary arterial segment suggesting pulmonary arterial hypertension also present on study from

## 2021-10-28 NOTE — PROGRESS NOTE ADULT - ASSESSMENT
87M hx BPH, CAD, COPD, glaucoma HTN, HLD admitted for:         # Sepsis with acute hypoxic resp failure and acute metabolic encephalopathy 2/2 RSV infection, CAD and COPD exacerbation.   -Now afebrile  - WBCs elevated   - CT chest with RLL infiltrate, suspected pulm HTN   - BCX NGTD  - F/u UCX   - C/w NC. monitor pulse ox   - C/w  albuterol, Symbicort, Spiriva   -C/w solumedrol IV  - Started on IV ceftriaxone and Azithromycin as CT with RLL infiltrate   - mental status improved   - echo done not suggestive of Pulm HTN,  will further d/w Dr Watts       # Abnormal UA, UTI ruled out   - asymptomatic but WBcs elevated  -  UCX: neg       #thrombocytopenia, improving   -likely in setting of sepsis, trend    # Presyncope, likely related to acute Dz, hypoxia  and dehydration   -tele:  w NSVT in ED, no further events   -trops neg  -echo with only diastolic dysfunction, preserved EF, no significant valvular Dz  -CTH w no acute findings but with chronic lacunar infarcts and chronic microvascular ischemic changes    # NSVT  -monitor  lytes  -tele  -echo as above  -low dose BB   - Cardio f/u appreciated     #CAD, HTN, HLD  -ASA, statin, ACEi, BB  -pt has not taken lasix in many months and was prior only on it for LE swelling, will hold for now     #DVT ppx- SCDs    #full code      Dispo: C/w IV Abxs, if still improving will taper off steroids, C/w PT       POC d/w Pts daughter and wife, wife was recently sick as well and not feeling well enough to be able to provide care for Pt. Visiting in wheelchair.

## 2021-10-29 ENCOUNTER — TRANSCRIPTION ENCOUNTER (OUTPATIENT)
Age: 86
End: 2021-10-29

## 2021-10-29 LAB
ANION GAP SERPL CALC-SCNC: 4 MMOL/L — LOW (ref 5–17)
BUN SERPL-MCNC: 35 MG/DL — HIGH (ref 7–23)
CALCIUM SERPL-MCNC: 8.4 MG/DL — LOW (ref 8.5–10.1)
CHLORIDE SERPL-SCNC: 106 MMOL/L — SIGNIFICANT CHANGE UP (ref 96–108)
CO2 SERPL-SCNC: 29 MMOL/L — SIGNIFICANT CHANGE UP (ref 22–31)
CREAT SERPL-MCNC: 0.81 MG/DL — SIGNIFICANT CHANGE UP (ref 0.5–1.3)
GLUCOSE SERPL-MCNC: 150 MG/DL — HIGH (ref 70–99)
HCT VFR BLD CALC: 43.5 % — SIGNIFICANT CHANGE UP (ref 39–50)
HGB BLD-MCNC: 13.9 G/DL — SIGNIFICANT CHANGE UP (ref 13–17)
MCHC RBC-ENTMCNC: 27.9 PG — SIGNIFICANT CHANGE UP (ref 27–34)
MCHC RBC-ENTMCNC: 32 GM/DL — SIGNIFICANT CHANGE UP (ref 32–36)
MCV RBC AUTO: 87.2 FL — SIGNIFICANT CHANGE UP (ref 80–100)
PLATELET # BLD AUTO: 153 K/UL — SIGNIFICANT CHANGE UP (ref 150–400)
POTASSIUM SERPL-MCNC: 3.8 MMOL/L — SIGNIFICANT CHANGE UP (ref 3.5–5.3)
POTASSIUM SERPL-SCNC: 3.8 MMOL/L — SIGNIFICANT CHANGE UP (ref 3.5–5.3)
RBC # BLD: 4.99 M/UL — SIGNIFICANT CHANGE UP (ref 4.2–5.8)
RBC # FLD: 14.9 % — HIGH (ref 10.3–14.5)
SODIUM SERPL-SCNC: 139 MMOL/L — SIGNIFICANT CHANGE UP (ref 135–145)
WBC # BLD: 16.14 K/UL — HIGH (ref 3.8–10.5)
WBC # FLD AUTO: 16.14 K/UL — HIGH (ref 3.8–10.5)

## 2021-10-29 PROCEDURE — 99232 SBSQ HOSP IP/OBS MODERATE 35: CPT

## 2021-10-29 RX ADMIN — TAMSULOSIN HYDROCHLORIDE 0.4 MILLIGRAM(S): 0.4 CAPSULE ORAL at 21:39

## 2021-10-29 RX ADMIN — Medication 100 MILLIGRAM(S): at 03:14

## 2021-10-29 RX ADMIN — BUDESONIDE AND FORMOTEROL FUMARATE DIHYDRATE 2 PUFF(S): 160; 4.5 AEROSOL RESPIRATORY (INHALATION) at 21:29

## 2021-10-29 RX ADMIN — AZITHROMYCIN 255 MILLIGRAM(S): 500 TABLET, FILM COATED ORAL at 19:05

## 2021-10-29 RX ADMIN — BUDESONIDE AND FORMOTEROL FUMARATE DIHYDRATE 2 PUFF(S): 160; 4.5 AEROSOL RESPIRATORY (INHALATION) at 09:32

## 2021-10-29 RX ADMIN — Medication 1200 MILLIGRAM(S): at 05:53

## 2021-10-29 RX ADMIN — ATORVASTATIN CALCIUM 20 MILLIGRAM(S): 80 TABLET, FILM COATED ORAL at 21:39

## 2021-10-29 RX ADMIN — Medication 81 MILLIGRAM(S): at 09:25

## 2021-10-29 RX ADMIN — Medication 40 MILLIGRAM(S): at 05:53

## 2021-10-29 RX ADMIN — Medication 10 MILLIGRAM(S): at 21:39

## 2021-10-29 RX ADMIN — Medication 1200 MILLIGRAM(S): at 19:05

## 2021-10-29 RX ADMIN — FAMOTIDINE 20 MILLIGRAM(S): 10 INJECTION INTRAVENOUS at 09:25

## 2021-10-29 RX ADMIN — Medication 10 MILLIGRAM(S): at 09:26

## 2021-10-29 RX ADMIN — TIOTROPIUM BROMIDE 1 CAPSULE(S): 18 CAPSULE ORAL; RESPIRATORY (INHALATION) at 09:32

## 2021-10-29 RX ADMIN — CEFTRIAXONE 1000 MILLIGRAM(S): 500 INJECTION, POWDER, FOR SOLUTION INTRAMUSCULAR; INTRAVENOUS at 19:07

## 2021-10-29 RX ADMIN — ENOXAPARIN SODIUM 40 MILLIGRAM(S): 100 INJECTION SUBCUTANEOUS at 09:26

## 2021-10-29 RX ADMIN — Medication 25 MILLIGRAM(S): at 09:25

## 2021-10-29 RX ADMIN — LATANOPROST 1 DROP(S): 0.05 SOLUTION/ DROPS OPHTHALMIC; TOPICAL at 21:40

## 2021-10-29 NOTE — PROGRESS NOTE ADULT - SUBJECTIVE AND OBJECTIVE BOX
CC: syncope (26 Oct 2021 06:46)    HPI: 86 y/o M with a PMHx of BPH, CAD, COPD, glaucoma, HTN, HLD presented  to the ED BIBEMS s/p near syncopal episode this morning. Stated  he was shaving this morning when he began to feel dizzy, weak and could not support himself. He fell toward onto the sink with elbow resulting in skin tears. Denies LOC.  C/o  R forearm  soreness   Upon evaluation in ED Pt was  confused; was not able  tell the date; No  CP, abdominal pain or urinary symptoms;  Pt was given IVF     INTERVAL HPI/ OVERNIGHT EVENTS: Pt was seen and examined,   in bed,  reports feeling tired, was not OOb  yet. Denies SOB, still with cough but better. POC discussed.       Vital Signs Last 24 Hrs  T(C): 36.9 (29 Oct 2021 08:48), Max: 36.9 (29 Oct 2021 08:48)  T(F): 98.4 (29 Oct 2021 08:48), Max: 98.4 (29 Oct 2021 08:48)  HR: 62 (29 Oct 2021 09:40) (55 - 76)  BP: 140/62 (29 Oct 2021 08:48) (133/65 - 140/62)  BP(mean): 81 (28 Oct 2021 16:25) (81 - 81)  RR: 18 (29 Oct 2021 08:48) (18 - 18)  SpO2: 96% (29 Oct 2021 09:40) (92% - 96%)    REVIEW OF SYSTEMS:  All other review of systems is negative unless indicated above.        PHYSICAL EXAM:  General: Well developed; in no acute distress, on RA  Eyes:  EOMI; conjunctiva and sclera clear  Head: Normocephalic; atraumatic  ENMT: No nasal discharge; airway clear  Neck: Supple; non tender; no masses  Respiratory: Improved air entry,  LLL rhonchi, better after coughing, mild wheezing     Cardiovascular: Regular rate and rhythm. S1 and S2 Normal;   Gastrointestinal: Soft non-tender non-distended; Normal bowel sounds  Genitourinary: No  suprapubic  tenderness  Extremities: No edema  Vascular: Peripheral pulses palpable 2+ bilaterally  Neurological: Alert and oriented x 2-3, non focal, forgetful   Musculoskeletal: Normal muscle tone  Psychiatric: Cooperative       LABS:   1                      13.9   16.14 )-----------( 153      ( 29 Oct 2021 09:20 )             43.5     10    139  |  106  |  35<H>  ----------------------------<  150<H>  3.8   |  29  |  0.81    Ca    8.4<L>      29 Oct 2021 09:20                                  14.1   14.45 )-----------( 135      ( 28 Oct 2021 08:37 )             45.0     10    136  |  105  |  34<H>  ----------------------------<  149<H>  4.7   |  26  |  0.99    Ca    8.5      28 Oct 2021 08:37                            14.0   11.07 )-----------( 120      ( 27 Oct 2021 08:21 )             44.9     10    138  |  105  |  22  ----------------------------<  138<H>  4.3   |  28  |  0.88    Ca    8.4<L>      27 Oct 2021 08:21      CARDIAC MARKERS ( 25 Oct 2021 08:11 )  x     / x     / 674 U/L / x     / x                                14.0   14.25 )-----------( 114      ( 26 Oct 2021 08:03 )             44.4     26 Oct 2021 08:03    135    |  107    |  17     ----------------------------<  108    4.4     |  20     |  0.88     Ca    8.1        26 Oct 2021 08:03  Phos  2.9       25 Oct 2021 08:11  Mg     1.9       25 Oct 2021 08:11    Urinalysis Basic - ( 24 Oct 2021 21:14 )  Color: Yellow / Appearance: Clear / S.020 / pH: x  Gluc: x / Ketone: Moderate  / Bili: Negative / Urobili: Negative mg/dL   Blood: x / Protein: 30 mg/dL / Nitrite: Negative   Leuk Esterase: Negative / RBC: 3-5 /HPF / WBC Negative   Sq Epi: x / Non Sq Epi: Few / Bacteria: Moderate    Culture - Urine (10.25.21 @ 13:11)   Specimen Source: Clean Catch Clean Catch (Midstream)   Culture Results:   <10,000 CFU/mL Normal Urogenital Diana     MEDICATIONS  (STANDING):  aspirin enteric coated 81 milliGRAM(s) Oral daily  atorvastatin 20 milliGRAM(s) Oral at bedtime  azithromycin  IVPB      azithromycin  IVPB 500 milliGRAM(s) IV Intermittent every 24 hours  budesonide 160 MICROgram(s)/formoterol 4.5 MICROgram(s) Inhaler 2 Puff(s) Inhalation two times a day  cefTRIAXone Injectable. 1000 milliGRAM(s) IV Push every 24 hours  cefTRIAXone Injectable.      enalapril 10 milliGRAM(s) Oral two times a day  enoxaparin Injectable 40 milliGRAM(s) SubCutaneous daily  famotidine    Tablet 20 milliGRAM(s) Oral daily  guaiFENesin ER 1200 milliGRAM(s) Oral every 12 hours  latanoprost 0.005% Ophthalmic Solution 1 Drop(s) Both EYES at bedtime  metoprolol succinate ER 25 milliGRAM(s) Oral daily  tamsulosin 0.4 milliGRAM(s) Oral at bedtime  tiotropium 18 MICROgram(s) Capsule 1 Capsule(s) Inhalation daily    MEDICATIONS  (PRN):  acetaminophen     Tablet .. 650 milliGRAM(s) Oral every 6 hours PRN Temp greater or equal to 38C (100.4F)  ALBUTerol    90 MICROgram(s) HFA Inhaler 2 Puff(s) Inhalation every 4 hours PRN Shortness of Breath and/or Wheezing  benzonatate 100 milliGRAM(s) Oral every 8 hours PRN Cough        RADIOLOGY & ADDITIONAL TESTS:      EXAM:  CT CERVICAL SPINE                        EXAM:  CT BRAIN                        PROCEDURE DATE:  10/24/2021        < from: Xray Chest 1 View- PORTABLE-Urgent (10.24.21 @ 12:19) >    EXAM:  XR CHEST PORTABLE URGENT 1V                            PROCEDURE DATE:  10/24/2021          INTERPRETATION:  CLINICAL STATEMENT: Chest Pain.    TECHNIQUE: AP view of the chest.      COMPARISON: 2020    Patient is rotated towards the right.    The cardiomediastinal silhouette is normal and the bernice are not enlarged. Aortic calcification. There is no focal infiltrate or sizable pleural effusion. The osseous structures are intact. Degenerative changes of the spine and AC joints. History of reverse right shoulder arthroplasty.    IMPRESSION:    Unremarkable frontal chest x ray         EXAM:  ECHO TTE WO CON COMP W DOPP    PROCEDURE DATE:  10/25/2021     Summary     The left ventricle is normal in size, wall motion and contractility.   Mild concentric left ventricular hypertrophy is present.   Estimated left ventricular ejection fraction is 55-60 %.   Normal appearing left atrium.   Normal appearing right atrium.   Normal appearing right ventricle structure and function.   Aortic valve not well seen.   Fibrocalcific changes noted to the Aortic valve leaflets with preserved   leaflet excursion.   Trace aortic regurgitation is present.   Fibrocalcific changes noted to the mitral valve leaflets with preserved   leaflet excursion.   EA reversal of the mitral inflow consistent with reduced compliance of the   left ventricle.   Mild mitral annular calcification is present.   Trace to mild mitral regurgitation is present.   No evidence of pericardial effusion.      < from: CT Chest No Cont (10.26.21 @ 19:41) >    EXAM:  CT CHEST                            PROCEDURE DATE:  10/26/2021          INTERPRETATION:  CLINICAL INFORMATION: Shortness of breath and hypoxia    COMPARISON: 3/10/2014.    CONTRAST/COMPLICATIONS:  IV Contrast: NONE  Oral Contrast: NONE  Complications: None reported at time of study completion    PROCEDURE:  CT of the Chest was performed.  Sagittal and coronal reformats were performed.    FINDINGS:    LUNGS AND AIRWAYS: Patent central airways.  Lungs are remarkable for linear atelectasis at the right lung base with linear atelectasis versus infiltrate at the left lung base  PLEURA: No pleural effusion.  MEDIASTINUM AND BERNICE: No lymphadenopathy.  VESSELS: There is atherosclerotic calcification of the aorta and coronary arteries. There is dilatation of the origin of the pulmonary arteries suggestive of pulmonary arterial hypertension  HEART: Heart size is normal. No pericardial effusion.  CHEST WALL AND LOWER NECK: Within normal limits.  VISUALIZED UPPER ABDOMEN: Within normal limits.  BONES: Degenerative changes and osteopenia. Right shoulder prosthesis. Moderate compression fracture midthoracic spine unchanged from the prior study    IMPRESSION: Left basilar infiltrate versus atelectasis. Linear atelectasis in the posterior right lower lobe  Enlarged main pulmonary arterial segment suggesting pulmonary arterial hypertension also present on study from

## 2021-10-29 NOTE — PROGRESS NOTE ADULT - ASSESSMENT
87M hx BPH, CAD, COPD, glaucoma HTN, HLD admitted for:         # Sepsis with acute hypoxic resp failure and acute metabolic encephalopathy 2/2 RSV infection, CAD and COPD exacerbation.  CAP  -Now afebrile  - WBCs elevated   - CT chest with RLL infiltrate, suspected pulm HTN   - BCX NGTD  - F/u UCX   - On RA, monitor pulse ox   - C/w  albuterol, Symbicort, Spiriva   -C/w solumedrol IV, decrease to QD   - C/w IV ceftriaxone and Azithromycin as CT with RLL infiltrate, suspect secondary bacterial infection   - mental status improved   - echo done not suggestive of Pulm HTN      # Abnormal UA, UTI ruled out   - asymptomatic  -  UCX: neg       #thrombocytopenia, improved   -likely in setting of sepsis    # Presyncope, likely related to acute Dz, hypoxia  and dehydration   -tele:   NSVT in ED, no further events   -trops neg  -echo with only diastolic dysfunction, preserved EF, no significant valvular Dz  -CTH w no acute findings but with chronic lacunar infarcts and chronic microvascular ischemic changes    # NSVT  -monitor  lytes  -tele  -echo as above  -low dose BB   -Cardio f/u appreciated     #CAD, HTN, HLD  -ASA, statin, ACEi, BB  -pt has not taken lasix in many months and was prior only on it for LE swelling, will hold for now     #DVT ppx- SCDs    #full code      Dispo: C/w IV Abxs, change steroids to PO, d/c planning

## 2021-10-29 NOTE — DISCHARGE NOTE NURSING/CASE MANAGEMENT/SOCIAL WORK - PATIENT PORTAL LINK FT
You can access the FollowMyHealth Patient Portal offered by [facility] by registering at the following website: [email or web address]. By joining [facility]’s FollowMyHealth portal, you will also be able to view your health information using other applications (apps) compatible with our system.

## 2021-10-30 LAB
ANION GAP SERPL CALC-SCNC: 6 MMOL/L — SIGNIFICANT CHANGE UP (ref 5–17)
BUN SERPL-MCNC: 35 MG/DL — HIGH (ref 7–23)
CALCIUM SERPL-MCNC: 8.1 MG/DL — LOW (ref 8.5–10.1)
CHLORIDE SERPL-SCNC: 110 MMOL/L — HIGH (ref 96–108)
CO2 SERPL-SCNC: 27 MMOL/L — SIGNIFICANT CHANGE UP (ref 22–31)
CREAT SERPL-MCNC: 0.88 MG/DL — SIGNIFICANT CHANGE UP (ref 0.5–1.3)
CULTURE RESULTS: SIGNIFICANT CHANGE UP
CULTURE RESULTS: SIGNIFICANT CHANGE UP
GLUCOSE SERPL-MCNC: 90 MG/DL — SIGNIFICANT CHANGE UP (ref 70–99)
HCT VFR BLD CALC: 42.4 % — SIGNIFICANT CHANGE UP (ref 39–50)
HGB BLD-MCNC: 13.6 G/DL — SIGNIFICANT CHANGE UP (ref 13–17)
MCHC RBC-ENTMCNC: 27.9 PG — SIGNIFICANT CHANGE UP (ref 27–34)
MCHC RBC-ENTMCNC: 32.1 GM/DL — SIGNIFICANT CHANGE UP (ref 32–36)
MCV RBC AUTO: 86.9 FL — SIGNIFICANT CHANGE UP (ref 80–100)
PLATELET # BLD AUTO: 142 K/UL — LOW (ref 150–400)
POTASSIUM SERPL-MCNC: 3.6 MMOL/L — SIGNIFICANT CHANGE UP (ref 3.5–5.3)
POTASSIUM SERPL-SCNC: 3.6 MMOL/L — SIGNIFICANT CHANGE UP (ref 3.5–5.3)
RBC # BLD: 4.88 M/UL — SIGNIFICANT CHANGE UP (ref 4.2–5.8)
RBC # FLD: 14.8 % — HIGH (ref 10.3–14.5)
SODIUM SERPL-SCNC: 143 MMOL/L — SIGNIFICANT CHANGE UP (ref 135–145)
SPECIMEN SOURCE: SIGNIFICANT CHANGE UP
SPECIMEN SOURCE: SIGNIFICANT CHANGE UP
WBC # BLD: 14.44 K/UL — HIGH (ref 3.8–10.5)
WBC # FLD AUTO: 14.44 K/UL — HIGH (ref 3.8–10.5)

## 2021-10-30 PROCEDURE — 71045 X-RAY EXAM CHEST 1 VIEW: CPT | Mod: 26

## 2021-10-30 PROCEDURE — 99232 SBSQ HOSP IP/OBS MODERATE 35: CPT

## 2021-10-30 RX ORDER — CEFUROXIME AXETIL 250 MG
500 TABLET ORAL EVERY 12 HOURS
Refills: 0 | Status: DISCONTINUED | OUTPATIENT
Start: 2021-10-31 | End: 2021-10-31

## 2021-10-30 RX ORDER — PANTOPRAZOLE SODIUM 20 MG/1
40 TABLET, DELAYED RELEASE ORAL DAILY
Refills: 0 | Status: DISCONTINUED | OUTPATIENT
Start: 2021-10-31 | End: 2021-10-31

## 2021-10-30 RX ORDER — PANTOPRAZOLE SODIUM 20 MG/1
40 TABLET, DELAYED RELEASE ORAL ONCE
Refills: 0 | Status: COMPLETED | OUTPATIENT
Start: 2021-10-30 | End: 2021-10-30

## 2021-10-30 RX ORDER — PANTOPRAZOLE SODIUM 20 MG/1
40 TABLET, DELAYED RELEASE ORAL DAILY
Refills: 0 | Status: DISCONTINUED | OUTPATIENT
Start: 2021-10-30 | End: 2021-10-30

## 2021-10-30 RX ADMIN — PANTOPRAZOLE SODIUM 40 MILLIGRAM(S): 20 TABLET, DELAYED RELEASE ORAL at 12:52

## 2021-10-30 RX ADMIN — Medication 650 MILLIGRAM(S): at 21:30

## 2021-10-30 RX ADMIN — ENOXAPARIN SODIUM 40 MILLIGRAM(S): 100 INJECTION SUBCUTANEOUS at 09:36

## 2021-10-30 RX ADMIN — Medication 10 MILLIGRAM(S): at 09:35

## 2021-10-30 RX ADMIN — ATORVASTATIN CALCIUM 20 MILLIGRAM(S): 80 TABLET, FILM COATED ORAL at 21:57

## 2021-10-30 RX ADMIN — Medication 100 MILLIGRAM(S): at 12:50

## 2021-10-30 RX ADMIN — TAMSULOSIN HYDROCHLORIDE 0.4 MILLIGRAM(S): 0.4 CAPSULE ORAL at 21:57

## 2021-10-30 RX ADMIN — Medication 1200 MILLIGRAM(S): at 17:24

## 2021-10-30 RX ADMIN — Medication 1200 MILLIGRAM(S): at 05:14

## 2021-10-30 RX ADMIN — Medication 81 MILLIGRAM(S): at 09:35

## 2021-10-30 RX ADMIN — CEFTRIAXONE 1000 MILLIGRAM(S): 500 INJECTION, POWDER, FOR SOLUTION INTRAMUSCULAR; INTRAVENOUS at 12:49

## 2021-10-30 RX ADMIN — FAMOTIDINE 20 MILLIGRAM(S): 10 INJECTION INTRAVENOUS at 09:35

## 2021-10-30 RX ADMIN — Medication 650 MILLIGRAM(S): at 20:25

## 2021-10-30 RX ADMIN — TIOTROPIUM BROMIDE 1 CAPSULE(S): 18 CAPSULE ORAL; RESPIRATORY (INHALATION) at 09:04

## 2021-10-30 RX ADMIN — Medication 25 MILLIGRAM(S): at 09:36

## 2021-10-30 RX ADMIN — Medication 100 MILLIGRAM(S): at 05:14

## 2021-10-30 RX ADMIN — Medication 10 MILLIGRAM(S): at 21:57

## 2021-10-30 RX ADMIN — Medication 100 MILLIGRAM(S): at 20:25

## 2021-10-30 RX ADMIN — BUDESONIDE AND FORMOTEROL FUMARATE DIHYDRATE 2 PUFF(S): 160; 4.5 AEROSOL RESPIRATORY (INHALATION) at 09:01

## 2021-10-30 RX ADMIN — AZITHROMYCIN 255 MILLIGRAM(S): 500 TABLET, FILM COATED ORAL at 12:52

## 2021-10-30 RX ADMIN — Medication 40 MILLIGRAM(S): at 09:35

## 2021-10-30 RX ADMIN — LATANOPROST 1 DROP(S): 0.05 SOLUTION/ DROPS OPHTHALMIC; TOPICAL at 22:01

## 2021-10-30 RX ADMIN — BUDESONIDE AND FORMOTEROL FUMARATE DIHYDRATE 2 PUFF(S): 160; 4.5 AEROSOL RESPIRATORY (INHALATION) at 20:43

## 2021-10-30 NOTE — PROGRESS NOTE ADULT - ASSESSMENT
87M hx BPH, CAD, COPD, glaucoma HTN, HLD admitted for:         # Sepsis with acute hypoxic resp failure and acute metabolic encephalopathy 2/2 RSV infection, CAD and COPD exacerbation.  CAP  -Now afebrile  - WBCs elevated   - CT chest with RLL infiltrate, suspected pulm HTN   - BCX NGTD  - F/u UCX   - On RA, monitor pulse ox   - C/w  albuterol, Symbicort, Spiriva   -C/w solumedrol IV, decrease to QD   - switch  IV ceftriaxone to PO ceftin to complete total of 5 days,  Azithromycin plan for 5 days. CT with RLL infiltrate, suspect secondary bacterial infection   - mental status improved   - echo done not suggestive of Pulm HTN      # Abnormal UA, UTI ruled out   - asymptomatic  -  UCX: neg       #thrombocytopenia, improved   -likely in setting of sepsis    # Presyncope, likely related to acute Dz, hypoxia  and dehydration   -tele:   NSVT in ED, no further events   -trops neg  -echo with only diastolic dysfunction, preserved EF, no significant valvular Dz  -CTH w no acute findings but with chronic lacunar infarcts and chronic microvascular ischemic changes    # NSVT  -monitor  lytes  -tele  -echo as above  -low dose BB   -Cardio f/u appreciated     #CAD, HTN, HLD  -ASA, statin, ACEi, BB  -pt has not taken lasix in many months and was prior only on it for LE swelling, will hold for now     #DVT ppx- SCDs    #full code      Dispo: d/c tele, d/c planning. CM will further d/c family

## 2021-10-30 NOTE — PROGRESS NOTE ADULT - SUBJECTIVE AND OBJECTIVE BOX
CC: syncope (26 Oct 2021 06:46)    HPI: 88 y/o M with a PMHx of BPH, CAD, COPD, glaucoma, HTN, HLD presented  to the ED BIBEMS s/p near syncopal episode this morning. Stated  he was shaving this morning when he began to feel dizzy, weak and could not support himself. He fell toward onto the sink with elbow resulting in skin tears. Denies LOC.  C/o  R forearm  soreness   Upon evaluation in ED Pt was  confused; was not able  tell the date; No  CP, abdominal pain or urinary symptoms;  Pt was given IVF     INTERVAL HPI/ OVERNIGHT EVENTS: Pt was seen and examined,     no complains, no SOB, still cough. D/c planning discussed, Pts wife in the ED today, will not be able to pick him up    Vital Signs Last 24 Hrs  T(C): 36.4 (30 Oct 2021 09:17), Max: 36.4 (30 Oct 2021 09:17)  T(F): 97.5 (30 Oct 2021 09:17), Max: 97.5 (30 Oct 2021 09:17)  HR: 76 (30 Oct 2021 09:17) (60 - 76)  BP: 128/63 (30 Oct 2021 09:17) (127/61 - 128/63)  RR: 18 (30 Oct 2021 09:17) (18 - 18)  SpO2: 94% (30 Oct 2021 09:17) (93% - 94%)  REVIEW OF SYSTEMS:  All other review of systems is negative unless indicated above.        PHYSICAL EXAM:  General: Well developed; in no acute distress, on RA  Eyes:  EOMI; conjunctiva and sclera clear  Head: Normocephalic; atraumatic  ENMT: No nasal discharge; airway clear  Neck: Supple; non tender; no masses  Respiratory: Improved air entry,  LLL rhonchi, better after coughing,  no wheezing     Cardiovascular: Regular rate and rhythm. S1 and S2 Normal;   Gastrointestinal: Soft non-tender non-distended; Normal bowel sounds  Genitourinary: No  suprapubic  tenderness  Extremities: No edema  Vascular: Peripheral pulses palpable 2+ bilaterally  Neurological: Alert and oriented x 2-3, non focal, forgetful   Musculoskeletal: Normal muscle tone  Psychiatric: Cooperative       LABS:                           13.6   14.44 )-----------( 142      ( 30 Oct 2021 08:14 )             42.4     1030    143  |  110<H>  |  35<H>  ----------------------------<  90  3.6   |  27  |  0.88    Ca    8.1<L>      30 Oct 2021 08:14                           13.9   16.14 )-----------( 153      ( 29 Oct 2021 09:20 )             43.5     10    139  |  106  |  35<H>  ----------------------------<  150<H>  3.8   |  29  |  0.81    Ca    8.4<L>      29 Oct 2021 09:20                                  14.1   14.45 )-----------( 135      ( 28 Oct 2021 08:37 )             45.0     10    136  |  105  |  34<H>  ----------------------------<  149<H>  4.7   |  26  |  0.99    Ca    8.5      28 Oct 2021 08:37                            14.0   11.07 )-----------( 120      ( 27 Oct 2021 08:21 )             44.9     10-    138  |  105  |  22  ----------------------------<  138<H>  4.3   |  28  |  0.88    Ca    8.4<L>      27 Oct 2021 08:21      CARDIAC MARKERS ( 25 Oct 2021 08:11 )  x     / x     / 674 U/L / x     / x                                14.0   14.25 )-----------( 114      ( 26 Oct 2021 08:03 )             44.4     26 Oct 2021 08:03    135    |  107    |  17     ----------------------------<  108    4.4     |  20     |  0.88     Ca    8.1        26 Oct 2021 08:03  Phos  2.9       25 Oct 2021 08:11  Mg     1.9       25 Oct 2021 08:11    Urinalysis Basic - ( 24 Oct 2021 21:14 )  Color: Yellow / Appearance: Clear / S.020 / pH: x  Gluc: x / Ketone: Moderate  / Bili: Negative / Urobili: Negative mg/dL   Blood: x / Protein: 30 mg/dL / Nitrite: Negative   Leuk Esterase: Negative / RBC: 3-5 /HPF / WBC Negative   Sq Epi: x / Non Sq Epi: Few / Bacteria: Moderate    Culture - Urine (10.25.21 @ 13:11)   Specimen Source: Clean Catch Clean Catch (Midstream)   Culture Results:   <10,000 CFU/mL Normal Urogenital Diana     MEDICATIONS  (STANDING):  aspirin enteric coated 81 milliGRAM(s) Oral daily  atorvastatin 20 milliGRAM(s) Oral at bedtime  azithromycin  IVPB      azithromycin  IVPB 500 milliGRAM(s) IV Intermittent every 24 hours  budesonide 160 MICROgram(s)/formoterol 4.5 MICROgram(s) Inhaler 2 Puff(s) Inhalation two times a day  cefTRIAXone Injectable. 1000 milliGRAM(s) IV Push every 24 hours  cefTRIAXone Injectable.      enalapril 10 milliGRAM(s) Oral two times a day  enoxaparin Injectable 40 milliGRAM(s) SubCutaneous daily  guaiFENesin ER 1200 milliGRAM(s) Oral every 12 hours  latanoprost 0.005% Ophthalmic Solution 1 Drop(s) Both EYES at bedtime  metoprolol succinate ER 25 milliGRAM(s) Oral daily  predniSONE   Tablet 40 milliGRAM(s) Oral daily  tamsulosin 0.4 milliGRAM(s) Oral at bedtime  tiotropium 18 MICROgram(s) Capsule 1 Capsule(s) Inhalation daily    MEDICATIONS  (PRN):  acetaminophen     Tablet .. 650 milliGRAM(s) Oral every 6 hours PRN Temp greater or equal to 38C (100.4F)  ALBUTerol    90 MICROgram(s) HFA Inhaler 2 Puff(s) Inhalation every 4 hours PRN Shortness of Breath and/or Wheezing  benzonatate 100 milliGRAM(s) Oral every 8 hours PRN Cough        RADIOLOGY & ADDITIONAL TESTS:      EXAM:  CT CERVICAL SPINE                        EXAM:  CT BRAIN                        PROCEDURE DATE:  10/24/2021        < from: Xray Chest 1 View- PORTABLE-Urgent (10.24.21 @ 12:19) >    EXAM:  XR CHEST PORTABLE URGENT 1V                            PROCEDURE DATE:  10/24/2021          INTERPRETATION:  CLINICAL STATEMENT: Chest Pain.    TECHNIQUE: AP view of the chest.      COMPARISON: 2020    Patient is rotated towards the right.    The cardiomediastinal silhouette is normal and the bernice are not enlarged. Aortic calcification. There is no focal infiltrate or sizable pleural effusion. The osseous structures are intact. Degenerative changes of the spine and AC joints. History of reverse right shoulder arthroplasty.    IMPRESSION:    Unremarkable frontal chest x ray         EXAM:  ECHO TTE WO CON COMP W DOPP    PROCEDURE DATE:  10/25/2021     Summary     The left ventricle is normal in size, wall motion and contractility.   Mild concentric left ventricular hypertrophy is present.   Estimated left ventricular ejection fraction is 55-60 %.   Normal appearing left atrium.   Normal appearing right atrium.   Normal appearing right ventricle structure and function.   Aortic valve not well seen.   Fibrocalcific changes noted to the Aortic valve leaflets with preserved   leaflet excursion.   Trace aortic regurgitation is present.   Fibrocalcific changes noted to the mitral valve leaflets with preserved   leaflet excursion.   EA reversal of the mitral inflow consistent with reduced compliance of the   left ventricle.   Mild mitral annular calcification is present.   Trace to mild mitral regurgitation is present.   No evidence of pericardial effusion.      < from: CT Chest No Cont (10.. @ 19:41) >    EXAM:  CT CHEST                            PROCEDURE DATE:  10/26/2021          INTERPRETATION:  CLINICAL INFORMATION: Shortness of breath and hypoxia    COMPARISON: 3/10/2014.    CONTRAST/COMPLICATIONS:  IV Contrast: NONE  Oral Contrast: NONE  Complications: None reported at time of study completion    PROCEDURE:  CT of the Chest was performed.  Sagittal and coronal reformats were performed.    FINDINGS:    LUNGS AND AIRWAYS: Patent central airways.  Lungs are remarkable for linear atelectasis at the right lung base with linear atelectasis versus infiltrate at the left lung base  PLEURA: No pleural effusion.  MEDIASTINUM AND BERNICE: No lymphadenopathy.  VESSELS: There is atherosclerotic calcification of the aorta and coronary arteries. There is dilatation of the origin of the pulmonary arteries suggestive of pulmonary arterial hypertension  HEART: Heart size is normal. No pericardial effusion.  CHEST WALL AND LOWER NECK: Within normal limits.  VISUALIZED UPPER ABDOMEN: Within normal limits.  BONES: Degenerative changes and osteopenia. Right shoulder prosthesis. Moderate compression fracture midthoracic spine unchanged from the prior study    IMPRESSION: Left basilar infiltrate versus atelectasis. Linear atelectasis in the posterior right lower lobe  Enlarged main pulmonary arterial segment suggesting pulmonary arterial hypertension also present on study from 2014

## 2021-10-31 ENCOUNTER — TRANSCRIPTION ENCOUNTER (OUTPATIENT)
Age: 86
End: 2021-10-31

## 2021-10-31 VITALS
SYSTOLIC BLOOD PRESSURE: 125 MMHG | RESPIRATION RATE: 18 BRPM | DIASTOLIC BLOOD PRESSURE: 62 MMHG | OXYGEN SATURATION: 92 % | HEART RATE: 65 BPM | TEMPERATURE: 98 F

## 2021-10-31 LAB
ANION GAP SERPL CALC-SCNC: 4 MMOL/L — LOW (ref 5–17)
BUN SERPL-MCNC: 29 MG/DL — HIGH (ref 7–23)
CALCIUM SERPL-MCNC: 7.9 MG/DL — LOW (ref 8.5–10.1)
CHLORIDE SERPL-SCNC: 107 MMOL/L — SIGNIFICANT CHANGE UP (ref 96–108)
CO2 SERPL-SCNC: 28 MMOL/L — SIGNIFICANT CHANGE UP (ref 22–31)
CREAT SERPL-MCNC: 0.86 MG/DL — SIGNIFICANT CHANGE UP (ref 0.5–1.3)
GLUCOSE SERPL-MCNC: 87 MG/DL — SIGNIFICANT CHANGE UP (ref 70–99)
HCT VFR BLD CALC: 43.9 % — SIGNIFICANT CHANGE UP (ref 39–50)
HGB BLD-MCNC: 13.8 G/DL — SIGNIFICANT CHANGE UP (ref 13–17)
MCHC RBC-ENTMCNC: 27.4 PG — SIGNIFICANT CHANGE UP (ref 27–34)
MCHC RBC-ENTMCNC: 31.4 GM/DL — LOW (ref 32–36)
MCV RBC AUTO: 87.3 FL — SIGNIFICANT CHANGE UP (ref 80–100)
PLATELET # BLD AUTO: 153 K/UL — SIGNIFICANT CHANGE UP (ref 150–400)
POTASSIUM SERPL-MCNC: 3.8 MMOL/L — SIGNIFICANT CHANGE UP (ref 3.5–5.3)
POTASSIUM SERPL-SCNC: 3.8 MMOL/L — SIGNIFICANT CHANGE UP (ref 3.5–5.3)
RBC # BLD: 5.03 M/UL — SIGNIFICANT CHANGE UP (ref 4.2–5.8)
RBC # FLD: 15 % — HIGH (ref 10.3–14.5)
SODIUM SERPL-SCNC: 139 MMOL/L — SIGNIFICANT CHANGE UP (ref 135–145)
WBC # BLD: 14.3 K/UL — HIGH (ref 3.8–10.5)
WBC # FLD AUTO: 14.3 K/UL — HIGH (ref 3.8–10.5)

## 2021-10-31 PROCEDURE — 99239 HOSP IP/OBS DSCHRG MGMT >30: CPT

## 2021-10-31 RX ORDER — INFLUENZA VIRUS VACCINE 15; 15; 15; 15 UG/.5ML; UG/.5ML; UG/.5ML; UG/.5ML
0.5 SUSPENSION INTRAMUSCULAR
Qty: 0 | Refills: 0 | DISCHARGE

## 2021-10-31 RX ORDER — BUDESONIDE AND FORMOTEROL FUMARATE DIHYDRATE 160; 4.5 UG/1; UG/1
1 AEROSOL RESPIRATORY (INHALATION)
Qty: 1 | Refills: 0
Start: 2021-10-31 | End: 2021-11-29

## 2021-10-31 RX ORDER — CEFUROXIME AXETIL 250 MG
1 TABLET ORAL
Qty: 6 | Refills: 0
Start: 2021-10-31 | End: 2021-11-02

## 2021-10-31 RX ORDER — ALBUTEROL 90 UG/1
2 AEROSOL, METERED ORAL
Qty: 1 | Refills: 0
Start: 2021-10-31 | End: 2021-10-31

## 2021-10-31 RX ORDER — FLUTICASONE FUROATE, UMECLIDINIUM BROMIDE AND VILANTEROL TRIFENATATE 200; 62.5; 25 UG/1; UG/1; UG/1
1 POWDER RESPIRATORY (INHALATION)
Qty: 0 | Refills: 0 | DISCHARGE

## 2021-10-31 RX ORDER — TIOTROPIUM BROMIDE 18 UG/1
1 CAPSULE ORAL; RESPIRATORY (INHALATION)
Qty: 1 | Refills: 0
Start: 2021-10-31 | End: 2021-11-29

## 2021-10-31 RX ORDER — METOPROLOL TARTRATE 50 MG
1 TABLET ORAL
Qty: 30 | Refills: 0
Start: 2021-10-31 | End: 2021-11-29

## 2021-10-31 RX ORDER — ALBUTEROL 90 UG/1
2 AEROSOL, METERED ORAL
Qty: 0 | Refills: 0 | DISCHARGE
Start: 2021-10-31

## 2021-10-31 RX ORDER — RNA INGREDIENT BNT-162B2 0.23 G/1.8ML
0.3 INJECTION, SUSPENSION INTRAMUSCULAR
Qty: 0 | Refills: 0 | DISCHARGE

## 2021-10-31 RX ADMIN — Medication 650 MILLIGRAM(S): at 01:54

## 2021-10-31 RX ADMIN — ENOXAPARIN SODIUM 40 MILLIGRAM(S): 100 INJECTION SUBCUTANEOUS at 09:41

## 2021-10-31 RX ADMIN — Medication 100 MILLIGRAM(S): at 04:07

## 2021-10-31 RX ADMIN — AZITHROMYCIN 255 MILLIGRAM(S): 500 TABLET, FILM COATED ORAL at 12:18

## 2021-10-31 RX ADMIN — TIOTROPIUM BROMIDE 1 CAPSULE(S): 18 CAPSULE ORAL; RESPIRATORY (INHALATION) at 08:46

## 2021-10-31 RX ADMIN — Medication 81 MILLIGRAM(S): at 09:40

## 2021-10-31 RX ADMIN — PANTOPRAZOLE SODIUM 40 MILLIGRAM(S): 20 TABLET, DELAYED RELEASE ORAL at 09:41

## 2021-10-31 RX ADMIN — Medication 25 MILLIGRAM(S): at 09:41

## 2021-10-31 RX ADMIN — Medication 40 MILLIGRAM(S): at 09:41

## 2021-10-31 RX ADMIN — Medication 10 MILLIGRAM(S): at 09:41

## 2021-10-31 RX ADMIN — Medication 1200 MILLIGRAM(S): at 05:17

## 2021-10-31 RX ADMIN — Medication 650 MILLIGRAM(S): at 02:56

## 2021-10-31 RX ADMIN — Medication 500 MILLIGRAM(S): at 05:17

## 2021-10-31 RX ADMIN — BUDESONIDE AND FORMOTEROL FUMARATE DIHYDRATE 2 PUFF(S): 160; 4.5 AEROSOL RESPIRATORY (INHALATION) at 08:47

## 2021-10-31 NOTE — DISCHARGE NOTE PROVIDER - CARE PROVIDER_API CALL
Trey Watts (MD)  Cardiovascular Disease; Internal Medicine; Nuclear Cardiology  [address] [place] [address]  Phone: [phone]  Fax: [phone]  Follow Up Time:

## 2021-10-31 NOTE — DISCHARGE NOTE PROVIDER - HOSPITAL COURSE
86 y/o M with a PMHx of BPH, CAD, COPD, glaucoma, HTN, HLD presented  to the ED BIBEMS s/p near syncopal episode this morning. Stated  he was shaving this morning when he began to feel dizzy, weak and could not support himself. He fell toward onto the sink with elbow resulting in skin tears. Denies LOC.  C/o  R forearm  soreness Upon evaluation in ED Pt was  confused; was not able  tell the date; No  CP, abdominal pain or urinary symptoms.     In the hospital, patient was noted to have a acute on chronic hypoxic respiratory failure -  that was attributed to viral infection. In addition, patient's hospital stay was complicated with CAP. Patient doing well this am. Denies any HA, CP, SOB. No coughing. Patient feels that he has recovered very well since initial hospitalization and wants to be discharged. I tried multiple times -  calling ur wife and daughter -  no answer.     Physical Exam:   GENERAL APPEARANCE:  Very deconditioned, frail, elderly gent  T(C): 36.6 (10-31-21 @ 09:39), Max: 36.7 (10-30-21 @ 21:41)  HR: 65 (10-31-21 @ 09:39) (65 - 78)  BP: 125/62 (10-31-21 @ 09:39) (106/59 - 125/62)  RR: 18 (10-31-21 @ 09:39) (18 - 18)  SpO2: 92% (10-31-21 @ 09:39) (92% - 94%)  HEENT:  Head is normocephalic    Skin:  Warm and dry without any rash   NECK:  Supple without lymphadenopathy.   HEART:  Regular rate and rhythm. normal S1 and S2, No M/R/G  LUNGS: good insp/ expiratory sounds. rhonchi noted around the (L) base. No wheezing  ABDOMEN:  Soft, nontender, nondistended with good bowel sounds heard  EXTREMITIES:  chronic skin break down /  wearing red socks  NEUROLOGICAL:  Gross nonfocal      88 y/o M with a PMHx of BPH, CAD, COPD, glaucoma, HTN, HLD presented  to the ED BIBEMS s/p near syncopal episode this morning. Stated  he was shaving this morning when he began to feel dizzy, weak and could not support himself. He fell toward onto the sink with elbow resulting in skin tears. Denies LOC.  C/o  R forearm  soreness Upon evaluation in ED Pt was  confused; was not able  tell the date; No  CP, abdominal pain or urinary symptoms.     In the hospital, patient was noted to have a acute on chronic hypoxic respiratory failure -  that was attributed to viral infection. In addition, patient's hospital stay was complicated with CAP. Patient doing well this am. Denies any HA, CP, SOB. No coughing. Patient feels that he has recovered very well since initial hospitalization and wants to be discharged. I tried multiple times -  calling ur wife and daughter -  no answer. The reason for my call for the family was that, I found patient extremely deocnditioned and frail. I think, family will have a hard time taking care of the patient at home. Patient was demanding to be leaving @ 2pm. Care discussed with nursing (Stephanie).     Physical Exam:   GENERAL APPEARANCE:  Very deconditioned, frail, elderly gent  T(C): 36.6 (10-31-21 @ 09:39), Max: 36.7 (10-30-21 @ 21:41)  HR: 65 (10-31-21 @ 09:39) (65 - 78)  BP: 125/62 (10-31-21 @ 09:39) (106/59 - 125/62)  RR: 18 (10-31-21 @ 09:39) (18 - 18)  SpO2: 92% (10-31-21 @ 09:39) (92% - 94%)  HEENT:  Head is normocephalic    Skin:  Warm and dry without any rash   NECK:  Supple without lymphadenopathy.   HEART:  Regular rate and rhythm. normal S1 and S2, No M/R/G  LUNGS: good insp/ expiratory sounds. rhonchi noted around the (L) base. No wheezing  ABDOMEN:  Soft, nontender, nondistended with good bowel sounds heard  EXTREMITIES:  chronic skin break down /  wearing red socks  NEUROLOGICAL:  Gross nonfocal

## 2021-10-31 NOTE — PROGRESS NOTE ADULT - SUBJECTIVE AND OBJECTIVE BOX
CC: syncope (26 Oct 2021 06:46)    Patient is a  88 y/o M with a PMHx of BPH, CAD, COPD, glaucoma, HTN, HLD presented  to the ED BIBEMS s/p near syncopal episode this morning. Stated  he was shaving this morning when he began to feel dizzy, weak and could not support himself. He fell toward onto the sink with elbow resulting in skin tears. Denies LOC.  C/o  R forearm  soreness   Upon evaluation in ED Pt was  confused; was not able  tell the date; No  CP, abdominal pain or urinary symptoms;    10/31: Patient seen and evaluated at the bedside. hospital course reviewed. patient denies any HA, CP, SOB. He states that he was told yesterday that he will be elaving today and does not wants to undergo any further testing. Patient feels well. Last chest xray unremarkable.     This being said, patient is very deconditioned and frail appearing elderly gent.     REVIEW OF SYSTEMS:  All other review of systems is negative unless indicated above.    PHYSICAL EXAM:  T(C): 36.6 (31 Oct 2021 09:39), Max: 36.7 (30 Oct 2021 21:41)  T(F): 97.9 (31 Oct 2021 09:39), Max: 98 (30 Oct 2021 21:41)  HR: 65 (31 Oct 2021 09:39) (65 - 78)  BP: 125/62 (31 Oct 2021 09:39) (106/59 - 125/62)  RR: 18 (31 Oct 2021 09:39) (18 - 18)  SpO2: 92% (31 Oct 2021 09:39) (92% - 94%)  General: Well developed; in no acute distress, on RA  Eyes:  EOMI; conjunctiva and sclera clear  Head: Normocephalic; atraumatic  ENMT: No nasal discharge; airway clear  Neck: Supple; non tender; no masses  Respiratory: Improved air entry,  LLL rhonchi, better after coughing,  no wheezing     Cardiovascular: Regular rate and rhythm. S1 and S2 Normal;   Gastrointestinal: Soft non-tender non-distended; Normal bowel sounds  Genitourinary: No  suprapubic  tenderness  Extremities: No edema  Vascular: Peripheral pulses palpable 2+ bilaterally  Neurological: Alert and oriented x 2-3, non focal, forgetful   Musculoskeletal: Normal muscle tone  Psychiatric: Cooperative       LABS:       CBC Full  -  ( 31 Oct 2021 07:57 )  WBC Count : 14.30 K/uL  RBC Count : 5.03 M/uL  Hemoglobin : 13.8 g/dL  Hematocrit : 43.9 %  Platelet Count - Automated : 153 K/uL  Mean Cell Volume : 87.3 fl  Mean Cell Hemoglobin : 27.4 pg  Mean Cell Hemoglobin Concentration : 31.4 gm/dL  Auto Neutrophil # : x  Auto Lymphocyte # : x  Auto Monocyte # : x  Auto Eosinophil # : x  Auto Basophil # : x  Auto Neutrophil % : x  Auto Lymphocyte % : x  Auto Monocyte % : x  Auto Eosinophil % : x  Auto Basophil % : x        10-31    139  |  107  |  29<H>  ----------------------------<  87  3.8   |  28  |  0.86    Ca    7.9<L>      31 Oct 2021 07:57    # Sepsis with acute hypoxic resp failure and acute metabolic encephalopathy 2/2 RSV infection, CAD and COPD exacerbation  # CAP  - persistant elevation in wbc most likely secondary to steroid use  - CXRAY: The heart is unremarkable. The lungs are clear.  - On RA   - C/w  albuterol, Symbicort, Spiriva   - prednisone 40 mg po qdaily  - switch  IV ceftriaxone to PO ceftin to complete total of 5 days,  Azithromycin plan for 5 days. CT with RLL infiltrate, suspect secondary bacterial infection   - mental status improved   - echo done not suggestive of Pulm HTN    # Abnormal UA, UTI ruled out   - asymptomatic  - UCX: neg     # thrombocytopenia, improved   -likely in setting of sepsis    # Presyncope, likely related to acute Dz, hypoxia  and dehydration   - tele:   NSVT in ED, no further events   - trops neg  - echo with only diastolic dysfunction, preserved EF, no significant valvular Dz  - CTH w no acute findings but with chronic lacunar infarcts and chronic microvascular ischemic changes    # NSVT  -monitor  lytes  -tele  -echo as above  -low dose BB     #CAD, HTN, HLD  -ASA, statin, ACEi, BB  -pt has not taken lasix in many months and was prior only on it for LE swelling, will hold for now     #DVT ppx  - SCDs    #full code

## 2021-10-31 NOTE — DISCHARGE NOTE PROVIDER - NSDCMRMEDTOKEN_GEN_ALL_CORE_FT
albuterol 90 mcg/inh inhalation aerosol: 2 puff(s) inhaled every 4 hours, As Needed -Shortness of Breath and/or Wheezing - for bronchospasm   albuterol 90 mcg/inh inhalation aerosol: 2 puff(s) inhaled every 4 hours, As needed, Shortness of Breath and/or Wheezing  aspirin 81 mg oral delayed release tablet: 1 tab(s) orally once a day (at bedtime)  atorvastatin 20 mg oral tablet: 1 tab(s) orally once a day (at bedtime)  budesonide-formoterol 160 mcg-4.5 mcg/inh inhalation aerosol: 1 application inhaled 2 times a day   enalapril 10 mg oral tablet: 1 tab(s) orally 2 times a day  famotidine 20 mg oral tablet:  orally once a day  furosemide 40 mg oral tablet: 1 tab(s) orally once a day  latanoprost 0.005% ophthalmic solution: 1 drop(s) to each affected eye once a day (at bedtime)  metoprolol succinate 25 mg oral tablet, extended release: 1 tab(s) orally once a day  predniSONE 20 mg oral tablet: 1 tab(s) orally once a day   tamsulosin 0.4 mg oral capsule: 1 cap(s) orally once a day (at bedtime)  tiotropium 18 mcg inhalation capsule: 1 cap(s) inhaled once a day  Vitamin B12: orally once a day  Vitamin D3: orally once a day   albuterol 90 mcg/inh inhalation aerosol: 2 puff(s) inhaled every 4 hours, As Needed -Shortness of Breath and/or Wheezing - for bronchospasm   albuterol 90 mcg/inh inhalation aerosol: 2 puff(s) inhaled every 4 hours, As needed, Shortness of Breath and/or Wheezing  aspirin 81 mg oral delayed release tablet: 1 tab(s) orally once a day (at bedtime)  atorvastatin 20 mg oral tablet: 1 tab(s) orally once a day (at bedtime)  budesonide-formoterol 160 mcg-4.5 mcg/inh inhalation aerosol: 1 application inhaled 2 times a day   cefuroxime 500 mg oral tablet: 1 tab(s) orally every 12 hours  enalapril 10 mg oral tablet: 1 tab(s) orally 2 times a day  famotidine 20 mg oral tablet:  orally once a day  furosemide 40 mg oral tablet: 1 tab(s) orally once a day  latanoprost 0.005% ophthalmic solution: 1 drop(s) to each affected eye once a day (at bedtime)  metoprolol succinate 25 mg oral tablet, extended release: 1 tab(s) orally once a day  predniSONE 20 mg oral tablet: 1 tab(s) orally once a day   tamsulosin 0.4 mg oral capsule: 1 cap(s) orally once a day (at bedtime)  tiotropium 18 mcg inhalation capsule: 1 cap(s) inhaled once a day  Vitamin B12: orally once a day  Vitamin D3: orally once a day

## 2021-10-31 NOTE — DISCHARGE NOTE PROVIDER - NSDCCPCAREPLAN_GEN_ALL_CORE_FT
PRINCIPAL DISCHARGE DIAGNOSIS  Diagnosis: Sepsis  Assessment and Plan of Treatment: # Sepsis with acute hypoxic resp failure and acute metabolic encephalopathy 2/2 RSV infection, CAD and COPD exacerbation  # CAP  - elevated WBC ( this is most likely 3N)  - CXRAY: The heart is unremarkable. The lungs are clear.  - C/w  albuterol, Symbicort, Spiriva   - prednisone 20 mg po qdaily  - You received IV antibiotics, while in the hospital  - mental status improved   - echo done not suggestive of Pulm HTN        SECONDARY DISCHARGE DIAGNOSES  Diagnosis: Multiple skin tears  Assessment and Plan of Treatment: - supportive care  - avoid falls    Diagnosis: Near syncope  Assessment and Plan of Treatment: - multifactorial in the setting of sepsis  - be careful with falling       PRINCIPAL DISCHARGE DIAGNOSIS  Diagnosis: Sepsis  Assessment and Plan of Treatment: # Sepsis with acute hypoxic resp failure and acute metabolic encephalopathy 2/2 RSV infection, CAD and COPD exacerbation  # CAP  - elevated WBC ( this is most likely due to steroids, as patient does not have active signs of infection)  - CXRAY: The heart is unremarkable. The lungs are clear.  - C/w  albuterol, Symbicort, Spiriva   - prednisone 20 mg po qdaily  - You received IV antibiotics, while in the hospital  - mental status improved   - echo done not suggestive of Pulm HTN        SECONDARY DISCHARGE DIAGNOSES  Diagnosis: Multiple skin tears  Assessment and Plan of Treatment: - supportive care  - avoid falls    Diagnosis: Near syncope  Assessment and Plan of Treatment: - multifactorial in the setting of sepsis  - be careful with falling

## 2021-11-04 DIAGNOSIS — E78.5 HYPERLIPIDEMIA, UNSPECIFIED: ICD-10-CM

## 2021-11-04 DIAGNOSIS — J12.1 RESPIRATORY SYNCYTIAL VIRUS PNEUMONIA: ICD-10-CM

## 2021-11-04 DIAGNOSIS — Z95.5 PRESENCE OF CORONARY ANGIOPLASTY IMPLANT AND GRAFT: ICD-10-CM

## 2021-11-04 DIAGNOSIS — Z96.652 PRESENCE OF LEFT ARTIFICIAL KNEE JOINT: ICD-10-CM

## 2021-11-04 DIAGNOSIS — D69.6 THROMBOCYTOPENIA, UNSPECIFIED: ICD-10-CM

## 2021-11-04 DIAGNOSIS — Z79.82 LONG TERM (CURRENT) USE OF ASPIRIN: ICD-10-CM

## 2021-11-04 DIAGNOSIS — N40.0 BENIGN PROSTATIC HYPERPLASIA WITHOUT LOWER URINARY TRACT SYMPTOMS: ICD-10-CM

## 2021-11-04 DIAGNOSIS — I10 ESSENTIAL (PRIMARY) HYPERTENSION: ICD-10-CM

## 2021-11-04 DIAGNOSIS — R65.20 SEVERE SEPSIS WITHOUT SEPTIC SHOCK: ICD-10-CM

## 2021-11-04 DIAGNOSIS — G93.41 METABOLIC ENCEPHALOPATHY: ICD-10-CM

## 2021-11-04 DIAGNOSIS — Z79.899 OTHER LONG TERM (CURRENT) DRUG THERAPY: ICD-10-CM

## 2021-11-04 DIAGNOSIS — J96.01 ACUTE RESPIRATORY FAILURE WITH HYPOXIA: ICD-10-CM

## 2021-11-04 DIAGNOSIS — I47.2 VENTRICULAR TACHYCARDIA: ICD-10-CM

## 2021-11-04 DIAGNOSIS — Z87.891 PERSONAL HISTORY OF NICOTINE DEPENDENCE: ICD-10-CM

## 2021-11-04 DIAGNOSIS — J44.1 CHRONIC OBSTRUCTIVE PULMONARY DISEASE WITH (ACUTE) EXACERBATION: ICD-10-CM

## 2021-11-04 DIAGNOSIS — J44.0 CHRONIC OBSTRUCTIVE PULMONARY DISEASE WITH (ACUTE) LOWER RESPIRATORY INFECTION: ICD-10-CM

## 2021-11-04 DIAGNOSIS — A41.9 SEPSIS, UNSPECIFIED ORGANISM: ICD-10-CM

## 2021-11-04 DIAGNOSIS — I25.10 ATHEROSCLEROTIC HEART DISEASE OF NATIVE CORONARY ARTERY WITHOUT ANGINA PECTORIS: ICD-10-CM

## 2021-11-17 ENCOUNTER — EMERGENCY (EMERGENCY)
Facility: HOSPITAL | Age: 86
LOS: 0 days | Discharge: HOME | End: 2021-11-18
Attending: EMERGENCY MEDICINE
Payer: MEDICARE

## 2021-11-17 VITALS
WEIGHT: 184.97 LBS | HEART RATE: 57 BPM | RESPIRATION RATE: 18 BRPM | HEIGHT: 71 IN | DIASTOLIC BLOOD PRESSURE: 51 MMHG | OXYGEN SATURATION: 94 % | SYSTOLIC BLOOD PRESSURE: 121 MMHG | TEMPERATURE: 98 F

## 2021-11-17 DIAGNOSIS — M25.552 PAIN IN LEFT HIP: ICD-10-CM

## 2021-11-17 DIAGNOSIS — Z79.82 LONG TERM (CURRENT) USE OF ASPIRIN: ICD-10-CM

## 2021-11-17 DIAGNOSIS — Z96.642 PRESENCE OF LEFT ARTIFICIAL HIP JOINT: Chronic | ICD-10-CM

## 2021-11-17 DIAGNOSIS — M25.551 PAIN IN RIGHT HIP: ICD-10-CM

## 2021-11-17 DIAGNOSIS — Z96.642 PRESENCE OF LEFT ARTIFICIAL HIP JOINT: ICD-10-CM

## 2021-11-17 DIAGNOSIS — E78.5 HYPERLIPIDEMIA, UNSPECIFIED: ICD-10-CM

## 2021-11-17 DIAGNOSIS — Z98.890 OTHER SPECIFIED POSTPROCEDURAL STATES: Chronic | ICD-10-CM

## 2021-11-17 DIAGNOSIS — Z90.89 ACQUIRED ABSENCE OF OTHER ORGANS: Chronic | ICD-10-CM

## 2021-11-17 DIAGNOSIS — Z95.5 PRESENCE OF CORONARY ANGIOPLASTY IMPLANT AND GRAFT: Chronic | ICD-10-CM

## 2021-11-17 DIAGNOSIS — I10 ESSENTIAL (PRIMARY) HYPERTENSION: ICD-10-CM

## 2021-11-17 DIAGNOSIS — Z98.49 CATARACT EXTRACTION STATUS, UNSPECIFIED EYE: Chronic | ICD-10-CM

## 2021-11-17 DIAGNOSIS — Z20.822 CONTACT WITH AND (SUSPECTED) EXPOSURE TO COVID-19: ICD-10-CM

## 2021-11-17 LAB
ALBUMIN SERPL ELPH-MCNC: 2.4 G/DL — LOW (ref 3.3–5)
ALP SERPL-CCNC: 130 U/L — HIGH (ref 40–120)
ALT FLD-CCNC: 18 U/L — SIGNIFICANT CHANGE UP (ref 12–78)
ANION GAP SERPL CALC-SCNC: 4 MMOL/L — LOW (ref 5–17)
APTT BLD: 26.4 SEC — LOW (ref 27.5–35.5)
AST SERPL-CCNC: 30 U/L — SIGNIFICANT CHANGE UP (ref 15–37)
BASOPHILS # BLD AUTO: 0.05 K/UL — SIGNIFICANT CHANGE UP (ref 0–0.2)
BASOPHILS NFR BLD AUTO: 0.4 % — SIGNIFICANT CHANGE UP (ref 0–2)
BILIRUB SERPL-MCNC: 1.3 MG/DL — HIGH (ref 0.2–1.2)
BLD GP AB SCN SERPL QL: SIGNIFICANT CHANGE UP
BUN SERPL-MCNC: 16 MG/DL — SIGNIFICANT CHANGE UP (ref 7–23)
CALCIUM SERPL-MCNC: 8.3 MG/DL — LOW (ref 8.5–10.1)
CHLORIDE SERPL-SCNC: 105 MMOL/L — SIGNIFICANT CHANGE UP (ref 96–108)
CO2 SERPL-SCNC: 26 MMOL/L — SIGNIFICANT CHANGE UP (ref 22–31)
CREAT SERPL-MCNC: 0.8 MG/DL — SIGNIFICANT CHANGE UP (ref 0.5–1.3)
EOSINOPHIL # BLD AUTO: 0.26 K/UL — SIGNIFICANT CHANGE UP (ref 0–0.5)
EOSINOPHIL NFR BLD AUTO: 2.2 % — SIGNIFICANT CHANGE UP (ref 0–6)
GLUCOSE SERPL-MCNC: 90 MG/DL — SIGNIFICANT CHANGE UP (ref 70–99)
HCT VFR BLD CALC: 45.3 % — SIGNIFICANT CHANGE UP (ref 39–50)
HGB BLD-MCNC: 14.1 G/DL — SIGNIFICANT CHANGE UP (ref 13–17)
IMM GRANULOCYTES NFR BLD AUTO: 1.3 % — SIGNIFICANT CHANGE UP (ref 0–1.5)
INR BLD: 1.16 RATIO — SIGNIFICANT CHANGE UP (ref 0.88–1.16)
LYMPHOCYTES # BLD AUTO: 1.13 K/UL — SIGNIFICANT CHANGE UP (ref 1–3.3)
LYMPHOCYTES # BLD AUTO: 9.5 % — LOW (ref 13–44)
MCHC RBC-ENTMCNC: 27.9 PG — SIGNIFICANT CHANGE UP (ref 27–34)
MCHC RBC-ENTMCNC: 31.1 GM/DL — LOW (ref 32–36)
MCV RBC AUTO: 89.5 FL — SIGNIFICANT CHANGE UP (ref 80–100)
MONOCYTES # BLD AUTO: 1.08 K/UL — HIGH (ref 0–0.9)
MONOCYTES NFR BLD AUTO: 9.1 % — SIGNIFICANT CHANGE UP (ref 2–14)
NEUTROPHILS # BLD AUTO: 9.23 K/UL — HIGH (ref 1.8–7.4)
NEUTROPHILS NFR BLD AUTO: 77.5 % — HIGH (ref 43–77)
PLATELET # BLD AUTO: 176 K/UL — SIGNIFICANT CHANGE UP (ref 150–400)
POTASSIUM SERPL-MCNC: 4.7 MMOL/L — SIGNIFICANT CHANGE UP (ref 3.5–5.3)
POTASSIUM SERPL-SCNC: 4.7 MMOL/L — SIGNIFICANT CHANGE UP (ref 3.5–5.3)
PROT SERPL-MCNC: 6.2 GM/DL — SIGNIFICANT CHANGE UP (ref 6–8.3)
PROTHROM AB SERPL-ACNC: 13.4 SEC — SIGNIFICANT CHANGE UP (ref 10.6–13.6)
RBC # BLD: 5.06 M/UL — SIGNIFICANT CHANGE UP (ref 4.2–5.8)
RBC # FLD: 16.7 % — HIGH (ref 10.3–14.5)
SARS-COV-2 RNA SPEC QL NAA+PROBE: SIGNIFICANT CHANGE UP
SODIUM SERPL-SCNC: 135 MMOL/L — SIGNIFICANT CHANGE UP (ref 135–145)
WBC # BLD: 11.9 K/UL — HIGH (ref 3.8–10.5)
WBC # FLD AUTO: 11.9 K/UL — HIGH (ref 3.8–10.5)

## 2021-11-17 PROCEDURE — 72192 CT PELVIS W/O DYE: CPT | Mod: 26,MA

## 2021-11-17 PROCEDURE — 73552 X-RAY EXAM OF FEMUR 2/>: CPT | Mod: 26,LT

## 2021-11-17 PROCEDURE — 36415 COLL VENOUS BLD VENIPUNCTURE: CPT

## 2021-11-17 PROCEDURE — 73521 X-RAY EXAM HIPS BI 2 VIEWS: CPT | Mod: 26

## 2021-11-17 PROCEDURE — 93005 ELECTROCARDIOGRAM TRACING: CPT

## 2021-11-17 PROCEDURE — 86900 BLOOD TYPING SEROLOGIC ABO: CPT

## 2021-11-17 PROCEDURE — 86901 BLOOD TYPING SEROLOGIC RH(D): CPT

## 2021-11-17 PROCEDURE — 93010 ELECTROCARDIOGRAM REPORT: CPT

## 2021-11-17 PROCEDURE — 99284 EMERGENCY DEPT VISIT MOD MDM: CPT | Mod: 25

## 2021-11-17 PROCEDURE — 97530 THERAPEUTIC ACTIVITIES: CPT | Mod: GP

## 2021-11-17 PROCEDURE — 72192 CT PELVIS W/O DYE: CPT | Mod: MA

## 2021-11-17 PROCEDURE — 85610 PROTHROMBIN TIME: CPT

## 2021-11-17 PROCEDURE — 73521 X-RAY EXAM HIPS BI 2 VIEWS: CPT

## 2021-11-17 PROCEDURE — 94640 AIRWAY INHALATION TREATMENT: CPT

## 2021-11-17 PROCEDURE — 80053 COMPREHEN METABOLIC PANEL: CPT

## 2021-11-17 PROCEDURE — 87635 SARS-COV-2 COVID-19 AMP PRB: CPT

## 2021-11-17 PROCEDURE — 97162 PT EVAL MOD COMPLEX 30 MIN: CPT | Mod: GP

## 2021-11-17 PROCEDURE — 76376 3D RENDER W/INTRP POSTPROCES: CPT | Mod: 26

## 2021-11-17 PROCEDURE — 99285 EMERGENCY DEPT VISIT HI MDM: CPT

## 2021-11-17 PROCEDURE — 97116 GAIT TRAINING THERAPY: CPT | Mod: GP

## 2021-11-17 PROCEDURE — 86850 RBC ANTIBODY SCREEN: CPT

## 2021-11-17 PROCEDURE — 76376 3D RENDER W/INTRP POSTPROCES: CPT

## 2021-11-17 PROCEDURE — 85025 COMPLETE CBC W/AUTO DIFF WBC: CPT

## 2021-11-17 PROCEDURE — 73552 X-RAY EXAM OF FEMUR 2/>: CPT | Mod: LT

## 2021-11-17 PROCEDURE — 85730 THROMBOPLASTIN TIME PARTIAL: CPT

## 2021-11-17 RX ORDER — FAMOTIDINE 10 MG/ML
0 INJECTION INTRAVENOUS
Qty: 0 | Refills: 0 | DISCHARGE

## 2021-11-17 RX ORDER — CHOLECALCIFEROL (VITAMIN D3) 125 MCG
0 CAPSULE ORAL
Qty: 0 | Refills: 0 | DISCHARGE

## 2021-11-17 RX ORDER — TRAMADOL HYDROCHLORIDE 50 MG/1
50 TABLET ORAL ONCE
Refills: 0 | Status: DISCONTINUED | OUTPATIENT
Start: 2021-11-17 | End: 2021-11-17

## 2021-11-17 RX ORDER — PREGABALIN 225 MG/1
0 CAPSULE ORAL
Qty: 0 | Refills: 0 | DISCHARGE

## 2021-11-17 RX ADMIN — TRAMADOL HYDROCHLORIDE 50 MILLIGRAM(S): 50 TABLET ORAL at 12:43

## 2021-11-17 RX ADMIN — TRAMADOL HYDROCHLORIDE 50 MILLIGRAM(S): 50 TABLET ORAL at 11:38

## 2021-11-17 NOTE — ED PROVIDER NOTE - CLINICAL SUMMARY MEDICAL DECISION MAKING FREE TEXT BOX
Will evaluate for periprosthetic fracture vs. arthritis vs. dislocation. Pt otherwise well-appearing. Dispo pending imaging. Will evaluate for periprosthetic fracture vs. arthritis vs. dislocation. Pt otherwise well-appearing. Dispo pending imaging.    Imaging unremarkable - discussed with ortho to verify no periprosthetic fx.  Likely severe arthritis.  No e/o septic joint.  Pt very well appearing.  Discussed with SW for rehab placement.  PT consulted as well.  SW states pt will need to stay overnight for placement in AM.  Discussed again with ortho to ensure no other underlying pathology to hardware and they state that we may check CT, but no other intervention warranted by ortho at this time.  CT ordered.  S/o to Dr. Watts pending placement and ct results.

## 2021-11-17 NOTE — ED PROVIDER NOTE - PROGRESS NOTE DETAILS
Received signout from Dr. Ledesma.  CT with age indeterminate L4 superior endplate fx, patient denies pain in back, has no lumbar bony tenderness.  Pending SW placement, will be staying night in ED.  Angel Watts D.O. pt signed out to me pending placement. pt has placement arranged for by case management. gabby obando. Hussein Castañeda M.D., Attending Physician

## 2021-11-17 NOTE — PHYSICAL THERAPY INITIAL EVALUATION ADULT - GENERAL OBSERVATIONS, REHAB EVAL
Pt seen for 45min PT eval in ED. Awaiting official reading of XR, as per MD Baltazar Moser to amb. Pt with c/o of b/l hip and anterior thigh pain, increases with movement and weightbearing. Pt trans supine>sit with min Ax1. Pt trans sit<>stand with CGA at RW. Pt amb 10ft with RW and CGA, limited 2/2 pain. Pt dem dec step length. Pt returned semi supine all needs met, RN aware.

## 2021-11-17 NOTE — ED ADULT NURSE REASSESSMENT NOTE - NS ED NURSE REASSESS COMMENT FT1
pt resting in bed, awake and alert. provided pillow for heels, warm blanket, brushed teeth. ekg performed. VSS. pt awaiting SW in AM.

## 2021-11-17 NOTE — ED PROVIDER NOTE - PATIENT PORTAL LINK FT
You can access the FollowMyHealth Patient Portal offered by [facility] by registering at the following website: [email or web address]. By joining [employer]’s FollowMyHealth portal, you will also be able to view your health information using other applications (apps) compatible with our system.

## 2021-11-17 NOTE — PHYSICAL THERAPY INITIAL EVALUATION ADULT - DISCHARGE DISPOSITION, PT EVAL
subacute rehab vs home with assist and home PT/home/home w/ assist/home w/ home PT/rehabilitation facility

## 2021-11-17 NOTE — PHYSICAL THERAPY INITIAL EVALUATION ADULT - PERTINENT HX OF CURRENT PROBLEM, REHAB EVAL
88 y/o male with PMHx of HTN, HLD, CAD, COPD, BPH, glaucoma, H/O of left total hip replacement presents to the ED c/o bilateral hip pain, ongoing for the past several days. [place] trauma. States he just cannot walk due to the pain.

## 2021-11-17 NOTE — ED ADULT NURSE REASSESSMENT NOTE - NS ED NURSE REASSESS COMMENT FT1
plan for rehab placement discussed with pt and family (daughter [phone]) by case management,  pt fed dinner resting comfortably assisted with the urinal pt is close to nurses station will cont to monitor

## 2021-11-17 NOTE — ED PROVIDER NOTE - NS ED ROS FT
Constitutional: nad, well appearing  HEENT:  no nasal congestion, eye drainage or ear pain.    CVS:  no cp  Resp:  No sob, no cough  GI:  no abdominal pain, no nausea or vomiting  :  no dysuria  MSK: +bilateral hip pain  Skin: no rash  Neuro: no change in mental status or level of consciousness  Heme/lymph: no bleeding

## 2021-11-17 NOTE — ED PROVIDER NOTE - NSFOLLOWUPINSTRUCTIONS_ED_ALL_ED_FT
1. return for worsening symptoms or anything concerning to you  2. take all home meds as prescribed  3. follow up with your pmd call to make an appointment    Fracture    A fracture is a break in one of your bones. This can occur from a variety of injuries, especially traumatic ones. Symptoms include pain, bruising, or swelling. Do not use the injured limb. If a fracture is in one of the bones below your waist, do not put weight on that limb unless instructed to do so by your healthcare provider. Crutches or a cane may have been provided. A splint or cast may have been applied by your health care provider. Make sure to keep it dry and follow up with an orthopedist as instructed.    SEEK IMMEDIATE MEDICAL CARE IF YOU HAVE ANY OF THE FOLLOWING SYMPTOMS: numbness, tingling, increasing pain, or weakness in any part of the injured limb.

## 2021-11-17 NOTE — ED PROVIDER NOTE - PHYSICAL EXAMINATION
Constitutional: NAD, well appearing  HEENT: no rhinorrhea, PERRL, no oropharyngeal erythema or exudates, midline uvula.  TMs clear.  CVS:  RRR, no m/r/g  Resp:  CTAB  GI: soft, ntnd  MSK:  +Pain to bilateral hips. Not able to flex at the hip. NVI distally.   Neuro:  A&Ox3, 5/5 strength to all extremities,  SILT to all extremities  Skin: no rash  psych: clear thought content  Heme/lymph:  No LAD

## 2021-11-17 NOTE — ED ADULT NURSE NOTE - NSIMPLEMENTINTERV_GEN_ALL_ED
Implemented All Fall with Harm Risk Interventions:  [place] to call system. Call bell, personal items and telephone within reach. Instruct patient to call for assistance. Room bathroom lighting operational. Non-slip footwear when patient is off stretcher. Physically safe environment: no spills, clutter or unnecessary equipment. Stretcher in lowest position, wheels locked, appropriate side rails in place. Provide visual cue, wrist band, yellow gown, etc. Monitor gait and stability. Monitor for mental status changes and reorient to person, place, and time. Review medications for side effects contributing to fall risk. Reinforce activity limits and safety measures with patient and family. Provide visual clues: red socks.

## 2021-11-17 NOTE — ED PROVIDER NOTE - OBJECTIVE STATEMENT
86 y/o male with PMHx of HTN, HLD, CAD, COPD, BPH, glaucoma, H/O of left total hip replacement presents to the ED c/o 86 y/o male with PMHx of HTN, HLD, CAD, COPD, BPH, glaucoma, H/O of left total hip replacement presents to the ED c/o bilateral hip pain, ongoing for the past several days. [place] trauma. States he just cannot walk due to the pain. Denies back pain or any urinary symptoms. No other complaints at this time.

## 2021-11-18 VITALS
HEART RATE: 89 BPM | OXYGEN SATURATION: 96 % | DIASTOLIC BLOOD PRESSURE: 95 MMHG | TEMPERATURE: 98 F | SYSTOLIC BLOOD PRESSURE: 111 MMHG | RESPIRATION RATE: 18 BRPM

## 2021-11-18 RX ORDER — METOPROLOL TARTRATE 50 MG
25 TABLET ORAL DAILY
Refills: 0 | Status: DISCONTINUED | OUTPATIENT
Start: 2021-11-18 | End: 2021-11-18

## 2021-11-18 RX ORDER — ALBUTEROL 90 UG/1
2 AEROSOL, METERED ORAL EVERY 4 HOURS
Refills: 0 | Status: DISCONTINUED | OUTPATIENT
Start: 2021-11-18 | End: 2021-11-18

## 2021-11-18 RX ORDER — ATORVASTATIN CALCIUM 80 MG/1
20 TABLET, FILM COATED ORAL AT BEDTIME
Refills: 0 | Status: DISCONTINUED | OUTPATIENT
Start: 2021-11-18 | End: 2021-11-18

## 2021-11-18 RX ORDER — BUDESONIDE AND FORMOTEROL FUMARATE DIHYDRATE 160; 4.5 UG/1; UG/1
2 AEROSOL RESPIRATORY (INHALATION)
Refills: 0 | Status: DISCONTINUED | OUTPATIENT
Start: 2021-11-18 | End: 2021-11-18

## 2021-11-18 RX ORDER — TIOTROPIUM BROMIDE 18 UG/1
1 CAPSULE ORAL; RESPIRATORY (INHALATION) DAILY
Refills: 0 | Status: DISCONTINUED | OUTPATIENT
Start: 2021-11-18 | End: 2021-11-18

## 2021-11-18 RX ORDER — ASPIRIN/CALCIUM CARB/MAGNESIUM 324 MG
81 TABLET ORAL DAILY
Refills: 0 | Status: DISCONTINUED | OUTPATIENT
Start: 2021-11-18 | End: 2021-11-18

## 2021-11-18 RX ORDER — LATANOPROST 0.05 MG/ML
1 SOLUTION/ DROPS OPHTHALMIC; TOPICAL AT BEDTIME
Refills: 0 | Status: DISCONTINUED | OUTPATIENT
Start: 2021-11-18 | End: 2021-11-18

## 2021-11-18 RX ORDER — TAMSULOSIN HYDROCHLORIDE 0.4 MG/1
0.4 CAPSULE ORAL AT BEDTIME
Refills: 0 | Status: DISCONTINUED | OUTPATIENT
Start: 2021-11-18 | End: 2021-11-18

## 2021-11-18 RX ORDER — FUROSEMIDE 40 MG
40 TABLET ORAL DAILY
Refills: 0 | Status: DISCONTINUED | OUTPATIENT
Start: 2021-11-18 | End: 2021-11-18

## 2021-11-18 RX ADMIN — BUDESONIDE AND FORMOTEROL FUMARATE DIHYDRATE 2 PUFF(S): 160; 4.5 AEROSOL RESPIRATORY (INHALATION) at 08:53

## 2021-11-18 RX ADMIN — LATANOPROST 1 DROP(S): 0.05 SOLUTION/ DROPS OPHTHALMIC; TOPICAL at 01:34

## 2021-11-18 RX ADMIN — Medication 81 MILLIGRAM(S): at 01:33

## 2021-11-18 RX ADMIN — TIOTROPIUM BROMIDE 1 CAPSULE(S): 18 CAPSULE ORAL; RESPIRATORY (INHALATION) at 08:53

## 2021-11-18 RX ADMIN — ATORVASTATIN CALCIUM 20 MILLIGRAM(S): 80 TABLET, FILM COATED ORAL at 01:33

## 2021-11-18 RX ADMIN — Medication 25 MILLIGRAM(S): at 01:33

## 2021-11-18 RX ADMIN — TAMSULOSIN HYDROCHLORIDE 0.4 MILLIGRAM(S): 0.4 CAPSULE ORAL at 01:33

## 2021-11-18 RX ADMIN — Medication 40 MILLIGRAM(S): at 01:33

## 2021-11-18 NOTE — ED ADULT NURSE REASSESSMENT NOTE - NS ED NURSE REASSESS COMMENT FT1
Linen changed, pt. cleaned and comfortable.  Pt. has no complaints at this time.  Will continue to monitor.

## 2021-11-18 NOTE — CHART NOTE - NSCHARTNOTEFT_GEN_A_CORE
CM met with patient at bedside in the ER. Pt states he is having trouble ambulating due to B/L hip pain, pt is requesting [facility]. PT met with patient and eval done. Pt provided with list of in- network rehabs for [facility]. Pt requested referrals be sent to [facility], [facility], [facility] and [facility]. Pt was offered a bed at [facility] and has accepted it. Pending Auth Ref# is [id number]. CM is awaiting auth, clinicals have been sent. Pt and daughter are aware pt may need to stay in the ER overnight. CM attempted to contact pts wife x3 today to provide updates, messages left requesting call back.
Pt was accepted at [facility], Auth # [id number], for 5 days, Next review needed 11/22, Fax review to , CM Suri Schulz, I spoke with Nicky at [facility]. EMS transport arranged for 12pm.
Daughter and pts wife in the ER and are not happy with star rating of [facility], they do not want pt transferred there. Yoselin- wife  was unable to be reached today by phone because she had MD appts per daughter Mag, cell . They are requesting [facility] at [facility], [facility] and [facility]. Referrals have been made.

## 2021-11-18 NOTE — ED ADULT NURSE REASSESSMENT NOTE - NS ED NURSE REASSESS COMMENT FT1
Pt. resting comfortably.  VSS. no complaints at this time. Awaiting SW. Pt. resting comfortably.  Linen and diaper changed. VSS. no complaints at this time. Awaiting SW.

## 2022-01-03 ENCOUNTER — APPOINTMENT (OUTPATIENT)
Dept: ORTHOPEDIC SURGERY | Facility: CLINIC | Age: 87
End: 2022-01-03

## 2022-01-04 ENCOUNTER — FORM ENCOUNTER (OUTPATIENT)
Age: 87
End: 2022-01-04

## 2022-01-19 ENCOUNTER — NON-APPOINTMENT (OUTPATIENT)
Age: 87
End: 2022-01-19

## 2022-01-19 ENCOUNTER — INPATIENT (INPATIENT)
Facility: HOSPITAL | Age: 87
LOS: 6 days | Discharge: HOME | DRG: 177 | End: 2022-01-26
Attending: STUDENT IN AN ORGANIZED HEALTH CARE EDUCATION/TRAINING PROGRAM | Admitting: INTERNAL MEDICINE
Payer: MEDICARE

## 2022-01-19 VITALS
HEART RATE: 96 BPM | HEIGHT: 71 IN | TEMPERATURE: 98 F | RESPIRATION RATE: 18 BRPM | SYSTOLIC BLOOD PRESSURE: 124 MMHG | OXYGEN SATURATION: 100 % | DIASTOLIC BLOOD PRESSURE: 68 MMHG

## 2022-01-19 DIAGNOSIS — Z96.642 PRESENCE OF LEFT ARTIFICIAL HIP JOINT: Chronic | ICD-10-CM

## 2022-01-19 DIAGNOSIS — Z95.5 PRESENCE OF CORONARY ANGIOPLASTY IMPLANT AND GRAFT: Chronic | ICD-10-CM

## 2022-01-19 DIAGNOSIS — Z90.89 ACQUIRED ABSENCE OF OTHER ORGANS: Chronic | ICD-10-CM

## 2022-01-19 DIAGNOSIS — Z98.49 CATARACT EXTRACTION STATUS, UNSPECIFIED EYE: Chronic | ICD-10-CM

## 2022-01-19 DIAGNOSIS — Z98.890 OTHER SPECIFIED POSTPROCEDURAL STATES: Chronic | ICD-10-CM

## 2022-01-19 DIAGNOSIS — M79.605 PAIN IN LEFT LEG: ICD-10-CM

## 2022-01-19 LAB
ALBUMIN SERPL ELPH-MCNC: 2.2 G/DL — LOW (ref 3.3–5)
ALP SERPL-CCNC: 80 U/L — SIGNIFICANT CHANGE UP (ref 40–120)
ALT FLD-CCNC: 15 U/L — SIGNIFICANT CHANGE UP (ref 12–78)
ANION GAP SERPL CALC-SCNC: 6 MMOL/L — SIGNIFICANT CHANGE UP (ref 5–17)
AST SERPL-CCNC: 14 U/L — LOW (ref 15–37)
BASOPHILS # BLD AUTO: 0 K/UL — SIGNIFICANT CHANGE UP (ref 0–0.2)
BASOPHILS NFR BLD AUTO: 0 % — SIGNIFICANT CHANGE UP (ref 0–2)
BILIRUB SERPL-MCNC: 1.1 MG/DL — SIGNIFICANT CHANGE UP (ref 0.2–1.2)
BUN SERPL-MCNC: 21 MG/DL — SIGNIFICANT CHANGE UP (ref 7–23)
CALCIUM SERPL-MCNC: 8.6 MG/DL — SIGNIFICANT CHANGE UP (ref 8.5–10.1)
CHLORIDE SERPL-SCNC: 107 MMOL/L — SIGNIFICANT CHANGE UP (ref 96–108)
CO2 SERPL-SCNC: 25 MMOL/L — SIGNIFICANT CHANGE UP (ref 22–31)
CREAT SERPL-MCNC: 0.83 MG/DL — SIGNIFICANT CHANGE UP (ref 0.5–1.3)
EOSINOPHIL # BLD AUTO: 0 K/UL — SIGNIFICANT CHANGE UP (ref 0–0.5)
EOSINOPHIL NFR BLD AUTO: 0 % — SIGNIFICANT CHANGE UP (ref 0–6)
GLUCOSE SERPL-MCNC: 115 MG/DL — HIGH (ref 70–99)
HCT VFR BLD CALC: 46.7 % — SIGNIFICANT CHANGE UP (ref 39–50)
HGB BLD-MCNC: 15.2 G/DL — SIGNIFICANT CHANGE UP (ref 13–17)
LYMPHOCYTES # BLD AUTO: 1.4 K/UL — SIGNIFICANT CHANGE UP (ref 1–3.3)
LYMPHOCYTES # BLD AUTO: 7 % — LOW (ref 13–44)
MAGNESIUM SERPL-MCNC: 2.6 MG/DL — SIGNIFICANT CHANGE UP (ref 1.6–2.6)
MCHC RBC-ENTMCNC: 28.6 PG — SIGNIFICANT CHANGE UP (ref 27–34)
MCHC RBC-ENTMCNC: 32.5 GM/DL — SIGNIFICANT CHANGE UP (ref 32–36)
MCV RBC AUTO: 87.9 FL — SIGNIFICANT CHANGE UP (ref 80–100)
MONOCYTES # BLD AUTO: 1.8 K/UL — HIGH (ref 0–0.9)
MONOCYTES NFR BLD AUTO: 9 % — SIGNIFICANT CHANGE UP (ref 2–14)
NEUTROPHILS # BLD AUTO: 16.82 K/UL — HIGH (ref 1.8–7.4)
NEUTROPHILS NFR BLD AUTO: 84 % — HIGH (ref 43–77)
NRBC # BLD: SIGNIFICANT CHANGE UP /100 WBCS (ref 0–0)
NT-PROBNP SERPL-SCNC: 1001 PG/ML — HIGH (ref 0–450)
PLATELET # BLD AUTO: 162 K/UL — SIGNIFICANT CHANGE UP (ref 150–400)
POTASSIUM SERPL-MCNC: 4.1 MMOL/L — SIGNIFICANT CHANGE UP (ref 3.5–5.3)
POTASSIUM SERPL-SCNC: 4.1 MMOL/L — SIGNIFICANT CHANGE UP (ref 3.5–5.3)
PROT SERPL-MCNC: 6.1 GM/DL — SIGNIFICANT CHANGE UP (ref 6–8.3)
RBC # BLD: 5.31 M/UL — SIGNIFICANT CHANGE UP (ref 4.2–5.8)
RBC # FLD: 17.6 % — HIGH (ref 10.3–14.5)
SARS-COV-2 RNA SPEC QL NAA+PROBE: SIGNIFICANT CHANGE UP
SODIUM SERPL-SCNC: 138 MMOL/L — SIGNIFICANT CHANGE UP (ref 135–145)
TROPONIN I, HIGH SENSITIVITY RESULT: 43.3 NG/L — SIGNIFICANT CHANGE UP
WBC # BLD: 20.02 K/UL — HIGH (ref 3.8–10.5)
WBC # FLD AUTO: 20.02 K/UL — HIGH (ref 3.8–10.5)

## 2022-01-19 PROCEDURE — U0003: CPT

## 2022-01-19 PROCEDURE — 97110 THERAPEUTIC EXERCISES: CPT | Mod: GP

## 2022-01-19 PROCEDURE — 82962 GLUCOSE BLOOD TEST: CPT

## 2022-01-19 PROCEDURE — 93306 TTE W/DOPPLER COMPLETE: CPT

## 2022-01-19 PROCEDURE — 73552 X-RAY EXAM OF FEMUR 2/>: CPT | Mod: 26,LT

## 2022-01-19 PROCEDURE — 99285 EMERGENCY DEPT VISIT HI MDM: CPT

## 2022-01-19 PROCEDURE — 70450 CT HEAD/BRAIN W/O DYE: CPT | Mod: 26,MA

## 2022-01-19 PROCEDURE — 72146 MRI CHEST SPINE W/O DYE: CPT

## 2022-01-19 PROCEDURE — 72148 MRI LUMBAR SPINE W/O DYE: CPT

## 2022-01-19 PROCEDURE — U0005: CPT

## 2022-01-19 PROCEDURE — 72141 MRI NECK SPINE W/O DYE: CPT

## 2022-01-19 PROCEDURE — 85025 COMPLETE CBC W/AUTO DIFF WBC: CPT

## 2022-01-19 PROCEDURE — 72125 CT NECK SPINE W/O DYE: CPT

## 2022-01-19 PROCEDURE — 84443 ASSAY THYROID STIM HORMONE: CPT

## 2022-01-19 PROCEDURE — 80048 BASIC METABOLIC PNL TOTAL CA: CPT

## 2022-01-19 PROCEDURE — 36415 COLL VENOUS BLD VENIPUNCTURE: CPT

## 2022-01-19 PROCEDURE — 82607 VITAMIN B-12: CPT

## 2022-01-19 PROCEDURE — 87040 BLOOD CULTURE FOR BACTERIA: CPT

## 2022-01-19 PROCEDURE — 97162 PT EVAL MOD COMPLEX 30 MIN: CPT | Mod: GP

## 2022-01-19 PROCEDURE — 97116 GAIT TRAINING THERAPY: CPT | Mod: GP

## 2022-01-19 PROCEDURE — 71045 X-RAY EXAM CHEST 1 VIEW: CPT | Mod: 26

## 2022-01-19 PROCEDURE — 73502 X-RAY EXAM HIP UNI 2-3 VIEWS: CPT | Mod: 26,LT

## 2022-01-19 PROCEDURE — 85027 COMPLETE CBC AUTOMATED: CPT

## 2022-01-19 PROCEDURE — 72131 CT LUMBAR SPINE W/O DYE: CPT

## 2022-01-19 PROCEDURE — 82550 ASSAY OF CK (CPK): CPT

## 2022-01-19 PROCEDURE — 71250 CT THORAX DX C-: CPT

## 2022-01-19 PROCEDURE — 93005 ELECTROCARDIOGRAM TRACING: CPT

## 2022-01-19 PROCEDURE — 72132 CT LUMBAR SPINE W/DYE: CPT

## 2022-01-19 PROCEDURE — 0225U NFCT DS DNA&RNA 21 SARSCOV2: CPT

## 2022-01-19 PROCEDURE — 84145 PROCALCITONIN (PCT): CPT

## 2022-01-19 PROCEDURE — 94640 AIRWAY INHALATION TREATMENT: CPT

## 2022-01-19 PROCEDURE — 81001 URINALYSIS AUTO W/SCOPE: CPT

## 2022-01-19 PROCEDURE — 97530 THERAPEUTIC ACTIVITIES: CPT | Mod: GP

## 2022-01-19 PROCEDURE — 84484 ASSAY OF TROPONIN QUANT: CPT

## 2022-01-19 PROCEDURE — 93010 ELECTROCARDIOGRAM REPORT: CPT

## 2022-01-19 PROCEDURE — 83036 HEMOGLOBIN GLYCOSYLATED A1C: CPT

## 2022-01-19 RX ORDER — ONDANSETRON 8 MG/1
4 TABLET, FILM COATED ORAL EVERY 6 HOURS
Refills: 0 | Status: DISCONTINUED | OUTPATIENT
Start: 2022-01-19 | End: 2022-01-26

## 2022-01-19 RX ORDER — FUROSEMIDE 40 MG
1 TABLET ORAL
Qty: 0 | Refills: 0 | DISCHARGE

## 2022-01-19 RX ORDER — TRAMADOL HYDROCHLORIDE 50 MG/1
25 TABLET ORAL ONCE
Refills: 0 | Status: DISCONTINUED | OUTPATIENT
Start: 2022-01-19 | End: 2022-01-19

## 2022-01-19 RX ORDER — SODIUM CHLORIDE 9 MG/ML
1000 INJECTION INTRAMUSCULAR; INTRAVENOUS; SUBCUTANEOUS ONCE
Refills: 0 | Status: COMPLETED | OUTPATIENT
Start: 2022-01-19 | End: 2022-01-19

## 2022-01-19 RX ORDER — PREGABALIN 225 MG/1
1 CAPSULE ORAL
Qty: 0 | Refills: 0 | DISCHARGE

## 2022-01-19 RX ORDER — CEFTRIAXONE 500 MG/1
1000 INJECTION, POWDER, FOR SOLUTION INTRAMUSCULAR; INTRAVENOUS ONCE
Refills: 0 | Status: COMPLETED | OUTPATIENT
Start: 2022-01-19 | End: 2022-01-19

## 2022-01-19 RX ORDER — AZITHROMYCIN 500 MG/1
500 TABLET, FILM COATED ORAL ONCE
Refills: 0 | Status: COMPLETED | OUTPATIENT
Start: 2022-01-19 | End: 2022-01-19

## 2022-01-19 RX ORDER — OXYCODONE AND ACETAMINOPHEN 5; 325 MG/1; MG/1
1 TABLET ORAL ONCE
Refills: 0 | Status: DISCONTINUED | OUTPATIENT
Start: 2022-01-19 | End: 2022-01-19

## 2022-01-19 RX ORDER — FUROSEMIDE 40 MG
40 TABLET ORAL ONCE
Refills: 0 | Status: COMPLETED | OUTPATIENT
Start: 2022-01-19 | End: 2022-01-19

## 2022-01-19 RX ADMIN — CEFTRIAXONE 100 MILLIGRAM(S): 500 INJECTION, POWDER, FOR SOLUTION INTRAMUSCULAR; INTRAVENOUS at 21:44

## 2022-01-19 RX ADMIN — SODIUM CHLORIDE 1000 MILLILITER(S): 9 INJECTION INTRAMUSCULAR; INTRAVENOUS; SUBCUTANEOUS at 18:09

## 2022-01-19 RX ADMIN — TRAMADOL HYDROCHLORIDE 25 MILLIGRAM(S): 50 TABLET ORAL at 15:00

## 2022-01-19 RX ADMIN — TRAMADOL HYDROCHLORIDE 25 MILLIGRAM(S): 50 TABLET ORAL at 14:56

## 2022-01-19 RX ADMIN — OXYCODONE AND ACETAMINOPHEN 1 TABLET(S): 5; 325 TABLET ORAL at 16:06

## 2022-01-19 RX ADMIN — Medication 40 MILLIGRAM(S): at 21:44

## 2022-01-19 RX ADMIN — AZITHROMYCIN 255 MILLIGRAM(S): 500 TABLET, FILM COATED ORAL at 22:14

## 2022-01-19 RX ADMIN — OXYCODONE AND ACETAMINOPHEN 1 TABLET(S): 5; 325 TABLET ORAL at 16:36

## 2022-01-19 NOTE — ED PROVIDER NOTE - CARE PLAN
1 Principal Discharge DX:	Leg pain, anterior, left   Principal Discharge DX:	Leg pain, anterior, left  Secondary Diagnosis:	Weakness   Principal Discharge DX:	Leg pain, anterior, left  Secondary Diagnosis:	Weakness  Secondary Diagnosis:	Pneumonia

## 2022-01-19 NOTE — ED ADULT TRIAGE NOTE - CHIEF COMPLAINT QUOTE
pt presents from home with pain to Left hip. pain started months ago as per pt and has been worse the last few days. denies trauma.

## 2022-01-19 NOTE — PHARMACOTHERAPY INTERVENTION NOTE - COMMENTS
Medication reconciliation completed.  Patient was unable to provide medication information, spoke to wife Carla ([phone]) and they provided current medication list; confirmed with Dr. First [facility].

## 2022-01-19 NOTE — ED ADULT NURSE REASSESSMENT NOTE - NS ED NURSE REASSESS COMMENT FT1
MD Hodges spoke with wife & as per wife pt did not fall but collapsed in the bathroom. as per wife patient has been having weakness & collapsing since October & caring for him has been too difficult. pt made aware of admission plan. pt repositioned & changed. will ctm.

## 2022-01-19 NOTE — ED PROVIDER NOTE - PATIENT PORTAL LINK FT
You can access the FollowMyHealth Patient Portal offered by [facility] by registering at the following website: [email or web address]. By joining [employer]’s FollowMyHealth portal, you will also be able to view your health information using other applications (apps) compatible with our system.

## 2022-01-19 NOTE — ED PROVIDER NOTE - CLINICAL SUMMARY MEDICAL DECISION MAKING FREE TEXT BOX
88 y/o male with a PMHx of BPH, COPD, CAD s/p stents, Cdiff, glaucoma, [place], HTN, HLD, humerus fracture, shoulder pain, h/o left total hip replacement presents to the ED BIBA presents with recent worsening of chronic left hip/anterior thigh pain. Plan: XR, observe, reassess.

## 2022-01-19 NOTE — ED PROVIDER NOTE - PROGRESS NOTE DETAILS
JUNIOR Sierra MD:  XRs negative for acute pathology.  Tramadol po ordered, as well as ambulation trial,.  case endorsed to Dr. Hodges to f/u ambulation trial, expect D/C home for outpt Ortho f/u, pt has his won. pt states he can walk with is usual walker, feeling better. can call wife for discharge. will f/u with his orthopaedic. MD TYSON pt's wife called and satted he can not come home. he collapsed this AM at 11 am while brushing his teeth in the bathroom, she was there and she caught him. No LOC. she said he told her before it happened that he didn't feel well and was going to fall over. all screamed in pain due to his hip. she states he did not have any impact on his leg from the fall. she states similar has been happening since october and that he needs help. she can not take him home for fear of him collapsing again. pt had sated to me that he could get and walk with a walker, but then could not do it when asked to do an ambulation trial. will order syncope labs, ekg, ua and admit to [facility] jimenes MD

## 2022-01-19 NOTE — ED PROVIDER NOTE - MUSCULOSKELETAL, MLM
Spine appears normal, range of motion is not limited, no muscle or joint tenderness Spine appears normal. Minimal tenderness anterior left thigh. No focal swelling/deformity. Left thigh nontender. No swelling. Good AFROM with mild discomfort. Left knee nontender. No effusion. b/l SLR 35+ degrees with normal motor. LLE normal motor sensory exam. Good DP pulse. Other extremities normal exam.

## 2022-01-19 NOTE — ED PROVIDER NOTE - OBJECTIVE STATEMENT
86 y/o male with a PMHx of BPH, COPD, CAD s/p stents, Cdiff, glaucoma, [place], HTN, HLD, humerus fracture, shoulder pain, h/o left total hip replacement presents to the ED BIBA c/o left hip pain x months, worsening over the last few days. Pt reports he was brushing his teeth this morning, lost his balance and fell. No head trauma. No LOC. Denies fever, cough, chills. No other complaints at this time. 88 y/o male with a PMHx of BPH, COPD, CAD s/p stents, Cdiff, glaucoma, [place], HTN, HLD, humerus fracture, shoulder pain, h/o left total hip replacement presents to the ED BIBA c/o anterior mid left thigh pain x months, worsening over the last few days. Pt reports he was brushing his teeth this morning, lost his balance and fell. No head trauma. No LOC. Denies fever, cough, chills. No other complaints at this time.

## 2022-01-19 NOTE — ED ADULT NURSE NOTE - NSIMPLEMENTINTERV_GEN_ALL_ED
Implemented All Fall with Harm Risk Interventions:  [place] to call system. Call bell, personal items and telephone within reach. Instruct patient to call for assistance. Room bathroom lighting operational. Non-slip footwear when patient is off stretcher. Physically safe environment: no spills, clutter or unnecessary equipment. Stretcher in lowest position, wheels locked, appropriate side rails in place. Provide visual cue, wrist band, yellow gown, etc. Monitor gait and stability. Monitor for mental status changes and reorient to person, place, and time. Review medications for side effects contributing to fall risk. Reinforce activity limits and safety measures with patient and family. Provide visual clues: red socks.

## 2022-01-19 NOTE — ED PROVIDER NOTE - NSFOLLOWUPINSTRUCTIONS_ED_ALL_ED_FT
Continue your regular medications as per routine.  Tylenol 325 mg 2 tabs every 6 hours as needed for pain.  Use walker for ambulation assistance.  Follow up with your own regular & orthopedic doctors.  Call offices today/tomorrow for appointment.      Leg Pain    WHAT YOU NEED TO KNOW:    Leg pain may be caused by a variety of health conditions. Your tests did not show any broken bones or blood clots.    DISCHARGE INSTRUCTIONS:    Return to the emergency department if:   •You have a fever.      •Your leg starts to swell.      •Your leg pain gets worse.      •You have numbness or tingling in your leg or toes.      •You cannot put any weight on or move your leg.      Contact your healthcare provider if:   •Your pain does not decrease, even after treatment.      •You have questions or concerns about your condition or care.      Medicines:   •NSAIDs, such as ibuprofen, help decrease swelling, pain, and fever. This medicine is available with or without a doctor's order. NSAIDs can cause stomach bleeding or kidney problems in certain people. If you take blood thinner medicine, always ask your healthcare provider if NSAIDs are safe for you. Always read the medicine label and follow directions.      •Take your medicine as directed. Contact your healthcare provider if you think your medicine is not helping or if you have side effects. Tell him of her if you are allergic to any medicine. Keep a list of the medicines, vitamins, and herbs you take. Include the amounts, and when and why you take them. Bring the list or the pill bottles to follow-up visits. Carry your medicine list with you in case of an emergency.      Follow up with your healthcare provider as directed: You may need more tests to find the cause of your leg pain. You may need to see an orthopedic specialist or a physical therapist. Write down your questions so you remember to ask them during your visits.    Manage your leg pain:   •Rest your injured leg so that it can heal. You may need an immobilizer, brace, or splint to limit the movement of your leg. You may need to avoid putting any weight on your leg for at least 48 hours. Return to normal activities as directed.      •Ice the injury for 20 minutes every 4 hours for up to 24 hours, or as directed. Use an ice pack, or put crushed ice in a plastic bag. Cover it with a towel to protect your skin. Ice helps prevent tissue damage and decreases swelling and pain.      •Elevate your injured leg above the level of your heart as often as you can. This will help decrease swelling and pain. If possible, prop your leg on pillows or blankets to keep the area elevated comfortably.       •Use assistive devices as directed. You may need to use a cane or crutches. Assistive devices help decrease pain and pressure on your leg when you walk. Ask your healthcare provider for more information about assistive devices and how to use them correctly.      •Maintain a healthy weight. Extra body weight can cause pressure and pain in your hip, knee, and ankle joints. Ask your healthcare provider how much you should weigh. Ask him to help you create a weight loss plan if you are overweight.

## 2022-01-19 NOTE — ED ADULT NURSE NOTE - OBJECTIVE STATEMENT
Pt resting in stretcher. Pt complaining of left hip and leg pain that has gotten worse over time, pain today is a 9.5/10. When asked what made him come to the hospital, Pt states that he fell in the bathroom, says that the leg just gave out from under him. Pt denies LOC, denies hitting his head. Pt states he was on the floor until the firemen came to pick him up, maybe about 30 min. Pt unable to answer what medications he is currently on, states that this wife would know. Pt denies being prescribed any blood thinners.     A&O x4, Pt is soft spoken. Airway is patent, breathing is even and unlabored. Skin is warm and dry, PMS x4 extremities. No deformities noted to left leg. Pt able to lift left leg and wiggle toes. Denies N/V/D. Will continue to monitor.

## 2022-01-20 ENCOUNTER — APPOINTMENT (OUTPATIENT)
Dept: HOME HEALTH SERVICES | Facility: HOME HEALTH | Age: 87
End: 2022-01-20

## 2022-01-20 LAB
A1C WITH ESTIMATED AVERAGE GLUCOSE RESULT: 5.8 % — HIGH (ref 4–5.6)
ANION GAP SERPL CALC-SCNC: 6 MMOL/L — SIGNIFICANT CHANGE UP (ref 5–17)
APPEARANCE UR: CLEAR — SIGNIFICANT CHANGE UP
BASOPHILS # BLD AUTO: 0.07 K/UL — SIGNIFICANT CHANGE UP (ref 0–0.2)
BASOPHILS NFR BLD AUTO: 0.4 % — SIGNIFICANT CHANGE UP (ref 0–2)
BILIRUB UR-MCNC: NEGATIVE — SIGNIFICANT CHANGE UP
BUN SERPL-MCNC: 21 MG/DL — SIGNIFICANT CHANGE UP (ref 7–23)
CALCIUM SERPL-MCNC: 8.6 MG/DL — SIGNIFICANT CHANGE UP (ref 8.5–10.1)
CHLORIDE SERPL-SCNC: 103 MMOL/L — SIGNIFICANT CHANGE UP (ref 96–108)
CK SERPL-CCNC: 32 U/L — SIGNIFICANT CHANGE UP (ref 26–308)
CO2 SERPL-SCNC: 27 MMOL/L — SIGNIFICANT CHANGE UP (ref 22–31)
COLOR SPEC: YELLOW — SIGNIFICANT CHANGE UP
CREAT SERPL-MCNC: 1.02 MG/DL — SIGNIFICANT CHANGE UP (ref 0.5–1.3)
DIFF PNL FLD: NEGATIVE — SIGNIFICANT CHANGE UP
EOSINOPHIL # BLD AUTO: 0.15 K/UL — SIGNIFICANT CHANGE UP (ref 0–0.5)
EOSINOPHIL NFR BLD AUTO: 0.9 % — SIGNIFICANT CHANGE UP (ref 0–6)
ESTIMATED AVERAGE GLUCOSE: 120 MG/DL — HIGH (ref 68–114)
GLUCOSE SERPL-MCNC: 117 MG/DL — HIGH (ref 70–99)
GLUCOSE UR QL: NEGATIVE — SIGNIFICANT CHANGE UP
HCT VFR BLD CALC: 45 % — SIGNIFICANT CHANGE UP (ref 39–50)
HGB BLD-MCNC: 14.5 G/DL — SIGNIFICANT CHANGE UP (ref 13–17)
IMM GRANULOCYTES NFR BLD AUTO: 1.9 % — HIGH (ref 0–1.5)
KETONES UR-MCNC: NEGATIVE — SIGNIFICANT CHANGE UP
LEUKOCYTE ESTERASE UR-ACNC: ABNORMAL
LYMPHOCYTES # BLD AUTO: 1.1 K/UL — SIGNIFICANT CHANGE UP (ref 1–3.3)
LYMPHOCYTES # BLD AUTO: 6.3 % — LOW (ref 13–44)
MCHC RBC-ENTMCNC: 28.7 PG — SIGNIFICANT CHANGE UP (ref 27–34)
MCHC RBC-ENTMCNC: 32.2 GM/DL — SIGNIFICANT CHANGE UP (ref 32–36)
MCV RBC AUTO: 89.1 FL — SIGNIFICANT CHANGE UP (ref 80–100)
MONOCYTES # BLD AUTO: 1.5 K/UL — HIGH (ref 0–0.9)
MONOCYTES NFR BLD AUTO: 8.6 % — SIGNIFICANT CHANGE UP (ref 2–14)
NEUTROPHILS # BLD AUTO: 14.25 K/UL — HIGH (ref 1.8–7.4)
NEUTROPHILS NFR BLD AUTO: 81.9 % — HIGH (ref 43–77)
NITRITE UR-MCNC: NEGATIVE — SIGNIFICANT CHANGE UP
PH UR: 5 — SIGNIFICANT CHANGE UP (ref 5–8)
PLATELET # BLD AUTO: 161 K/UL — SIGNIFICANT CHANGE UP (ref 150–400)
POTASSIUM SERPL-MCNC: 4 MMOL/L — SIGNIFICANT CHANGE UP (ref 3.5–5.3)
POTASSIUM SERPL-SCNC: 4 MMOL/L — SIGNIFICANT CHANGE UP (ref 3.5–5.3)
PROCALCITONIN SERPL-MCNC: 0.17 NG/ML — HIGH (ref 0.02–0.1)
PROT UR-MCNC: 30 MG/DL
RAPID RVP RESULT: SIGNIFICANT CHANGE UP
RBC # BLD: 5.05 M/UL — SIGNIFICANT CHANGE UP (ref 4.2–5.8)
RBC # FLD: 17.6 % — HIGH (ref 10.3–14.5)
SARS-COV-2 RNA SPEC QL NAA+PROBE: SIGNIFICANT CHANGE UP
SODIUM SERPL-SCNC: 136 MMOL/L — SIGNIFICANT CHANGE UP (ref 135–145)
SP GR SPEC: 1.02 — SIGNIFICANT CHANGE UP (ref 1.01–1.02)
TROPONIN I, HIGH SENSITIVITY RESULT: 37.59 NG/L — SIGNIFICANT CHANGE UP
TSH SERPL-MCNC: 0.46 UU/ML — SIGNIFICANT CHANGE UP (ref 0.34–4.82)
UROBILINOGEN FLD QL: NEGATIVE — SIGNIFICANT CHANGE UP
VIT B12 SERPL-MCNC: 1124 PG/ML — SIGNIFICANT CHANGE UP (ref 232–1245)
WBC # BLD: 17.4 K/UL — HIGH (ref 3.8–10.5)
WBC # FLD AUTO: 17.4 K/UL — HIGH (ref 3.8–10.5)

## 2022-01-20 PROCEDURE — 99223 1ST HOSP IP/OBS HIGH 75: CPT

## 2022-01-20 PROCEDURE — 71250 CT THORAX DX C-: CPT | Mod: 26

## 2022-01-20 PROCEDURE — 72125 CT NECK SPINE W/O DYE: CPT | Mod: 26

## 2022-01-20 PROCEDURE — 93010 ELECTROCARDIOGRAM REPORT: CPT

## 2022-01-20 RX ORDER — LATANOPROST 0.05 MG/ML
1 SOLUTION/ DROPS OPHTHALMIC; TOPICAL AT BEDTIME
Refills: 0 | Status: DISCONTINUED | OUTPATIENT
Start: 2022-01-20 | End: 2022-01-26

## 2022-01-20 RX ORDER — ATORVASTATIN CALCIUM 80 MG/1
20 TABLET, FILM COATED ORAL AT BEDTIME
Refills: 0 | Status: DISCONTINUED | OUTPATIENT
Start: 2022-01-20 | End: 2022-01-26

## 2022-01-20 RX ORDER — SOD,AMMONIUM,POTASSIUM LACTATE
1 CREAM (GRAM) TOPICAL
Refills: 0 | Status: DISCONTINUED | OUTPATIENT
Start: 2022-01-20 | End: 2022-01-26

## 2022-01-20 RX ORDER — ACETAMINOPHEN 500 MG
650 TABLET ORAL EVERY 6 HOURS
Refills: 0 | Status: DISCONTINUED | OUTPATIENT
Start: 2022-01-20 | End: 2022-01-26

## 2022-01-20 RX ORDER — LANOLIN ALCOHOL/MO/W.PET/CERES
3 CREAM (GRAM) TOPICAL AT BEDTIME
Refills: 0 | Status: DISCONTINUED | OUTPATIENT
Start: 2022-01-20 | End: 2022-01-26

## 2022-01-20 RX ORDER — CEFTRIAXONE 500 MG/1
1000 INJECTION, POWDER, FOR SOLUTION INTRAMUSCULAR; INTRAVENOUS EVERY 24 HOURS
Refills: 0 | Status: DISCONTINUED | OUTPATIENT
Start: 2022-01-20 | End: 2022-01-20

## 2022-01-20 RX ORDER — BUDESONIDE AND FORMOTEROL FUMARATE DIHYDRATE 160; 4.5 UG/1; UG/1
2 AEROSOL RESPIRATORY (INHALATION)
Refills: 0 | Status: DISCONTINUED | OUTPATIENT
Start: 2022-01-20 | End: 2022-01-26

## 2022-01-20 RX ORDER — LIDOCAINE 4 G/100G
1 CREAM TOPICAL EVERY 24 HOURS
Refills: 0 | Status: DISCONTINUED | OUTPATIENT
Start: 2022-01-20 | End: 2022-01-26

## 2022-01-20 RX ORDER — METOPROLOL TARTRATE 50 MG
25 TABLET ORAL DAILY
Refills: 0 | Status: DISCONTINUED | OUTPATIENT
Start: 2022-01-20 | End: 2022-01-26

## 2022-01-20 RX ORDER — HEPARIN SODIUM 5000 [USP'U]/ML
5000 INJECTION INTRAVENOUS; SUBCUTANEOUS EVERY 12 HOURS
Refills: 0 | Status: DISCONTINUED | OUTPATIENT
Start: 2022-01-20 | End: 2022-01-26

## 2022-01-20 RX ORDER — FAMOTIDINE 10 MG/ML
20 INJECTION INTRAVENOUS DAILY
Refills: 0 | Status: DISCONTINUED | OUTPATIENT
Start: 2022-01-20 | End: 2022-01-26

## 2022-01-20 RX ORDER — ALBUTEROL 90 UG/1
2 AEROSOL, METERED ORAL EVERY 6 HOURS
Refills: 0 | Status: DISCONTINUED | OUTPATIENT
Start: 2022-01-20 | End: 2022-01-26

## 2022-01-20 RX ORDER — CHOLECALCIFEROL (VITAMIN D3) 125 MCG
1000 CAPSULE ORAL DAILY
Refills: 0 | Status: DISCONTINUED | OUTPATIENT
Start: 2022-01-20 | End: 2022-01-26

## 2022-01-20 RX ORDER — AZITHROMYCIN 500 MG/1
500 TABLET, FILM COATED ORAL EVERY 24 HOURS
Refills: 0 | Status: DISCONTINUED | OUTPATIENT
Start: 2022-01-20 | End: 2022-01-21

## 2022-01-20 RX ORDER — TAMSULOSIN HYDROCHLORIDE 0.4 MG/1
0.4 CAPSULE ORAL AT BEDTIME
Refills: 0 | Status: DISCONTINUED | OUTPATIENT
Start: 2022-01-20 | End: 2022-01-26

## 2022-01-20 RX ORDER — ASPIRIN/CALCIUM CARB/MAGNESIUM 324 MG
81 TABLET ORAL DAILY
Refills: 0 | Status: DISCONTINUED | OUTPATIENT
Start: 2022-01-20 | End: 2022-01-26

## 2022-01-20 RX ORDER — CEFTRIAXONE 500 MG/1
1000 INJECTION, POWDER, FOR SOLUTION INTRAMUSCULAR; INTRAVENOUS EVERY 24 HOURS
Refills: 0 | Status: DISCONTINUED | OUTPATIENT
Start: 2022-01-20 | End: 2022-01-21

## 2022-01-20 RX ORDER — OXYCODONE AND ACETAMINOPHEN 5; 325 MG/1; MG/1
1 TABLET ORAL EVERY 4 HOURS
Refills: 0 | Status: DISCONTINUED | OUTPATIENT
Start: 2022-01-20 | End: 2022-01-26

## 2022-01-20 RX ORDER — MAGNESIUM OXIDE 400 MG ORAL TABLET 241.3 MG
400 TABLET ORAL DAILY
Refills: 0 | Status: DISCONTINUED | OUTPATIENT
Start: 2022-01-20 | End: 2022-01-26

## 2022-01-20 RX ADMIN — HEPARIN SODIUM 5000 UNIT(S): 5000 INJECTION INTRAVENOUS; SUBCUTANEOUS at 21:40

## 2022-01-20 RX ADMIN — BUDESONIDE AND FORMOTEROL FUMARATE DIHYDRATE 2 PUFF(S): 160; 4.5 AEROSOL RESPIRATORY (INHALATION) at 09:08

## 2022-01-20 RX ADMIN — Medication 1000 UNIT(S): at 09:16

## 2022-01-20 RX ADMIN — CEFTRIAXONE 100 MILLIGRAM(S): 500 INJECTION, POWDER, FOR SOLUTION INTRAMUSCULAR; INTRAVENOUS at 17:42

## 2022-01-20 RX ADMIN — LIDOCAINE 1 PATCH: 4 CREAM TOPICAL at 09:16

## 2022-01-20 RX ADMIN — LATANOPROST 1 DROP(S): 0.05 SOLUTION/ DROPS OPHTHALMIC; TOPICAL at 06:22

## 2022-01-20 RX ADMIN — Medication 10 MILLIGRAM(S): at 21:40

## 2022-01-20 RX ADMIN — Medication 81 MILLIGRAM(S): at 09:16

## 2022-01-20 RX ADMIN — MAGNESIUM OXIDE 400 MG ORAL TABLET 400 MILLIGRAM(S): 241.3 TABLET ORAL at 09:16

## 2022-01-20 RX ADMIN — TAMSULOSIN HYDROCHLORIDE 0.4 MILLIGRAM(S): 0.4 CAPSULE ORAL at 21:40

## 2022-01-20 RX ADMIN — FAMOTIDINE 20 MILLIGRAM(S): 10 INJECTION INTRAVENOUS at 09:17

## 2022-01-20 RX ADMIN — LIDOCAINE 1 PATCH: 4 CREAM TOPICAL at 18:33

## 2022-01-20 RX ADMIN — Medication 1 APPLICATION(S): at 17:42

## 2022-01-20 RX ADMIN — LIDOCAINE 1 PATCH: 4 CREAM TOPICAL at 21:49

## 2022-01-20 RX ADMIN — Medication 1 APPLICATION(S): at 06:21

## 2022-01-20 RX ADMIN — TAMSULOSIN HYDROCHLORIDE 0.4 MILLIGRAM(S): 0.4 CAPSULE ORAL at 01:48

## 2022-01-20 RX ADMIN — AZITHROMYCIN 255 MILLIGRAM(S): 500 TABLET, FILM COATED ORAL at 18:25

## 2022-01-20 RX ADMIN — ATORVASTATIN CALCIUM 20 MILLIGRAM(S): 80 TABLET, FILM COATED ORAL at 21:40

## 2022-01-20 RX ADMIN — HEPARIN SODIUM 5000 UNIT(S): 5000 INJECTION INTRAVENOUS; SUBCUTANEOUS at 09:17

## 2022-01-20 NOTE — H&P ADULT - ASSESSMENT
87 year old male w PMHx of BPH, CAD, COPD, glaucoma, HLD, HTN, presented to  ED  BIBEMS s/p near syncopal episode this morning    #Weakness  #near syncope  #elevated BNP  CXR appears to have PVC to me  1. admit to telemetry  2. monitor for ectopy  3. serial trop, CK  4. daily weight  5, I/O  6. cardiology      #L hip pain  IMN 2011  increased pain x 1-2 months  s/p steroid injection w/o improvement  significant difficulty w walking and sitting to eat  1. lidoderm patch  2. review final reading by radiology of films  3. PT evaluation      #Leukocytosis  etiology unclear  no SOB, chest pain, cough  no abd pain  CXR looks like CXR to me  1. urine pending  2. procalcitonin in AM  3. trend WBC      #COPD  1. trelegy NA  2. budesonide-formot  3. albuterol prn      #Glaucoma  1. latanoprost      #HLD  1. atorvastatin      #HTN  continue home meds w parameters  1. lisinopril 10 mg   2. metoprolol      #Xerosis  1. lac-hydrin  2. TSH      #VTE  IMPROVE VTE Individual Risk Assessment    RISK                                                                Points    [  ] Previous VTE                                                  3    [  ] Thrombophilia                                               2    [  ] Lower limb paralysis                                      2        (unable to hold up >15 seconds)      [  ] Current Cancer                                              2         (within 6 months)    [  ] Immobilization > 24 hrs                                1    [  ] ICU/CCU stay > 24 hours                              1    [ X ] Age > 60                                                      1    IMPROVE VTE Score _____1____    IMPROVE Score 0-1: Low Risk, No VTE prophylaxis required for most patients, encourage ambulation.   IMPROVE Score 2-3: At risk, pharmacologic VTE prophylaxis is indicated for most patients (in the absence of a contraindication)  IMPROVE Score > or = 4: High Risk, pharmacologic VTE prophylaxis is indicated for most patients (in the absence of a contraindication) 87 year old male w PMHx of BPH, CAD, COPD, glaucoma, HLD, HTN, presented to  ED  BIBEMS s/p near syncopal episode this morning    #Weakness  #near syncope  #elevated BNP  CXR appears to have PVC to me  1. admit to telemetry  2. monitor for ectopy  3. serial trop, CK  4. daily weight  5, I/O  6. cardiology  7. daily wt  8. I/O  9. lasix 20 mg IV q AM      #L hip pain  IMN 2011  increased pain x 1-2 months  s/p steroid injection w/o improvement  significant difficulty w walking and sitting to eat  1. lidoderm patch  2. review final reading by radiology of films  3. PT evaluation      #Leukocytosis  etiology unclear  no SOB, chest pain, cough  no abd pain  CXR looks like CHF  1. urine pending  2. procalcitonin in AM  3. trend WBC  4. s/p ceftriaxone in ED and zithromax  5. if urine abn would consider restarting ceftriaxone      #COPD  1. trelegy NA  2. budesonide-formot  3. albuterol prn      #Glaucoma  1. latanoprost      #HLD  1. atorvastatin      #HTN  continue home meds w parameters  1. lisinopril 10 mg   2. metoprolol 25      #Xerosis  1. lac-hydrin  2. TSH      #VTE  IMPROVE VTE Individual Risk Assessment    RISK                                                                Points    [  ] Previous VTE                                                  3    [  ] Thrombophilia                                               2    [  ] Lower limb paralysis                                      2        (unable to hold up >15 seconds)      [  ] Current Cancer                                              2         (within 6 months)    [  ] Immobilization > 24 hrs                                1    [  ] ICU/CCU stay > 24 hours                              1    [ X ] Age > 60                                                      1    IMPROVE VTE Score _____1____    IMPROVE Score 0-1: Low Risk, No VTE prophylaxis required for most patients, encourage ambulation.   IMPROVE Score 2-3: At risk, pharmacologic VTE prophylaxis is indicated for most patients (in the absence of a contraindication)  IMPROVE Score > or = 4: High Risk, pharmacologic VTE prophylaxis is indicated for most patients (in the absence of a contraindication)      UFH q 12 hrs

## 2022-01-20 NOTE — PROGRESS NOTE ADULT - ASSESSMENT
87 year old male w PMHx of BPH, CAD, COPD, glaucoma, HLD, HTN, presented to  ED  BIBEMS s/p near syncopal episode this morning    #Weakness  #near syncope  #elevated BNP  # Leukocytosis - will r/o COVID, bacterial PNA, UTI  - cxr - suspicious for COVID although COVID PCR negative  - reswab woth RVP by BIB   - had steroid injection 2 weeks ago - leukocytosis?   - PUI isolation   - empiric IV ABx for PNA IV Rocephin and IV Zithro   - f/u procal,   - f/u UA   - CT chest to further assess suspicious findings on cxr   - monitor WBC  - check orthostatics  - CT head with cerebral and cerebellar volume loss - f/u MRI  - Neurology consult pending   - ID consult  - cardiology consult appreciated     #L hip pain  IMN 2011  increased pain x 1-2 months  s/p steroid injection w/o improvement - could explain leukocytosis ?  significant difficulty w walking and sitting to eat  lidoderm patch  xrays - no FX, no change from previous    PT evaluation    #COPD  1. trelegy NA  2. budesonide-formot  3. albuterol prn      #Glaucoma  1. latanoprost      #HLD  1. atorvastatin      #HTN  continue home meds w parameters  1. lisinopril 10 mg   2. metoprolol 25

## 2022-01-20 NOTE — HISTORY OF PRESENT ILLNESS
[FreeTextEntry2] : Patient denies fever, cough, trouble breathing, rash, vomiting and diarrhea. Patient has not been in close contact with someone Covid positive.\par N95 mask ,gloves and eyewear used during visit  Total  face to  face time  is  30 minutes \par patient is seen today for initial visit and enrollment into  a house call  program along with care manager \par patient is homebound due \par  most of the history obtained from family member \par   88 y/o M with a PMHx of BPH, CAD, COPD, glaucoma, HTN, HLD  CHF \par  patient  still follows with   specialists \par last hospitalization oct 2021 \par diet regular  appetite  \par dysphagia\par Weight\par constipation\par incontinence\par pressure/bed sores\par Ambulates\par falls\par Behavior\par Mood\par  memory \par  sleep \par sensory deficits \par pain\par Medication refills done and reconciliation  done \par Goals of care discussed and completed \par

## 2022-01-20 NOTE — DIETITIAN INITIAL EVALUATION ADULT. - CALCULATED TO (ML/KG)
I have personally evaluated and examined the patient. The Attending was available to me as a supervising provider if needed.
[id number]

## 2022-01-20 NOTE — DIETITIAN INITIAL EVALUATION ADULT. - OTHER INFO
87 year old male w PMHx of BPH, CAD, COPD, glaucoma, HLD, HTN, presented to  ED BIBEMS s/p near syncopal episode this morningPt reported significant L hip pain x months. s/p steroid injection 2 weeks ago w/o significant relief walks w walker has constant L hip pain and sitting is difficult and wife feeds him able to sleep feels progressively weaker Today collapsed near sink while brushing his teeth He denied injury denied LOC    Pt seen for assessment 2/2 to pressure injuries (noted SDTI on R heel; Stage I L. Heel). Pt's PO intake, wt loss and muscle/fat wasting (noted above). Pt denies issues with c/s difficulty and pt had one episode n/v, denies diarrhea and constipation. Pt agreeable to starting protein supplement in hospital. Recommend Ensure Enlive TID, encourage PO intake, monitor weight and monitor PO intake.

## 2022-01-20 NOTE — DIETITIAN INITIAL EVALUATION ADULT. - PERTINENT LABORATORY DATA
01-19    138  |  107  |  21  ----------------------------<  115<H>  4.1   |  25  |  0.83    Ca    8.6      19 Jan 2022 17:37  Mg     2.6     01-19    TPro  6.1  /  Alb  2.2<L>  /  TBili  1.1  /  DBili  x   /  AST  14<L>  /  ALT  15  /  AlkPhos  80  01-19

## 2022-01-20 NOTE — PATIENT PROFILE ADULT - FALL HARM RISK - HARM RISK INTERVENTIONS

## 2022-01-20 NOTE — PHYSICAL THERAPY INITIAL EVALUATION ADULT - PERTINENT HX OF CURRENT PROBLEM, REHAB EVAL
Pt presented to ED BIBEMS s/p near syncopal episode this morning Pt reported significant L hip pain x months.  has constant L hip pain and sitting is difficult and wife feeds him, collapsed near sink while brushing his teeth, He states he has become too weak since last admission in

## 2022-01-20 NOTE — H&P ADULT - NSHPPHYSICALEXAM_GEN_ALL_CORE
Vital Signs Last 24 Hrs  T(C): 36.7 (20 Jan 2022 00:25), Max: 37.1 (19 Jan 2022 17:48)  T(F): 98.1 (20 Jan 2022 00:25), Max: 98.8 (19 Jan 2022 17:48)  HR: 77 (20 Jan 2022 00:25) (77 - 96)  BP: 97/64 (20 Jan 2022 00:25) (97/64 - 124/68)  BP(mean): 75 (20 Jan 2022 00:25) (73 - 75)  RR: 16 (20 Jan 2022 00:25) (16 - 18)  SpO2: 97% (20 Jan 2022 00:25) (95% - 100%)

## 2022-01-20 NOTE — CONSULT NOTE ADULT - ASSESSMENT
88 y/o male with h/o BPH, CAD, COPD, glaucoma, HLD, HTN was admitted on 1/19 after he had a near syncopal episode at home. The patient reports significant L hip pain x several months. He received a steroid injection 2 weeks ago w/o significant relief. He walks w walker. He has constant L hip pain and sitting is difficult and wife feeds him. He feels progressively weaker. On the day of admission, he felt more SOB and collapsed near sink while brushing his teeth; denies LOC. In ER he received ceftriaxone and azithromycin.     1. Left side pneumonia. COPD. Near syncopal episode.   -leukocytosis  -obtain BC x 2  -agree with ceftriaxone 1 gm IV qd and azithromycin 500 mg IV qd  -reason for abx use and side effects reviewed with patient; monitor BMP   -cardial monitoring  -old chart reviewed to assess prior cultures  -monitor temps  -f/u CBC  -supportive care  2. Other issues:   -care per medicine     88 y/o male with h/o BPH, CAD, COPD, glaucoma, HLD, HTN was admitted on 1/19 after he had a near syncopal episode at home. The patient reports significant L hip pain x several months. He received a steroid injection 2 weeks ago w/o significant relief. He walks w walker. He has constant L hip pain and sitting is difficult and wife feeds him. He feels progressively weaker. On the day of admission, he felt more SOB and collapsed near sink while brushing his teeth; denies LOC. In ER he received ceftriaxone and azithromycin.     1. Left side pneumonia. ?Multifocal pneumonia. COPD. Near syncopal episode.   -COVID-19 PCR is negative  -obtain RVP ?viral pneumonia  -leukocytosis  -obtain BC x 2, sputum c/s  -agree with ceftriaxone 1 gm IV qd and azithromycin 500 mg IV qd  -reason for abx use and side effects reviewed with patient; monitor BMP   -cardiac monitoring  -old chart reviewed to assess prior cultures  -monitor temps  -f/u CBC  -supportive care  2. Other issues:   -care per medicine

## 2022-01-20 NOTE — DIETITIAN INITIAL EVALUATION ADULT. - PERTINENT MEDS FT
MEDICATIONS  (STANDING):  ammonium lactate 12% Lotion 1 Application(s) Topical two times a day  aspirin enteric coated 81 milliGRAM(s) Oral daily  atorvastatin 20 milliGRAM(s) Oral at bedtime  azithromycin  IVPB 500 milliGRAM(s) IV Intermittent every 24 hours  budesonide  80 MICROgram(s)/formoterol 4.5 MICROgram(s) Inhaler 2 Puff(s) Inhalation two times a day  cholecalciferol 1000 Unit(s) Oral daily  enalapril 10 milliGRAM(s) Oral two times a day  famotidine    Tablet 20 milliGRAM(s) Oral daily  heparin   Injectable 5000 Unit(s) SubCutaneous every 12 hours  latanoprost 0.005% Ophthalmic Solution 1 Drop(s) Both EYES at bedtime  lidocaine   4% Patch 1 Patch Transdermal every 24 hours  magnesium oxide 400 milliGRAM(s) Oral daily  metoprolol succinate ER 25 milliGRAM(s) Oral daily  tamsulosin 0.4 milliGRAM(s) Oral at bedtime    MEDICATIONS  (PRN):  acetaminophen     Tablet .. 650 milliGRAM(s) Oral every 6 hours PRN Temp greater or equal to 38C (100.4F), Mild Pain (1 - 3)  ALBUTerol    90 MICROgram(s) HFA Inhaler 2 Puff(s) Inhalation every 6 hours PRN Shortness of Breath and/or Wheezing  aluminum hydroxide/magnesium hydroxide/simethicone Suspension 30 milliLiter(s) Oral every 4 hours PRN Dyspepsia  melatonin 3 milliGRAM(s) Oral at bedtime PRN Insomnia  ondansetron Injectable 4 milliGRAM(s) IV Push every 6 hours PRN Nausea and/or Vomiting  oxycodone    5 mG/acetaminophen 325 mG 1 Tablet(s) Oral every 4 hours PRN Moderate Pain (4 - 6)

## 2022-01-20 NOTE — H&P ADULT - HISTORY OF PRESENT ILLNESS
87 year old male w PMHx of BPH, CAD, COPD, glaucoma, HLD, HTN, presented to  ED  BIBEMS s/p near syncopal episode this morning          PAST MEDICAL HX  BPH (benign prostatic hyperplasia)  CAD (coronary artery disease)  COPD (chronic obstructive pulmonary disease)  Glaucoma  Glaucoma of both eyes, unspecified glaucoma  H/O Clostridium difficile infection s/p left THR  Hard of hearing  HTN (hypertension)  Humerus fracture proximal, right  Hyperlipidemia  Shoulder pain, right  Swelling of ankle b/l.    PAST SURGICAL HX  H/O bilateral inguinal hernia repair 2016  H/O colonoscopy  History of tonsillectomy  History of total hip replacement, left 2011  S/P cataract surgery b/l  Status post coronary artery stent placement reports stents x 4?    FAMILY HX  Family history of hypertension in mother.    SOCIAL HX    Lives at home.   No tobacco, alcohol or illicit drug use 87 year old male w PMHx of BPH, CAD, COPD, glaucoma, HLD, HTN, presented to  ED  BIBEMS s/p near syncopal episode this morning      Pt reported significant L hip pain x months.    s/p steroid injection 2 weeks ago w/o significant relief  walks w walker  has constant L hip pain and sitting is difficult and wife feeds him  able to sleep  feels progressively weaker  Today collapsed near sink while brushing his teeth  He denied injury   denied LOC    He states he has become too weak since last admission in     Ambulance call report not available for review    In ED /68  HR 96    RR 18   T 97.8      100% sat RA  Head CT no acute changes  Hip xray intact prosthesis  L femur no fx  given ceftriaxone and zithromax    PAST MEDICAL HX  BPH (benign prostatic hyperplasia)  CAD (coronary artery disease)  COPD (chronic obstructive pulmonary disease)  Glaucoma of both eyes, unspecified glaucoma  H/O Clostridium difficile infection s/p left THR  Hard of hearing  HTN (hypertension)  Humerus fracture proximal, right  Hyperlipidemia  Shoulder pain, right  Swelling of ankle b/l.    PAST SURGICAL HX  H/O bilateral inguinal hernia repair 2016  H/O colonoscopy  History of tonsillectomy  History of total hip replacement, left 2011  S/P cataract surgery b/l  Status post coronary artery stent placement reports stents x 4?    FAMILY HX  Family history of hypertension in mother.    SOCIAL HX    Lives at home.   No tobacco, alcohol or illicit drug use 87 year old male w PMHx of BPH, CAD, COPD, glaucoma, HLD, HTN, presented to  ED  BIBEMS s/p near syncopal episode this morning      Pt reported significant L hip pain x months.    s/p steroid injection 2 weeks ago w/o significant relief  walks w walker  has constant L hip pain and sitting is difficult and wife feeds him  able to sleep  feels progressively weaker  Today collapsed near sink while brushing his teeth  He denied injury   denied LOC    He states he has become too weak since last admission in     Ambulance call report not available for review    In ED /68  HR 96    RR 18   T 97.8      100% sat RA  Head CT no acute changes  Hip xray intact prosthesis  L femur no fx  lasix 40 mg  percocet 5/325  tramadol 25 mg  given ceftriaxone and zithromax      PAST MEDICAL HX  BPH (benign prostatic hyperplasia)  CAD (coronary artery disease)  COPD (chronic obstructive pulmonary disease)  Glaucoma of both eyes, unspecified glaucoma  H/O Clostridium difficile infection s/p left THR  Hard of hearing  HTN (hypertension)  Humerus fracture proximal, right  Hyperlipidemia  Shoulder pain, right  Swelling of ankle b/l.    PAST SURGICAL HX  H/O bilateral inguinal hernia repair 2016  H/O colonoscopy  History of tonsillectomy  History of total hip replacement, left 2011  S/P cataract surgery b/l  Status post coronary artery stent placement reports stents x 4?      FAMILY HX  Family history of hypertension in mother.    SOCIAL HX    Lives at home.   No tobacco, alcohol or illicit drug use  walks w walker

## 2022-01-20 NOTE — PHYSICAL THERAPY INITIAL EVALUATION ADULT - GENERAL OBSERVATIONS, REHAB EVAL
Pt was found lying in bed with tele on, BLE CAIR boots on, Pt has pain patch on left hip, pt is willing to participate in PT, 1/10 pain at rest

## 2022-01-20 NOTE — PATIENT PROFILE ADULT - DO YOU FEEL UNSAFE AT HOME, WORK, OR SCHOOL?
Leave dressing dry and intact until post op appointment  Wear brace continuously  May weight bear as tolerated  Dr. Elena  [phone]  Or [phone]      ACTIVITY:  · Do not drive for 24 hours or while on pain medicine or while experiencing pain  · Do not drink alcohol while taking prescription pain medication  · May resume regular diet  · Use crutches as needed for safety. May bear weight as tolerated.  · Elevate leg on pillows, do not place under knee, support whole leg    Neurovascular checks  · Perform motion, sensation, circulation checks often for 24-48 hours.  Call if extremity becomes cold, pale, bluish, or numb when compared to the opposite side              Wound  Care  · Leave steri strips in place  · May shower in 48 hours.Do not rub incisions, you may let soap and water run over incision and then pat dry.  · Use Ice pack as much as possible for 48 hours  · Call for fever >101.5 degrees F.    Signs of infection  It is normal to have a slightly red, swollen incision.  Call your surgeon if you have any of the following:    • increased swelling  • increased redness  • foul-smelling drainage  • fever over 101.5 degrees F    Preventing surgical site infection  *Always clean your hands with soap and water before and after caring for your incision.  *Family or friends caring for you at home should always clean their hands with soap and water before and after caring for your incision.  * Do not smoke. Patients who smoke get more infections.    Signs of bleeding  A small amount of discharge from your  incision, from pink to dark red, is normal.  If the discharge is bright red and it soaks the dressing, a small blood vessel near your incision may have broken. Apply a clean washcloth over your incision with slight pressure and call your surgeon.    Steri-strips  If you have steri-strips (narrow pieces of tape) across your incision, leave them in place when you remove any dressing. The strips will wear off  no naturally or may be removed after 12 days. When removing the strips, gently lift from the outside edges and pull toward the wound.    Sutures or staples  The sutures (stitches) or staples will be removed at a follow-up appointment with the surgeon.      Trouble breathing or chest pain - CALL 911      CONTACT YOUR DOCTOR IF:  · You have symptoms that are not \"normal\" for you.  · You have pain not relieved by medicine or rest.  · Temperature greater than 101.5 degrees F, chills or flu like symptoms.  · For any signs of wound infection, bleeding, separation of incision, pus like drainage, or excessive swelling  · You have any difficulty breathing, vomiting, inability to tolerate oral intake, pain not controlled by pain medication, or anything worrisome    Keep scheduled appointments for doctor and physical therapy        _Patient/Family given For Your Well-Being Teaching Sheet:    ____x_Partners in Safety   ____x__Care after General Surgery                          ____x_Pain Management Tips                   ____x__Same Day Surgery  Card               Supplies:

## 2022-01-20 NOTE — CONSULT NOTE ADULT - ASSESSMENT
87 year old male w PMHx of BPH, CAD, COPD, glaucoma, HLD, HTN, who presented to the ED after a near syncopal episode.  His wife reports difficulty ambulating due to pain in the left lower extremity.  He does not have focal weakness on exam but seems to be somewhat debilitated.  On examination he does have brisk reflexes, particularly in the patellars.    -Chronic weakness likely exacerbated in the setting of infection (pneumonia).  -Treatment of pneumonia as per medicine/ID.  -He is having a CT chest now and will add on CT cervical spine to r/o any pathology causing myelopathy. This was performed several months ago after fall but unclear if he had these exam findings at that time (not seen by neurology at that time). If negative can consider MRI cervical and thoracic spine.  -Will check vitamin B12, TSH  -CT head shows chronic changes including atrophy and microvascular ischemic changes but nothing suspicious for acute stroke.  -PT     -will f/u as needed. 87 year old male w PMHx of BPH, CAD, COPD, glaucoma, HLD, HTN, who presented to the ED after a near syncopal episode.  His wife reports difficulty ambulating due to pain in the left lower extremity.  He does not have focal weakness on exam but seems to be somewhat debilitated.  On examination he does have brisk reflexes, particularly in the patellars.    -Chronic weakness likely exacerbated in the setting of infection (pneumonia).  -Treatment of pneumonia as per medicine/ID.  -He is having a CT chest now and will add on CT cervical spine to r/o any pathology causing myelopathy. This was performed several months ago after fall but unclear if he had these exam findings at that time (not seen by neurology at that time). If negative can consider MRI cervical and thoracic spine.  -Will check vitamin B12,  -CT head shows chronic changes including atrophy and microvascular ischemic changes but nothing suspicious for acute stroke.  -PT     -will f/u as needed.

## 2022-01-20 NOTE — DIETITIAN INITIAL EVALUATION ADULT. - ORAL INTAKE PTA/DIET HISTORY
Pt states his appetite over the past several months has been poor. Per pt he has been eating less and experienced weight loss as well (about 10-15lbs), Reviewed past weights (11/17/21: 83.9kg------>75.6kg; over 2-3 months; 9.8% of BW, Clinically sig). Appears pt has documented weight loss that confirms greater weight loss than self reported. 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

## 2022-01-20 NOTE — H&P ADULT - RS GEN PE MLT RESP DETAILS PC
breath sounds equal/respirations non-labored/clear to auscultation bilaterally/no intercostal retractions/diminished breath sounds, L/diminished breath sounds, R

## 2022-01-20 NOTE — PROGRESS NOTE ADULT - SUBJECTIVE AND OBJECTIVE BOX
Chief Complaint: near syncope     Subjective: and objective  87 year old male w PMHx of BPH, CAD, COPD, glaucoma, HLD, HTN, presented to  ED  BIBEMS s/p near syncopal episode this morning Pt reported significant L hip pain x months.    s/p steroid injection 2 weeks ago w/o significant relief walks w walker  has constant L hip pain and sitting is difficult and wife feeds him  able to sleep feels progressively weaker  Today collapsed near sink while brushing his teeth  He denied injury denied LOC  He states he has become too weak since last admission in     REVIEW OF SYSTEMS:  All other review of systems is negative unless indicated above    Vital Signs Last 24 Hrs  T(C): 36.6 (20 Jan 2022 08:31), Max: 37.1 (19 Jan 2022 17:48)  T(F): 97.9 (20 Jan 2022 08:31), Max: 98.8 (19 Jan 2022 17:48)  HR: 82 (20 Jan 2022 08:31) (75 - 82)  BP: 106/56 (20 Jan 2022 08:31) (97/64 - 121/56)  BP(mean): 75 (20 Jan 2022 00:25) (75 - 75)  RR: 18 (20 Jan 2022 08:31) (16 - 18)  SpO2: 96% (20 Jan 2022 08:31) (93% - 97%)    PHYSICAL EXAM:    Constitutional: NAD, awake and alert, well-developed  HEENT: PERR, EOMI, Normal Hearing, MMM  Neck: Soft and supple, No LAD, No JVD  Respiratory: Breath sounds are clear bilaterally, No wheezing, rales or rhonchi  Cardiovascular: S1 and S2, regular rate and rhythm, no Murmurs, gallops or rubs  Gastrointestinal: Bowel Sounds present, soft, nontender, nondistended, no guarding, no rebound  Extremities: No peripheral edema  Vascular: 2+ peripheral pulses  Neurological: A/O x 3, no focal deficits  Musculoskeletal: 5/5 strength b/l upper and lower extremities  Skin: No rashes    Medications:  MEDICATIONS  (STANDING):  ammonium lactate 12% Lotion 1 Application(s) Topical two times a day  aspirin enteric coated 81 milliGRAM(s) Oral daily  atorvastatin 20 milliGRAM(s) Oral at bedtime  azithromycin  IVPB 500 milliGRAM(s) IV Intermittent every 24 hours  budesonide  80 MICROgram(s)/formoterol 4.5 MICROgram(s) Inhaler 2 Puff(s) Inhalation two times a day  cefTRIAXone   IVPB 1000 milliGRAM(s) IV Intermittent every 24 hours  cholecalciferol 1000 Unit(s) Oral daily  enalapril 10 milliGRAM(s) Oral two times a day  famotidine    Tablet 20 milliGRAM(s) Oral daily  heparin   Injectable 5000 Unit(s) SubCutaneous every 12 hours  latanoprost 0.005% Ophthalmic Solution 1 Drop(s) Both EYES at bedtime  lidocaine   4% Patch 1 Patch Transdermal every 24 hours  magnesium oxide 400 milliGRAM(s) Oral daily  metoprolol succinate ER 25 milliGRAM(s) Oral daily  tamsulosin 0.4 milliGRAM(s) Oral at bedtime      Labs: All Labs Reviewed:                        14.5   17.40 )-----------( 161      ( 20 Jan 2022 09:40 )             45.0     01-20    136  |  103  |  21  ----------------------------<  117<H>  4.0   |  27  |  1.02    Ca    8.6      20 Jan 2022 09:40  Mg     2.6     01-19    TPro  6.1  /  Alb  2.2<L>  /  TBili  1.1  /  DBili  x   /  AST  14<L>  /  ALT  15  /  AlkPhos  80  01-19          CARDIAC MARKERS ( 20 Jan 2022 09:40 )  x     / x     / 32 U/L / x     / x          Blood Culture:   Urine Culture:     RADIOLOGY/EKG: all tests were reviewed  rad< from: Xray Chest 1 View AP/PA. (01.19.22 @ 18:05) >  ACC: [id number] EXAM:  XR CHEST 1 VIEW                          PROCEDURE DATE:  01/19/2022          INTERPRETATION:  AP chest on January 19, 2022 at 5:55 PM. Patient has   chest pain.    Heart magnified by technique.    Reverse right shoulder replacement again noted.    Present film shows slight infiltration off left lower heart border.   Possibly this is Covid related. It is new since October 30, 2021.    IMPRESSION: Slight left-sided infiltrates as above.    < end of copied text >  < from: CT Head No Cont (01.19.22 @ 18:05) >  IMPRESSION:    1)  cerebral and cerebellar volume loss with chronic ischemic changes. No   acute territorial infarct or hemorrhage noted. No hydrocephalus or   collections.  2)  follow-up MR imaging of the brain may be considered for further   assessment.    < end of copied text >  < from: Xray Femur 2 Views, Left (01.19.22 @ 13:46) >    ACC: [id number] EXAM:  XR FEMUR 2 VIEWS LT                        ACC: [id number] EXAM:  XR HIP WITH PELV 2-3V LT                          PROCEDURE DATE:  01/19/2022          INTERPRETATION:  Left hip with full pelvic view and left femur. Patient   has local pain.    Left hip with pelvis. 3 views.    Hip pinning on the left with good alignment and nonunion of the lesser   trochanter again noted.    There is bilateral hip degeneration left greater than right.    No bone destruction or acute fracture.    Findings are similar to November 17, 2021.    Left femur. 4 views. 5 images.    The hip pinning includes a long intramedullary carlos going down the entire   diaphysis. The knee shows mild to moderate degeneration. Lower femur is   intact.    Appearance is similar to November 17, 2021. Arterial calcifications again   noted.    IMPRESSION: No acute finding or change. Lower    < end of copied text >        DVT PPX:    Advance Directive:    Disposition:   Chief Complaint: near syncope     Subjective: and objective  87 year old male w PMHx of BPH, CAD, COPD, glaucoma, HLD, HTN, presented to  ED  BIBEMS s/p near syncopal episode this morning Pt reported significant L hip pain x months.    s/p steroid injection 2 weeks ago w/o significant relief walks w walker  has constant L hip pain and sitting is difficult and wife feeds him  able to sleep feels progressively weaker  Today collapsed near sink while brushing his teeth  He denied injury denied LOC  He states he has become too weak since last admission in     REVIEW OF SYSTEMS:  All other review of systems is negative unless indicated above    Vital Signs Last 24 Hrs  T(C): 36.6 (20 Jan 2022 08:31), Max: 37.1 (19 Jan 2022 17:48)  T(F): 97.9 (20 Jan 2022 08:31), Max: 98.8 (19 Jan 2022 17:48)  HR: 82 (20 Jan 2022 08:31) (75 - 82)  BP: 106/56 (20 Jan 2022 08:31) (97/64 - 121/56)  BP(mean): 75 (20 Jan 2022 00:25) (75 - 75)  RR: 18 (20 Jan 2022 08:31) (16 - 18)  SpO2: 96% (20 Jan 2022 08:31) (93% - 97%)    PHYSICAL EXAM:  Constitutional: NAD, awake and alert, well-developed  HEENT: PERR, EOMI, Normal Hearing, MMM  Neck: Soft and supple, No LAD, No JVD  Respiratory: Breath sounds are clear bilaterally, No wheezing, rales or rhonchi  Cardiovascular: S1 and S2, regular rate and rhythm,   Neurological: A/O x 2/3, no focal deficits      Medications:  MEDICATIONS  (STANDING):  ammonium lactate 12% Lotion 1 Application(s) Topical two times a day  aspirin enteric coated 81 milliGRAM(s) Oral daily  atorvastatin 20 milliGRAM(s) Oral at bedtime  azithromycin  IVPB 500 milliGRAM(s) IV Intermittent every 24 hours  budesonide  80 MICROgram(s)/formoterol 4.5 MICROgram(s) Inhaler 2 Puff(s) Inhalation two times a day  cefTRIAXone   IVPB 1000 milliGRAM(s) IV Intermittent every 24 hours  cholecalciferol 1000 Unit(s) Oral daily  enalapril 10 milliGRAM(s) Oral two times a day  famotidine    Tablet 20 milliGRAM(s) Oral daily  heparin   Injectable 5000 Unit(s) SubCutaneous every 12 hours  latanoprost 0.005% Ophthalmic Solution 1 Drop(s) Both EYES at bedtime  lidocaine   4% Patch 1 Patch Transdermal every 24 hours  magnesium oxide 400 milliGRAM(s) Oral daily  metoprolol succinate ER 25 milliGRAM(s) Oral daily  tamsulosin 0.4 milliGRAM(s) Oral at bedtime      Labs: All Labs Reviewed:                        14.5   17.40 )-----------( 161      ( 20 Jan 2022 09:40 )             45.0     01-20    136  |  103  |  21  ----------------------------<  117<H>  4.0   |  27  |  1.02    Ca    8.6      20 Jan 2022 09:40  Mg     2.6     01-19    TPro  6.1  /  Alb  2.2<L>  /  TBili  1.1  /  DBili  x   /  AST  14<L>  /  ALT  15  /  AlkPhos  80  01-19          CARDIAC MARKERS ( 20 Jan 2022 09:40 )  x     / x     / 32 U/L / x     / x          Blood Culture:   Urine Culture:     RADIOLOGY/EKG: all tests were reviewed  rad< from: Xray Chest 1 View AP/PA. (01.19.22 @ 18:05) >  ACC: [id number] EXAM:  XR CHEST 1 VIEW                          PROCEDURE DATE:  01/19/2022          INTERPRETATION:  AP chest on January 19, 2022 at 5:55 PM. Patient has   chest pain.    Heart magnified by technique.    Reverse right shoulder replacement again noted.    Present film shows slight infiltration off left lower heart border.   Possibly this is Covid related. It is new since October 30, 2021.    IMPRESSION: Slight left-sided infiltrates as above.    < end of copied text >  < from: CT Head No Cont (01.19.22 @ 18:05) >  IMPRESSION:    1)  cerebral and cerebellar volume loss with chronic ischemic changes. No   acute territorial infarct or hemorrhage noted. No hydrocephalus or   collections.  2)  follow-up MR imaging of the brain may be considered for further   assessment.    < end of copied text >  < from: Xray Femur 2 Views, Left (01.19.22 @ 13:46) >    ACC: [id number] EXAM:  XR FEMUR 2 VIEWS LT                        ACC: [id number] EXAM:  XR HIP WITH PELV 2-3V LT                          PROCEDURE DATE:  01/19/2022          INTERPRETATION:  Left hip with full pelvic view and left femur. Patient   has local pain.    Left hip with pelvis. 3 views.    Hip pinning on the left with good alignment and nonunion of the lesser   trochanter again noted.    There is bilateral hip degeneration left greater than right.    No bone destruction or acute fracture.    Findings are similar to November 17, 2021.    Left femur. 4 views. 5 images.    The hip pinning includes a long intramedullary carlos going down the entire   diaphysis. The knee shows mild to moderate degeneration. Lower femur is   intact.    Appearance is similar to November 17, 2021. Arterial calcifications again   noted.    IMPRESSION: No acute finding or change. Lower    < end of copied text >      DVT PPX: heparin sq      Disposition: isolation, PUI, CT chest, neuro consult

## 2022-01-21 LAB
ANION GAP SERPL CALC-SCNC: 7 MMOL/L — SIGNIFICANT CHANGE UP (ref 5–17)
BUN SERPL-MCNC: 21 MG/DL — SIGNIFICANT CHANGE UP (ref 7–23)
CALCIUM SERPL-MCNC: 8.7 MG/DL — SIGNIFICANT CHANGE UP (ref 8.5–10.1)
CHLORIDE SERPL-SCNC: 104 MMOL/L — SIGNIFICANT CHANGE UP (ref 96–108)
CO2 SERPL-SCNC: 25 MMOL/L — SIGNIFICANT CHANGE UP (ref 22–31)
CREAT SERPL-MCNC: 0.77 MG/DL — SIGNIFICANT CHANGE UP (ref 0.5–1.3)
GLUCOSE SERPL-MCNC: 94 MG/DL — SIGNIFICANT CHANGE UP (ref 70–99)
HCT VFR BLD CALC: 44.1 % — SIGNIFICANT CHANGE UP (ref 39–50)
HGB BLD-MCNC: 14 G/DL — SIGNIFICANT CHANGE UP (ref 13–17)
MCHC RBC-ENTMCNC: 28.2 PG — SIGNIFICANT CHANGE UP (ref 27–34)
MCHC RBC-ENTMCNC: 31.7 GM/DL — LOW (ref 32–36)
MCV RBC AUTO: 88.9 FL — SIGNIFICANT CHANGE UP (ref 80–100)
PLATELET # BLD AUTO: 138 K/UL — LOW (ref 150–400)
POTASSIUM SERPL-MCNC: 4.3 MMOL/L — SIGNIFICANT CHANGE UP (ref 3.5–5.3)
POTASSIUM SERPL-SCNC: 4.3 MMOL/L — SIGNIFICANT CHANGE UP (ref 3.5–5.3)
RBC # BLD: 4.96 M/UL — SIGNIFICANT CHANGE UP (ref 4.2–5.8)
RBC # FLD: 17.5 % — HIGH (ref 10.3–14.5)
SODIUM SERPL-SCNC: 136 MMOL/L — SIGNIFICANT CHANGE UP (ref 135–145)
VIT B12 SERPL-MCNC: 1110 PG/ML — SIGNIFICANT CHANGE UP (ref 232–1245)
WBC # BLD: 12.65 K/UL — HIGH (ref 3.8–10.5)
WBC # FLD AUTO: 12.65 K/UL — HIGH (ref 3.8–10.5)

## 2022-01-21 PROCEDURE — 99233 SBSQ HOSP IP/OBS HIGH 50: CPT

## 2022-01-21 PROCEDURE — 99232 SBSQ HOSP IP/OBS MODERATE 35: CPT

## 2022-01-21 RX ORDER — CEFEPIME 1 G/1
2000 INJECTION, POWDER, FOR SOLUTION INTRAMUSCULAR; INTRAVENOUS EVERY 8 HOURS
Refills: 0 | Status: DISCONTINUED | OUTPATIENT
Start: 2022-01-21 | End: 2022-01-23

## 2022-01-21 RX ADMIN — CEFEPIME 100 MILLIGRAM(S): 1 INJECTION, POWDER, FOR SOLUTION INTRAMUSCULAR; INTRAVENOUS at 14:15

## 2022-01-21 RX ADMIN — BUDESONIDE AND FORMOTEROL FUMARATE DIHYDRATE 2 PUFF(S): 160; 4.5 AEROSOL RESPIRATORY (INHALATION) at 10:12

## 2022-01-21 RX ADMIN — LIDOCAINE 1 PATCH: 4 CREAM TOPICAL at 22:58

## 2022-01-21 RX ADMIN — ATORVASTATIN CALCIUM 20 MILLIGRAM(S): 80 TABLET, FILM COATED ORAL at 22:58

## 2022-01-21 RX ADMIN — OXYCODONE AND ACETAMINOPHEN 1 TABLET(S): 5; 325 TABLET ORAL at 12:18

## 2022-01-21 RX ADMIN — OXYCODONE AND ACETAMINOPHEN 1 TABLET(S): 5; 325 TABLET ORAL at 00:00

## 2022-01-21 RX ADMIN — LATANOPROST 1 DROP(S): 0.05 SOLUTION/ DROPS OPHTHALMIC; TOPICAL at 23:00

## 2022-01-21 RX ADMIN — Medication 3 MILLIGRAM(S): at 23:01

## 2022-01-21 RX ADMIN — Medication 10 MILLIGRAM(S): at 12:19

## 2022-01-21 RX ADMIN — CEFEPIME 100 MILLIGRAM(S): 1 INJECTION, POWDER, FOR SOLUTION INTRAMUSCULAR; INTRAVENOUS at 22:59

## 2022-01-21 RX ADMIN — FAMOTIDINE 20 MILLIGRAM(S): 10 INJECTION INTRAVENOUS at 12:18

## 2022-01-21 RX ADMIN — TAMSULOSIN HYDROCHLORIDE 0.4 MILLIGRAM(S): 0.4 CAPSULE ORAL at 23:00

## 2022-01-21 RX ADMIN — OXYCODONE AND ACETAMINOPHEN 1 TABLET(S): 5; 325 TABLET ORAL at 23:01

## 2022-01-21 RX ADMIN — HEPARIN SODIUM 5000 UNIT(S): 5000 INJECTION INTRAVENOUS; SUBCUTANEOUS at 23:00

## 2022-01-21 RX ADMIN — HEPARIN SODIUM 5000 UNIT(S): 5000 INJECTION INTRAVENOUS; SUBCUTANEOUS at 12:19

## 2022-01-21 RX ADMIN — Medication 1 APPLICATION(S): at 17:20

## 2022-01-21 RX ADMIN — BUDESONIDE AND FORMOTEROL FUMARATE DIHYDRATE 2 PUFF(S): 160; 4.5 AEROSOL RESPIRATORY (INHALATION) at 22:37

## 2022-01-21 RX ADMIN — Medication 81 MILLIGRAM(S): at 12:18

## 2022-01-21 RX ADMIN — Medication 1000 UNIT(S): at 12:18

## 2022-01-21 RX ADMIN — MAGNESIUM OXIDE 400 MG ORAL TABLET 400 MILLIGRAM(S): 241.3 TABLET ORAL at 12:19

## 2022-01-21 RX ADMIN — Medication 1200 MILLIGRAM(S): at 23:00

## 2022-01-21 RX ADMIN — Medication 10 MILLIGRAM(S): at 22:59

## 2022-01-21 RX ADMIN — Medication 25 MILLIGRAM(S): at 12:19

## 2022-01-21 NOTE — PROGRESS NOTE ADULT - SUBJECTIVE AND OBJECTIVE BOX
Date of service: 01-21-22 @ 11:32    Lying in bed in NAD  Weak looking  No fever    ROS: no fever or chills; poorly verbal    MEDICATIONS  (STANDING):  ammonium lactate 12% Lotion 1 Application(s) Topical two times a day  aspirin enteric coated 81 milliGRAM(s) Oral daily  atorvastatin 20 milliGRAM(s) Oral at bedtime  budesonide  80 MICROgram(s)/formoterol 4.5 MICROgram(s) Inhaler 2 Puff(s) Inhalation two times a day  cefepime   IVPB 2000 milliGRAM(s) IV Intermittent every 8 hours  cholecalciferol 1000 Unit(s) Oral daily  enalapril 10 milliGRAM(s) Oral two times a day  famotidine    Tablet 20 milliGRAM(s) Oral daily  heparin   Injectable 5000 Unit(s) SubCutaneous every 12 hours  latanoprost 0.005% Ophthalmic Solution 1 Drop(s) Both EYES at bedtime  lidocaine   4% Patch 1 Patch Transdermal every 24 hours  magnesium oxide 400 milliGRAM(s) Oral daily  metoprolol succinate ER 25 milliGRAM(s) Oral daily  tamsulosin 0.4 milliGRAM(s) Oral at bedtime    Vital Signs Last 24 Hrs  T(C): 37.1 (21 Jan 2022 08:30), Max: 37.1 (21 Jan 2022 08:30)  T(F): 98.7 (21 Jan 2022 08:30), Max: 98.7 (21 Jan 2022 08:30)  HR: 91 (21 Jan 2022 08:30) (84 - 91)  BP: 102/60 (21 Jan 2022 08:30) (102/60 - 114/59)  BP(mean): --  RR: 18 (21 Jan 2022 08:30) (18 - 18)  SpO2: 90% (21 Jan 2022 08:30) (90% - 94%)     Physical exam:    Constitutional:  No acute distress  HEENT: NC/AT, EOMI, PERRLA, conjunctivae clear; ears and nose atraumatic  Neck: supple; thyroid not palpable  Back: no tenderness  Respiratory: respiratory effort normal; few crackles at bases  Cardiovascular: S1S2 regular, no murmurs  Abdomen: soft, not tender, not distended, positive BS  Genitourinary: no suprapubic tenderness  Lymphatic: no LN palpable  Musculoskeletal: no muscle tenderness, no joint swelling or tenderness  Extremities: no pedal edema  Neurological/ Psychiatric: AxOx3, moving all extremities  Skin: no rashes; no palpable lesions    Labs: reviewed                        14.0   12.65 )-----------( 138      ( 21 Jan 2022 08:20 )             44.1     01-21    136  |  104  |  21  ----------------------------<  94  4.3   |  25  |  0.77    Ca    8.7      21 Jan 2022 08:20  Mg     2.6     01-19    TPro  6.1  /  Alb  2.2<L>  /  TBili  1.1  /  DBili  x   /  AST  14<L>  /  ALT  15  /  AlkPhos  80  01-19                        14.5   17.40 )-----------( 161      ( 20 Jan 2022 09:40 )             45.0     01-20    136  |  103  |  21  ----------------------------<  117<H>  4.0   |  27  |  1.02    Ca    8.6      20 Jan 2022 09:40  Mg     2.6     01-19    TPro  6.1  /  Alb  2.2<L>  /  TBili  1.1  /  DBili  x   /  AST  14<L>  /  ALT  15  /  AlkPhos  80  01-19     LIVER FUNCTIONS - ( 19 Jan 2022 17:37 )  Alb: 2.2 g/dL / Pro: 6.1 gm/dL / ALK PHOS: 80 U/L / ALT: 15 U/L / AST: 14 U/L / GGT: x           COVID-19 PCR: NotDetec (01-19-22 @ 17:37)    Culture - Blood (collected 19 Jan 2022 21:10)  Source: .Blood Blood  Preliminary Report (21 Jan 2022 03:01):    No growth to date.    Culture - Blood (collected 19 Jan 2022 21:10)  Source: .Blood Blood / Aerobic plus received  Preliminary Report (21 Jan 2022 03:01):    No growth to date.    Radiology: all available radiological tests reviewed    < from: Xray Chest 1 View AP/PA. (01.19.22 @ 18:05) >  IMPRESSION: Slight left-sided infiltrates as above.  < end of copied text >    Advanced directives addressed: full resuscitation

## 2022-01-21 NOTE — PROGRESS NOTE ADULT - SUBJECTIVE AND OBJECTIVE BOX
Interval History:  22: No new events.    MEDICATIONS  (STANDING):  ammonium lactate 12% Lotion 1 Application(s) Topical two times a day  aspirin enteric coated 81 milliGRAM(s) Oral daily  atorvastatin 20 milliGRAM(s) Oral at bedtime  budesonide  80 MICROgram(s)/formoterol 4.5 MICROgram(s) Inhaler 2 Puff(s) Inhalation two times a day  cefepime   IVPB 2000 milliGRAM(s) IV Intermittent every 8 hours  cholecalciferol 1000 Unit(s) Oral daily  enalapril 10 milliGRAM(s) Oral two times a day  famotidine    Tablet 20 milliGRAM(s) Oral daily  heparin   Injectable 5000 Unit(s) SubCutaneous every 12 hours  latanoprost 0.005% Ophthalmic Solution 1 Drop(s) Both EYES at bedtime  lidocaine   4% Patch 1 Patch Transdermal every 24 hours  magnesium oxide 400 milliGRAM(s) Oral daily  metoprolol succinate ER 25 milliGRAM(s) Oral daily  tamsulosin 0.4 milliGRAM(s) Oral at bedtime    MEDICATIONS  (PRN):  acetaminophen     Tablet .. 650 milliGRAM(s) Oral every 6 hours PRN Temp greater or equal to 38C (100.4F), Mild Pain (1 - 3)  ALBUTerol    90 MICROgram(s) HFA Inhaler 2 Puff(s) Inhalation every 6 hours PRN Shortness of Breath and/or Wheezing  aluminum hydroxide/magnesium hydroxide/simethicone Suspension 30 milliLiter(s) Oral every 4 hours PRN Dyspepsia  melatonin 3 milliGRAM(s) Oral at bedtime PRN Insomnia  ondansetron Injectable 4 milliGRAM(s) IV Push every 6 hours PRN Nausea and/or Vomiting  oxycodone    5 mG/acetaminophen 325 mG 1 Tablet(s) Oral every 4 hours PRN Moderate Pain (4 - 6)      Allergies    No Known Allergies    Intolerances        PHYSICAL EXAM:  Vital Signs Last 24 Hrs  T(F): 98.7 (22 @ 08:30)  HR: 91 (22 @ 08:30)  BP: 102/60 (22 @ 08:30)  RR: 18 (22 @ 08:30)    Constitutional: awake and alert.  HEENT: PERRLA, EOMI,   Neck: Supple.  Respiratory: Breath sounds are clear bilaterally  Cardiovascular: S1 and S2, regular / irregular rhythm  Gastrointestinal: soft, nontender  Extremities:  no edema  Musculoskeletal: no joint swelling/tenderness, no abnormal movements  Skin: No rashes    Neurological exam:  HF: Awake and alert. Oriented to person, place, time. Able to name current president and previous two presidents.  Appropriately interactive, normal affect. Speech fluent, No Aphasia or paraphasic errors. Naming /repetition intact   CN: ASHWIN, EOMI, VFF, facial sensation normal, no NLFD, tongue midline, Palate moves equally, SCM equal bilaterally  Motor: No pronator drift, Strength 5/5 in all 4 ext on confrontation testing in bed. , normal bulk and tone, no tremor, rigidity or bradykinesia.    Sens: Intact to light touch  Reflexes: Symmetric and normal . BJ 2+, BR 2+, KJ 3+, downgoing toes b/l, negative Bell's  Coord:  No FNFA, dysmetria, MARCO intact   Gait/Balance: Not tested      LABS:                        14.0   12.65 )-----------( 138      ( 2022 08:20 )             44.1         136  |  104  |  21  ----------------------------<  94  4.3   |  25  |  0.77    Ca    8.7      2022 08:20  Mg     2.6         TPro  6.1  /  Alb  2.2<L>  /  TBili  1.1  /  DBili  x   /  AST  14<L>  /  ALT  15  /  AlkPhos  80        Urinalysis Basic - ( 2022 12:30 )    Color: Yellow / Appearance: Clear / S.020 / pH: x  Gluc: x / Ketone: Negative  / Bili: Negative / Urobili: Negative   Blood: x / Protein: 30 mg/dL / Nitrite: Negative   Leuk Esterase: Trace / RBC: Negative /HPF / WBC 0-2   Sq Epi: x / Non Sq Epi: Occasional / Bacteria: Moderate        RADIOLOGY & ADDITIONAL STUDIES:    CT head 22:  1)  cerebral and cerebellar volume loss with chronic ischemic changes. No   acute territorial infarct or hemorrhage noted. No hydrocephalus or   collections.  2)  follow-up MR imaging of the brain may be considered for further   assessment.    CT cervical spine 22:  No acute fracture.  Multilevel degenerative changes of the cervical spine   as above.

## 2022-01-21 NOTE — PROGRESS NOTE ADULT - ASSESSMENT
86 y/o male with h/o BPH, CAD, COPD, glaucoma, HLD, HTN was admitted on 1/19 after he had a near syncopal episode at home. The patient reports significant L hip pain x several months. He received a steroid injection 2 weeks ago w/o significant relief. He walks w walker. He has constant L hip pain and sitting is difficult and wife feeds him. He feels progressively weaker. On the day of admission, he felt more SOB and collapsed near sink while brushing his teeth; denies LOC. In ER he received ceftriaxone and azithromycin.     1. Left side pneumonia. ?Multifocal pneumonia. COPD. Near syncopal episode.   -COVID-19 PCR is negative  -obtain RVP ?viral pneumonia  -leukocytosis improving  -obtain BC x 2 noted  -on ceftriaxone 1 gm IV qd and azithromycin 500 mg IV qd # 2  -tolerating abx well so far; no side effects noted  -cardiac monitoring  -continue abx coverage   -monitor temps  -f/u CBC  -supportive care  2. Other issues:   -care per medicine

## 2022-01-21 NOTE — PROGRESS NOTE ADULT - ASSESSMENT
87 year old male w PMHx of BPH, CAD, COPD, glaucoma, HLD, HTN, who presented to the ED after a near syncopal episode.  His wife reports difficulty ambulating due to pain in the left lower extremity.  He does not have focal weakness on exam but seems to be somewhat debilitated.  On examination he does have brisk reflexes, particularly in the patellars.    -Chronic weakness likely exacerbated in the setting of infection (pneumonia).  -Treatment of pneumonia as per medicine/ID.  -CT cervical spine is not remarkable.  -Will get MRI of spine due to possible myelopathy causing weakness, difficulty walking.   -Vitamin B12 level pending  -CT head shows chronic changes including atrophy and microvascular ischemic changes but nothing suspicious for acute stroke.  -PT     Dr. Multani will take over neurology service on 1/22 and will follow up as needed. 87 year old male w PMHx of BPH, CAD, COPD, glaucoma, HLD, HTN, who presented to the ED after a near syncopal episode.  His wife reports difficulty ambulating due to pain in the left lower extremity.  He does not have focal weakness on exam but seems to be somewhat debilitated.  On examination he does have brisk reflexes, particularly in the patellars.    -Chronic weakness likely exacerbated in the setting of infection (pneumonia).  -Treatment of pneumonia as per medicine/ID.  -CT cervical spine is not remarkable.  -Will get MRI of spine due to possible myelopathy causing weakness, difficulty walking.   -B12 level normal.  -CT head shows chronic changes including atrophy and microvascular ischemic changes but nothing suspicious for acute stroke.  -PT     Dr. Multani will take over neurology service on 1/22 and will follow up as needed.

## 2022-01-21 NOTE — PROGRESS NOTE ADULT - ASSESSMENT
87 year old male w PMHx of BPH, CAD, COPD, glaucoma, HLD, HTN, presented to  ED  BIBEMS s/p near syncopal episode this morning    #Weakness  #near syncope  #elevated BNP  - orthostatics - negative   - seems euvolemic  - recent echo revealed preserved EF  - unlikely syncope, recent event due to weakness and deconditioning   - Cardiology consult appreciated  - PT    # Leukocytosis 2/2 Aspiration PNA  - improved, WBC trending down   UA - negative   BCx - negative   CT chest - Aspiration PNA, intermediate for COVID, swabbed twice -negative   monitor WBC  f/u sputum culture   treated with IV Roceophin and Zithro - switched to IV cefepime as CT chest - not typical for bacterial PNA  add mucinex   ID consult appreciated     #Left hip pain  IMN 2011  increased pain x 1-2 months  s/p steroid injection w/o improvement - could explain leukocytosis ?  significant difficulty w walking and sitting to eat  lidoderm patch. tylenol and percocet prn for pain management   xrays - no FX, no change from previous  will check MRI of cervical, thoracic and lumbar spine - D/W Dr. León    will order ortho vs. spine sx consult based on MRI results - explained to pt's wife on 1/21   PT evaluation    #COPD   trelegy NA   budesonide-formot  albuterol prn    #Glaucoma   latanoprost    #HLD  atorvastatin    #HTN  continue home meds w parameters   lisinopril 10 mg   . metoprolol 25

## 2022-01-21 NOTE — PROGRESS NOTE ADULT - SUBJECTIVE AND OBJECTIVE BOX
Chief Complaint: near syncope     Subjective: and objective  87 year old male w PMHx of BPH, CAD, COPD, glaucoma, HLD, HTN, presented to  ED  BIBEMS s/p near syncopal episode this morning Pt reported significant L hip pain x months.    s/p steroid injection 2 weeks ago w/o significant relief walks w walker  has constant L hip pain and sitting is difficult and wife feeds him  able to sleep feels progressively weaker  Today collapsed near sink while brushing his teeth  He denied injury denied LOC  He states he has become too weak since last admission in     REVIEW OF SYSTEMS:  All other review of systems is negative unless indicated above    Vital Signs Last 24 Hrs  T(C): 37.1 (21 Jan 2022 08:30), Max: 37.1 (21 Jan 2022 08:30)  T(F): 98.7 (21 Jan 2022 08:30), Max: 98.7 (21 Jan 2022 08:30)  HR: 91 (21 Jan 2022 08:30) (84 - 91)  BP: 102/60 (21 Jan 2022 08:30) (102/60 - 114/59)  BP(mean): --  RR: 18 (21 Jan 2022 08:30) (18 - 18)  SpO2: 90% (21 Jan 2022 08:30) (90% - 94%)    PHYSICAL EXAM:  Constitutional: NAD, awake and alert, well-developed  HEENT: PERR, EOMI, Normal Hearing, MMM  Neck: Soft and supple, No LAD, No JVD  Respiratory: Scattered rhonchi   Cardiovascular: S1 and S2, regular rate and rhythm,   Neurological: A/O x 2/3, no focal deficits      Medications:  MEDICATIONS  (STANDING):  ammonium lactate 12% Lotion 1 Application(s) Topical two times a day  aspirin enteric coated 81 milliGRAM(s) Oral daily  atorvastatin 20 milliGRAM(s) Oral at bedtime  azithromycin  IVPB 500 milliGRAM(s) IV Intermittent every 24 hours  budesonide  80 MICROgram(s)/formoterol 4.5 MICROgram(s) Inhaler 2 Puff(s) Inhalation two times a day  cefTRIAXone   IVPB 1000 milliGRAM(s) IV Intermittent every 24 hours  cholecalciferol 1000 Unit(s) Oral daily  enalapril 10 milliGRAM(s) Oral two times a day  famotidine    Tablet 20 milliGRAM(s) Oral daily  heparin   Injectable 5000 Unit(s) SubCutaneous every 12 hours  latanoprost 0.005% Ophthalmic Solution 1 Drop(s) Both EYES at bedtime  lidocaine   4% Patch 1 Patch Transdermal every 24 hours  magnesium oxide 400 milliGRAM(s) Oral daily  metoprolol succinate ER 25 milliGRAM(s) Oral daily  tamsulosin 0.4 milliGRAM(s) Oral at bedtime      Labs: All Labs Reviewed:                        14.5   17.40 )-----------( 161      ( 20 Jan 2022 09:40 )             45.0     01-20    136  |  103  |  21  ----------------------------<  117<H>  4.0   |  27  |  1.02    Ca    8.6      20 Jan 2022 09:40  Mg     2.6     01-19    TPro  6.1  /  Alb  2.2<L>  /  TBili  1.1  /  DBili  x   /  AST  14<L>  /  ALT  15  /  AlkPhos  80  01-19          CARDIAC MARKERS ( 20 Jan 2022 09:40 )  x     / x     / 32 U/L / x     / x          Blood Culture:   Urine Culture:     RADIOLOGY/EKG: all tests were reviewed  rad< from: Xray Chest 1 View AP/PA. (01.19.22 @ 18:05) >  ACC: [id number] EXAM:  XR CHEST 1 VIEW                          PROCEDURE DATE:  01/19/2022          INTERPRETATION:  AP chest on January 19, 2022 at 5:55 PM. Patient has   chest pain.    Heart magnified by technique.    Reverse right shoulder replacement again noted.    Present film shows slight infiltration off left lower heart border.   Possibly this is Covid related. It is new since October 30, 2021.    IMPRESSION: Slight left-sided infiltrates as above.    < end of copied text >  < from: CT Head No Cont (01.19.22 @ 18:05) >  IMPRESSION:    1)  cerebral and cerebellar volume loss with chronic ischemic changes. No   acute territorial infarct or hemorrhage noted. No hydrocephalus or   collections.  2)  follow-up MR imaging of the brain may be considered for further   assessment.    < end of copied text >  < from: Xray Femur 2 Views, Left (01.19.22 @ 13:46) >    ACC: [id number] EXAM:  XR FEMUR 2 VIEWS LT                        ACC: [id number] EXAM:  XR HIP WITH PELV 2-3V LT                          PROCEDURE DATE:  01/19/2022          INTERPRETATION:  Left hip with full pelvic view and left femur. Patient   has local pain.    Left hip with pelvis. 3 views.    Hip pinning on the left with good alignment and nonunion of the lesser   trochanter again noted.    There is bilateral hip degeneration left greater than right.    No bone destruction or acute fracture.    Findings are similar to November 17, 2021.    Left femur. 4 views. 5 images.    The hip pinning includes a long intramedullary carlos going down the entire   diaphysis. The knee shows mild to moderate degeneration. Lower femur is   intact.    Appearance is similar to November 17, 2021. Arterial calcifications again   noted.    IMPRESSION: No acute finding or change. Lower    < end of copied text >  \ra< from: CT Chest No Cont (01.20.22 @ 16:49) >  IMPRESSION:    1.  New findings in both lungs favored to be aspiration. There are   indeterminate for Covid 19 pneumonia and not typical for bacterial   pneumonia.    2.  Persistent paracentral bronchiectasis in the posterior lungs.   Improved/resolved left paraspinal consolidation    3.New trace right greater than left pleural effusions are    < end of copied text >      DVT PPX: heparin sq      Disposition: IV Abx, PT

## 2022-01-22 LAB
ANION GAP SERPL CALC-SCNC: 5 MMOL/L — SIGNIFICANT CHANGE UP (ref 5–17)
BUN SERPL-MCNC: 24 MG/DL — HIGH (ref 7–23)
CALCIUM SERPL-MCNC: 8.6 MG/DL — SIGNIFICANT CHANGE UP (ref 8.5–10.1)
CHLORIDE SERPL-SCNC: 104 MMOL/L — SIGNIFICANT CHANGE UP (ref 96–108)
CO2 SERPL-SCNC: 24 MMOL/L — SIGNIFICANT CHANGE UP (ref 22–31)
CREAT SERPL-MCNC: 0.94 MG/DL — SIGNIFICANT CHANGE UP (ref 0.5–1.3)
GLUCOSE SERPL-MCNC: 114 MG/DL — HIGH (ref 70–99)
HCT VFR BLD CALC: 47.5 % — SIGNIFICANT CHANGE UP (ref 39–50)
HGB BLD-MCNC: 14.7 G/DL — SIGNIFICANT CHANGE UP (ref 13–17)
MCHC RBC-ENTMCNC: 28.1 PG — SIGNIFICANT CHANGE UP (ref 27–34)
MCHC RBC-ENTMCNC: 30.9 GM/DL — LOW (ref 32–36)
MCV RBC AUTO: 90.8 FL — SIGNIFICANT CHANGE UP (ref 80–100)
PLATELET # BLD AUTO: 129 K/UL — LOW (ref 150–400)
POTASSIUM SERPL-MCNC: 4.8 MMOL/L — SIGNIFICANT CHANGE UP (ref 3.5–5.3)
POTASSIUM SERPL-SCNC: 4.8 MMOL/L — SIGNIFICANT CHANGE UP (ref 3.5–5.3)
RBC # BLD: 5.23 M/UL — SIGNIFICANT CHANGE UP (ref 4.2–5.8)
RBC # FLD: 17.3 % — HIGH (ref 10.3–14.5)
SODIUM SERPL-SCNC: 133 MMOL/L — LOW (ref 135–145)
WBC # BLD: 11.5 K/UL — HIGH (ref 3.8–10.5)
WBC # FLD AUTO: 11.5 K/UL — HIGH (ref 3.8–10.5)

## 2022-01-22 PROCEDURE — 99232 SBSQ HOSP IP/OBS MODERATE 35: CPT

## 2022-01-22 PROCEDURE — 72146 MRI CHEST SPINE W/O DYE: CPT | Mod: 26

## 2022-01-22 PROCEDURE — 99233 SBSQ HOSP IP/OBS HIGH 50: CPT

## 2022-01-22 PROCEDURE — 72141 MRI NECK SPINE W/O DYE: CPT | Mod: 26

## 2022-01-22 PROCEDURE — 93306 TTE W/DOPPLER COMPLETE: CPT | Mod: 26

## 2022-01-22 PROCEDURE — 72148 MRI LUMBAR SPINE W/O DYE: CPT | Mod: 26

## 2022-01-22 RX ADMIN — LIDOCAINE 1 PATCH: 4 CREAM TOPICAL at 22:06

## 2022-01-22 RX ADMIN — Medication 1200 MILLIGRAM(S): at 22:04

## 2022-01-22 RX ADMIN — LIDOCAINE 1 PATCH: 4 CREAM TOPICAL at 08:57

## 2022-01-22 RX ADMIN — HEPARIN SODIUM 5000 UNIT(S): 5000 INJECTION INTRAVENOUS; SUBCUTANEOUS at 22:04

## 2022-01-22 RX ADMIN — HEPARIN SODIUM 5000 UNIT(S): 5000 INJECTION INTRAVENOUS; SUBCUTANEOUS at 08:59

## 2022-01-22 RX ADMIN — Medication 81 MILLIGRAM(S): at 08:58

## 2022-01-22 RX ADMIN — LATANOPROST 1 DROP(S): 0.05 SOLUTION/ DROPS OPHTHALMIC; TOPICAL at 22:04

## 2022-01-22 RX ADMIN — LIDOCAINE 1 PATCH: 4 CREAM TOPICAL at 09:30

## 2022-01-22 RX ADMIN — MAGNESIUM OXIDE 400 MG ORAL TABLET 400 MILLIGRAM(S): 241.3 TABLET ORAL at 08:58

## 2022-01-22 RX ADMIN — Medication 1000 UNIT(S): at 08:58

## 2022-01-22 RX ADMIN — Medication 3 MILLIGRAM(S): at 22:05

## 2022-01-22 RX ADMIN — CEFEPIME 100 MILLIGRAM(S): 1 INJECTION, POWDER, FOR SOLUTION INTRAMUSCULAR; INTRAVENOUS at 22:04

## 2022-01-22 RX ADMIN — LIDOCAINE 1 PATCH: 4 CREAM TOPICAL at 19:44

## 2022-01-22 RX ADMIN — TAMSULOSIN HYDROCHLORIDE 0.4 MILLIGRAM(S): 0.4 CAPSULE ORAL at 22:04

## 2022-01-22 RX ADMIN — FAMOTIDINE 20 MILLIGRAM(S): 10 INJECTION INTRAVENOUS at 08:58

## 2022-01-22 RX ADMIN — ATORVASTATIN CALCIUM 20 MILLIGRAM(S): 80 TABLET, FILM COATED ORAL at 22:03

## 2022-01-22 RX ADMIN — BUDESONIDE AND FORMOTEROL FUMARATE DIHYDRATE 2 PUFF(S): 160; 4.5 AEROSOL RESPIRATORY (INHALATION) at 08:21

## 2022-01-22 RX ADMIN — BUDESONIDE AND FORMOTEROL FUMARATE DIHYDRATE 2 PUFF(S): 160; 4.5 AEROSOL RESPIRATORY (INHALATION) at 20:39

## 2022-01-22 RX ADMIN — Medication 1200 MILLIGRAM(S): at 08:58

## 2022-01-22 RX ADMIN — CEFEPIME 100 MILLIGRAM(S): 1 INJECTION, POWDER, FOR SOLUTION INTRAMUSCULAR; INTRAVENOUS at 13:29

## 2022-01-22 RX ADMIN — Medication 1 APPLICATION(S): at 05:32

## 2022-01-22 RX ADMIN — CEFEPIME 100 MILLIGRAM(S): 1 INJECTION, POWDER, FOR SOLUTION INTRAMUSCULAR; INTRAVENOUS at 05:32

## 2022-01-22 RX ADMIN — Medication 10 MILLIGRAM(S): at 22:04

## 2022-01-22 NOTE — PROGRESS NOTE ADULT - ASSESSMENT
87 year old male w PMHx of BPH, CAD, COPD, glaucoma, HLD, HTN  admitted for:     #Weakness. Near syncope  - likely related to leg weakness and debility  - was admitted previously with similar presentation and work up was neg   -CT head neg to acute findings   - orthostatics - negative now, but was hydrated, monitor   - Likely need compression stockings for  prolong standing, both episodes while was shaving and was brushing his teeth   - recent echo revealed preserved EF  - tele: SR 80-90s,  halina overnight to 40s   - TSH. b12 WNL  - Cardiology consult appreciated  - PT    # Leukocytosis 2/2 Aspiration PNA  - improved, WBC trending down   UA - negative   BCx - negative   CT chest - Aspiration PNA, intermediate for COVID, swabbed twice: COVID negative   treated with IV Roceophin and Zithro - switched to IV cefepime as CT chest - not typical for bacterial PNA  C/w mucinex   D/w DR Lua      #Left hip pain,   IMN 2011 for FX   increased pain x 1-2 months  s/p steroid injection w/o improvement   significant difficulty w walking and sitting to eat  XR: L hip with i/medullary nail, no Fx   lidoderm patch. tylenol and percocet prn for pain management   F/u  MRI of cervical, thoracic and lumbar spine -  will order ortho vs. spine sx consult based on MRI results  C/w PT     # elevated BNP  - seems euvolemic, monitor       #COPD, stable    trelegy NA   budesonide-formot  albuterol prn    #Glaucoma   latanoprost    #HLD  atorvastatin    #HTN  continue home meds w parameters  enalapril  10 mg BID  metoprolol 25      DVT PPX: Heparin SQ     Dispo: C/w current care, F/i MRI report

## 2022-01-22 NOTE — PROGRESS NOTE ADULT - ASSESSMENT
· Assessment	  87 year old male w PMHx of BPH, CAD, COPD, glaucoma, HLD, HTN, who presented to the ED after a near syncopal episode.  His wife reports difficulty ambulating due to pain in the left lower extremity.  He does not have focal weakness on exam but seems to be somewhat debilitated.  On examination he does have brisk reflexes, particularly in the patellars.    -Chronic weakness likely exacerbated in the setting of infection (pneumonia).  -Treatment of pneumonia as per medicine/ID.  -CT cervical spine is not remarkable.  -Will get MRI of spine due to possible myelopathy causing weakness, difficulty walking.   -MRI scans pending   -B12 level normal.  -CT head shows chronic changes including atrophy and microvascular ischemic changes but nothing suspicious for acute stroke.  -PT /OT for ambulation  -Discussed with pt and Dr. Dowell

## 2022-01-22 NOTE — PROGRESS NOTE ADULT - ASSESSMENT
86 y/o male with h/o BPH, CAD, COPD, glaucoma, HLD, HTN was admitted on 1/19 after he had a near syncopal episode at home. The patient reports significant L hip pain x several months. He received a steroid injection 2 weeks ago w/o significant relief. He walks w walker. He has constant L hip pain and sitting is difficult and wife feeds him. He feels progressively weaker. On the day of admission, he felt more SOB and collapsed near sink while brushing his teeth; denies LOC. In ER he received ceftriaxone and azithromycin.     1. Left side pneumonia. ?Multifocal pneumonia. COPD. Near syncopal episode.   -COVID-19 PCR is negative  -obtain RVP ?viral pneumonia  -leukocytosis improving  -BC x 2 noted  -on ceftriaxone 1 gm IV qd and azithromycin 500 mg IV qd # 3  -tolerating abx well so far; no side effects noted  -MRI spine to evaluate for back pain  -cardiac monitoring  -continue abx coverage   -monitor temps  -f/u CBC  -supportive care  2. Other issues:   -care per medicine

## 2022-01-22 NOTE — PROGRESS NOTE ADULT - SUBJECTIVE AND OBJECTIVE BOX
Date of service: 01-22-22 @ 10:23    Lying in bed in NAD  Has dry cough  Poorly ambulatory with back pain    ROS: no fever or chills; denies pain; limited    MEDICATIONS  (STANDING):  ammonium lactate 12% Lotion 1 Application(s) Topical two times a day  aspirin enteric coated 81 milliGRAM(s) Oral daily  atorvastatin 20 milliGRAM(s) Oral at bedtime  budesonide  80 MICROgram(s)/formoterol 4.5 MICROgram(s) Inhaler 2 Puff(s) Inhalation two times a day  cefepime   IVPB 2000 milliGRAM(s) IV Intermittent every 8 hours  cholecalciferol 1000 Unit(s) Oral daily  enalapril 10 milliGRAM(s) Oral two times a day  famotidine    Tablet 20 milliGRAM(s) Oral daily  guaiFENesin ER 1200 milliGRAM(s) Oral every 12 hours  heparin   Injectable 5000 Unit(s) SubCutaneous every 12 hours  latanoprost 0.005% Ophthalmic Solution 1 Drop(s) Both EYES at bedtime  lidocaine   4% Patch 1 Patch Transdermal every 24 hours  magnesium oxide 400 milliGRAM(s) Oral daily  metoprolol succinate ER 25 milliGRAM(s) Oral daily  tamsulosin 0.4 milliGRAM(s) Oral at bedtime    Vital Signs Last 24 Hrs  T(C): 36.6 (22 Jan 2022 06:05), Max: 36.6 (21 Jan 2022 21:26)  T(F): 97.8 (22 Jan 2022 06:05), Max: 97.8 (21 Jan 2022 21:26)  HR: 85 (22 Jan 2022 08:50) (80 - 89)  BP: 102/54 (22 Jan 2022 08:50) (102/54 - 111/50)  BP(mean): --  RR: 18 (21 Jan 2022 21:26) (18 - 18)  SpO2: 90% (22 Jan 2022 06:05) (90% - 96%)     Physical exam:    Constitutional:  No acute distress  HEENT: NC/AT, EOMI, PERRLA, conjunctivae clear; ears and nose atraumatic  Neck: supple; thyroid not palpable  Back: no tenderness  Respiratory: respiratory effort normal; few crackles at bases  Cardiovascular: S1S2 regular, no murmurs  Abdomen: soft, not tender, not distended, positive BS  Genitourinary: no suprapubic tenderness  Lymphatic: no LN palpable  Musculoskeletal: no muscle tenderness, no joint swelling or tenderness  Extremities: no pedal edema  Neurological/ Psychiatric: AxOx3, moving all extremities  Skin: no rashes; no palpable lesions    Labs: reviewed                        14.7   11.50 )-----------( 129      ( 22 Jan 2022 08:16 )             47.5     01-22    133<L>  |  104  |  24<H>  ----------------------------<  114<H>  4.8   |  24  |  0.94    Ca    8.6      22 Jan 2022 08:16                        14.0   12.65 )-----------( 138      ( 21 Jan 2022 08:20 )             44.1     01-21    136  |  104  |  21  ----------------------------<  94  4.3   |  25  |  0.77    Ca    8.7      21 Jan 2022 08:20  Mg     2.6     01-19    TPro  6.1  /  Alb  2.2<L>  /  TBili  1.1  /  DBili  x   /  AST  14<L>  /  ALT  15  /  AlkPhos  80  01-19                        14.5   17.40 )-----------( 161      ( 20 Jan 2022 09:40 )             45.0     01-20    136  |  103  |  21  ----------------------------<  117<H>  4.0   |  27  |  1.02    Ca    8.6      20 Jan 2022 09:40  Mg     2.6     01-19    TPro  6.1  /  Alb  2.2<L>  /  TBili  1.1  /  DBili  x   /  AST  14<L>  /  ALT  15  /  AlkPhos  80  01-19     LIVER FUNCTIONS - ( 19 Jan 2022 17:37 )  Alb: 2.2 g/dL / Pro: 6.1 gm/dL / ALK PHOS: 80 U/L / ALT: 15 U/L / AST: 14 U/L / GGT: x           COVID-19 PCR: Tameka (01-19-22 @ 17:37)    Culture - Blood (collected 19 Jan 2022 21:10)  Source: .Blood Blood  Preliminary Report (21 Jan 2022 03:01):    No growth to date.    Culture - Blood (collected 19 Jan 2022 21:10)  Source: .Blood Blood / Aerobic plus received  Preliminary Report (21 Jan 2022 03:01):    No growth to date.    Rapid RVP Result: NotDetec (01.20.22 @ 12:30)     Radiology: all available radiological tests reviewed    < from: Xray Chest 1 View AP/PA. (01.19.22 @ 18:05) >  IMPRESSION: Slight left-sided infiltrates as above.  < end of copied text >    Advanced directives addressed: full resuscitation

## 2022-01-22 NOTE — PROGRESS NOTE ADULT - SUBJECTIVE AND OBJECTIVE BOX
CC: near syncope  unable to walk    HPI:  87 year old male w PMHx of BPH, CAD, COPD, glaucoma, HLD, HTN, PNA presented to  ED BIBEMS s/p near syncopal episode in the  morning. Pt reported significant L hip pain x months, s/p steroid injection 2 weeks ago w/o significant relief  walks w walker, has constant L hip pain and sitting is difficult and wife feeds him, Pt feels progressively weaker  On day of admission Pt  collapsed near sink while brushing his teeth. He denied injury, denied LOC  He stated  he has become too weak since last admission in .     In ED /68  HR 96    RR 18   T 97.8      100% sat RA. Leukocytosis, WBCs 20K, Elevated BNP. CXR LLL infiltrate   Head CT no acute changes  Hip xray intact prosthesis  L femur no fx  lasix 40 mg  percocet 5/325  tramadol 25 mg  given ceftriaxone and Zithromax        INTERVAL HPI/ OVERNIGHT EVENTS: chart reviewed, Pt was seen and examined, reports feeling better today, no cough, no SOB, no pain. Feels tired after MRI, wants to rest. Ambulated with PT yesterday denies pain in the hip     Vital Signs Last 24 Hrs  T(C): 36.6 (22 Jan 2022 06:05), Max: 36.6 (21 Jan 2022 21:26)  T(F): 97.8 (22 Jan 2022 06:05), Max: 97.8 (21 Jan 2022 21:26)  HR: 85 (22 Jan 2022 08:50) (80 - 89)  BP: 102/54 (22 Jan 2022 08:50) (102/54 - 111/50)  RR: 18 (21 Jan 2022 21:26) (18 - 18)  SpO2: 90% (22 Jan 2022 06:05) (90% - 96%)      REVIEW OF SYSTEMS:  All other review of systems is negative unless indicated above.      PHYSICAL EXAM:  General: Well developed; frail elderly male,  in no acute distress  Eyes:EOMI; conjunctiva and sclera clear  Head: Normocephalic; atraumatic  ENMT: No nasal discharge; airway clear  Neck: Supple; non tender; no masses  Respiratory: Decreased BS. No wheezes, rales or rhonchi  Cardiovascular: Regular rate and rhythm. S1 and S2 Normal;  Gastrointestinal: Soft non-tender non-distended; Normal bowel sounds  Genitourinary: No  suprapubic  tenderness  Extremities: No leg  edema, feet in the  boot  Neurological: Alert and oriented x2-3, non focal   Musculoskeletal: Normal muscle tone and strength   Psychiatric: Cooperative       LABS:                         14.7   11.50 )-----------( 129      ( 22 Jan 2022 08:16 )             47.5     22 Jan 2022 08:16    133    |  104    |  24     ----------------------------<  114    4.8     |  24     |  0.94     Ca    8.6        22 Jan 2022 08:16      MEDICATIONS  (STANDING):  ammonium lactate 12% Lotion 1 Application(s) Topical two times a day  aspirin enteric coated 81 milliGRAM(s) Oral daily  atorvastatin 20 milliGRAM(s) Oral at bedtime  budesonide  80 MICROgram(s)/formoterol 4.5 MICROgram(s) Inhaler 2 Puff(s) Inhalation two times a day  cefepime   IVPB 2000 milliGRAM(s) IV Intermittent every 8 hours  cholecalciferol 1000 Unit(s) Oral daily  enalapril 10 milliGRAM(s) Oral two times a day  famotidine    Tablet 20 milliGRAM(s) Oral daily  guaiFENesin ER 1200 milliGRAM(s) Oral every 12 hours  heparin   Injectable 5000 Unit(s) SubCutaneous every 12 hours  latanoprost 0.005% Ophthalmic Solution 1 Drop(s) Both EYES at bedtime  lidocaine   4% Patch 1 Patch Transdermal every 24 hours  magnesium oxide 400 milliGRAM(s) Oral daily  metoprolol succinate ER 25 milliGRAM(s) Oral daily  tamsulosin 0.4 milliGRAM(s) Oral at bedtime    MEDICATIONS  (PRN):  acetaminophen     Tablet .. 650 milliGRAM(s) Oral every 6 hours PRN Temp greater or equal to 38C (100.4F), Mild Pain (1 - 3)  ALBUTerol    90 MICROgram(s) HFA Inhaler 2 Puff(s) Inhalation every 6 hours PRN Shortness of Breath and/or Wheezing  aluminum hydroxide/magnesium hydroxide/simethicone Suspension 30 milliLiter(s) Oral every 4 hours PRN Dyspepsia  melatonin 3 milliGRAM(s) Oral at bedtime PRN Insomnia  ondansetron Injectable 4 milliGRAM(s) IV Push every 6 hours PRN Nausea and/or Vomiting  oxycodone    5 mG/acetaminophen 325 mG 1 Tablet(s) Oral every 4 hours PRN Moderate Pain (4 - 6)      RADIOLOGY & ADDITIONAL TESTS:      ACC: [id number] EXAM:  CT CHEST                        PROCEDURE DATE:  01/20/2022      FINDINGS:    Lungs/Airways/Pleura: New ill-defined groundglass in the posterior right   upper lobe and lingula. There is persistent paracentral bronchiectasis   and the posterior lungs, right greater than left. Left paraspinal   density/mucous plugging has improved/resolved. Persistent centrilobular   and branching linear densities in the anterior right upper lobe. New   nodular density medial right middle lobe. New trace right greater than   left pleural effusions.    Mediastinum/Lymph nodes: No thoracic adenopathy.    Heart and Vessels: The heart is stable in size. There are three-vessel   coronary artery calcifications. Pulmonary artery enlargement compared to   adjacent ascending aorta may reflect pulmonary hypertension.    Upper Abdomen: There is cholelithiasis.    Osseous structures and Soft Tissues: There is diffuse qualitative   osteopenia. Stable severe T8 vertebral body compression   fracture/vertebral plana. No aggressive bone lesions. Status post right   shoulder arthroplasty.    IMPRESSION:    1.  New findings in both lungs favored to be aspiration. There are   indeterminate for Covid 19 pneumonia and not typical for bacterial   pneumonia.    2.  Persistent paracentral bronchiectasis in the posterior lungs.   Improved/resolved left paraspinal consolidation    3.New trace right greater than left pleural effusions are              ACC: [id number] EXAM:  CT CERVICAL SPINE                        PROCEDURE DATE:  01/20/2022    FINDINGS:   No prior similar studies are available for review    Cervical vertebral body heights are maintained. No vertebral fracture is   seen. No destructive bone lesion is found.  Alignment is preserved.    Facet joints appear intact and aligned.    Cervical intervertebral disc spaces show multilevel degenerative disease   and spondylosis with loss of intervertebral disc height and associated   degenerative endplate changes.  There is multilevel narrowing of the   neural foramina on a degenerative basis.  There is no evidence of   high-grade central canal stenosis.   MR would be required to evaluate the   intervertebral discs at higher sensitivity for disc pathology.    The skull base appears intact.  No neck mass is recognized.  Paraspinal   soft tissues appear intact. Visualized lymph nodes appear to be within   physiologic size limits.      IMPRESSION:    No acute fracture.  Multilevel degenerative changes of the cervical spine   as above.          < from: Xray Femur 2 Views, Left (01.19.22 @ 13:46) >    ACC: [id number] EXAM:  XR FEMUR 2 VIEWS LT                        ACC: [id number] EXAM:  XR HIP WITH PELV 2-3V LT                          PROCEDURE DATE:  01/19/2022          INTERPRETATION:  Left hip with full pelvic view and left femur. Patient   has local pain.    Left hip with pelvis. 3 views.    Hip pinning on the left with good alignment and nonunion of the lesser   trochanter again noted.    There is bilateral hip degeneration left greater than right.    No bone destruction or acute fracture.    Findings are similar to November 17, 2021.    Left femur. 4 views. 5 images.    The hip pinning includes a long intramedullary carlos going down the entire   diaphysis. The knee shows mild to moderate degeneration. Lower femur is   intact.    Appearance is similar to November 17, 2021. Arterial calcifications again   noted.    IMPRESSION: No acute finding or change.    =

## 2022-01-22 NOTE — PROGRESS NOTE ADULT - SUBJECTIVE AND OBJECTIVE BOX
· Reason for Admission	near syncope  unable to walk  1/22/22: Pt in bed, no new c/o. No Pain or new sx.     MEDICATIONS  (STANDING):  ammonium lactate 12% Lotion 1 Application(s) Topical two times a day  aspirin enteric coated 81 milliGRAM(s) Oral daily  atorvastatin 20 milliGRAM(s) Oral at bedtime  budesonide  80 MICROgram(s)/formoterol 4.5 MICROgram(s) Inhaler 2 Puff(s) Inhalation two times a day  cefepime   IVPB 2000 milliGRAM(s) IV Intermittent every 8 hours  cholecalciferol 1000 Unit(s) Oral daily  enalapril 10 milliGRAM(s) Oral two times a day  famotidine    Tablet 20 milliGRAM(s) Oral daily  guaiFENesin ER 1200 milliGRAM(s) Oral every 12 hours  heparin   Injectable 5000 Unit(s) SubCutaneous every 12 hours  latanoprost 0.005% Ophthalmic Solution 1 Drop(s) Both EYES at bedtime  lidocaine   4% Patch 1 Patch Transdermal every 24 hours  magnesium oxide 400 milliGRAM(s) Oral daily  metoprolol succinate ER 25 milliGRAM(s) Oral daily  tamsulosin 0.4 milliGRAM(s) Oral at bedtime      Vital Signs Last 24 Hrs  T(C): 36.6 (22 Jan 2022 06:05), Max: 36.6 (21 Jan 2022 21:26)  T(F): 97.8 (22 Jan 2022 06:05), Max: 97.8 (21 Jan 2022 21:26)  HR: 85 (22 Jan 2022 08:50) (80 - 89)  BP: 102/54 (22 Jan 2022 08:50) (102/54 - 111/50)  BP(mean): --  RR: 18 (21 Jan 2022 21:26) (18 - 18)  SpO2: 90% (22 Jan 2022 06:05) (90% - 96%)    Neurological exam:  HF: Awake and alert. Oriented to person, place, time.  Speech fluent, No Aphasia or paraphasic errors.   CN: ASHWIN, EOMI, VFF, facial sensation normal, no NLFD, gag intact  Motor: No pronator drift, Strength 5/5 in all 4 ext .   Sens: Intact to light touch  Reflexes: Symmetric and normal . BJ 2+, BR 2+, KJ 3+, downgoing toes b/l  Coord:  No FNFA, dysmetria, MARCO intact   Gait/Balance: Not tested                        14.7   11.50 )-----------( 129      ( 22 Jan 2022 08:16 )             47.5     01-22    133<L>  |  104  |  24<H>  ----------------------------<  114<H>  4.8   |  24  |  0.94    Ca    8.6      22 Jan 2022 08:16    Radiology report:  - CT Head

## 2022-01-23 LAB
ANION GAP SERPL CALC-SCNC: 6 MMOL/L — SIGNIFICANT CHANGE UP (ref 5–17)
BUN SERPL-MCNC: 19 MG/DL — SIGNIFICANT CHANGE UP (ref 7–23)
CALCIUM SERPL-MCNC: 8.7 MG/DL — SIGNIFICANT CHANGE UP (ref 8.5–10.1)
CHLORIDE SERPL-SCNC: 105 MMOL/L — SIGNIFICANT CHANGE UP (ref 96–108)
CO2 SERPL-SCNC: 24 MMOL/L — SIGNIFICANT CHANGE UP (ref 22–31)
CREAT SERPL-MCNC: 0.74 MG/DL — SIGNIFICANT CHANGE UP (ref 0.5–1.3)
GLUCOSE SERPL-MCNC: 115 MG/DL — HIGH (ref 70–99)
HCT VFR BLD CALC: 43.9 % — SIGNIFICANT CHANGE UP (ref 39–50)
HGB BLD-MCNC: 14.2 G/DL — SIGNIFICANT CHANGE UP (ref 13–17)
MCHC RBC-ENTMCNC: 29 PG — SIGNIFICANT CHANGE UP (ref 27–34)
MCHC RBC-ENTMCNC: 32.3 GM/DL — SIGNIFICANT CHANGE UP (ref 32–36)
MCV RBC AUTO: 89.8 FL — SIGNIFICANT CHANGE UP (ref 80–100)
PLATELET # BLD AUTO: 127 K/UL — LOW (ref 150–400)
POTASSIUM SERPL-MCNC: 4.6 MMOL/L — SIGNIFICANT CHANGE UP (ref 3.5–5.3)
POTASSIUM SERPL-SCNC: 4.6 MMOL/L — SIGNIFICANT CHANGE UP (ref 3.5–5.3)
RBC # BLD: 4.89 M/UL — SIGNIFICANT CHANGE UP (ref 4.2–5.8)
RBC # FLD: 17.2 % — HIGH (ref 10.3–14.5)
SODIUM SERPL-SCNC: 135 MMOL/L — SIGNIFICANT CHANGE UP (ref 135–145)
WBC # BLD: 10.98 K/UL — HIGH (ref 3.8–10.5)
WBC # FLD AUTO: 10.98 K/UL — HIGH (ref 3.8–10.5)

## 2022-01-23 PROCEDURE — 99221 1ST HOSP IP/OBS SF/LOW 40: CPT

## 2022-01-23 PROCEDURE — 99233 SBSQ HOSP IP/OBS HIGH 50: CPT

## 2022-01-23 PROCEDURE — 99232 SBSQ HOSP IP/OBS MODERATE 35: CPT

## 2022-01-23 PROCEDURE — 72131 CT LUMBAR SPINE W/O DYE: CPT | Mod: 26

## 2022-01-23 RX ORDER — CEFEPIME 1 G/1
2000 INJECTION, POWDER, FOR SOLUTION INTRAMUSCULAR; INTRAVENOUS EVERY 12 HOURS
Refills: 0 | Status: COMPLETED | OUTPATIENT
Start: 2022-01-23 | End: 2022-01-25

## 2022-01-23 RX ADMIN — Medication 1 APPLICATION(S): at 17:16

## 2022-01-23 RX ADMIN — Medication 10 MILLIGRAM(S): at 22:35

## 2022-01-23 RX ADMIN — LATANOPROST 1 DROP(S): 0.05 SOLUTION/ DROPS OPHTHALMIC; TOPICAL at 22:35

## 2022-01-23 RX ADMIN — OXYCODONE AND ACETAMINOPHEN 1 TABLET(S): 5; 325 TABLET ORAL at 22:35

## 2022-01-23 RX ADMIN — Medication 10 MILLIGRAM(S): at 09:40

## 2022-01-23 RX ADMIN — FAMOTIDINE 20 MILLIGRAM(S): 10 INJECTION INTRAVENOUS at 09:40

## 2022-01-23 RX ADMIN — CEFEPIME 100 MILLIGRAM(S): 1 INJECTION, POWDER, FOR SOLUTION INTRAMUSCULAR; INTRAVENOUS at 05:33

## 2022-01-23 RX ADMIN — Medication 1200 MILLIGRAM(S): at 09:40

## 2022-01-23 RX ADMIN — BUDESONIDE AND FORMOTEROL FUMARATE DIHYDRATE 2 PUFF(S): 160; 4.5 AEROSOL RESPIRATORY (INHALATION) at 20:44

## 2022-01-23 RX ADMIN — Medication 81 MILLIGRAM(S): at 09:40

## 2022-01-23 RX ADMIN — Medication 1 APPLICATION(S): at 05:34

## 2022-01-23 RX ADMIN — MAGNESIUM OXIDE 400 MG ORAL TABLET 400 MILLIGRAM(S): 241.3 TABLET ORAL at 09:40

## 2022-01-23 RX ADMIN — Medication 25 MILLIGRAM(S): at 09:40

## 2022-01-23 RX ADMIN — ATORVASTATIN CALCIUM 20 MILLIGRAM(S): 80 TABLET, FILM COATED ORAL at 22:35

## 2022-01-23 RX ADMIN — LIDOCAINE 1 PATCH: 4 CREAM TOPICAL at 12:08

## 2022-01-23 RX ADMIN — Medication 1200 MILLIGRAM(S): at 22:35

## 2022-01-23 RX ADMIN — HEPARIN SODIUM 5000 UNIT(S): 5000 INJECTION INTRAVENOUS; SUBCUTANEOUS at 09:41

## 2022-01-23 RX ADMIN — BUDESONIDE AND FORMOTEROL FUMARATE DIHYDRATE 2 PUFF(S): 160; 4.5 AEROSOL RESPIRATORY (INHALATION) at 08:35

## 2022-01-23 RX ADMIN — TAMSULOSIN HYDROCHLORIDE 0.4 MILLIGRAM(S): 0.4 CAPSULE ORAL at 22:35

## 2022-01-23 RX ADMIN — Medication 1000 UNIT(S): at 09:40

## 2022-01-23 RX ADMIN — CEFEPIME 100 MILLIGRAM(S): 1 INJECTION, POWDER, FOR SOLUTION INTRAMUSCULAR; INTRAVENOUS at 22:34

## 2022-01-23 RX ADMIN — Medication 3 MILLIGRAM(S): at 22:36

## 2022-01-23 RX ADMIN — HEPARIN SODIUM 5000 UNIT(S): 5000 INJECTION INTRAVENOUS; SUBCUTANEOUS at 22:35

## 2022-01-23 NOTE — CONSULT NOTE ADULT - ASSESSMENT
87 year old male with PNA on cefepime,  PMHx of BPH, CAD, COPD, glaucoma, HLD, HTN, PNA presented to  ED BIBEMS s/p near syncopal episode in the  morning. Pt reported significant L hip pain x months,    leukocytosis improving on cefepime  Patient without back pain at this time.   patient recommended to have MRI with contrast however patient does not want another MRI.   Discussed with Dr. Riggs  Recommend Ct L-spine with contrast to r/o abscess vs alternate cause for enhancement  recommend TLSO brace  no acute emergent neurosurgical intervention at this time.

## 2022-01-23 NOTE — PROGRESS NOTE ADULT - ASSESSMENT
87 year old male w PMHx of BPH, CAD, COPD, glaucoma, HLD, HTN  admitted for:     #Weakness. Near syncope  - likely related to leg weakness and debility  - was admitted previously with similar presentation and work up was neg   -CT head neg to acute findings   - orthostatics - negative now, but was hydrated, monitor   - Likely need compression stockings for  prolong standing, both episodes while was shaving and was brushing his teeth   - recent echo revealed preserved EF  - tele: SR 80-90s,  halina overnight to 40s   - TSH. b12 WNL  - Cardiology consult appreciated  - PT    # Leukocytosis 2/2 Aspiration PNA  - improved, WBC trending down   UA - negative   BCx - negative   CT chest - Aspiration PNA, intermediate for COVID, swabbed twice: COVID negative   treated with IV Roceophin and Zithro - switched to IV cefepime as CT chest - not typical for bacterial PNA  C/w mucinex   D/w DR Lua      #Left hip pain,   IMN 2011 for FX   increased pain x 1-2 months  s/p steroid injection w/o improvement   significant difficulty w walking and sitting to eat  XR: L hip with i/medullary nail, no Fx   lidoderm patch. tylenol and percocet prn for pain management   F/u  MRI of cervical, thoracic and lumbar spine -  will order ortho vs. spine sx consult based on MRI results  C/w PT     # elevated BNP  - seems euvolemic, monitor       #COPD, stable    trelegy NA   budesonide-formot  albuterol prn    #Glaucoma   latanoprost    #HLD  atorvastatin    #HTN  continue home meds w parameters  enalapril  10 mg BID  metoprolol 25      DVT PPX: Heparin SQ     Dispo: C/w current care, F/i MRI report    87 year old male w PMHx of BPH, CAD, COPD, glaucoma, HLD, HTN  admitted for:     #Weakness. Near syncope  - likely related to leg weakness and debility  - was admitted previously with similar presentation and work up was neg   -CT head neg to acute findings   - orthostatics - negative now, but was hydrated, monitor   - Likely need compression stockings for  prolong standing, both episodes while was shaving and was brushing his teeth   - recent echo revealed preserved EF  - tele: SR 80-90s,  halina overnight to 40s   - TSH. b12 WNL  - Cardiology consult appreciated  - d/c tele     # Leukocytosis 2/2 Aspiration PNA  - improved, WBC trending down   UA - negative   BCx - negative   CT chest - Aspiration PNA, intermediate for COVID, swabbed twice: COVID negative   treated with IV Roceophin and Zithro - switched to IV cefepime day 3  as CT chest  showed - not typical for bacterial PNA  C/w mucinex   ID f/u appreciated       #Left hip pain, L2 acute fracture . 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 tylenol and percocet prn for pain management   MRI of cervical, thoracic and lumbar spine : + Acute unstable L2 Fx, old L4, T8 Fx  neuroSx eval, d/w Team   Bedrest       # elevated BNP  - seems euvolemic, monitor       #COPD, stable   On  Trelegy  at hole   C/w budesonide-formoterol   albuterol prn    #Glaucoma   latanoprost    #HLD  atorvastatin    #HTN  continue home meds w parameters  enalapril  10 mg BID  metoprolol 25      DVT PPX: Heparin SQ     Dispo: C/w current care, neuroSx eval, bedrest

## 2022-01-23 NOTE — PROGRESS NOTE ADULT - ASSESSMENT
· Assessment	  87 year old male w PMHx of BPH, CAD, COPD, glaucoma, HLD, HTN, who presented to the ED after a near syncopal episode.  His wife reports difficulty ambulating due to pain in the left lower extremity.  He does not have focal weakness on exam but seems to be somewhat debilitated.  On examination he does have brisk reflexes, particularly in the patellars.    -Chronic weakness likely exacerbated in the setting of infection (pneumonia).  -Treatment of pneumonia as per medicine/ID.  -CT cervical spine is not remarkable.  -Will get MRI of spine due to possible myelopathy causing weakness, difficulty walking.   -MRI scans done pending results   -B12 level normal.  -CT head shows chronic changes including atrophy and microvascular ischemic changes but nothing suspicious for acute stroke.  -PT /OT for ambulation  -Discussed with pt and Dr. Dowell

## 2022-01-23 NOTE — PROGRESS NOTE ADULT - SUBJECTIVE AND OBJECTIVE BOX
Date of service: 01-23-22 @ 10:00    Lying in bed in NAD  Alert and mid confused  Has low back discomfort  Has dry cough    ROS: no fever or chills; no HA, no abdominal pain, no dysuria, no legs pain, no rashes    MEDICATIONS  (STANDING):  ammonium lactate 12% Lotion 1 Application(s) Topical two times a day  aspirin enteric coated 81 milliGRAM(s) Oral daily  atorvastatin 20 milliGRAM(s) Oral at bedtime  budesonide  80 MICROgram(s)/formoterol 4.5 MICROgram(s) Inhaler 2 Puff(s) Inhalation two times a day  cefepime   IVPB 2000 milliGRAM(s) IV Intermittent every 8 hours  cholecalciferol 1000 Unit(s) Oral daily  enalapril 10 milliGRAM(s) Oral two times a day  famotidine    Tablet 20 milliGRAM(s) Oral daily  guaiFENesin ER 1200 milliGRAM(s) Oral every 12 hours  heparin   Injectable 5000 Unit(s) SubCutaneous every 12 hours  latanoprost 0.005% Ophthalmic Solution 1 Drop(s) Both EYES at bedtime  lidocaine   4% Patch 1 Patch Transdermal every 24 hours  magnesium oxide 400 milliGRAM(s) Oral daily  metoprolol succinate ER 25 milliGRAM(s) Oral daily  tamsulosin 0.4 milliGRAM(s) Oral at bedtime    Vital Signs Last 24 Hrs  T(C): 36.4 (23 Jan 2022 08:45), Max: 36.7 (22 Jan 2022 16:59)  T(F): 97.6 (23 Jan 2022 08:45), Max: 98.1 (22 Jan 2022 16:59)  HR: 70 (23 Jan 2022 08:45) (67 - 91)  BP: 129/61 (23 Jan 2022 08:45) (113/74 - 129/61)  BP(mean): 73 (23 Jan 2022 06:25) (73 - 73)  RR: 18 (23 Jan 2022 08:45) (18 - 19)  SpO2: 95% (23 Jan 2022 08:45) (91% - 100%)     Physical exam:    Constitutional:  No acute distress  HEENT: NC/AT, EOMI, PERRLA, conjunctivae clear; ears and nose atraumatic  Neck: supple; thyroid not palpable  Back: no tenderness  Respiratory: respiratory effort normal; few crackles at bases  Cardiovascular: S1S2 regular, no murmurs  Abdomen: soft, not tender, not distended, positive BS  Genitourinary: no suprapubic tenderness  Lymphatic: no LN palpable  Musculoskeletal: no muscle tenderness, no joint swelling or tenderness  Extremities: no pedal edema  Neurological/ Psychiatric: AxOx3, moving all extremities  Skin: no rashes; no palpable lesions    Labs: reviewed                        14.7   11.50 )-----------( 129      ( 22 Jan 2022 08:16 )             47.5     01-22    133<L>  |  104  |  24<H>  ----------------------------<  114<H>  4.8   |  24  |  0.94    Ca    8.6      22 Jan 2022 08:16                        14.0   12.65 )-----------( 138      ( 21 Jan 2022 08:20 )             44.1     01-21    136  |  104  |  21  ----------------------------<  94  4.3   |  25  |  0.77    Ca    8.7      21 Jan 2022 08:20  Mg     2.6     01-19    TPro  6.1  /  Alb  2.2<L>  /  TBili  1.1  /  DBili  x   /  AST  14<L>  /  ALT  15  /  AlkPhos  80  01-19                        14.5   17.40 )-----------( 161      ( 20 Jan 2022 09:40 )             45.0     01-20    136  |  103  |  21  ----------------------------<  117<H>  4.0   |  27  |  1.02    Ca    8.6      20 Jan 2022 09:40  Mg     2.6     01-19    TPro  6.1  /  Alb  2.2<L>  /  TBili  1.1  /  DBili  x   /  AST  14<L>  /  ALT  15  /  AlkPhos  80  01-19     LIVER FUNCTIONS - ( 19 Jan 2022 17:37 )  Alb: 2.2 g/dL / Pro: 6.1 gm/dL / ALK PHOS: 80 U/L / ALT: 15 U/L / AST: 14 U/L / GGT: x           COVID-19 PCR: NotDete (01-19-22 @ 17:37)    Culture - Blood (collected 19 Jan 2022 21:10)  Source: .Blood Blood  Preliminary Report (21 Jan 2022 03:01):    No growth to date.    Culture - Blood (collected 19 Jan 2022 21:10)  Source: .Blood Blood / Aerobic plus received  Preliminary Report (21 Jan 2022 03:01):    No growth to date.    Rapid RVP Result: Narendrate (01.20.22 @ 12:30)     Radiology: all available radiological tests reviewed    < from: Xray Chest 1 View AP/PA. (01.19.22 @ 18:05) >  IMPRESSION: Slight left-sided infiltrates as above.  < end of copied text >    Advanced directives addressed: full resuscitation

## 2022-01-23 NOTE — CHART NOTE - NSCHARTNOTEFT_GEN_A_CORE
Reviewed MRI images with Dr Love from Radiology, patient noted to have an acute compression fracture at L2 which appears to be unstable.  There is no compromise of the spinal canal and no noted compression of spinal cord or cauda equina.     -Recommending CT of LS spine  -The patient will need immobilization with TLSO brace  -Neurosurgery consult  Case discussed with Dr Multani

## 2022-01-23 NOTE — PROGRESS NOTE ADULT - ASSESSMENT
88 y/o male with h/o BPH, CAD, COPD, glaucoma, HLD, HTN was admitted on 1/19 after he had a near syncopal episode at home. The patient reports significant L hip pain x several months. He received a steroid injection 2 weeks ago w/o significant relief. He walks w walker. He has constant L hip pain and sitting is difficult and wife feeds him. He feels progressively weaker. On the day of admission, he felt more SOB and collapsed near sink while brushing his teeth; denies LOC. In ER he received ceftriaxone and azithromycin.     1. Left side pneumonia. ?Multifocal pneumonia. COPD. Near syncopal episode.   -COVID-19 PCR is negative  -obtain RVP ?viral pneumonia  -leukocytosis improving  -BC x 2 noted  -s/p ceftriaxone 1 gm IV qd and azithromycin 500 mg IV qd # 2-3  -on cefepime 2 gm IV q12h # 2  -tolerating abx well so far; no side effects noted  -MRI spine to evaluate for back pain  -cardiac monitoring  -continue abx coverage   -monitor temps  -f/u CBC  -supportive care  2. Other issues:   -care per medicine

## 2022-01-23 NOTE — CONSULT NOTE ADULT - SUBJECTIVE AND OBJECTIVE BOX
CHIEF COMPLAINT: Patient is a 87y old  Male who presents with a chief complaint of near syncope  unable to walk (20 Jan 2022 00:53)      HPI:  87 year old male w PMHx of BPH, CAD, COPD, glaucoma, HLD, HTN, presented to  ED  BIBEMS s/p near syncopal episode this morning      Pt reported significant L hip pain x months.    s/p steroid injection 2 weeks ago w/o significant relief  walks w walker  has constant L hip pain and sitting is difficult and wife feeds him  able to sleep  feels progressively weaker  Today collapsed near sink while brushing his teeth  He denied injury   denied LOC    He states he has become too weak since last admission in     Ambulance call report not available for review    In ED /68  HR 96    RR 18   T 97.8      100% sat RA  Head CT no acute changes  Hip xray intact prosthesis  L femur no fx  lasix 40 mg  percocet 5/325  tramadol 25 mg  given ceftriaxone and zithromax      PAST MEDICAL HX  BPH (benign prostatic hyperplasia)  CAD (coronary artery disease)  COPD (chronic obstructive pulmonary disease)  Glaucoma of both eyes, unspecified glaucoma  H/O Clostridium difficile infection s/p left THR  Hard of hearing  HTN (hypertension)  Humerus fracture proximal, right  Hyperlipidemia  Shoulder pain, right  Swelling of ankle b/l.    PAST SURGICAL HX  H/O bilateral inguinal hernia repair 2016  H/O colonoscopy  History of tonsillectomy  History of total hip replacement, left 2011  S/P cataract surgery b/l  Status post coronary artery stent placement reports stents x 4?      FAMILY HX  Family history of hypertension in mother.    SOCIAL HX    Lives at home.   No tobacco, alcohol or illicit drug use  walks w walker (20 Jan 2022 00:53)      PMHx: PAST MEDICAL & SURGICAL HISTORY:  HTN (hypertension)    Hyperlipidemia    COPD (chronic obstructive pulmonary disease)    Glaucoma of both eyes, unspecified glaucoma    CAD (coronary artery disease)    BPH (benign prostatic hyperplasia)    Glaucoma    Swelling of ankle  b/l    Shoulder pain, right    Humerus fracture  proximal, right    Hard of hearing    H/O Clostridium difficile infection  s/p left THR    H/O bilateral inguinal hernia repair  2016    History of tonsillectomy    Status post coronary artery stent placement  reports stents x 4?    History of total hip replacement, left  2011    H/O colonoscopy    S/P cataract surgery  b/l          Soc Hx:       Allergies: Allergies    No Known Allergies    Intolerances        Vital Signs Last 24 Hrs  T(C): 36.8 (20 Jan 2022 01:18), Max: 37.1 (19 Jan 2022 17:48)  T(F): 98.3 (20 Jan 2022 01:18), Max: 98.8 (19 Jan 2022 17:48)  HR: 78 (20 Jan 2022 01:18) (77 - 96)  BP: 110/56 (20 Jan 2022 01:18) (97/64 - 124/68)  BP(mean): 75 (20 Jan 2022 00:25) (73 - 75)  RR: 18 (20 Jan 2022 01:18) (16 - 18)  SpO2: 93% (20 Jan 2022 01:18) (93% - 100%)    I&O's Summary          PHYSICAL EXAM:   Constitutional: NAD, awake and alert, well-developed  HEENT: PERR, EOMI, Normal Hearing, MMM  Neck: Soft and supple, No LAD, No JVD  Respiratory: Breath sounds are clear bilaterally, No wheezing, rales or rhonchi  Cardiovascular: S1 and S2, regular rate and rhythm,   Neurological: A/O x 3, no focal deficits    MEDICATIONS:  MEDICATIONS  (STANDING):  ammonium lactate 12% Lotion 1 Application(s) Topical two times a day  aspirin enteric coated 81 milliGRAM(s) Oral daily  atorvastatin 20 milliGRAM(s) Oral at bedtime  azithromycin  IVPB 500 milliGRAM(s) IV Intermittent every 24 hours  budesonide  80 MICROgram(s)/formoterol 4.5 MICROgram(s) Inhaler 2 Puff(s) Inhalation two times a day  cholecalciferol 1000 Unit(s) Oral daily  enalapril 10 milliGRAM(s) Oral two times a day  famotidine    Tablet 20 milliGRAM(s) Oral daily  heparin   Injectable 5000 Unit(s) SubCutaneous every 12 hours  latanoprost 0.005% Ophthalmic Solution 1 Drop(s) Both EYES at bedtime  lidocaine   4% Patch 1 Patch Transdermal every 24 hours  magnesium oxide 400 milliGRAM(s) Oral daily  metoprolol succinate ER 25 milliGRAM(s) Oral daily  tamsulosin 0.4 milliGRAM(s) Oral at bedtime      LABS: All Labs Reviewed:                        15.2   20.02 )-----------( 162      ( 19 Jan 2022 17:37 )             46.7     01-19    138  |  107  |  21  ----------------------------<  115<H>  4.1   |  25  |  0.83    Ca    8.6      19 Jan 2022 17:37  Mg     2.6     01-19    TPro  6.1  /  Alb  2.2<L>  /  TBili  1.1  /  DBili  x   /  AST  14<L>  /  ALT  15  /  AlkPhos  80  01-19          Serum Pro-Brain Natriuretic Peptide: 1001 pg/mL (01-19 @ 17:37)    EKG:SR with RBBB, LAFB, PAC, no significant ST changes     Telemetry: SR with PAC    ECHO:< from: TTE Echo Complete w/o Contrast w/ Doppler (10.25.21 @ 10:44) >   Summary     The left ventricle is normal in size, wall motion and contractility.   Mild concentric left ventricular hypertrophy is present.   Estimated left ventricular ejection fraction is 55-60 %.   Normal appearing left atrium.   Normal appearing right atrium.   Normal appearing right ventricle structure and function.   Aortic valve not well seen.   Fibrocalcific changes noted to the Aortic valve leaflets with preserved   leaflet excursion.   Trace aortic regurgitation is present.   Fibrocalcific changes noted to the mitral valve leaflets with preserved   leaflet excursion.   EA reversal of the mitral inflow consistent with reduced compliance of the   left ventricle.   Mild mitral annular calcification is present.   Trace to mild mitral regurgitation is present.   No evidence of pericardial effusion.     Signature     ----------------------------------------------------------------   Electronically signed by Kiko White MD(Interpreting physician)   on 10/25/2021 05:46 PM   ----------------------------------------------------------------    < end of copied text >      
Patient is a 87y old  Male who presents with a chief complaint of near syncope, in hospital has had PNA and started on cefepime. MRI shows L2 fracture patient without presently complaining of pain. Patient has been incontinent of bowel and bladder recently and now using diaper.       HPI:  87 year old male w PMHx of BPH, CAD, COPD, glaucoma, HLD, HTN, presented to  ED BIBEMS s/p near syncopal episode this morning      Pt reported significant L hip pain x months.    s/p steroid injection 2 weeks ago w/o significant relief  walks w walker  has constant L hip pain and sitting is difficult and wife feeds him  able to sleep  feels progressively weaker  Today collapsed near sink while brushing his teeth  He denied injury   denied LOC    He states he has become too weak since last admission in     Ambulance call report not available for review      PAST MEDICAL HX  BPH (benign prostatic hyperplasia)  CAD (coronary artery disease)  COPD (chronic obstructive pulmonary disease)  Glaucoma of both eyes, unspecified glaucoma  H/O Clostridium difficile infection s/p left THR  Hard of hearing  HTN (hypertension)  Humerus fracture proximal, right  Hyperlipidemia  Shoulder pain, right  Swelling of ankle b/l.    PAST SURGICAL HX  H/O bilateral inguinal hernia repair 2016  H/O colonoscopy  History of tonsillectomy  History of total hip replacement, left 2011  S/P cataract surgery b/l  Status post coronary artery stent placement reports stents x 4?      FAMILY HX  Family history of hypertension in mother.    SOCIAL HX    Lives at home.   No tobacco, alcohol or illicit drug use  walks w walker (20 Jan 2022 00:53)      MEDICATIONS  (STANDING):  ammonium lactate 12% Lotion 1 Application(s) Topical two times a day  aspirin enteric coated 81 milliGRAM(s) Oral daily  atorvastatin 20 milliGRAM(s) Oral at bedtime  budesonide  80 MICROgram(s)/formoterol 4.5 MICROgram(s) Inhaler 2 Puff(s) Inhalation two times a day  cefepime   IVPB 2000 milliGRAM(s) IV Intermittent every 12 hours  cholecalciferol 1000 Unit(s) Oral daily  enalapril 10 milliGRAM(s) Oral two times a day  famotidine    Tablet 20 milliGRAM(s) Oral daily  guaiFENesin ER 1200 milliGRAM(s) Oral every 12 hours  heparin   Injectable 5000 Unit(s) SubCutaneous every 12 hours  latanoprost 0.005% Ophthalmic Solution 1 Drop(s) Both EYES at bedtime  lidocaine   4% Patch 1 Patch Transdermal every 24 hours  magnesium oxide 400 milliGRAM(s) Oral daily  metoprolol succinate ER 25 milliGRAM(s) Oral daily  tamsulosin 0.4 milliGRAM(s) Oral at bedtime       Allergies    No Known Allergies      ROS: Pertinent positives in HPI, all other ROS were reviewed and are negative.      Vital Signs Last 24 Hrs  T(C): 36.7 (23 Jan 2022 20:52), Max: 36.7 (23 Jan 2022 20:52)  T(F): 98 (23 Jan 2022 20:52), Max: 98 (23 Jan 2022 20:52)  HR: 70 (23 Jan 2022 20:52) (70 - 77)  BP: 105/73 (23 Jan 2022 20:52) (105/73 - 129/61)  BP(mean): 84 (23 Jan 2022 20:52) (73 - 84)  RR: 19 (23 Jan 2022 20:52) (18 - 19)  SpO2: 93% (23 Jan 2022 20:52) (93% - 98%)    Neurological exam:  HF: A x O x 3. Appropriately interactive, normal affect. Speech fluent, No Aphasia or paraphasic errors. Naming /repetition intact   CN: ASHWIN, EOMI, VFF, facial sensation normal, no NLFD, tongue midline, Palate moves equally, SCM equal bilaterally  Motor: No pronator drift, Strength 5/5 in all 4 ext, normal bulk and tone, no tremor, rigidity or bradykinesia.    Sens: Intact to light touch   Reflexes: Symmetric and normal . BJ 2+, BR 2+, KJ 2+, AJ 2+, downgoing toes b/l  neg straight leg raise.   denies pain on palpation.       Labs:                        14.2   10.98 )-----------( 127      ( 23 Jan 2022 06:54 )             43.9     01-23    135  |  105  |  19  ----------------------------<  115<H>  4.6   |  24  |  0.74    Ca    8.7      23 Jan 2022 06:54      RADIOLOGY:  < from: MR Thoracic Spine No Cont (01.22.22 @ 10:10) >  IMPRESSION:        1.   Cervical spine:   Small central disc herniations noted at C2-3,   C3-4 which indent the ventral thecal sac. No significant central or   foraminal stenosis is noted.        2.   Thoracic spine:   chronic compression fracture of T8 with loss   of 90% of vertebral body height and minimal posterior bony retropulsion   without compression.        3.   Lumbar spine:   acute chance fracture of L2 with comminuted   fragments of the vertebral body with central distraction and edema.   Fractures appear to be present within the pedicles and articular bodies   with edema within the L2-3 interspinous space and disruption of the   ligamentum flavum. This is consistent with an unstable fracture. CT   lumbar spine recommended for complete evaluation. Chronic compression   deformity of L4 with loss of 40% vertebral body height centrally.      < end of copied text >  
Patient is a 87y old  Male who presents with a chief complaint of near syncope  unable to walk (20 Jan 2022 16:10)      HPI:  87 year old male w PMHx of BPH, CAD, COPD, glaucoma, HLD, HTN, presented to  ED  BIBEMS s/p near syncopal episode this morning      Pt reported significant L hip pain x months.    s/p steroid injection 2 weeks ago w/o significant relief  walks w walker  has constant L hip pain and sitting is difficult and wife feeds him  able to sleep  feels progressively weaker  Today collapsed near sink while brushing his teeth  He denied injury   denied LOC    He states he has become too weak since last admission in     1/20/22: I spoke to his wife who reports that he is in bed most of the day because of pain in his left lower extremity.  She has been trying to get him to an orthopedist but has not been able to get an expedited appointment.    She does say that his memory is good.    In ED /68  HR 96    RR 18   T 97.8      100% sat RA  Head CT no acute changes  Hip xray intact prosthesis  L femur no fx  lasix 40 mg  percocet 5/325  tramadol 25 mg  given ceftriaxone and zithromax      PAST MEDICAL HX  BPH (benign prostatic hyperplasia)  CAD (coronary artery disease)  COPD (chronic obstructive pulmonary disease)  Glaucoma of both eyes, unspecified glaucoma  H/O Clostridium difficile infection s/p left THR  Hard of hearing  HTN (hypertension)  Humerus fracture proximal, right  Hyperlipidemia  Shoulder pain, right  Swelling of ankle b/l.    PAST SURGICAL HX  H/O bilateral inguinal hernia repair 2016  H/O colonoscopy  History of tonsillectomy  History of total hip replacement, left 2011  S/P cataract surgery b/l  Status post coronary artery stent placement reports stents x 4?      FAMILY HX  Family history of hypertension in mother.    SOCIAL HX    Lives at home.   No tobacco, alcohol or illicit drug use  walks w walker (20 Jan 2022 00:53)      PAST MEDICAL & SURGICAL HISTORY:  HTN (hypertension)    Hyperlipidemia    COPD (chronic obstructive pulmonary disease)    Glaucoma of both eyes, unspecified glaucoma    CAD (coronary artery disease)    BPH (benign prostatic hyperplasia)    Glaucoma    Swelling of ankle  b/l    Shoulder pain, right    Humerus fracture  proximal, right    Hard of hearing    H/O Clostridium difficile infection  s/p left THR    H/O bilateral inguinal hernia repair  2016    History of tonsillectomy    Status post coronary artery stent placement  reports stents x 4?    History of total hip replacement, left  2011    H/O colonoscopy    S/P cataract surgery  b/l        FAMILY HISTORY:  Family history of hypertension in mother        Social Hx:  Nonsmoker, no drug or alcohol use. Lives with wife.    MEDICATIONS  (STANDING):  ammonium lactate 12% Lotion 1 Application(s) Topical two times a day  aspirin enteric coated 81 milliGRAM(s) Oral daily  atorvastatin 20 milliGRAM(s) Oral at bedtime  azithromycin  IVPB 500 milliGRAM(s) IV Intermittent every 24 hours  budesonide  80 MICROgram(s)/formoterol 4.5 MICROgram(s) Inhaler 2 Puff(s) Inhalation two times a day  cefTRIAXone   IVPB 1000 milliGRAM(s) IV Intermittent every 24 hours  cholecalciferol 1000 Unit(s) Oral daily  enalapril 10 milliGRAM(s) Oral two times a day  famotidine    Tablet 20 milliGRAM(s) Oral daily  heparin   Injectable 5000 Unit(s) SubCutaneous every 12 hours  latanoprost 0.005% Ophthalmic Solution 1 Drop(s) Both EYES at bedtime  lidocaine   4% Patch 1 Patch Transdermal every 24 hours  magnesium oxide 400 milliGRAM(s) Oral daily  metoprolol succinate ER 25 milliGRAM(s) Oral daily  tamsulosin 0.4 milliGRAM(s) Oral at bedtime       Allergies    No Known Allergies    Intolerances        ROS: Pertinent positives in HPI, all other ROS were reviewed and are negative.      Vital Signs Last 24 Hrs  T(C): 36.6 (20 Jan 2022 08:31), Max: 37.1 (19 Jan 2022 17:48)  T(F): 97.9 (20 Jan 2022 08:31), Max: 98.8 (19 Jan 2022 17:48)  HR: 82 (20 Jan 2022 08:31) (75 - 82)  BP: 106/56 (20 Jan 2022 08:31) (97/64 - 121/56)  BP(mean): 75 (20 Jan 2022 00:25) (75 - 75)  RR: 18 (20 Jan 2022 08:31) (16 - 18)  SpO2: 96% (20 Jan 2022 08:31) (93% - 97%)        Constitutional: awake and alert.  HEENT: PERRLA, EOMI,   Neck: Supple.  Respiratory: Breath sounds are clear bilaterally  Cardiovascular: S1 and S2, regular / irregular rhythm  Gastrointestinal: soft, nontender  Extremities:  no edema  Musculoskeletal: no joint swelling/tenderness, no abnormal movements  Skin: No rashes    Neurological exam:  HF: Awake and alert. Oriented to person, place, time. Able to name current president and previous two presidents.  Appropriately interactive, normal affect. Speech fluent, No Aphasia or paraphasic errors. Naming /repetition intact   CN: ASHWIN, EOMI, VFF, facial sensation normal, no NLFD, tongue midline, Palate moves equally, SCM equal bilaterally  Motor: No pronator drift, Strength 5/5 in all 4 ext on confrontation testing in bed. , normal bulk and tone, no tremor, rigidity or bradykinesia.    Sens: Intact to light touch  Reflexes: Symmetric and normal . BJ 2+, BR 2+, KJ 3+, downgoing toes b/l, negative Bell's  Coord:  No FNFA, dysmetria, MARCO intact   Gait/Balance: Not tested          Labs:   01-20    136  |  103  |  21  ----------------------------<  117<H>  4.0   |  27  |  1.02    Ca    8.6      20 Jan 2022 09:40  Mg     2.6     01-19    TPro  6.1  /  Alb  2.2<L>  /  TBili  1.1  /  DBili  x   /  AST  14<L>  /  ALT  15  /  AlkPhos  80  01-19                              14.5   17.40 )-----------( 161      ( 20 Jan 2022 09:40 )             45.0       Radiology:  CT head 1/19/22:  1)  cerebral and cerebellar volume loss with chronic ischemic changes. No   acute territorial infarct or hemorrhage noted. No hydrocephalus or   collections.  2)  follow-up MR imaging of the brain may be considered for further   assessment.  
Patient is a 87y old  Male who presents with a chief complaint of near syncope  unable to walk     HPI:  86 y/o male with h/o BPH, CAD, COPD, glaucoma, HLD, HTN was admitted on  after he had a near syncopal episode at home. The patient reports significant L hip pain x several months. He received a steroid injection 2 weeks ago w/o significant relief. He walks w walker. He has constant L hip pain and sitting is difficult and wife feeds him. He feels progressively weaker. On the day of admission, he felt more SOB and collapsed near sink while brushing his teeth; denies LOC. In ER he received ceftriaxone and azithromycin.       PAST MEDICAL HX  BPH (benign prostatic hyperplasia)  CAD (coronary artery disease)  COPD (chronic obstructive pulmonary disease)  Glaucoma of both eyes, unspecified glaucoma  H/O Clostridium difficile infection s/p left THR  Hard of hearing  HTN (hypertension)  Humerus fracture proximal, right  Hyperlipidemia  Shoulder pain, right  Swelling of ankle b/l.    PAST SURGICAL HX  H/O bilateral inguinal hernia repair   H/O colonoscopy  History of tonsillectomy  History of total hip replacement, left   S/P cataract surgery b/l  Status post coronary artery stent placement reports stents x 4?    Meds: per reconciliation sheet, noted below  MEDICATIONS  (STANDING):  ammonium lactate 12% Lotion 1 Application(s) Topical two times a day  aspirin enteric coated 81 milliGRAM(s) Oral daily  atorvastatin 20 milliGRAM(s) Oral at bedtime  azithromycin  IVPB 500 milliGRAM(s) IV Intermittent every 24 hours  budesonide  80 MICROgram(s)/formoterol 4.5 MICROgram(s) Inhaler 2 Puff(s) Inhalation two times a day  cefTRIAXone   IVPB 1000 milliGRAM(s) IV Intermittent every 24 hours  cholecalciferol 1000 Unit(s) Oral daily  enalapril 10 milliGRAM(s) Oral two times a day  famotidine    Tablet 20 milliGRAM(s) Oral daily  heparin   Injectable 5000 Unit(s) SubCutaneous every 12 hours  latanoprost 0.005% Ophthalmic Solution 1 Drop(s) Both EYES at bedtime  lidocaine   4% Patch 1 Patch Transdermal every 24 hours  magnesium oxide 400 milliGRAM(s) Oral daily  metoprolol succinate ER 25 milliGRAM(s) Oral daily  tamsulosin 0.4 milliGRAM(s) Oral at bedtime    MEDICATIONS  (PRN):  acetaminophen     Tablet .. 650 milliGRAM(s) Oral every 6 hours PRN Temp greater or equal to 38C (100.4F), Mild Pain (1 - 3)  ALBUTerol    90 MICROgram(s) HFA Inhaler 2 Puff(s) Inhalation every 6 hours PRN Shortness of Breath and/or Wheezing  aluminum hydroxide/magnesium hydroxide/simethicone Suspension 30 milliLiter(s) Oral every 4 hours PRN Dyspepsia  melatonin 3 milliGRAM(s) Oral at bedtime PRN Insomnia  ondansetron Injectable 4 milliGRAM(s) IV Push every 6 hours PRN Nausea and/or Vomiting  oxycodone    5 mG/acetaminophen 325 mG 1 Tablet(s) Oral every 4 hours PRN Moderate Pain (4 - 6)    Allergies    No Known Allergies    Intolerances    Social: no smoking, no alcohol, no illegal drugs; no recent travel, no exposure to TB  FAMILY HISTORY:  Family history of hypertension in mother  no history of premature cardiovascular disease in first degree relatives    ROS: the patient denies fever, no chills, no HA, no seizures, no dizziness, no sore throat, no nasal congestion, no blurry vision, no CP, no palpitations, has SOB, no cough, no abdominal pain, no diarrhea, no N/V, no dysuria, no leg pain, no claudication, no rash, no joint aches, no rectal pain or bleeding, no night sweats, ahs increased weakness, has poor appetite  All other systems reviewed and are negative    Vital Signs Last 24 Hrs  T(C): 36.6 (2022 08:31), Max: 37.1 (2022 17:48)  T(F): 97.9 (2022 08:31), Max: 98.8 (2022 17:48)  HR: 82 (2022 08:31) (75 - 82)  BP: 106/56 (2022 08:31) (97/64 - 121/56)  BP(mean): 75 (2022 00:25) (75 - 75)  RR: 18 (2022 08:31) (16 - 18)  SpO2: 96% (2022 08:31) (93% - 97%)  Daily     Daily Weight in k.6 (2022 01:18)    PE:    Constitutional:  No acute distress  HEENT: NC/AT, EOMI, PERRLA, conjunctivae clear; ears and nose atraumatic; pharynx benign  Neck: supple; thyroid not palpable  Back: no tenderness  Respiratory: respiratory effort normal; few crackles at bases  Cardiovascular: S1S2 regular, no murmurs  Abdomen: soft, not tender, not distended, positive BS; no liver or spleen organomegaly  Genitourinary: no suprapubic tenderness  Lymphatic: no LN palpable  Musculoskeletal: no muscle tenderness, no joint swelling or tenderness  Extremities: no pedal edema  Neurological/ Psychiatric: AxOx3, judgement and insight normal; moving all extremities  Skin: no rashes; no palpable lesions    Labs: all available labs reviewed                        14.5    )-----------( 161      ( 2022 09:40 )             45.0         136  |  103  |  21  ----------------------------<  117<H>  4.0   |  27  |  1.02    Ca    8.6      2022 09:40  Mg     2.6         TPro  6.1  /  Alb  2.2<L>  /  TBili  1.1  /  DBili  x   /  AST  14<L>  /  ALT  15  /  AlkPhos  80       LIVER FUNCTIONS - ( 2022 17:37 )  Alb: 2.2 g/dL / Pro: 6.1 gm/dL / ALK PHOS: 80 U/L / ALT: 15 U/L / AST: 14 U/L / GGT: x           COVID-19 PCR: NotDetec (22 @ 17:37)    Radiology: all available radiological tests reviewed    < from: Xray Chest 1 View AP/PA. (22 @ 18:05) >  IMPRESSION: Slight left-sided infiltrates as above.  < end of copied text >    Advanced directives addressed: full resuscitation

## 2022-01-23 NOTE — PROGRESS NOTE ADULT - SUBJECTIVE AND OBJECTIVE BOX
CC: near syncope  unable to walk    HPI:  87 year old male w PMHx of BPH, CAD, COPD, glaucoma, HLD, HTN, PNA presented to  ED BIBEMS s/p near syncopal episode in the  morning. Pt reported significant L hip pain x months, s/p steroid injection 2 weeks ago w/o significant relief  walks w walker, has constant L hip pain and sitting is difficult and wife feeds him, Pt feels progressively weaker  On day of admission Pt  collapsed near sink while brushing his teeth. He denied injury, denied LOC  He stated  he has become too weak since last admission in .     In ED /68  HR 96    RR 18   T 97.8      100% sat RA. Leukocytosis, WBCs 20K, Elevated BNP. CXR LLL infiltrate   Head CT no acute changes  Hip xray intact prosthesis  L femur no fx  lasix 40 mg  percocet 5/325  tramadol 25 mg  given ceftriaxone and Zithromax        INTERVAL HPI/ OVERNIGHT EVENTS: chart reviewed, Pt was seen and examined, reports feeling better today, no cough, no SOB, no pain. Feels tired after MRI, wants to rest. Ambulated with PT yesterday denies pain in the hip       Vital Signs Last 24 Hrs  T(C): 36.4 (23 Jan 2022 08:45), Max: 36.7 (22 Jan 2022 16:59)  T(F): 97.6 (23 Jan 2022 08:45), Max: 98.1 (22 Jan 2022 16:59)  HR: 70 (23 Jan 2022 08:45) (67 - 91)  BP: 129/61 (23 Jan 2022 08:45) (113/74 - 129/61)  BP(mean): 73 (23 Jan 2022 06:25) (73 - 73)  RR: 18 (23 Jan 2022 08:45) (18 - 19)  SpO2: 95% (23 Jan 2022 08:45) (91% - 100%)      REVIEW OF SYSTEMS:  All other review of systems is negative unless indicated above.      PHYSICAL EXAM:  General: Well developed; frail elderly male,  in no acute distress  Eyes:EOMI; conjunctiva and sclera clear  Head: Normocephalic; atraumatic  ENMT: No nasal discharge; airway clear  Neck: Supple; non tender; no masses  Respiratory: Decreased BS. No wheezes, rales or rhonchi  Cardiovascular: Regular rate and rhythm. S1 and S2 Normal;  Gastrointestinal: Soft non-tender non-distended; Normal bowel sounds  Genitourinary: No  suprapubic  tenderness  Extremities: No leg  edema, feet in the  boot  Neurological: Alert and oriented x2-3, non focal   Musculoskeletal: Normal muscle tone and strength   Psychiatric: Cooperative       LABS:                         14.7   11.50 )-----------( 129      ( 22 Jan 2022 08:16 )             47.5     22 Jan 2022 08:16    133    |  104    |  24     ----------------------------<  114    4.8     |  24     |  0.94     Ca    8.6        22 Jan 2022 08:16      MEDICATIONS  (STANDING):  ammonium lactate 12% Lotion 1 Application(s) Topical two times a day  aspirin enteric coated 81 milliGRAM(s) Oral daily  atorvastatin 20 milliGRAM(s) Oral at bedtime  budesonide  80 MICROgram(s)/formoterol 4.5 MICROgram(s) Inhaler 2 Puff(s) Inhalation two times a day  cefepime   IVPB 2000 milliGRAM(s) IV Intermittent every 8 hours  cholecalciferol 1000 Unit(s) Oral daily  enalapril 10 milliGRAM(s) Oral two times a day  famotidine    Tablet 20 milliGRAM(s) Oral daily  guaiFENesin ER 1200 milliGRAM(s) Oral every 12 hours  heparin   Injectable 5000 Unit(s) SubCutaneous every 12 hours  latanoprost 0.005% Ophthalmic Solution 1 Drop(s) Both EYES at bedtime  lidocaine   4% Patch 1 Patch Transdermal every 24 hours  magnesium oxide 400 milliGRAM(s) Oral daily  metoprolol succinate ER 25 milliGRAM(s) Oral daily  tamsulosin 0.4 milliGRAM(s) Oral at bedtime    MEDICATIONS  (PRN):  acetaminophen     Tablet .. 650 milliGRAM(s) Oral every 6 hours PRN Temp greater or equal to 38C (100.4F), Mild Pain (1 - 3)  ALBUTerol    90 MICROgram(s) HFA Inhaler 2 Puff(s) Inhalation every 6 hours PRN Shortness of Breath and/or Wheezing  aluminum hydroxide/magnesium hydroxide/simethicone Suspension 30 milliLiter(s) Oral every 4 hours PRN Dyspepsia  melatonin 3 milliGRAM(s) Oral at bedtime PRN Insomnia  ondansetron Injectable 4 milliGRAM(s) IV Push every 6 hours PRN Nausea and/or Vomiting  oxycodone    5 mG/acetaminophen 325 mG 1 Tablet(s) Oral every 4 hours PRN Moderate Pain (4 - 6)      RADIOLOGY & ADDITIONAL TESTS:      ACC: [id number] EXAM:  CT CHEST                        PROCEDURE DATE:  01/20/2022      FINDINGS:    Lungs/Airways/Pleura: New ill-defined groundglass in the posterior right   upper lobe and lingula. There is persistent paracentral bronchiectasis   and the posterior lungs, right greater than left. Left paraspinal   density/mucous plugging has improved/resolved. Persistent centrilobular   and branching linear densities in the anterior right upper lobe. New   nodular density medial right middle lobe. New trace right greater than   left pleural effusions.    Mediastinum/Lymph nodes: No thoracic adenopathy.    Heart and Vessels: The heart is stable in size. There are three-vessel   coronary artery calcifications. Pulmonary artery enlargement compared to   adjacent ascending aorta may reflect pulmonary hypertension.    Upper Abdomen: There is cholelithiasis.    Osseous structures and Soft Tissues: There is diffuse qualitative   osteopenia. Stable severe T8 vertebral body compression   fracture/vertebral plana. No aggressive bone lesions. Status post right   shoulder arthroplasty.    IMPRESSION:    1.  New findings in both lungs favored to be aspiration. There are   indeterminate for Covid 19 pneumonia and not typical for bacterial   pneumonia.    2.  Persistent paracentral bronchiectasis in the posterior lungs.   Improved/resolved left paraspinal consolidation    3.New trace right greater than left pleural effusions are              ACC: [id number] EXAM:  CT CERVICAL SPINE                        PROCEDURE DATE:  01/20/2022    FINDINGS:   No prior similar studies are available for review    Cervical vertebral body heights are maintained. No vertebral fracture is   seen. No destructive bone lesion is found.  Alignment is preserved.    Facet joints appear intact and aligned.    Cervical intervertebral disc spaces show multilevel degenerative disease   and spondylosis with loss of intervertebral disc height and associated   degenerative endplate changes.  There is multilevel narrowing of the   neural foramina on a degenerative basis.  There is no evidence of   high-grade central canal stenosis.   MR would be required to evaluate the   intervertebral discs at higher sensitivity for disc pathology.    The skull base appears intact.  No neck mass is recognized.  Paraspinal   soft tissues appear intact. Visualized lymph nodes appear to be within   physiologic size limits.      IMPRESSION:    No acute fracture.  Multilevel degenerative changes of the cervical spine   as above.          < from: Xray Femur 2 Views, Left (01.19.22 @ 13:46) >    ACC: [id number] EXAM:  XR FEMUR 2 VIEWS LT                        ACC: [id number] EXAM:  XR HIP WITH PELV 2-3V LT                          PROCEDURE DATE:  01/19/2022          INTERPRETATION:  Left hip with full pelvic view and left femur. Patient   has local pain.    Left hip with pelvis. 3 views.    Hip pinning on the left with good alignment and nonunion of the lesser   trochanter again noted.    There is bilateral hip degeneration left greater than right.    No bone destruction or acute fracture.    Findings are similar to November 17, 2021.    Left femur. 4 views. 5 images.    The hip pinning includes a long intramedullary carlos going down the entire   diaphysis. The knee shows mild to moderate degeneration. Lower femur is   intact.    Appearance is similar to November 17, 2021. Arterial calcifications again   noted.    IMPRESSION: No acute finding or change.    =           CC: near syncope  unable to walk    HPI: 87 year old male w PMHx of BPH, CAD, COPD, glaucoma, HLD, HTN, PNA presented to  ED BIBEMS s/p near syncopal episode in the  morning. Pt reported significant L hip pain x months, s/p steroid injection 2 weeks ago w/o significant relief  walks w walker, has constant L hip pain and sitting is difficult and wife feeds him, Pt feels progressively weaker  On day of admission Pt  collapsed near sink while brushing his teeth. He denied injury, denied LOC  He stated  he has become too weak since last admission in .     In ED /68  HR 96    RR 18   T 97.8      100% sat RA. Leukocytosis, WBCs 20K, Elevated BNP. CXR LLL infiltrate   Head CT no acute changes  Hip xray intact prosthesis  L femur no fx  lasix 40 mg  percocet 5/325  tramadol 25 mg  given ceftriaxone and Zithromax        INTERVAL HPI/ OVERNIGHT EVENTS: Pt was seen and examined,  no new complains, no pain, no SOB. Did not ambulate  today. MRI report spending     Vital Signs Last 24 Hrs  T(C): 36.4 (23 Jan 2022 08:45), Max: 36.7 (22 Jan 2022 16:59)  T(F): 97.6 (23 Jan 2022 08:45), Max: 98.1 (22 Jan 2022 16:59)  HR: 70 (23 Jan 2022 08:45) (67 - 91)  BP: 129/61 (23 Jan 2022 08:45) (113/74 - 129/61)  BP(mean): 73 (23 Jan 2022 06:25) (73 - 73)  RR: 18 (23 Jan 2022 08:45) (18 - 19)  SpO2: 95% (23 Jan 2022 08:45) (91% - 100%)      REVIEW OF SYSTEMS:  All other review of systems is negative unless indicated above.      PHYSICAL EXAM:  General: Well developed; frail elderly male,  in no acute distress  Eyes: EOMI; conjunctiva and sclera clear  Head: Normocephalic; atraumatic  ENMT: No nasal discharge; airway clear  Neck: Supple; non tender; no masses  Respiratory: Decreased BS. No wheezes, rales or rhonchi  Cardiovascular: Regular rate and rhythm. S1 and S2 Normal;  Gastrointestinal: Soft non-tender non-distended; Normal bowel sounds  Genitourinary: No  suprapubic  tenderness  Extremities: No leg edema, feet in the  boot  Neurological: Alert and oriented x2-3, non focal   Musculoskeletal: Normal muscle tone and strength   Psychiatric: Cooperative       LABS:                         14.2   10.98 )-----------( 127      ( 23 Jan 2022 06:54 )             43.9     01-23    135  |  105  |  19  ----------------------------<  115<H>  4.6   |  24  |  0.74    Ca    8.7      23 Jan 2022 06:54                            14.7   11.50 )-----------( 129      ( 22 Jan 2022 08:16 )             47.5     22 Jan 2022 08:16    133    |  104    |  24     ----------------------------<  114    4.8     |  24     |  0.94     Ca    8.6        22 Jan 2022 08:16      MEDICATIONS  (STANDING):  ammonium lactate 12% Lotion 1 Application(s) Topical two times a day  aspirin enteric coated 81 milliGRAM(s) Oral daily  atorvastatin 20 milliGRAM(s) Oral at bedtime  budesonide  80 MICROgram(s)/formoterol 4.5 MICROgram(s) Inhaler 2 Puff(s) Inhalation two times a day  cefepime   IVPB 2000 milliGRAM(s) IV Intermittent every 12 hours  cholecalciferol 1000 Unit(s) Oral daily  enalapril 10 milliGRAM(s) Oral two times a day  famotidine    Tablet 20 milliGRAM(s) Oral daily  guaiFENesin ER 1200 milliGRAM(s) Oral every 12 hours  heparin   Injectable 5000 Unit(s) SubCutaneous every 12 hours  latanoprost 0.005% Ophthalmic Solution 1 Drop(s) Both EYES at bedtime  lidocaine   4% Patch 1 Patch Transdermal every 24 hours  magnesium oxide 400 milliGRAM(s) Oral daily  metoprolol succinate ER 25 milliGRAM(s) Oral daily  tamsulosin 0.4 milliGRAM(s) Oral at bedtime    MEDICATIONS  (PRN):  acetaminophen     Tablet .. 650 milliGRAM(s) Oral every 6 hours PRN Temp greater or equal to 38C (100.4F), Mild Pain (1 - 3)  ALBUTerol    90 MICROgram(s) HFA Inhaler 2 Puff(s) Inhalation every 6 hours PRN Shortness of Breath and/or Wheezing  aluminum hydroxide/magnesium hydroxide/simethicone Suspension 30 milliLiter(s) Oral every 4 hours PRN Dyspepsia  melatonin 3 milliGRAM(s) Oral at bedtime PRN Insomnia  ondansetron Injectable 4 milliGRAM(s) IV Push every 6 hours PRN Nausea and/or Vomiting  oxycodone    5 mG/acetaminophen 325 mG 1 Tablet(s) Oral every 4 hours PRN Moderate Pain (4 - 6)    RADIOLOGY & ADDITIONAL TESTS:      ACC: [id number] EXAM:  CT CHEST                        PROCEDURE DATE:  01/20/2022      FINDINGS:    Lungs/Airways/Pleura: New ill-defined groundglass in the posterior right   upper lobe and lingula. There is persistent paracentral bronchiectasis   and the posterior lungs, right greater than left. Left paraspinal   density/mucous plugging has improved/resolved. Persistent centrilobular   and branching linear densities in the anterior right upper lobe. New   nodular density medial right middle lobe. New trace right greater than   left pleural effusions.    Mediastinum/Lymph nodes: No thoracic adenopathy.    Heart and Vessels: The heart is stable in size. There are three-vessel   coronary artery calcifications. Pulmonary artery enlargement compared to   adjacent ascending aorta may reflect pulmonary hypertension.    Upper Abdomen: There is cholelithiasis.    Osseous structures and Soft Tissues: There is diffuse qualitative   osteopenia. Stable severe T8 vertebral body compression   fracture/vertebral plana. No aggressive bone lesions. Status post right   shoulder arthroplasty.    IMPRESSION:    1.  New findings in both lungs favored to be aspiration. There are   indeterminate for Covid 19 pneumonia and not typical for bacterial   pneumonia.    2.  Persistent paracentral bronchiectasis in the posterior lungs.   Improved/resolved left paraspinal consolidation    3.New trace right greater than left pleural effusions are              ACC: [id number] EXAM:  CT CERVICAL SPINE                        PROCEDURE DATE:  01/20/2022    FINDINGS:   No prior similar studies are available for review    Cervical vertebral body heights are maintained. No vertebral fracture is   seen. No destructive bone lesion is found.  Alignment is preserved.    Facet joints appear intact and aligned.    Cervical intervertebral disc spaces show multilevel degenerative disease   and spondylosis with loss of intervertebral disc height and associated   degenerative endplate changes.  There is multilevel narrowing of the   neural foramina on a degenerative basis.  There is no evidence of   high-grade central canal stenosis.   MR would be required to evaluate the   intervertebral discs at higher sensitivity for disc pathology.    The skull base appears intact.  No neck mass is recognized.  Paraspinal   soft tissues appear intact. Visualized lymph nodes appear to be within   physiologic size limits.      IMPRESSION:    No acute fracture.  Multilevel degenerative changes of the cervical spine   as above.          < from: Xray Femur 2 Views, Left (01.19.22 @ 13:46) >    ACC: [id number] EXAM:  XR FEMUR 2 VIEWS LT                        ACC: [id number] EXAM:  XR HIP WITH PELV 2-3V LT                          PROCEDURE DATE:  01/19/2022          INTERPRETATION:  Left hip with full pelvic view and left femur. Patient   has local pain.    Left hip with pelvis. 3 views.    Hip pinning on the left with good alignment and nonunion of the lesser   trochanter again noted.    There is bilateral hip degeneration left greater than right.    No bone destruction or acute fracture.    Findings are similar to November 17, 2021.    Left femur. 4 views. 5 images.    The hip pinning includes a long intramedullary carlos going down the entire   diaphysis. The knee shows mild to moderate degeneration. Lower femur is   intact.    Appearance is similar to November 17, 2021. Arterial calcifications again   noted.    IMPRESSION: No acute finding or change.    =

## 2022-01-23 NOTE — PROGRESS NOTE ADULT - SUBJECTIVE AND OBJECTIVE BOX
· Reason for Admission	near syncope  unable to walk  1/23/22 : Pt awake, alert , lying in bed, no new c/o.  Walks  with walker. Awaiting MRI results.     MEDICATIONS  (STANDING):  ammonium lactate 12% Lotion 1 Application(s) Topical two times a day  aspirin enteric coated 81 milliGRAM(s) Oral daily  atorvastatin 20 milliGRAM(s) Oral at bedtime  budesonide  80 MICROgram(s)/formoterol 4.5 MICROgram(s) Inhaler 2 Puff(s) Inhalation two times a day  cefepime   IVPB 2000 milliGRAM(s) IV Intermittent every 12 hours  cholecalciferol 1000 Unit(s) Oral daily  enalapril 10 milliGRAM(s) Oral two times a day  famotidine    Tablet 20 milliGRAM(s) Oral daily  guaiFENesin ER 1200 milliGRAM(s) Oral every 12 hours  heparin   Injectable 5000 Unit(s) SubCutaneous every 12 hours  latanoprost 0.005% Ophthalmic Solution 1 Drop(s) Both EYES at bedtime  lidocaine   4% Patch 1 Patch Transdermal every 24 hours  magnesium oxide 400 milliGRAM(s) Oral daily  metoprolol succinate ER 25 milliGRAM(s) Oral daily  tamsulosin 0.4 milliGRAM(s) Oral at bedtime      Vital Signs Last 24 Hrs  T(C): 36.4 (23 Jan 2022 08:45), Max: 36.7 (22 Jan 2022 16:59)  T(F): 97.6 (23 Jan 2022 08:45), Max: 98.1 (22 Jan 2022 16:59)  HR: 70 (23 Jan 2022 08:45) (67 - 91)  BP: 129/61 (23 Jan 2022 08:45) (113/74 - 129/61)  BP(mean): 73 (23 Jan 2022 06:25) (73 - 73)  RR: 18 (23 Jan 2022 08:45) (18 - 19)  SpO2: 95% (23 Jan 2022 08:45) (91% - 100%)    HF: Awake and alert. Oriented to person, place, time.  Speech fluent, No Aphasia or paraphasic errors.   CN: ASHWIN, EOMI, VFF, facial sensation normal, no NLFD, gag intact  Motor: No pronator drift, Strength 5/5 in all 4 ext .   Sens: Intact to light touch  Reflexes: Symmetric and normal . BJ 2+, BR 2+, KJ 3+, downgoing toes b/l  Coord:  No FNFA, dysmetria, MARCO intact   Gait/Balance: Not tested                          14.2   10.98 )-----------( 127      ( 23 Jan 2022 06:54 )             43.9     01-23    135  |  105  |  19  ----------------------------<  115<H>  4.6   |  24  |  0.74    Ca    8.7      23 Jan 2022 06:54      Radiology report:  - CT Head  < from: CT Head No Cont (01.19.22 @ 18:05) >  IMPRESSION:    1)  cerebral and cerebellar volume loss with chronic ischemic changes. No   acute territorial infarct or hemorrhage noted. No hydrocephalus or   collections.  2)  follow-up MR imaging of the brain may be considered for further   assessment.    < from: CT Cervical Spine No Cont (01.20.22 @ 16:48) >  IMPRESSION:    No acute fracture.  Multilevel degenerative changes of the cervical spine   as above.

## 2022-01-23 NOTE — CONSULT NOTE ADULT - REASON FOR ADMISSION
near syncope  unable to walk

## 2022-01-24 PROCEDURE — 99232 SBSQ HOSP IP/OBS MODERATE 35: CPT

## 2022-01-24 PROCEDURE — 72132 CT LUMBAR SPINE W/DYE: CPT | Mod: 26

## 2022-01-24 RX ADMIN — BUDESONIDE AND FORMOTEROL FUMARATE DIHYDRATE 2 PUFF(S): 160; 4.5 AEROSOL RESPIRATORY (INHALATION) at 08:57

## 2022-01-24 RX ADMIN — FAMOTIDINE 20 MILLIGRAM(S): 10 INJECTION INTRAVENOUS at 11:48

## 2022-01-24 RX ADMIN — Medication 1200 MILLIGRAM(S): at 11:49

## 2022-01-24 RX ADMIN — Medication 1200 MILLIGRAM(S): at 21:44

## 2022-01-24 RX ADMIN — ATORVASTATIN CALCIUM 20 MILLIGRAM(S): 80 TABLET, FILM COATED ORAL at 21:45

## 2022-01-24 RX ADMIN — Medication 25 MILLIGRAM(S): at 11:51

## 2022-01-24 RX ADMIN — LATANOPROST 1 DROP(S): 0.05 SOLUTION/ DROPS OPHTHALMIC; TOPICAL at 21:48

## 2022-01-24 RX ADMIN — BUDESONIDE AND FORMOTEROL FUMARATE DIHYDRATE 2 PUFF(S): 160; 4.5 AEROSOL RESPIRATORY (INHALATION) at 20:43

## 2022-01-24 RX ADMIN — HEPARIN SODIUM 5000 UNIT(S): 5000 INJECTION INTRAVENOUS; SUBCUTANEOUS at 21:50

## 2022-01-24 RX ADMIN — Medication 1 APPLICATION(S): at 17:48

## 2022-01-24 RX ADMIN — CEFEPIME 100 MILLIGRAM(S): 1 INJECTION, POWDER, FOR SOLUTION INTRAMUSCULAR; INTRAVENOUS at 11:49

## 2022-01-24 RX ADMIN — Medication 1000 UNIT(S): at 11:49

## 2022-01-24 RX ADMIN — TAMSULOSIN HYDROCHLORIDE 0.4 MILLIGRAM(S): 0.4 CAPSULE ORAL at 23:19

## 2022-01-24 RX ADMIN — LIDOCAINE 1 PATCH: 4 CREAM TOPICAL at 11:58

## 2022-01-24 RX ADMIN — MAGNESIUM OXIDE 400 MG ORAL TABLET 400 MILLIGRAM(S): 241.3 TABLET ORAL at 11:50

## 2022-01-24 RX ADMIN — LIDOCAINE 1 PATCH: 4 CREAM TOPICAL at 19:30

## 2022-01-24 RX ADMIN — Medication 1 APPLICATION(S): at 06:27

## 2022-01-24 RX ADMIN — HEPARIN SODIUM 5000 UNIT(S): 5000 INJECTION INTRAVENOUS; SUBCUTANEOUS at 11:50

## 2022-01-24 RX ADMIN — Medication 10 MILLIGRAM(S): at 11:51

## 2022-01-24 RX ADMIN — CEFEPIME 100 MILLIGRAM(S): 1 INJECTION, POWDER, FOR SOLUTION INTRAMUSCULAR; INTRAVENOUS at 21:50

## 2022-01-24 RX ADMIN — Medication 81 MILLIGRAM(S): at 11:50

## 2022-01-24 RX ADMIN — LIDOCAINE 1 PATCH: 4 CREAM TOPICAL at 23:57

## 2022-01-24 NOTE — CHART NOTE - NSCHARTNOTEFT_GEN_A_CORE
Patient Rajan Dubose was dispensed a TLSO rigid posterior panel extending from sacrococcygeal junction to the scapular spine, with a soft apron front. Rajan was educated on the care use and function of the orthosis. Contact info was given to patient. All went without incident.   Nic MCHUGH  [facility]  [phone]

## 2022-01-24 NOTE — PROGRESS NOTE ADULT - SUBJECTIVE AND OBJECTIVE BOX
Date of service: 01-24-22 @ 12:37    Lying in bed in NAD  Has dry cough  Has lower back pain with movement  No fever    ROS: no fever or chills; denies dizziness, no HA, no abdominal pain, no diarrhea or constipation; no dysuria, no legs pain, no rashes    MEDICATIONS  (STANDING):  ammonium lactate 12% Lotion 1 Application(s) Topical two times a day  aspirin enteric coated 81 milliGRAM(s) Oral daily  atorvastatin 20 milliGRAM(s) Oral at bedtime  budesonide  80 MICROgram(s)/formoterol 4.5 MICROgram(s) Inhaler 2 Puff(s) Inhalation two times a day  cefepime   IVPB 2000 milliGRAM(s) IV Intermittent every 12 hours  cholecalciferol 1000 Unit(s) Oral daily  enalapril 10 milliGRAM(s) Oral two times a day  famotidine    Tablet 20 milliGRAM(s) Oral daily  guaiFENesin ER 1200 milliGRAM(s) Oral every 12 hours  heparin   Injectable 5000 Unit(s) SubCutaneous every 12 hours  latanoprost 0.005% Ophthalmic Solution 1 Drop(s) Both EYES at bedtime  lidocaine   4% Patch 1 Patch Transdermal every 24 hours  magnesium oxide 400 milliGRAM(s) Oral daily  metoprolol succinate ER 25 milliGRAM(s) Oral daily  tamsulosin 0.4 milliGRAM(s) Oral at bedtime    Vital Signs Last 24 Hrs  T(C): 36.4 (24 Jan 2022 08:48), Max: 36.7 (23 Jan 2022 20:52)  T(F): 97.5 (24 Jan 2022 08:48), Max: 98 (23 Jan 2022 20:52)  HR: 69 (24 Jan 2022 08:48) (69 - 82)  BP: 127/64 (24 Jan 2022 08:48) (105/73 - 139/59)  BP(mean): 84 (23 Jan 2022 20:52) (84 - 84)  RR: 18 (24 Jan 2022 08:48) (18 - 19)  SpO2: 95% (24 Jan 2022 08:48) (93% - 98%)     Physical exam:    Constitutional:  No acute distress  HEENT: NC/AT, EOMI, PERRLA, conjunctivae clear; ears and nose atraumatic  Neck: supple; thyroid not palpable  Back: no tenderness  Respiratory: respiratory effort normal; few crackles at bases  Cardiovascular: S1S2 regular, no murmurs  Abdomen: soft, not tender, not distended, positive BS  Genitourinary: no suprapubic tenderness  Lymphatic: no LN palpable  Musculoskeletal: no muscle tenderness, no joint swelling or tenderness  Extremities: no pedal edema  Neurological/ Psychiatric: AxOx3, moving all extremities  Skin: no rashes; no palpable lesions    Labs: reviewed                        14.2   10.98 )-----------( 127      ( 23 Jan 2022 06:54 )             43.9     01-23    135  |  105  |  19  ----------------------------<  115<H>  4.6   |  24  |  0.74    Ca    8.7      23 Jan 2022 06:54                        14.0   12.65 )-----------( 138      ( 21 Jan 2022 08:20 )             44.1     01-21    136  |  104  |  21  ----------------------------<  94  4.3   |  25  |  0.77    Ca    8.7      21 Jan 2022 08:20  Mg     2.6     01-19    TPro  6.1  /  Alb  2.2<L>  /  TBili  1.1  /  DBili  x   /  AST  14<L>  /  ALT  15  /  AlkPhos  80  01-19                        14.5   17.40 )-----------( 161      ( 20 Jan 2022 09:40 )             45.0     01-20    136  |  103  |  21  ----------------------------<  117<H>  4.0   |  27  |  1.02    Ca    8.6      20 Jan 2022 09:40  Mg     2.6     01-19    TPro  6.1  /  Alb  2.2<L>  /  TBili  1.1  /  DBili  x   /  AST  14<L>  /  ALT  15  /  AlkPhos  80  01-19     LIVER FUNCTIONS - ( 19 Jan 2022 17:37 )  Alb: 2.2 g/dL / Pro: 6.1 gm/dL / ALK PHOS: 80 U/L / ALT: 15 U/L / AST: 14 U/L / GGT: x           COVID-19 PCR: NotDete (01-19-22 @ 17:37)    Culture - Blood (collected 19 Jan 2022 21:10)  Source: .Blood Blood  Preliminary Report (21 Jan 2022 03:01):    No growth to date.    Culture - Blood (collected 19 Jan 2022 21:10)  Source: .Blood Blood / Aerobic plus received  Preliminary Report (21 Jan 2022 03:01):    No growth to date.    Rapid RVP Result: Narendrate (01.20.22 @ 12:30)     Radiology: all available radiological tests reviewed    < from: Xray Chest 1 View AP/PA. (01.19.22 @ 18:05) >  IMPRESSION: Slight left-sided infiltrates as above.  < end of copied text >    Advanced directives addressed: full resuscitation

## 2022-01-24 NOTE — PROGRESS NOTE ADULT - SUBJECTIVE AND OBJECTIVE BOX
CC: near syncope  unable to walk    HPI: 87 year old male w PMHx of BPH, CAD, COPD, glaucoma, HLD, HTN, PNA presented to  ED BIBEMS s/p near syncopal episode in the  morning. Pt reported significant L hip pain x months, s/p steroid injection 2 weeks ago w/o significant relief  walks w walker, has constant L hip pain and sitting is difficult and wife feeds him, Pt feels progressively weaker  On day of admission Pt  collapsed near sink while brushing his teeth. He denied injury, denied LOC  He stated  he has become too weak since last admission in .     In ED /68  HR 96    RR 18   T 97.8      100% sat RA. Leukocytosis, WBCs 20K, Elevated BNP. CXR LLL infiltrate   Head CT no acute changes  Hip xray intact prosthesis  L femur no fx  lasix 40 mg  percocet 5/325  tramadol 25 mg  given ceftriaxone and Zithromax      Medical progress:   Complaints:  State of mind:       REVIEW OF SYSTEMS:  All other review of systems is negative unless indicated above.    PHYSICAL EXAM:  General: Well developed; frail elderly male,  in no acute distress  Eyes: EOMI; conjunctiva and sclera clear  Head: Normocephalic; atraumatic  ENMT: No nasal discharge; airway clear  Neck: Supple; non tender; no masses  Respiratory: Decreased BS. No wheezes, rales or rhonchi  Cardiovascular: Regular rate and rhythm. S1 and S2 Normal;  Gastrointestinal: Soft non-tender non-distended; Normal bowel sounds  Genitourinary: No  suprapubic  tenderness  Extremities: No leg edema, feet in the  boot  Neurological: Alert and oriented x2-3, non focal   Musculoskeletal: Normal muscle tone and strength   Psychiatric: Cooperative       LABS:            MEDICATIONS  (STANDING):  ammonium lactate 12% Lotion 1 Application(s) Topical two times a day  aspirin enteric coated 81 milliGRAM(s) Oral daily  atorvastatin 20 milliGRAM(s) Oral at bedtime  budesonide  80 MICROgram(s)/formoterol 4.5 MICROgram(s) Inhaler 2 Puff(s) Inhalation two times a day  cefepime   IVPB 2000 milliGRAM(s) IV Intermittent every 12 hours  cholecalciferol 1000 Unit(s) Oral daily  enalapril 10 milliGRAM(s) Oral two times a day  famotidine    Tablet 20 milliGRAM(s) Oral daily  guaiFENesin ER 1200 milliGRAM(s) Oral every 12 hours  heparin   Injectable 5000 Unit(s) SubCutaneous every 12 hours  latanoprost 0.005% Ophthalmic Solution 1 Drop(s) Both EYES at bedtime  lidocaine   4% Patch 1 Patch Transdermal every 24 hours  magnesium oxide 400 milliGRAM(s) Oral daily  metoprolol succinate ER 25 milliGRAM(s) Oral daily  tamsulosin 0.4 milliGRAM(s) Oral at bedtime    MEDICATIONS  (PRN):  acetaminophen     Tablet .. 650 milliGRAM(s) Oral every 6 hours PRN Temp greater or equal to 38C (100.4F), Mild Pain (1 - 3)  ALBUTerol    90 MICROgram(s) HFA Inhaler 2 Puff(s) Inhalation every 6 hours PRN Shortness of Breath and/or Wheezing  aluminum hydroxide/magnesium hydroxide/simethicone Suspension 30 milliLiter(s) Oral every 4 hours PRN Dyspepsia  melatonin 3 milliGRAM(s) Oral at bedtime PRN Insomnia  ondansetron Injectable 4 milliGRAM(s) IV Push every 6 hours PRN Nausea and/or Vomiting  oxycodone    5 mG/acetaminophen 325 mG 1 Tablet(s) Oral every 4 hours PRN Moderate Pain (4 - 6)    RADIOLOGY & ADDITIONAL TESTS:      ACC: [id number] EXAM:  CT CHEST                        PROCEDURE DATE:  01/20/2022      FINDINGS:    Lungs/Airways/Pleura: New ill-defined groundglass in the posterior right   upper lobe and lingula. There is persistent paracentral bronchiectasis   and the posterior lungs, right greater than left. Left paraspinal   density/mucous plugging has improved/resolved. Persistent centrilobular   and branching linear densities in the anterior right upper lobe. New   nodular density medial right middle lobe. New trace right greater than   left pleural effusions.    Mediastinum/Lymph nodes: No thoracic adenopathy.    Heart and Vessels: The heart is stable in size. There are three-vessel   coronary artery calcifications. Pulmonary artery enlargement compared to   adjacent ascending aorta may reflect pulmonary hypertension.    Upper Abdomen: There is cholelithiasis.    Osseous structures and Soft Tissues: There is diffuse qualitative   osteopenia. Stable severe T8 vertebral body compression   fracture/vertebral plana. No aggressive bone lesions. Status post right   shoulder arthroplasty.    IMPRESSION:    1.  New findings in both lungs favored to be aspiration. There are   indeterminate for Covid 19 pneumonia and not typical for bacterial   pneumonia.    2.  Persistent paracentral bronchiectasis in the posterior lungs.   Improved/resolved left paraspinal consolidation    3.New trace right greater than left pleural effusions are                   CC: near syncope  unable to walk    HPI: 87 year old male w PMHx of BPH, CAD, COPD, glaucoma, HLD, HTN, PNA presented to  ED BIBEMS s/p near syncopal episode in the  morning. Pt reported significant L hip pain x months, s/p steroid injection 2 weeks ago w/o significant relief  walks w walker, has constant L hip pain and sitting is difficult and wife feeds him, Pt feels progressively weaker  On day of admission Pt  collapsed near sink while brushing his teeth. He denied injury, denied LOC  He stated  he has become too weak since last admission in .     Medical progress:   Complaints:  State of mind:       REVIEW OF SYSTEMS:  All other review of systems is negative unless indicated above.    PHYSICAL EXAM:  General: Well developed; frail elderly male,  in no acute distress  Eyes: EOMI; conjunctiva and sclera clear  Head: Normocephalic; atraumatic  ENMT: No nasal discharge; airway clear  Neck: Supple; non tender; no masses  Respiratory: Decreased BS. No wheezes, rales or rhonchi  Cardiovascular: Regular rate and rhythm. S1 and S2 Normal;  Gastrointestinal: Soft non-tender non-distended; Normal bowel sounds  Genitourinary: No  suprapubic  tenderness  Extremities: No leg edema, feet in the  boot  Neurological: Alert and oriented x2-3, non focal   Musculoskeletal: Normal muscle tone and strength   Psychiatric: Cooperative       LABS:            MEDICATIONS  (STANDING):  ammonium lactate 12% Lotion 1 Application(s) Topical two times a day  aspirin enteric coated 81 milliGRAM(s) Oral daily  atorvastatin 20 milliGRAM(s) Oral at bedtime  budesonide  80 MICROgram(s)/formoterol 4.5 MICROgram(s) Inhaler 2 Puff(s) Inhalation two times a day  cefepime   IVPB 2000 milliGRAM(s) IV Intermittent every 12 hours  cholecalciferol 1000 Unit(s) Oral daily  enalapril 10 milliGRAM(s) Oral two times a day  famotidine    Tablet 20 milliGRAM(s) Oral daily  guaiFENesin ER 1200 milliGRAM(s) Oral every 12 hours  heparin   Injectable 5000 Unit(s) SubCutaneous every 12 hours  latanoprost 0.005% Ophthalmic Solution 1 Drop(s) Both EYES at bedtime  lidocaine   4% Patch 1 Patch Transdermal every 24 hours  magnesium oxide 400 milliGRAM(s) Oral daily  metoprolol succinate ER 25 milliGRAM(s) Oral daily  tamsulosin 0.4 milliGRAM(s) Oral at bedtime      RADIOLOGY & ADDITIONAL TESTS:  ACC: [id number] EXAM:  CT CHEST                        PROCEDURE DATE:  01/20/2022      Lungs/Airways/Pleura: New ill-defined groundglass in the posterior right   upper lobe and lingula. There is persistent paracentral bronchiectasis   and the posterior lungs, right greater than left. Left paraspinal   density/mucous plugging has improved/resolved. Persistent centrilobular   and branching linear densities in the anterior right upper lobe. New   nodular density medial right middle lobe. New trace right greater than   left pleural effusions.    Mediastinum/Lymph nodes: No thoracic adenopathy.    Heart and Vessels: The heart is stable in size. There are three-vessel   coronary artery calcifications. Pulmonary artery enlargement compared to   adjacent ascending aorta may reflect pulmonary hypertension.    Upper Abdomen: There is cholelithiasis.    Osseous structures and Soft Tissues: There is diffuse qualitative   osteopenia. Stable severe T8 vertebral body compression   fracture/vertebral plana. No aggressive bone lesions. Status post right   shoulder arthroplasty.    IMPRESSION:    1.  New findings in both lungs favored to be aspiration. There are   indeterminate for Covid 19 pneumonia and not typical for bacterial   pneumonia.    2.  Persistent paracentral bronchiectasis in the posterior lungs.   Improved/resolved left paraspinal consolidation    3.New trace right greater than left pleural effusions are             Pt is a 87 year old male w PMHx of BPH, CAD, COPD, glaucoma, HLD, HTN  admitted for:     # Weakness. Near syncope  - likely related to leg weakness and debility  - was admitted previously with similar presentation and work up was neg   - CT head neg to acute findings   - orthostatics - negative now, but was hydrated, monitor   - Likely need compression stockings for  prolong standing, both episodes while was shaving and was brushing his teeth   - recent echo revealed preserved EF  - tele: SR 80-90s,  halina overnight to 40s      # Left side pneumonia. ?Multifocal pneumonia suspected ASPIRATION  - obtain RVP ?viral pneumonia  - leukocytosis improving  - BC x 2 note  - on cefepime 2 gm IV q12h # 3  - tolerating abx well so far; no side effects noted    # Left hip pain, L2 acute fracture . L4/T8 chronic Fx   - patient recommended to have MRI with contrast however patient does not want another MRI.   - Recommend Ct L-spine with contrast to r/o abscess vs alternate cause for enhancement recommend TLSO brace  - no acute emergent neurosurgical intervention at this timE  - NO SURGICAL INTERVENTION AT THIS TIME    #COPD, stable   On  Trelegy  at hole   C/w budesonide-formoterol   albuterol prn    #Glaucoma   latanoprost    #HLD  atorvastatin    #HTN  continue home meds w parameters  enalapril  10 mg BID  metoprolol 25    DVT PPX: Heparin SQ

## 2022-01-24 NOTE — PROGRESS NOTE ADULT - ASSESSMENT
· Assessment	  87 year old male w PMHx of BPH, CAD, COPD, glaucoma, HLD, HTN, who presented to the ED after a near syncopal episode.  His wife reports difficulty ambulating due to pain in the left lower extremity.  He does not have focal weakness on exam but seems to be somewhat debilitated.  On examination he does have brisk reflexes, particularly in the patellars.    -Chronic weakness likely exacerbated in the setting of infection (pneumonia).  -Treatment of pneumonia as per medicine/ID.  -CT cervical spine is not remarkable.  -Will get MRI of spine old Fx T8 and Comminuted Fx L2 with central distraction and  edema .  -Seen by NS no surgical intervention needed Rec TSLO brace   -B12 level normal.  -CT head shows chronic changes including atrophy and microvascular ischemic changes but nothing suspicious for acute stroke.  -PT /OT for ambulation  - Will follow up as needed.

## 2022-01-24 NOTE — CHART NOTE - NSCHARTNOTEFT_GEN_A_CORE
Neurosurgery PA Follow up note:    CT L-spine with contrast reviewed with Dr. Riggs  Pt denies back pain  Pt should wear TLSO brace at all time when out of bed  Advance activity as tolerated

## 2022-01-24 NOTE — PROGRESS NOTE ADULT - SUBJECTIVE AND OBJECTIVE BOX
HPI:  87 year old male w PMHx of BPH, CAD, COPD, glaucoma, HLD, HTN, presented to  ED BIBEMS s/p near syncopal episode this morning    Pt reported significant L hip pain x months.    s/p steroid injection 2 weeks ago w/o significant relief  walks w walker  has constant L hip pain and sitting is difficult and wife feeds him  able to sleep  feels progressively weaker  Today collapsed near sink while brushing his teeth  He denied injury   denied LOC    He states he has become too weak since last admission in . MRI shows L2 fracture patient without presently complaining of pain. Patient has been incontinent of bowel and bladder recently and now using diaper.     Vital Signs Last 24 Hrs  T(C): 36.4 (24 Jan 2022 08:48), Max: 36.7 (23 Jan 2022 20:52)  T(F): 97.5 (24 Jan 2022 08:48), Max: 98 (23 Jan 2022 20:52)  HR: 69 (24 Jan 2022 08:48) (69 - 82)  BP: 127/64 (24 Jan 2022 08:48) (105/73 - 139/59)  BP(mean): 84 (23 Jan 2022 20:52) (84 - 84)  RR: 18 (24 Jan 2022 08:48) (18 - 19)  SpO2: 95% (24 Jan 2022 08:48) (93% - 98%)    MEDICATIONS  (STANDING):  ammonium lactate 12% Lotion 1 Application(s) Topical two times a day  aspirin enteric coated 81 milliGRAM(s) Oral daily  atorvastatin 20 milliGRAM(s) Oral at bedtime  budesonide  80 MICROgram(s)/formoterol 4.5 MICROgram(s) Inhaler 2 Puff(s) Inhalation two times a day  cefepime   IVPB 2000 milliGRAM(s) IV Intermittent every 12 hours  cholecalciferol 1000 Unit(s) Oral daily  enalapril 10 milliGRAM(s) Oral two times a day  famotidine    Tablet 20 milliGRAM(s) Oral daily  guaiFENesin ER 1200 milliGRAM(s) Oral every 12 hours  heparin   Injectable 5000 Unit(s) SubCutaneous every 12 hours  latanoprost 0.005% Ophthalmic Solution 1 Drop(s) Both EYES at bedtime  lidocaine   4% Patch 1 Patch Transdermal every 24 hours  magnesium oxide 400 milliGRAM(s) Oral daily  metoprolol succinate ER 25 milliGRAM(s) Oral daily  tamsulosin 0.4 milliGRAM(s) Oral at bedtime    MEDICATIONS  (PRN):  acetaminophen     Tablet .. 650 milliGRAM(s) Oral every 6 hours PRN Temp greater or equal to 38C (100.4F), Mild Pain (1 - 3)  ALBUTerol    90 MICROgram(s) HFA Inhaler 2 Puff(s) Inhalation every 6 hours PRN Shortness of Breath and/or Wheezing  aluminum hydroxide/magnesium hydroxide/simethicone Suspension 30 milliLiter(s) Oral every 4 hours PRN Dyspepsia  melatonin 3 milliGRAM(s) Oral at bedtime PRN Insomnia  ondansetron Injectable 4 milliGRAM(s) IV Push every 6 hours PRN Nausea and/or Vomiting  oxycodone    5 mG/acetaminophen 325 mG 1 Tablet(s) Oral every 4 hours PRN Moderate Pain (4 - 6)      PHYSICAL EXAM:  Constitutional: awake and alert.  HEENT: PERRLA, EOMI,   Neck: Supple.  Respiratory: Breath sounds are clear bilaterally  Cardiovascular: S1 and S2, regular rhythm  Gastrointestinal: soft, nontender  Extremities:  no edema  Musculoskeletal: no joint swelling/tenderness, no abnormal movements  Skin: No rashes    HF: A x O x 3. Appropriately interactive, normal affect. Speech fluent, No Aphasia or paraphasic errors. Naming /repetition intact   CN: ASHWIN, EOMI, VFF, facial sensation normal, no NLFD, tongue midline, Palate moves equally, SCM equal bilaterally  Motor: No pronator drift, Strength 5/5 in all 4 ext, normal bulk and tone, no tremor, rigidity or bradykinesia.    Sens: Intact to light touch   Reflexes: Symmetric and normal . BJ 2+, BR 2+, KJ 2+, AJ 2+, downgoing toes b/l  Gait: Not assessed        LABS:                         14.2   10.98 )-----------( 127      ( 23 Jan 2022 06:54 )             43.9     01-23    135  |  105  |  19  ----------------------------<  115<H>  4.6   |  24  |  0.74    Ca    8.7      23 Jan 2022 06:54              RADIOLOGY:  CT Lumbar Spine No Cont (01.23.22 @ 16:51)  IMPRESSION:  Redemonstration of acute comminuted compression fracture of L2. The   fracture extends posteriorly to involve the bilateral pedicles and right   inferior facet of L2.    Minimally retropulsed bone along the posterior cortex of the L2 vertebral   body.

## 2022-01-24 NOTE — PROGRESS NOTE ADULT - ASSESSMENT
87 year old male with PNA on cefepime,  PMHx of BPH, CAD, COPD, glaucoma, HLD, HTN, PNA presented to  ED BIBEMS s/p near syncopal episode in the  morning. Pt reported significant L hip pain x months, denies back pain at present.    Plan:  - No acute neurosurgical intervention  - CT L Spine with contrast-pending report  - leukocytosis improving on cefepime  - TLSO brace to be worn when out of bed  - Continue care as per primary team  Will follow up    Discussed with Dr. Riggs

## 2022-01-24 NOTE — PROGRESS NOTE ADULT - SUBJECTIVE AND OBJECTIVE BOX
· Reason for Admission	near syncope  unable to walk  1/24/22 : Pt feeling fine. Still c/o pain in back and Hip.  Spine mRI resulted acute Fx L2 / old Fx T8 . Seen by NS no surgical intervention needed TSLO brace recommended. No other c/o.     MEDICATIONS  (STANDING):  ammonium lactate 12% Lotion 1 Application(s) Topical two times a day  aspirin enteric coated 81 milliGRAM(s) Oral daily  atorvastatin 20 milliGRAM(s) Oral at bedtime  budesonide  80 MICROgram(s)/formoterol 4.5 MICROgram(s) Inhaler 2 Puff(s) Inhalation two times a day  cefepime   IVPB 2000 milliGRAM(s) IV Intermittent every 12 hours  cholecalciferol 1000 Unit(s) Oral daily  enalapril 10 milliGRAM(s) Oral two times a day  famotidine    Tablet 20 milliGRAM(s) Oral daily  guaiFENesin ER 1200 milliGRAM(s) Oral every 12 hours  heparin   Injectable 5000 Unit(s) SubCutaneous every 12 hours  latanoprost 0.005% Ophthalmic Solution 1 Drop(s) Both EYES at bedtime  lidocaine   4% Patch 1 Patch Transdermal every 24 hours  magnesium oxide 400 milliGRAM(s) Oral daily  metoprolol succinate ER 25 milliGRAM(s) Oral daily  tamsulosin 0.4 milliGRAM(s) Oral at bedtime      Vital Signs Last 24 Hrs  T(C): 36.4 (24 Jan 2022 08:48), Max: 36.7 (23 Jan 2022 20:52)  T(F): 97.5 (24 Jan 2022 08:48), Max: 98 (23 Jan 2022 20:52)  HR: 69 (24 Jan 2022 08:48) (69 - 82)  BP: 127/64 (24 Jan 2022 08:48) (105/73 - 139/59)  BP(mean): 84 (23 Jan 2022 20:52) (84 - 84)  RR: 18 (24 Jan 2022 08:48) (18 - 19)  SpO2: 95% (24 Jan 2022 08:48) (93% - 98%)    HF: Awake and alert. Oriented to person, place, time.  Speech fluent, No Aphasia or paraphasic errors.   CN: ASHWIN, EOMI, VFF, facial sensation normal, no NLFD, gag intact  Motor: No pronator drift, Strength 5/5 in all 4 ext .   Sens: Intact to light touch  Reflexes: Symmetric and normal . BJ 2+, BR 2+, KJ 3+, downgoing toes b/l  Coord:  No FNFA, dysmetria, MARCO intact   Gait/Balance: Not tested                      14.2   10.98 )-----------( 127      ( 23 Jan 2022 06:54 )             43.9     01-23    135  |  105  |  19  ----------------------------<  115<H>  4.6   |  24  |  0.74    Ca    8.7      23 Jan 2022 06:54      Radiology report:  - CT Head  - CT Head  < from: CT Head No Cont (01.19.22 @ 18:05) >  IMPRESSION:    1)  cerebral and cerebellar volume loss with chronic ischemic changes. No   acute territorial infarct or hemorrhage noted. No hydrocephalus or   collections.  2)  follow-up MR imaging of the brain may be considered for further   assessment.    < from: CT Cervical Spine No Cont (01.20.22 @ 16:48) >  IMPRESSION:    No acute fracture.  Multilevel degenerative changes of the cervical spine   as above.  < from: CT Lumbar Spine No Cont (01.23.22 @ 16:51) >  IMPRESSION:    Redemonstration of acute comminuted compression fracture of L2. The   fracture extends posteriorly to involve the bilateral pedicles and right   inferior facet of L2.    Minimally retropulsed bone along the posterior cortex of the L2 vertebral   body.    < from: MR Thoracic Spine No Cont (01.22.22 @ 10:10) >  IMPRESSION:        1.   Cervical spine:   Small central disc herniations noted at C2-3,   C3-4 which indent the ventral thecal sac. No significant central or   foraminal stenosis is noted.        2.   Thoracic spine:   chronic compression fracture of T8 with loss   of 90% of vertebral body height and minimal posterior bony retropulsion   without compression.        3.   Lumbar spine:   acute chance fracture of L2 with comminuted   fragments of the vertebral body with central distraction and edema.   Fractures appear to be present within the pedicles and articular bodies   with edema within the L2-3 interspinous space and disruption of the   ligamentum flavum. This is consistent with an unstable fracture. CT   lumbar spine recommended for complete evaluation. Chronic compression   deformity of L4 with loss of 40% vertebral body height centrally.

## 2022-01-24 NOTE — PROGRESS NOTE ADULT - ASSESSMENT
86 y/o male with h/o BPH, CAD, COPD, glaucoma, HLD, HTN was admitted on 1/19 after he had a near syncopal episode at home. The patient reports significant L hip pain x several months. He received a steroid injection 2 weeks ago w/o significant relief. He walks w walker. He has constant L hip pain and sitting is difficult and wife feeds him. He feels progressively weaker. On the day of admission, he felt more SOB and collapsed near sink while brushing his teeth; denies LOC. In ER he received ceftriaxone and azithromycin.     1. Left side pneumonia. ?Multifocal pneumonia. COPD. Near syncopal episode.   -COVID-19 PCR is negative  -obtain RVP ?viral pneumonia  -leukocytosis improving  -BC x 2 noted  -s/p ceftriaxone 1 gm IV qd and azithromycin 500 mg IV qd # 2-3  -on cefepime 2 gm IV q12h # 3  -tolerating abx well so far; no side effects noted  -MRI spine to evaluate for back pain  -cardiac monitoring  -continue abx coverage for now  -monitor temps  -f/u CBC  -supportive care  2. Other issues:   -care per medicine

## 2022-01-24 NOTE — PROGRESS NOTE ADULT - ASSESSMENT
Pt is a 87 year old male w PMHx of BPH, CAD, COPD, glaucoma, HLD, HTN  admitted for:     #Weakness. Near syncope  - likely related to leg weakness and debility  - was admitted previously with similar presentation and work up was neg   -CT head neg to acute findings   - orthostatics - negative now, but was hydrated, monitor   - Likely need compression stockings for  prolong standing, both episodes while was shaving and was brushing his teeth   - recent echo revealed preserved EF  - tele: SR 80-90s,  halina overnight to 40s   - TSH. b12 WNL  - Cardiology consult appreciated  - d/c tele     # Leukocytosis 2/2 Aspiration PNA  - improved, WBC trending down   UA - negative   BCx - negative   CT chest - Aspiration PNA, intermediate for COVID, swabbed twice: COVID negative   treated with IV Roceophin and Zithro - switched to IV cefepime day 3  as CT chest  showed - not typical for bacterial PNA  C/w mucinex   ID f/u appreciated       #Left hip pain, L2 acute fracture . 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 tylenol and percocet prn for pain management   MRI of cervical, thoracic and lumbar spine : + Acute unstable L2 Fx, old L4, T8 Fx  neuroSx eval, d/w Team   Bedrest       # elevated BNP  - seems euvolemic, monitor       #COPD, stable   On  Trelegy  at hole   C/w budesonide-formoterol   albuterol prn    #Glaucoma   latanoprost    #HLD  atorvastatin    #HTN  continue home meds w parameters  enalapril  10 mg BID  metoprolol 25      DVT PPX: Heparin SQ     Dispo: C/w current care, neuroSx eval, bedrest

## 2022-01-25 PROCEDURE — 99232 SBSQ HOSP IP/OBS MODERATE 35: CPT

## 2022-01-25 RX ADMIN — TAMSULOSIN HYDROCHLORIDE 0.4 MILLIGRAM(S): 0.4 CAPSULE ORAL at 22:48

## 2022-01-25 RX ADMIN — Medication 1 APPLICATION(S): at 11:51

## 2022-01-25 RX ADMIN — Medication 10 MILLIGRAM(S): at 22:49

## 2022-01-25 RX ADMIN — Medication 650 MILLIGRAM(S): at 03:36

## 2022-01-25 RX ADMIN — HEPARIN SODIUM 5000 UNIT(S): 5000 INJECTION INTRAVENOUS; SUBCUTANEOUS at 22:49

## 2022-01-25 RX ADMIN — Medication 1200 MILLIGRAM(S): at 22:48

## 2022-01-25 RX ADMIN — Medication 81 MILLIGRAM(S): at 11:51

## 2022-01-25 RX ADMIN — BUDESONIDE AND FORMOTEROL FUMARATE DIHYDRATE 2 PUFF(S): 160; 4.5 AEROSOL RESPIRATORY (INHALATION) at 08:07

## 2022-01-25 RX ADMIN — Medication 1 APPLICATION(S): at 06:14

## 2022-01-25 RX ADMIN — CEFEPIME 100 MILLIGRAM(S): 1 INJECTION, POWDER, FOR SOLUTION INTRAMUSCULAR; INTRAVENOUS at 22:48

## 2022-01-25 RX ADMIN — LIDOCAINE 1 PATCH: 4 CREAM TOPICAL at 11:51

## 2022-01-25 RX ADMIN — MAGNESIUM OXIDE 400 MG ORAL TABLET 400 MILLIGRAM(S): 241.3 TABLET ORAL at 11:53

## 2022-01-25 RX ADMIN — Medication 1200 MILLIGRAM(S): at 11:53

## 2022-01-25 RX ADMIN — LIDOCAINE 1 PATCH: 4 CREAM TOPICAL at 20:09

## 2022-01-25 RX ADMIN — Medication 650 MILLIGRAM(S): at 03:06

## 2022-01-25 RX ADMIN — FAMOTIDINE 20 MILLIGRAM(S): 10 INJECTION INTRAVENOUS at 11:53

## 2022-01-25 RX ADMIN — HEPARIN SODIUM 5000 UNIT(S): 5000 INJECTION INTRAVENOUS; SUBCUTANEOUS at 11:53

## 2022-01-25 RX ADMIN — CEFEPIME 100 MILLIGRAM(S): 1 INJECTION, POWDER, FOR SOLUTION INTRAMUSCULAR; INTRAVENOUS at 11:54

## 2022-01-25 RX ADMIN — BUDESONIDE AND FORMOTEROL FUMARATE DIHYDRATE 2 PUFF(S): 160; 4.5 AEROSOL RESPIRATORY (INHALATION) at 20:41

## 2022-01-25 RX ADMIN — LATANOPROST 1 DROP(S): 0.05 SOLUTION/ DROPS OPHTHALMIC; TOPICAL at 22:49

## 2022-01-25 RX ADMIN — ATORVASTATIN CALCIUM 20 MILLIGRAM(S): 80 TABLET, FILM COATED ORAL at 22:48

## 2022-01-25 RX ADMIN — Medication 3 MILLIGRAM(S): at 22:48

## 2022-01-25 RX ADMIN — ONDANSETRON 4 MILLIGRAM(S): 8 TABLET, FILM COATED ORAL at 02:12

## 2022-01-25 RX ADMIN — Medication 1000 UNIT(S): at 11:52

## 2022-01-25 NOTE — PROGRESS NOTE ADULT - PROVIDER SPECIALTY LIST ADULT
Infectious Disease
Neurology
Neurosurgery
Hospitalist
Hospitalist
Infectious Disease
Neurosurgery
Hospitalist
Hospitalist
Infectious Disease
Hospitalist
Hospitalist

## 2022-01-25 NOTE — PROGRESS NOTE ADULT - NUTRITIONAL ASSESSMENT
This patient has been assessed with a concern for Malnutrition and has been determined to have a diagnosis/diagnoses of Severe protein-calorie malnutrition.    This patient is being managed with:   Diet Regular-  DASH/TLC {Sodium & Cholesterol Restricted} (DASH)  Supplement Feeding Modality:  Oral  Ensure Enlive Cans or Servings Per Day:  1       Frequency:  Three Times a day  Entered: Jan 20 2022  3:25PM    

## 2022-01-25 NOTE — PROGRESS NOTE ADULT - SUBJECTIVE AND OBJECTIVE BOX
HPI:  87 year old male w PMHx of BPH, CAD, COPD, glaucoma, HLD, HTN, presented to  ED BIBEMS s/p near syncopal episode this morning    Pt reported significant L hip pain x months.    s/p steroid injection 2 weeks ago w/o significant relief  walks w walker  has constant L hip pain and sitting is difficult and wife feeds him  able to sleep  feels progressively weaker  Today collapsed near sink while brushing his teeth  He denied injury   denied LOC    He states he has become too weak since last admission in . MRI shows L2 fracture patient without presently complaining of pain. Patient has been incontinent of bowel and bladder recently and now using diaper. Denies back pain.     Vital Signs Last 24 Hrs  T(C): 36.8 (25 Jan 2022 08:25), Max: 37.2 (24 Jan 2022 23:23)  T(F): 98.3 (25 Jan 2022 08:25), Max: 98.9 (24 Jan 2022 23:23)  HR: 75 (25 Jan 2022 08:45) (75 - 84)  BP: 106/57 (25 Jan 2022 08:25) (106/56 - 133/85)  BP(mean): 74 (24 Jan 2022 23:00) (74 - 97)  RR: 18 (25 Jan 2022 08:25) (17 - 48)  SpO2: 90% (25 Jan 2022 08:25) (90% - 93%)    MEDICATIONS  (STANDING):  ammonium lactate 12% Lotion 1 Application(s) Topical two times a day  aspirin enteric coated 81 milliGRAM(s) Oral daily  atorvastatin 20 milliGRAM(s) Oral at bedtime  budesonide  80 MICROgram(s)/formoterol 4.5 MICROgram(s) Inhaler 2 Puff(s) Inhalation two times a day  cefepime   IVPB 2000 milliGRAM(s) IV Intermittent every 12 hours  cholecalciferol 1000 Unit(s) Oral daily  enalapril 10 milliGRAM(s) Oral two times a day  famotidine    Tablet 20 milliGRAM(s) Oral daily  guaiFENesin ER 1200 milliGRAM(s) Oral every 12 hours  heparin   Injectable 5000 Unit(s) SubCutaneous every 12 hours  latanoprost 0.005% Ophthalmic Solution 1 Drop(s) Both EYES at bedtime  lidocaine   4% Patch 1 Patch Transdermal every 24 hours  magnesium oxide 400 milliGRAM(s) Oral daily  metoprolol succinate ER 25 milliGRAM(s) Oral daily  tamsulosin 0.4 milliGRAM(s) Oral at bedtime    MEDICATIONS  (PRN):  acetaminophen     Tablet .. 650 milliGRAM(s) Oral every 6 hours PRN Temp greater or equal to 38C (100.4F), Mild Pain (1 - 3)  ALBUTerol    90 MICROgram(s) HFA Inhaler 2 Puff(s) Inhalation every 6 hours PRN Shortness of Breath and/or Wheezing  aluminum hydroxide/magnesium hydroxide/simethicone Suspension 30 milliLiter(s) Oral every 4 hours PRN Dyspepsia  melatonin 3 milliGRAM(s) Oral at bedtime PRN Insomnia  ondansetron Injectable 4 milliGRAM(s) IV Push every 6 hours PRN Nausea and/or Vomiting  oxycodone    5 mG/acetaminophen 325 mG 1 Tablet(s) Oral every 4 hours PRN Moderate Pain (4 - 6)      PHYSICAL EXAM:  Constitutional: awake and alert  HEENT: PERRLA, EOMI,   Neck: Supple.  Respiratory: Breath sounds are clear bilaterally  Cardiovascular: S1 and S2, regular rhythm  Gastrointestinal: soft, nontender  Extremities:  no edema  Musculoskeletal: no joint swelling/tenderness, no abnormal movements  Skin: No rashes    HF: A x O x 3. Appropriately interactive, normal affect. Speech fluent, No Aphasia or paraphasic errors. Naming /repetition intact   CN: ASHWIN, EOMI, VFF, facial sensation normal, no NLFD, tongue midline, Palate moves equally, SCM equal bilaterally  Motor: No pronator drift, Strength 5/5 in all 4 ext, normal bulk and tone, no tremor, rigidity or bradykinesia.    Sens: Intact to light touch   Reflexes: Symmetric and normal . BJ 2+, BR 2+, KJ 2+, AJ 2+, downgoing toes b/l  Gait: Not assessed              RADIOLOGY:  CT Lumbar Spine w/ IV Cont (01.24.22 @ 11:15)  IMPRESSION:  Acute comminuted compression fracture of L2. No abnormal paraspinal   enhancement or evidence for abnormal epidural enhancement tosuggest   infection or tumor. Consider follow-up reimaging of the lumbar spine in   one month after the acute edema resolves with contrast-enhanced MRI for   better evaluation.

## 2022-01-25 NOTE — PROGRESS NOTE ADULT - ASSESSMENT
Pt is a 87 year old male w PMHx of BPH, CAD, COPD, glaucoma, HLD, HTN  admitted for:     # Weakness. Near syncope  - likely related to leg weakness and debility  - was admitted previously with similar presentation and work up was neg   - CT head neg to acute findings   - orthostatics - negative now, but was hydrated, monitor   - Likely need compression stockings for  prolong standing, both episodes while was shaving and was brushing his teeth   - recent echo revealed preserved EF  - tele: SR 80-90s,  halina overnight to 40s      # Left side pneumonia. ?Multifocal pneumonia suspected ASPIRATION  - obtain RVP ?viral pneumonia  - leukocytosis improving  - BC x 2 note  - on cefepime 2 gm IV q12h # 4 - will finish Abx course toady 1/25 -D/W Dr/ Chanell   - tolerating abx well so far; no side effects noted    # Left hip pain, L2 acute fracture . L4/T8 chronic Fx   - patient recommended to have MRI with contrast however patient does not want another MRI.   - Recommend Ct L-spine with contrast to r/o abscess vs alternate cause for enhancement recommend TLSO brace  - no acute emergent neurosurgical intervention at this timE  - NO SURGICAL INTERVENTION AT THIS TIME  Neurosurgery consult appreciated:  - TLSO brace to be worn when out of bed at all times  - Patient given our contact info to follow up in office 2 weeks upon discharge    #COPD, stable   On  Trelegy  at [facility]   C/w budesonide-formoterol   albuterol prn    #Glaucoma   latanoprost    #HLD  atorvastatin    #HTN  continue home meds w parameters  enalapril  10 mg BID  metoprolol 25    DVT PPX: Heparin SQ     Dispo: [facility] planning to [facility]

## 2022-01-25 NOTE — PROGRESS NOTE ADULT - SUBJECTIVE AND OBJECTIVE BOX
CC: near syncope  unable to walk    HPI: 87 year old male w PMHx of BPH, CAD, COPD, glaucoma, HLD, HTN, PNA presented to  ED BIBEMS s/p near syncopal episode in the  morning. Pt reported significant L hip pain x months, s/p steroid injection 2 weeks ago w/o significant relief  walks w walker, has constant L hip pain and sitting is difficult and wife feeds him, Pt feels progressively weaker  On day of admission Pt  collapsed near sink while brushing his teeth. He denied injury, denied LOC  He stated  he has become too weak since last admission in .     Medical progress:   Complaints:  State of mind:       REVIEW OF SYSTEMS:  All other review of systems is negative unless indicated above.    PHYSICAL EXAM:  Vital Signs Last 24 Hrs  T(C): 36.8 (25 Jan 2022 08:25), Max: 37.2 (24 Jan 2022 23:23)  T(F): 98.3 (25 Jan 2022 08:25), Max: 98.9 (24 Jan 2022 23:23)  HR: 75 (25 Jan 2022 08:45) (75 - 84)  BP: 106/57 (25 Jan 2022 08:25) (106/56 - 133/85)  BP(mean): 74 (24 Jan 2022 23:00) (74 - 97)  RR: 18 (25 Jan 2022 08:25) (17 - 48)  SpO2: 90% (25 Jan 2022 08:25) (90% - 93%)  General: Well developed; frail elderly male,  in no acute distress  Eyes: EOMI; conjunctiva and sclera clear  Head: Normocephalic; atraumatic  ENMT: No nasal discharge; airway clear  Neck: Supple; non tender; no masses  Respiratory: Decreased BS. No wheezes, rales or rhonchi  Cardiovascular: Regular rate and rhythm. S1 and S2 Normal;  Gastrointestinal: Soft non-tender non-distended; Normal bowel sounds  Genitourinary: No  suprapubic  tenderness  Extremities: No leg edema, feet in the  boot  Neurological: Alert and oriented x2-3, non focal   Musculoskeletal: Normal muscle tone and strength   Psychiatric: Cooperative       LABS:            MEDICATIONS  (STANDING):  ammonium lactate 12% Lotion 1 Application(s) Topical two times a day  aspirin enteric coated 81 milliGRAM(s) Oral daily  atorvastatin 20 milliGRAM(s) Oral at bedtime  budesonide  80 MICROgram(s)/formoterol 4.5 MICROgram(s) Inhaler 2 Puff(s) Inhalation two times a day  cefepime   IVPB 2000 milliGRAM(s) IV Intermittent every 12 hours  cholecalciferol 1000 Unit(s) Oral daily  enalapril 10 milliGRAM(s) Oral two times a day  famotidine    Tablet 20 milliGRAM(s) Oral daily  guaiFENesin ER 1200 milliGRAM(s) Oral every 12 hours  heparin   Injectable 5000 Unit(s) SubCutaneous every 12 hours  latanoprost 0.005% Ophthalmic Solution 1 Drop(s) Both EYES at bedtime  lidocaine   4% Patch 1 Patch Transdermal every 24 hours  magnesium oxide 400 milliGRAM(s) Oral daily  metoprolol succinate ER 25 milliGRAM(s) Oral daily  tamsulosin 0.4 milliGRAM(s) Oral at bedtime      RADIOLOGY & ADDITIONAL TESTS:  ACC: [id number] EXAM:  CT CHEST                        PROCEDURE DATE:  01/20/2022      Lungs/Airways/Pleura: New ill-defined groundglass in the posterior right   upper lobe and lingula. There is persistent paracentral bronchiectasis   and the posterior lungs, right greater than left. Left paraspinal   density/mucous plugging has improved/resolved. Persistent centrilobular   and branching linear densities in the anterior right upper lobe. New   nodular density medial right middle lobe. New trace right greater than   left pleural effusions.    Mediastinum/Lymph nodes: No thoracic adenopathy.    Heart and Vessels: The heart is stable in size. There are three-vessel   coronary artery calcifications. Pulmonary artery enlargement compared to   adjacent ascending aorta may reflect pulmonary hypertension.    Upper Abdomen: There is cholelithiasis.    Osseous structures and Soft Tissues: There is diffuse qualitative   osteopenia. Stable severe T8 vertebral body compression   fracture/vertebral plana. No aggressive bone lesions. Status post right   shoulder arthroplasty.    IMPRESSION:    1.  New findings in both lungs favored to be aspiration. There are   indeterminate for Covid 19 pneumonia and not typical for bacterial   pneumonia.    2.  Persistent paracentral bronchiectasis in the posterior lungs.   Improved/resolved left paraspinal consolidation    3.New trace right greater than left pleural effusions are             rad< from: MR Thoracic Spine No Cont (01.22.22 @ 10:10) >    IMPRESSION:        1.   Cervical spine:   Small central disc herniations noted at C2-3,   C3-4 which indent the ventral thecal sac. No significant central or   foraminal stenosis is noted.        2.   Thoracic spine:   chronic compression fracture of T8 with loss   of 90% of vertebral body height and minimal posterior bony retropulsion   without compression.        3.   Lumbar spine:   acute chance fracture of L2 with comminuted   fragments of the vertebral body with central distraction and edema.   Fractures appear to be present within the pedicles and articular bodies   with edema within the L2-3 interspinous space and disruption of the   ligamentum flavum. This is consistent with an unstable fracture. CT   lumbar spine recommended for complete evaluation. Chronic compression   deformity of L4 with loss of 40% vertebral body height centrally.    Critical value:  I discussed the finding of this report with EVELYN Ceja at 3:30 PM on 01/23/2022.  Critical value policy of the   hospital was followed.  Read back andconfirmation of receipt of this   communication was performed.  This verbal communication supplements the   text report of this document.    --- End of Report ---    < end of copied text >

## 2022-01-25 NOTE — PROGRESS NOTE ADULT - ASSESSMENT
n87 year old male with PNA on cefepime,  PMHx of BPH, CAD, COPD, glaucoma, HLD, HTN, PNA presented to  ED BIBEMS s/p near syncopal episode in the  morning. Pt reported significant L hip pain x months, denies back pain at present.    #Chronic T8 compression fracture  #Acute comminuted L2 compression fracture  #Chronic L4 compression fracture    Plan:  - No acute neurosurgical intervention  - CT L Spine with contrast negative for abscess  - leukocytosis improving on cefepime  - TLSO brace to be worn when out of bed at all times  - Patient given our contact info to follow up in office 2 weeks upon discharge  - Continue care as per primary team    Will sign off for now, please reconsult prn    Discussed with Dr. Riggs

## 2022-01-25 NOTE — PROGRESS NOTE ADULT - ASSESSMENT
88 y/o male with h/o BPH, CAD, COPD, glaucoma, HLD, HTN was admitted on 1/19 after he had a near syncopal episode at home. The patient reports significant L hip pain x several months. He received a steroid injection 2 weeks ago w/o significant relief. He walks w walker. He has constant L hip pain and sitting is difficult and wife feeds him. He feels progressively weaker. On the day of admission, he felt more SOB and collapsed near sink while brushing his teeth; denies LOC. In ER he received ceftriaxone and azithromycin.     1. Left side pneumonia. ?Multifocal pneumonia. COPD. Near syncopal episode.   -COVID-19 PCR is negative  -obtain RVP ?viral pneumonia  -leukocytosis improving  -BC x 2 noted  -s/p ceftriaxone 1 gm IV qd and azithromycin 500 mg IV qd # 2-3  -on cefepime 2 gm IV q12h # 4  -tolerating abx well so far; no side effects noted  -MRI spine to evaluate for back pain  -cardiac monitoring  -complete abx therapy today  -monitor temps  -f/u CBC  -supportive care  2. Other issues:   -care per medicine

## 2022-01-25 NOTE — PROGRESS NOTE ADULT - REASON FOR ADMISSION
near syncope  unable to walk

## 2022-01-25 NOTE — PROGRESS NOTE ADULT - SUBJECTIVE AND OBJECTIVE BOX
Date of service: 01-25-22 @ 09:15    Lying in bed in NAD  Alert and confused  Has dry cough    ROS: no fever or chills; denies dizziness, no HA, no abdominal pain, no diarrhea or constipation; no dysuria, no legs pain, no rashes    MEDICATIONS  (STANDING):  ammonium lactate 12% Lotion 1 Application(s) Topical two times a day  aspirin enteric coated 81 milliGRAM(s) Oral daily  atorvastatin 20 milliGRAM(s) Oral at bedtime  budesonide  80 MICROgram(s)/formoterol 4.5 MICROgram(s) Inhaler 2 Puff(s) Inhalation two times a day  cefepime   IVPB 2000 milliGRAM(s) IV Intermittent every 12 hours  cholecalciferol 1000 Unit(s) Oral daily  enalapril 10 milliGRAM(s) Oral two times a day  famotidine    Tablet 20 milliGRAM(s) Oral daily  guaiFENesin ER 1200 milliGRAM(s) Oral every 12 hours  heparin   Injectable 5000 Unit(s) SubCutaneous every 12 hours  latanoprost 0.005% Ophthalmic Solution 1 Drop(s) Both EYES at bedtime  lidocaine   4% Patch 1 Patch Transdermal every 24 hours  magnesium oxide 400 milliGRAM(s) Oral daily  metoprolol succinate ER 25 milliGRAM(s) Oral daily  tamsulosin 0.4 milliGRAM(s) Oral at bedtime    Vital Signs Last 24 Hrs  T(C): 36.8 (25 Jan 2022 08:25), Max: 37.2 (24 Jan 2022 23:23)  T(F): 98.3 (25 Jan 2022 08:25), Max: 98.9 (24 Jan 2022 23:23)  HR: 75 (25 Jan 2022 08:45) (75 - 84)  BP: 106/57 (25 Jan 2022 08:25) (106/56 - 133/85)  BP(mean): 74 (24 Jan 2022 23:00) (74 - 97)  RR: 18 (25 Jan 2022 08:25) (17 - 48)  SpO2: 90% (25 Jan 2022 08:25) (90% - 93%)     Physical exam:    Vital Signs Last 24 Hrs  T(C): 36.4 (24 Jan 2022 08:48), Max: 36.7 (23 Jan 2022 20:52)  T(F): 97.5 (24 Jan 2022 08:48), Max: 98 (23 Jan 2022 20:52)  HR: 69 (24 Jan 2022 08:48) (69 - 82)  BP: 127/64 (24 Jan 2022 08:48) (105/73 - 139/59)  BP(mean): 84 (23 Jan 2022 20:52) (84 - 84)  RR: 18 (24 Jan 2022 08:48) (18 - 19)  SpO2: 95% (24 Jan 2022 08:48) (93% - 98%)     Physical exam:    Constitutional:  No acute distress  HEENT: NC/AT, EOMI, PERRLA, conjunctivae clear; ears and nose atraumatic  Neck: supple; thyroid not palpable  Back: no tenderness  Respiratory: respiratory effort normal; few crackles at bases  Cardiovascular: S1S2 regular, no murmurs  Abdomen: soft, not tender, not distended, positive BS  Genitourinary: no suprapubic tenderness  Lymphatic: no LN palpable  Musculoskeletal: no muscle tenderness, no joint swelling or tenderness  Extremities: no pedal edema  Neurological/ Psychiatric: AxOx3, moving all extremities  Skin: no rashes; no palpable lesions    Labs: reviewed                        14.2   10.98 )-----------( 127      ( 23 Jan 2022 06:54 )             43.9     01-23    135  |  105  |  19  ----------------------------<  115<H>  4.6   |  24  |  0.74    Ca    8.7      23 Jan 2022 06:54                        14.0   12.65 )-----------( 138      ( 21 Jan 2022 08:20 )             44.1     01-21    136  |  104  |  21  ----------------------------<  94  4.3   |  25  |  0.77    Ca    8.7      21 Jan 2022 08:20  Mg     2.6     01-19    TPro  6.1  /  Alb  2.2<L>  /  TBili  1.1  /  DBili  x   /  AST  14<L>  /  ALT  15  /  AlkPhos  80  01-19                        14.5   17.40 )-----------( 161      ( 20 Jan 2022 09:40 )             45.0     01-20    136  |  103  |  21  ----------------------------<  117<H>  4.0   |  27  |  1.02    Ca    8.6      20 Jan 2022 09:40  Mg     2.6     01-19    TPro  6.1  /  Alb  2.2<L>  /  TBili  1.1  /  DBili  x   /  AST  14<L>  /  ALT  15  /  AlkPhos  80  01-19     LIVER FUNCTIONS - ( 19 Jan 2022 17:37 )  Alb: 2.2 g/dL / Pro: 6.1 gm/dL / ALK PHOS: 80 U/L / ALT: 15 U/L / AST: 14 U/L / GGT: x           COVID-19 PCR: Abimaelc (01-19-22 @ 17:37)    Culture - Blood (collected 19 Jan 2022 21:10)  Source: .Blood Blood  Preliminary Report (21 Jan 2022 03:01):    No growth to date.    Culture - Blood (collected 19 Jan 2022 21:10)  Source: .Blood Blood / Aerobic plus received  Preliminary Report (21 Jan 2022 03:01):    No growth to date.    Rapid RVP Result: Narendratec (01.20.22 @ 12:30)     Radiology: all available radiological tests reviewed    < from: Xray Chest 1 View AP/PA. (01.19.22 @ 18:05) >  IMPRESSION: Slight left-sided infiltrates as above.  < end of copied text >    Advanced directives addressed: full resuscitation

## 2022-01-26 ENCOUNTER — TRANSCRIPTION ENCOUNTER (OUTPATIENT)
Age: 87
End: 2022-01-26

## 2022-01-26 VITALS — OXYGEN SATURATION: 98 % | DIASTOLIC BLOOD PRESSURE: 60 MMHG | SYSTOLIC BLOOD PRESSURE: 108 MMHG | HEART RATE: 105 BPM

## 2022-01-26 LAB
ANION GAP SERPL CALC-SCNC: 4 MMOL/L — LOW (ref 5–17)
BUN SERPL-MCNC: 18 MG/DL — SIGNIFICANT CHANGE UP (ref 7–23)
CALCIUM SERPL-MCNC: 9.1 MG/DL — SIGNIFICANT CHANGE UP (ref 8.5–10.1)
CHLORIDE SERPL-SCNC: 106 MMOL/L — SIGNIFICANT CHANGE UP (ref 96–108)
CO2 SERPL-SCNC: 25 MMOL/L — SIGNIFICANT CHANGE UP (ref 22–31)
CREAT SERPL-MCNC: 0.78 MG/DL — SIGNIFICANT CHANGE UP (ref 0.5–1.3)
GLUCOSE SERPL-MCNC: 112 MG/DL — HIGH (ref 70–99)
HCT VFR BLD CALC: 44.8 % — SIGNIFICANT CHANGE UP (ref 39–50)
HGB BLD-MCNC: 14.1 G/DL — SIGNIFICANT CHANGE UP (ref 13–17)
MCHC RBC-ENTMCNC: 28.7 PG — SIGNIFICANT CHANGE UP (ref 27–34)
MCHC RBC-ENTMCNC: 31.5 GM/DL — LOW (ref 32–36)
MCV RBC AUTO: 91.1 FL — SIGNIFICANT CHANGE UP (ref 80–100)
PLATELET # BLD AUTO: 159 K/UL — SIGNIFICANT CHANGE UP (ref 150–400)
POTASSIUM SERPL-MCNC: 4.6 MMOL/L — SIGNIFICANT CHANGE UP (ref 3.5–5.3)
POTASSIUM SERPL-SCNC: 4.6 MMOL/L — SIGNIFICANT CHANGE UP (ref 3.5–5.3)
RBC # BLD: 4.92 M/UL — SIGNIFICANT CHANGE UP (ref 4.2–5.8)
RBC # FLD: 17 % — HIGH (ref 10.3–14.5)
SARS-COV-2 RNA SPEC QL NAA+PROBE: SIGNIFICANT CHANGE UP
SODIUM SERPL-SCNC: 135 MMOL/L — SIGNIFICANT CHANGE UP (ref 135–145)
WBC # BLD: 10.4 K/UL — SIGNIFICANT CHANGE UP (ref 3.8–10.5)
WBC # FLD AUTO: 10.4 K/UL — SIGNIFICANT CHANGE UP (ref 3.8–10.5)

## 2022-01-26 PROCEDURE — 99239 HOSP IP/OBS DSCHRG MGMT >30: CPT

## 2022-01-26 RX ORDER — LIDOCAINE 4 G/100G
1 CREAM TOPICAL
Qty: 0 | Refills: 0 | DISCHARGE
Start: 2022-01-26

## 2022-01-26 RX ORDER — SOD,AMMONIUM,POTASSIUM LACTATE
1 CREAM (GRAM) TOPICAL
Qty: 0 | Refills: 0 | DISCHARGE
Start: 2022-01-26

## 2022-01-26 RX ORDER — RNA INGREDIENT BNT-162B2 0.23 G/1.8ML
0.3 INJECTION, SUSPENSION INTRAMUSCULAR
Qty: 0 | Refills: 0 | DISCHARGE

## 2022-01-26 RX ORDER — INFLUENZA VIRUS VACCINE 15; 15; 15; 15 UG/.5ML; UG/.5ML; UG/.5ML; UG/.5ML
0.7 SUSPENSION INTRAMUSCULAR
Qty: 0 | Refills: 0 | DISCHARGE

## 2022-01-26 RX ORDER — LANOLIN ALCOHOL/MO/W.PET/CERES
1 CREAM (GRAM) TOPICAL
Qty: 0 | Refills: 0 | DISCHARGE
Start: 2022-01-26

## 2022-01-26 RX ADMIN — Medication 81 MILLIGRAM(S): at 10:59

## 2022-01-26 RX ADMIN — BUDESONIDE AND FORMOTEROL FUMARATE DIHYDRATE 2 PUFF(S): 160; 4.5 AEROSOL RESPIRATORY (INHALATION) at 09:04

## 2022-01-26 RX ADMIN — Medication 30 MILLILITER(S): at 01:46

## 2022-01-26 RX ADMIN — OXYCODONE AND ACETAMINOPHEN 1 TABLET(S): 5; 325 TABLET ORAL at 06:34

## 2022-01-26 RX ADMIN — FAMOTIDINE 20 MILLIGRAM(S): 10 INJECTION INTRAVENOUS at 10:59

## 2022-01-26 RX ADMIN — LIDOCAINE 1 PATCH: 4 CREAM TOPICAL at 11:02

## 2022-01-26 RX ADMIN — Medication 1 APPLICATION(S): at 06:07

## 2022-01-26 RX ADMIN — Medication 1000 UNIT(S): at 11:00

## 2022-01-26 RX ADMIN — MAGNESIUM OXIDE 400 MG ORAL TABLET 400 MILLIGRAM(S): 241.3 TABLET ORAL at 14:34

## 2022-01-26 RX ADMIN — HEPARIN SODIUM 5000 UNIT(S): 5000 INJECTION INTRAVENOUS; SUBCUTANEOUS at 11:00

## 2022-01-26 RX ADMIN — Medication 1200 MILLIGRAM(S): at 10:59

## 2022-01-26 NOTE — DISCHARGE NOTE PROVIDER - CARE PROVIDER_API CALL
Trey Watts (MD)  Cardiovascular Disease; Internal Medicine; Nuclear Cardiology  [address], [address]  [place] [address]  Phone: [phone]  Fax: [phone]  Follow Up Time:     Virgilio Riggs; PhD)  Neurosurgery  [address], [address] [place] [address]  Phone: [phone]  Fax: [phone]  Follow Up Time:

## 2022-01-26 NOTE — DISCHARGE NOTE PROVIDER - NSDCMRMEDTOKEN_GEN_ALL_CORE_FT
albuterol 90 mcg/inh inhalation aerosol: 2 puff(s) inhaled every 4 hours, As Needed -Shortness of Breath and/or Wheezing - for bronchospasm   ammonium lactate 12% topical lotion: 1 application topically 2 times a day  aspirin 81 mg oral delayed release tablet: 1 tab(s) orally once a day (at bedtime)  atorvastatin 20 mg oral tablet: 1 tab(s) orally once a day (at bedtime)  enalapril 10 mg oral tablet: 1 tab(s) orally 2 times a day  famotidine 20 mg oral tablet: 1 tab(s) orally once a day  guaiFENesin 1200 mg oral tablet, extended release: 1 tab(s) orally every 12 hours  latanoprost 0.005% ophthalmic solution: 1 drop(s) to each affected eye once a day (at bedtime)  lidocaine 4% topical film: Apply topically to affected area once a day  Mag-Ox 400 oral tablet: 1 tab(s) orally once a day  melatonin 3 mg oral tablet: 1 tab(s) orally once a day (at bedtime), As needed, Insomnia  metoprolol succinate 25 mg oral tablet, extended release: 1 tab(s) orally once a day  oxycodone-acetaminophen 5 mg-325 mg oral tablet: 1 tab(s) orally every 4 hours, As needed, Moderate Pain (4 - 6)  tamsulosin 0.4 mg oral capsule: 1 cap(s) orally once a day (at bedtime)  Trelegy Ellipta 100 mcg-62.5 mcg-25 mcg/inh inhalation powder: 1 puff(s) inhaled once a day  Tylenol 325 mg oral tablet: 2 tab(s) orally every 4 hours, As Needed  Vitamin D3 25 mcg (1000 intl units) oral tablet: 1 tab(s) orally once a day

## 2022-01-26 NOTE — DISCHARGE NOTE NURSING/CASE MANAGEMENT/SOCIAL WORK - NSDCPEFALRISK_GEN_ALL_CORE
For information on Fall & Injury Prevention, visit: [email or web address] OR  [email or web address] OR  https://www.cdc.gov/steadi/patient.html

## 2022-01-26 NOTE — DISCHARGE NOTE PROVIDER - NSDCFUADDINST_GEN_ALL_CORE_FT
- add Ensure 1 can TID to diet  - TSLO brace to be worn all the time when out of bed   - keep appointment with neurosurgery  - follow up with cardiology/PCP in one week

## 2022-01-26 NOTE — DISCHARGE NOTE NURSING/CASE MANAGEMENT/SOCIAL WORK - PATIENT PORTAL LINK FT
You can access the FollowMyHealth Patient Portal offered by [facility] by registering at the following website: [email or web address]. By joining [facility]’s FollowMyHealth portal, you will also be able to view your health information using other applications (apps) compatible with our system.

## 2022-01-26 NOTE — DISCHARGE NOTE PROVIDER - HOSPITAL COURSE
Pt is a 87 year old male w PMHx of BPH, CAD, COPD, glaucoma, HLD, HTN  admitted for:     # Weakness. Near syncope  - likely related to leg weakness and debility  - was admitted previously with similar presentation and work up was neg   - CT head neg to acute findings   - orthostatics - negative now, but was hydrated, monitor   - Likely need compression stockings for  prolong standing, both episodes while was shaving and was brushing his teeth   - recent echo revealed preserved EF  - tele: SR 80-90s,  halina overnight to 40s      # Left side pneumonia suspected ASPIRATION  RVP and COVID -negative   - leukocytosis resolved, Afebrile   - BC x 2 note  - on cefepime 2 gm IV q12h # 4 - will finish Abx course 1/25 -D/W / Chanell     # Left hip pain, L2 acute fracture . L4/T8 chronic Fx   - patient recommended to have MRI with contrast however patient does not want another MRI.   - Recommend Ct L-spine with contrast to r/o abscess vs alternate cause for enhancement recommend TLSO brace  - no acute emergent neurosurgical intervention at this timE  - NO SURGICAL INTERVENTION AT THIS TIME  Neurosurgery consult appreciated:  - TLSO brace to be worn when out of bed at all times  - Patient given our contact info to follow up in office 2 weeks upon discharge    #COPD, stable   On  Trelegy  at [facility]   C/w budesonide-formoterol   albuterol prn    #Glaucoma   latanoprost    #HLD  atorvastatin    #HTN  continue home meds w parameters  enalapril  10 mg BID  metoprolol 25    Pt. is stable for discharge, will follow up with neurosurgery and cardiology/;PCP    PHYSICAL EXAM:  Vital Signs Last 24 Hrs  T(C): 36.4 (26 Jan 2022 08:32), Max: 36.4 (25 Jan 2022 15:25)  T(F): 97.5 (26 Jan 2022 08:32), Max: 97.5 (25 Jan 2022 15:25)  HR: 74 (26 Jan 2022 09:29) (74 - 89)  BP: 101/60 (26 Jan 2022 08:32) (101/60 - 111/72)  BP(mean): --  RR: 18 (26 Jan 2022 08:32) (18 - 18)  SpO2: 92% (26 Jan 2022 09:29) (90% - 94%)  General: Well developed; frail elderly male,  in no acute distress  Eyes: EOMI; conjunctiva and sclera clear  Head: Normocephalic; atraumatic  ENMT: No nasal discharge; airway clear  Neck: Supple; non tender; no masses  Respiratory: Decreased BS. No wheezes, rales or rhonchi  Cardiovascular: Regular rate and rhythm. S1 and S2 Normal;  Gastrointestinal: Soft non-tender non-distended; Normal bowel sounds  Genitourinary: No  suprapubic  tenderness  Extremities: No leg edema, feet in the  boot  Neurological: Alert and oriented x2-3, non focal   Musculoskeletal: Normal muscle tone and strength   Psychiatric: Cooperative    Pt is a 87 year old male w PMHx of BPH, CAD, COPD, glaucoma, HLD, HTN  admitted for:     Denies any HA, CP, SOB. No fevers, chills or shakes. Labs and vitals reviewed. discussed with NP.     # Weakness. Near syncope  - likely related to leg weakness and debility  - was admitted previously with similar presentation and work up was neg   - CT head neg to acute findings   - orthostatics - negative now, but was hydrated, monitor   - Likely need compression stockings for  prolong standing, both episodes while was shaving and was brushing his teeth   - recent echo revealed preserved EF  - tele: SR 80-90s,  halina overnight to 40s      # Left side pneumonia suspected ASPIRATION  RVP and COVID -negative   - leukocytosis resolved, Afebrile   - BC x 2 note  - on cefepime 2 gm IV q12h # 4 - will finish Abx course 1/25 -D/W / Chanell     # Left hip pain, L2 acute fracture . L4/T8 chronic Fx   - patient recommended to have MRI with contrast however patient does not want another MRI.   - Recommend Ct L-spine with contrast to r/o abscess vs alternate cause for enhancement recommend TLSO brace  - no acute emergent neurosurgical intervention at this timE  - NO SURGICAL INTERVENTION AT THIS TIME  Neurosurgery consult appreciated:  - TLSO brace to be worn when out of bed at all times  - Patient given our contact info to follow up in office 2 weeks upon discharge    #COPD, stable   On  Trelegy  at [facility]   C/w budesonide-formoterol   albuterol prn    #Glaucoma   latanoprost    #HLD  atorvastatin    #HTN  continue home meds w parameters  enalapril  10 mg BID  metoprolol 25    Pt. is stable for discharge, will follow up with neurosurgery and cardiology/;PCP    PHYSICAL EXAM:  Vital Signs Last 24 Hrs  T(C): 36.4 (26 Jan 2022 08:32), Max: 36.4 (25 Jan 2022 15:25)  T(F): 97.5 (26 Jan 2022 08:32), Max: 97.5 (25 Jan 2022 15:25)  HR: 74 (26 Jan 2022 09:29) (74 - 89)  BP: 101/60 (26 Jan 2022 08:32) (101/60 - 111/72)  BP(mean): --  RR: 18 (26 Jan 2022 08:32) (18 - 18)  SpO2: 92% (26 Jan 2022 09:29) (90% - 94%)  General: Well developed; frail elderly male,  in no acute distress  Eyes: EOMI; conjunctiva and sclera clear  Head: Normocephalic; atraumatic  ENMT: No nasal discharge; airway clear  Neck: Supple; non tender; no masses  Respiratory: Decreased BS. No wheezes, rales or rhonchi  Cardiovascular: Regular rate and rhythm. S1 and S2 Normal;  Gastrointestinal: Soft non-tender non-distended; Normal bowel sounds  Genitourinary: No  suprapubic  tenderness  Extremities: No leg edema, feet in the  boot  Neurological: Alert and oriented x2-3, non focal   Musculoskeletal: Normal muscle tone and strength   Psychiatric: Cooperative

## 2022-01-26 NOTE — DISCHARGE NOTE PROVIDER - NSDCCPCAREPLAN_GEN_ALL_CORE_FT
PRINCIPAL DISCHARGE DIAGNOSIS  Diagnosis: Syncope  Assessment and Plan of Treatment: # Weakness. Near syncope  - likely related to leg weakness and debility  - was admitted previously with similar presentation and work up was neg   - CT head neg to acute findings   - orthostatics - negative now, but was hydrated, monitor   - Likely need compression stockings for  prolong standing, both episodes while was shaving and was brushing his teeth   - recent echo revealed preserved EF  - tele: SR 80-90s,  halina overnight to 40s            SECONDARY DISCHARGE DIAGNOSES  Diagnosis: Pneumonia  Assessment and Plan of Treatment: # Left side pneumonia suspected ASPIRATION  RVP and COVID -negative   - leukocytosis resolved, Afebrile   - BC x 2 note  - on cefepime 2 gm IV q12h # 4 - will finish Abx course 1/25 -D/W / Chanell       Diagnosis: Lumbar compression fracture  Assessment and Plan of Treatment: # Left hip pain, L2 acute fracture . L4/T8 chronic Fx   - patient recommended to have MRI with contrast however patient does not want another MRI.   - Recommend Ct L-spine with contrast to r/o abscess vs alternate cause for enhancement recommend TLSO brace  - no acute emergent neurosurgical intervention at this timE  - NO SURGICAL INTERVENTION AT THIS TIME  Neurosurgery consult appreciated:  - TLSO brace to be worn when out of bed at all times  - Patient given our contact info to follow up in office 2 weeks upon discharge

## 2022-01-28 ENCOUNTER — APPOINTMENT (OUTPATIENT)
Dept: ORTHOPEDIC SURGERY | Facility: CLINIC | Age: 87
End: 2022-01-28

## 2022-02-02 DIAGNOSIS — E78.5 HYPERLIPIDEMIA, UNSPECIFIED: ICD-10-CM

## 2022-02-02 DIAGNOSIS — M48.54XA COLLAPSED VERTEBRA, NOT ELSEWHERE CLASSIFIED, THORACIC REGION, INITIAL ENCOUNTER FOR FRACTURE: ICD-10-CM

## 2022-02-02 DIAGNOSIS — I25.10 ATHEROSCLEROTIC HEART DISEASE OF NATIVE CORONARY ARTERY WITHOUT ANGINA PECTORIS: ICD-10-CM

## 2022-02-02 DIAGNOSIS — H40.9 UNSPECIFIED GLAUCOMA: ICD-10-CM
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
## 2022-02-02 DIAGNOSIS — N40.0 BENIGN PROSTATIC HYPERPLASIA WITHOUT LOWER URINARY TRACT SYMPTOMS: ICD-10-CM

## 2022-02-02 DIAGNOSIS — E43 UNSPECIFIED SEVERE PROTEIN-CALORIE MALNUTRITION: ICD-10-CM

## 2022-02-02 DIAGNOSIS — J44.9 CHRONIC OBSTRUCTIVE PULMONARY DISEASE, UNSPECIFIED: ICD-10-CM

## 2022-02-02 DIAGNOSIS — Z79.82 LONG TERM (CURRENT) USE OF ASPIRIN: ICD-10-CM

## 2022-02-02 DIAGNOSIS — L85.3 XEROSIS CUTIS: ICD-10-CM
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
## 2022-02-02 DIAGNOSIS — Z86.19 PERSONAL HISTORY OF OTHER INFECTIOUS AND PARASITIC DISEASES: ICD-10-CM
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
## 2022-02-02 DIAGNOSIS — Z95.5 PRESENCE OF CORONARY ANGIOPLASTY IMPLANT AND GRAFT: ICD-10-CM

## 2022-02-02 DIAGNOSIS — R15.9 FULL INCONTINENCE OF FECES: ICD-10-CM

## 2022-02-02 DIAGNOSIS — H91.90 UNSPECIFIED HEARING LOSS, UNSPECIFIED EAR: ICD-10-CM

## 2022-02-02 DIAGNOSIS — R29.898 OTHER SYMPTOMS AND SIGNS INVOLVING THE MUSCULOSKELETAL SYSTEM: ICD-10-CM

## 2022-02-02 DIAGNOSIS — J69.0 PNEUMONITIS DUE TO INHALATION OF FOOD AND VOMIT: ICD-10-CM

## 2022-02-02 DIAGNOSIS — R32 UNSPECIFIED URINARY INCONTINENCE: ICD-10-CM

## 2022-02-02 DIAGNOSIS — R53.81 OTHER MALAISE: ICD-10-CM

## 2022-02-02 DIAGNOSIS — I10 ESSENTIAL (PRIMARY) HYPERTENSION: ICD-10-CM

## 2022-02-02 DIAGNOSIS — M48.56XA COLLAPSED VERTEBRA, NOT ELSEWHERE CLASSIFIED, LUMBAR REGION, INITIAL ENCOUNTER FOR FRACTURE: ICD-10-CM

## 2022-02-22 ENCOUNTER — FORM ENCOUNTER (OUTPATIENT)
Age: 87
End: 2022-02-22

## 2022-02-23 ENCOUNTER — APPOINTMENT (OUTPATIENT)
Dept: HOME HEALTH SERVICES | Facility: HOME HEALTH | Age: 87
End: 2022-02-23
Payer: MEDICARE

## 2022-02-23 VITALS
RESPIRATION RATE: 16 BRPM | HEART RATE: 65 BPM | SYSTOLIC BLOOD PRESSURE: 138 MMHG | TEMPERATURE: 97.3 F | DIASTOLIC BLOOD PRESSURE: 80 MMHG | OXYGEN SATURATION: 92 %

## 2022-02-23 DIAGNOSIS — M85.80 OTHER SPECIFIED DISORDERS OF BONE DENSITY AND STRUCTURE, UNSPECIFIED SITE: ICD-10-CM

## 2022-02-23 DIAGNOSIS — S42.291D OTHER DISPLACED FRACTURE OF UPPER END OF RIGHT HUMERUS, SUBSEQUENT ENCOUNTER FOR FRACTURE WITH ROUTINE HEALING: ICD-10-CM

## 2022-02-23 DIAGNOSIS — Z01.818 ENCOUNTER FOR OTHER PREPROCEDURAL EXAMINATION: ICD-10-CM

## 2022-02-23 DIAGNOSIS — B37.2 CANDIDIASIS OF SKIN AND NAIL: ICD-10-CM

## 2022-02-23 DIAGNOSIS — Z71.89 OTHER SPECIFIED COUNSELING: ICD-10-CM

## 2022-02-23 DIAGNOSIS — S30.22XA CONTUSION OF SCROTUM AND TESTES, INITIAL ENCOUNTER: ICD-10-CM

## 2022-02-23 DIAGNOSIS — Z23 ENCOUNTER FOR IMMUNIZATION: ICD-10-CM

## 2022-02-23 DIAGNOSIS — L60.3 NAIL DYSTROPHY: ICD-10-CM

## 2022-02-23 PROCEDURE — G0506: CPT

## 2022-02-23 PROCEDURE — 99350 HOME/RES VST EST HIGH MDM 60: CPT | Mod: 25

## 2022-02-23 PROCEDURE — 99345 HOME/RES VST NEW HIGH MDM 75: CPT | Mod: 25

## 2022-02-23 RX ORDER — METOPROLOL SUCCINATE 25 MG/1
25 TABLET, EXTENDED RELEASE ORAL DAILY
Refills: 1 | Status: ACTIVE | COMMUNITY
Start: 2022-02-23

## 2022-02-23 RX ORDER — ENALAPRIL MALEATE 5 MG/1
TABLET ORAL
Refills: 0 | Status: DISCONTINUED | COMMUNITY
End: 2022-02-23

## 2022-02-23 RX ORDER — FLUTICASONE FUROATE, UMECLIDINIUM BROMIDE AND VILANTEROL TRIFENATATE 100; 62.5; 25 UG/1; UG/1; UG/1
100-62.5-25 POWDER RESPIRATORY (INHALATION) DAILY
Qty: 1 | Refills: 2 | Status: ACTIVE | COMMUNITY
Start: 2022-02-23

## 2022-02-23 RX ORDER — SULFAMETHOXAZOLE AND TRIMETHOPRIM 800; 160 MG/1; MG/1
TABLET ORAL
Refills: 0 | Status: DISCONTINUED | COMMUNITY
End: 2022-02-23

## 2022-02-27 PROBLEM — M85.80 OSTEOPENIA OF THE ELDERLY: Status: ACTIVE | Noted: 2022-02-27

## 2022-02-27 PROBLEM — B37.2 MONILIAL RASH: Status: RESOLVED | Noted: 2017-04-12 | Resolved: 2022-02-27

## 2022-02-27 PROBLEM — S30.22XA SCROTAL HEMATOMA: Status: RESOLVED | Noted: 2017-04-21 | Resolved: 2022-02-27

## 2022-02-27 PROBLEM — Z01.818 PREOP TESTING: Status: RESOLVED | Noted: 2020-06-11 | Resolved: 2022-02-27

## 2022-02-27 PROBLEM — S42.291D OTHER CLOSED DISPLACED FRACTURE OF PROXIMAL END OF RIGHT HUMERUS WITH ROUTINE HEALING, SUBSEQUENT ENCOUNTER: Status: RESOLVED | Noted: 2020-03-09 | Resolved: 2022-02-27

## 2022-02-27 NOTE — HISTORY OF PRESENT ILLNESS
[Patient] : patient [Spouse] : spouse [FreeTextEntry1] :  copd  weakness  compression  fractures  [FreeTextEntry2] :  Patient denies fever, cough, trouble breathing, rash, vomiting and diarrhea. Patient has not been in close contact with someone Covid positive.\par Mask ,gloves and eyewear used during visit  Total  face to  face time  is  30 minutes \par   patient received  Covid vaccine  with no adverse effects  still continues to take all precautions and  safety  measures \par patient is seen today for initial visit and enrollment into  a house call  program along with care manager\par patient is homebound due  copd  weakness  compression  fractures \par  most of the history obtained from family member  wife \par Past medical history include   87 year old male w PMHx of BPH, CAD, COPD, glaucoma, HLD, HTN \par  patient  still follows with   specialists  pulmonary  cardiologist \par last hospitalization  1/2022 fall \par diet regular  appetite   poor  \par dysphagia none  but occasionally  coughs  with meals \par Weight some  30 pounds  does not like ensure  \par constipation every  3 days  uses  laxatives  \par incontinence  patient   has depends uses commode \par pressure/bed sores\par Ambulates with  rolling  walker  has back  brace but does   not wear it consistently \par falls frequent  has PT \par Behavior  ok  can get irritable and argumentative \par Mood ok  no  signs  \par  memory  good \par  sleep  ok \par sensory deficits  vision  compromised    due to glaucoma  \par pain  yes  back pain  had seen spinal  surgeon  and ortho in the past  advised on Tylenol \par Medication refills done and reconciliation  done \par Goals of care discussed but not  completed \par

## 2022-02-27 NOTE — PHYSICAL EXAM
[No Motor Deficits] : the motor exam was normal [Oriented x3] : oriented to person, place, and time [Normal Affect] : the affect was normal [No Acute Distress] : no acute distress [Normal Sclera/Conjunctiva] : normal sclera/conjunctiva [Normal Outer Ear/Nose] : the ears and nose were normal in appearance [Supple] : the neck was supple [No Respiratory Distress] : no respiratory distress [Clear to Auscultation] : lungs were clear to auscultation bilaterally [Normal Rate] : heart rate was normal  [Regular Rhythm] : with a regular rhythm [Breast Exam Declined] : patient declined to have breast exam done [Non Tender] : non-tender [Soft] : abdomen soft [Kyphosis] :  kyphosis present [Foot Ulcers] : no foot ulcers [de-identified] :    and cooperative   looking  stated age  prefers  recumbent position  due to back pain  [de-identified] : upper  dentures  [de-identified] :  trace edema   venous stasis  [de-identified] :  soft  reducible   umbilical hernia  [de-identified] : . [de-identified] : venous  stasis  hyperpigmentation  dry skin but no ulceration   long elongated  brittle nails

## 2022-02-27 NOTE — CHRONIC CARE ASSESSMENT
[Patient Non-adherent to care plan] : patient non-adherent to care plan [Resistant to using DME in place] : resistant to using DME in place [Less than 3 Times Per Week] : exercises less than 3 times per week [Strength Training] : strength training [General Adherence] : and is generally adherent [PPS Score: ____] : Palliative Performance Scale (PPS) Score: [unfilled] [FAST Score: ____] : Functional Assessment Scale (FAST) Score: [unfilled] [Class I] : [employer] [place] [employer] Association Class Output: Class I

## 2022-02-27 NOTE — COUNSELING
[Normal Weight - ( BMI  <25 )] : normal weight - ( BMI  <25 ) [Mediterranean diet recommended] : Mediterranean diet recommended [Non - Smoker] : non-smoker [Use assistive device to avoid falls] : use assistive device to avoid falls [] : cervical cancer screening [Decrease stress] : decrease stress [Improve mobility] : improve mobility [Decrease hospital use] : decrease hospital use [Minimize unnecessary interventions] : minimize unnecessary interventions [Maintain functional ability] : maintain functional ability [Discussed disease trajectory with patient/caregiver] : discussed disease trajectory with patient/caregiver [Likely to achieve goals/desired outcomes] : likely to achieve goals/desired outcomes [Patient/Caregiver has ___ understanding of disease process] : patient/caregiver has [unfilled] understanding of disease process [Patient/Caregiver is unclear of wishes] : patient/caregiver is unclear of wishes [Advanced Directives discussed: ____] : Advanced directives discussed: [unfilled] [ - New patient with 2 or more chronic conditions; CCM discussed and patient-centered care plan established] : New patient with 2 or more chronic conditions; CCM discussed and patient-centered care plan established

## 2022-02-27 NOTE — REVIEW OF SYSTEMS
[Fatigue] : fatigue [Constipation] : constipation [Joint Pain] : joint pain [Muscle Weakness] : muscle weakness [Back Pain] : back pain [Nail Changes] : nail changes [Unsteady Walk] : ataxia [Negative] : Genitourinary

## 2022-03-01 ENCOUNTER — NON-APPOINTMENT (OUTPATIENT)
Age: 87
End: 2022-03-01

## 2022-03-08 DIAGNOSIS — M54.9 DORSALGIA, UNSPECIFIED: ICD-10-CM

## 2022-04-06 ENCOUNTER — APPOINTMENT (OUTPATIENT)
Dept: NEUROSURGERY | Facility: CLINIC | Age: 87
End: 2022-04-06

## 2022-05-04 ENCOUNTER — TRANSCRIPTION ENCOUNTER (OUTPATIENT)
Age: 87
End: 2022-05-04

## 2022-05-04 ENCOUNTER — APPOINTMENT (OUTPATIENT)
Dept: HOME HEALTH SERVICES | Facility: HOME HEALTH | Age: 87
End: 2022-05-04
Payer: MEDICARE

## 2022-05-04 DIAGNOSIS — J18.9 PNEUMONIA, UNSPECIFIED ORGANISM: ICD-10-CM

## 2022-05-04 PROCEDURE — 99349 HOME/RES VST EST MOD MDM 40: CPT | Mod: 95

## 2022-05-04 NOTE — ED PROVIDER NOTE - MDM PATIENT STATEMENT FOR ADDL TREATMENT
5/4/2022    Patient: Agustín Valdovinos   YOB: 1998   Date of Visit: Emily Mendez MD  [address] [facility]    Dear Sabi Gonzalez MD,      Thank you for referring Ms. Marianna Gibbs to [facility] for evaluation. My notes for this consultation are attached. If you have questions, please do not hesitate to call me. I look forward to following your patient along with you.       Sincerely,    Alfredo Rousseau MD Patient with one or more new problems requiring additional work-up/treatment.

## 2022-05-05 ENCOUNTER — INPATIENT (INPATIENT)
Facility: HOSPITAL | Age: 87
LOS: 7 days | Discharge: HOME HEALTH | DRG: 177 | End: 2022-05-13
Attending: INTERNAL MEDICINE | Admitting: INTERNAL MEDICINE
Payer: MEDICARE

## 2022-05-05 ENCOUNTER — TRANSCRIPTION ENCOUNTER (OUTPATIENT)
Age: 87
End: 2022-05-05

## 2022-05-05 VITALS — HEIGHT: 71 IN

## 2022-05-05 DIAGNOSIS — Z98.890 OTHER SPECIFIED POSTPROCEDURAL STATES: Chronic | ICD-10-CM

## 2022-05-05 DIAGNOSIS — Z98.49 CATARACT EXTRACTION STATUS, UNSPECIFIED EYE: Chronic | ICD-10-CM

## 2022-05-05 DIAGNOSIS — Z96.642 PRESENCE OF LEFT ARTIFICIAL HIP JOINT: Chronic | ICD-10-CM

## 2022-05-05 DIAGNOSIS — Z95.5 PRESENCE OF CORONARY ANGIOPLASTY IMPLANT AND GRAFT: Chronic | ICD-10-CM

## 2022-05-05 DIAGNOSIS — Z90.89 ACQUIRED ABSENCE OF OTHER ORGANS: Chronic | ICD-10-CM

## 2022-05-05 LAB
ALBUMIN SERPL ELPH-MCNC: 2.8 G/DL — LOW (ref 3.3–5)
ALP SERPL-CCNC: 104 U/L — SIGNIFICANT CHANGE UP (ref 40–120)
ALT FLD-CCNC: 16 U/L — SIGNIFICANT CHANGE UP (ref 12–78)
ANION GAP SERPL CALC-SCNC: 5 MMOL/L — SIGNIFICANT CHANGE UP (ref 5–17)
AST SERPL-CCNC: 19 U/L — SIGNIFICANT CHANGE UP (ref 15–37)
BASOPHILS # BLD AUTO: 0.04 K/UL — SIGNIFICANT CHANGE UP (ref 0–0.2)
BASOPHILS NFR BLD AUTO: 0.6 % — SIGNIFICANT CHANGE UP (ref 0–2)
BILIRUB SERPL-MCNC: 0.6 MG/DL — SIGNIFICANT CHANGE UP (ref 0.2–1.2)
BUN SERPL-MCNC: 14 MG/DL — SIGNIFICANT CHANGE UP (ref 7–23)
CALCIUM SERPL-MCNC: 9.3 MG/DL — SIGNIFICANT CHANGE UP (ref 8.5–10.1)
CHLORIDE SERPL-SCNC: 100 MMOL/L — SIGNIFICANT CHANGE UP (ref 96–108)
CO2 SERPL-SCNC: 29 MMOL/L — SIGNIFICANT CHANGE UP (ref 22–31)
CREAT SERPL-MCNC: 0.88 MG/DL — SIGNIFICANT CHANGE UP (ref 0.5–1.3)
D DIMER BLD IA.RAPID-MCNC: 1080 NG/ML DDU — HIGH
EGFR: 83 ML/MIN/1.73M2 — SIGNIFICANT CHANGE UP
EOSINOPHIL # BLD AUTO: 0.06 K/UL — SIGNIFICANT CHANGE UP (ref 0–0.5)
EOSINOPHIL NFR BLD AUTO: 0.8 % — SIGNIFICANT CHANGE UP (ref 0–6)
GLUCOSE SERPL-MCNC: 103 MG/DL — HIGH (ref 70–99)
HCT VFR BLD CALC: 50 % — SIGNIFICANT CHANGE UP (ref 39–50)
HGB BLD-MCNC: 15.9 G/DL — SIGNIFICANT CHANGE UP (ref 13–17)
IMM GRANULOCYTES NFR BLD AUTO: 1.1 % — SIGNIFICANT CHANGE UP (ref 0–1.5)
LYMPHOCYTES # BLD AUTO: 1.02 K/UL — SIGNIFICANT CHANGE UP (ref 1–3.3)
LYMPHOCYTES # BLD AUTO: 14.4 % — SIGNIFICANT CHANGE UP (ref 13–44)
MCHC RBC-ENTMCNC: 27.8 PG — SIGNIFICANT CHANGE UP (ref 27–34)
MCHC RBC-ENTMCNC: 31.8 GM/DL — LOW (ref 32–36)
MCV RBC AUTO: 87.6 FL — SIGNIFICANT CHANGE UP (ref 80–100)
MONOCYTES # BLD AUTO: 1.05 K/UL — HIGH (ref 0–0.9)
MONOCYTES NFR BLD AUTO: 14.8 % — HIGH (ref 2–14)
NEUTROPHILS # BLD AUTO: 4.85 K/UL — SIGNIFICANT CHANGE UP (ref 1.8–7.4)
NEUTROPHILS NFR BLD AUTO: 68.3 % — SIGNIFICANT CHANGE UP (ref 43–77)
NT-PROBNP SERPL-SCNC: 575 PG/ML — HIGH (ref 0–450)
PLATELET # BLD AUTO: 137 K/UL — LOW (ref 150–400)
POTASSIUM SERPL-MCNC: 4.1 MMOL/L — SIGNIFICANT CHANGE UP (ref 3.5–5.3)
POTASSIUM SERPL-SCNC: 4.1 MMOL/L — SIGNIFICANT CHANGE UP (ref 3.5–5.3)
PROT SERPL-MCNC: 6.3 GM/DL — SIGNIFICANT CHANGE UP (ref 6–8.3)
RBC # BLD: 5.71 M/UL — SIGNIFICANT CHANGE UP (ref 4.2–5.8)
RBC # FLD: 15.4 % — HIGH (ref 10.3–14.5)
SODIUM SERPL-SCNC: 134 MMOL/L — LOW (ref 135–145)
TROPONIN I, HIGH SENSITIVITY RESULT: 17.18 NG/L — SIGNIFICANT CHANGE UP
WBC # BLD: 7.1 K/UL — SIGNIFICANT CHANGE UP (ref 3.8–10.5)
WBC # FLD AUTO: 7.1 K/UL — SIGNIFICANT CHANGE UP (ref 3.8–10.5)

## 2022-05-05 PROCEDURE — 99053 MED SERV 10PM-8AM 24 HR FAC: CPT

## 2022-05-05 PROCEDURE — 71275 CT ANGIOGRAPHY CHEST: CPT | Mod: 26,ME

## 2022-05-05 PROCEDURE — 71045 X-RAY EXAM CHEST 1 VIEW: CPT | Mod: 26

## 2022-05-05 PROCEDURE — 93010 ELECTROCARDIOGRAM REPORT: CPT

## 2022-05-05 PROCEDURE — G1004: CPT

## 2022-05-05 PROCEDURE — 99285 EMERGENCY DEPT VISIT HI MDM: CPT

## 2022-05-05 RX ORDER — SODIUM CHLORIDE 9 MG/ML
500 INJECTION INTRAMUSCULAR; INTRAVENOUS; SUBCUTANEOUS ONCE
Refills: 0 | Status: COMPLETED | OUTPATIENT
Start: 2022-05-05 | End: 2022-05-05

## 2022-05-05 RX ORDER — ACETAMINOPHEN 500 MG
650 TABLET ORAL ONCE
Refills: 0 | Status: COMPLETED | OUTPATIENT
Start: 2022-05-05 | End: 2022-05-05

## 2022-05-05 RX ADMIN — Medication 650 MILLIGRAM(S): at 20:41

## 2022-05-05 RX ADMIN — SODIUM CHLORIDE 500 MILLILITER(S): 9 INJECTION INTRAMUSCULAR; INTRAVENOUS; SUBCUTANEOUS at 21:40

## 2022-05-05 NOTE — ED CLERICAL - NS ED CLERK NOTE PRE-ARRIVAL INFORMATION; ADDITIONAL PRE-ARRIVAL INFORMATION

## 2022-05-05 NOTE — ED ADULT NURSE NOTE - CHIEF COMPLAINT QUOTE
Pt. BIBEMS from home with c/o bilateral hip pain [id number]. Pt. states that he is unable to ambulate recently. Denies numbness or tingling in the legs, fevers, SOB, falls or injury. HX: COPD. As per EMS patient was diagnosed with Pneumonia on Saturday and COVID today. No visible signs of distress noted. EMS also advised patient blood pressure is evaluated because the patient forgot to take their medications last night.

## 2022-05-05 NOTE — ED PROVIDER NOTE - PRINCIPAL DIAGNOSIS
Clear liquids per MD order - obtained verbal clearance from Dr. Castellanos to trial all consistencies during today's evaluation 2019 novel coronavirus disease (COVID-19)

## 2022-05-05 NOTE — ED ADULT NURSE REASSESSMENT NOTE - NS ED NURSE REASSESS COMMENT FT1
Pt aox3, VSS, occasional moist cough noted, no acute resp distress. Awaiting CTA results. Pt c/o chronic hip pain discomfort, repositioned for comfort, tylenol recently given.

## 2022-05-05 NOTE — ED PROVIDER NOTE - NS ED ROS FT
Gen: No fever, normal appetite  Eyes: No eye irritation or discharge  ENT: No ear pain, congestion, sore throat  Resp: No cough or trouble breathing  Cardiovascular: No chest pain or palpitation  Gastroenteric:  + vomiting and diarrhea   :  No change in urine output; no dysuria  MS:hip pain   Skin: No rashes  Neuro: No headache; no abnormal movements  Remainder negative, except as per the HPI

## 2022-05-05 NOTE — ED PROVIDER NOTE - CLINICAL SUMMARY MEDICAL DECISION MAKING FREE TEXT BOX
pt w/ vomiting and diarrhea in setting of being given abx for pna in the setting of COVID. + weakness and gait instability, saturating 94% on RA, no indication for steroids at this time, will give fluids > admit.

## 2022-05-05 NOTE — ED PROVIDER NOTE - OBJECTIVE STATEMENT
88yo m pmh HTN, COPD, CAD, presents with generalized weakness, vomiting, diarrhea. + covid at [facility] today.  daugther positive for covid last week, went to [facility] on saturday after daughter tested postive ( no symptoms), xray w/ pna , covid neg at that time( wife however was postive) . started on doxy and amox. then was changed to levaquin by home visits. pt then developed diarrhea and vomiting today. no known fevers. per home visit notes saturation yesterday was 90%. per wife pt now unsteady on feet, not safe at home, concerned he will fall.  .

## 2022-05-05 NOTE — ED ADULT NURSE NOTE - OBJECTIVE STATEMENT
Pt. reports to ED for COVID symptoms. Pt. aox3 from home reports went to urgent care on Saturday b/c wife tested positive for COVID, pt. sent home on antibiotics chest xray showed pneumonia, negative covid.  PT. reports today tested positive for covid at home and had one episode of diarrhea, chills and cough.  Denies SOB, CP, fevers at home. Pt. c/o chronic hip pain. EKG complete, VSS.

## 2022-05-05 NOTE — ED PROVIDER NOTE - CARE PLAN
Principal Discharge DX:	2019 novel coronavirus disease (COVID-19)  Secondary Diagnosis:	Diarrhea   1

## 2022-05-06 ENCOUNTER — NON-APPOINTMENT (OUTPATIENT)
Age: 87
End: 2022-05-06

## 2022-05-06 DIAGNOSIS — U07.1 COVID-19: ICD-10-CM

## 2022-05-06 DIAGNOSIS — I26.99 OTHER PULMONARY EMBOLISM WITHOUT ACUTE COR PULMONALE: ICD-10-CM

## 2022-05-06 LAB
ALBUMIN SERPL ELPH-MCNC: 2.9 G/DL — LOW (ref 3.3–5)
ALP SERPL-CCNC: 104 U/L — SIGNIFICANT CHANGE UP (ref 40–120)
ALT FLD-CCNC: 18 U/L — SIGNIFICANT CHANGE UP (ref 12–78)
ANION GAP SERPL CALC-SCNC: 8 MMOL/L — SIGNIFICANT CHANGE UP (ref 5–17)
APTT BLD: 39.2 SEC — HIGH (ref 27.5–35.5)
AST SERPL-CCNC: 17 U/L — SIGNIFICANT CHANGE UP (ref 15–37)
BASOPHILS # BLD AUTO: 0.03 K/UL — SIGNIFICANT CHANGE UP (ref 0–0.2)
BASOPHILS NFR BLD AUTO: 0.5 % — SIGNIFICANT CHANGE UP (ref 0–2)
BILIRUB SERPL-MCNC: 0.4 MG/DL — SIGNIFICANT CHANGE UP (ref 0.2–1.2)
BUN SERPL-MCNC: 13 MG/DL — SIGNIFICANT CHANGE UP (ref 7–23)
CALCIUM SERPL-MCNC: 9.1 MG/DL — SIGNIFICANT CHANGE UP (ref 8.5–10.1)
CHLORIDE SERPL-SCNC: 103 MMOL/L — SIGNIFICANT CHANGE UP (ref 96–108)
CO2 SERPL-SCNC: 27 MMOL/L — SIGNIFICANT CHANGE UP (ref 22–31)
CREAT SERPL-MCNC: 0.81 MG/DL — SIGNIFICANT CHANGE UP (ref 0.5–1.3)
CRP SERPL-MCNC: 20 MG/L — HIGH
EGFR: 85 ML/MIN/1.73M2 — SIGNIFICANT CHANGE UP
EOSINOPHIL # BLD AUTO: 0 K/UL — SIGNIFICANT CHANGE UP (ref 0–0.5)
EOSINOPHIL NFR BLD AUTO: 0 % — SIGNIFICANT CHANGE UP (ref 0–6)
FERRITIN SERPL-MCNC: 169 NG/ML — SIGNIFICANT CHANGE UP (ref 30–400)
GLUCOSE SERPL-MCNC: 130 MG/DL — HIGH (ref 70–99)
HCT VFR BLD CALC: 50.2 % — HIGH (ref 39–50)
HGB BLD-MCNC: 16 G/DL — SIGNIFICANT CHANGE UP (ref 13–17)
IMM GRANULOCYTES NFR BLD AUTO: 1.3 % — SIGNIFICANT CHANGE UP (ref 0–1.5)
INR BLD: 1.2 RATIO — HIGH (ref 0.88–1.16)
LYMPHOCYTES # BLD AUTO: 0.59 K/UL — LOW (ref 1–3.3)
LYMPHOCYTES # BLD AUTO: 9.4 % — LOW (ref 13–44)
MCHC RBC-ENTMCNC: 27.9 PG — SIGNIFICANT CHANGE UP (ref 27–34)
MCHC RBC-ENTMCNC: 31.9 GM/DL — LOW (ref 32–36)
MCV RBC AUTO: 87.6 FL — SIGNIFICANT CHANGE UP (ref 80–100)
MONOCYTES # BLD AUTO: 0.2 K/UL — SIGNIFICANT CHANGE UP (ref 0–0.9)
MONOCYTES NFR BLD AUTO: 3.2 % — SIGNIFICANT CHANGE UP (ref 2–14)
NEUTROPHILS # BLD AUTO: 5.41 K/UL — SIGNIFICANT CHANGE UP (ref 1.8–7.4)
NEUTROPHILS NFR BLD AUTO: 85.6 % — HIGH (ref 43–77)
PLATELET # BLD AUTO: 134 K/UL — LOW (ref 150–400)
POTASSIUM SERPL-MCNC: 5 MMOL/L — SIGNIFICANT CHANGE UP (ref 3.5–5.3)
POTASSIUM SERPL-SCNC: 5 MMOL/L — SIGNIFICANT CHANGE UP (ref 3.5–5.3)
PROCALCITONIN SERPL-MCNC: 0.06 NG/ML — SIGNIFICANT CHANGE UP (ref 0.02–0.1)
PROT SERPL-MCNC: 6.4 GM/DL — SIGNIFICANT CHANGE UP (ref 6–8.3)
PROTHROM AB SERPL-ACNC: 13.9 SEC — HIGH (ref 10.5–13.4)
RBC # BLD: 5.73 M/UL — SIGNIFICANT CHANGE UP (ref 4.2–5.8)
RBC # FLD: 15.6 % — HIGH (ref 10.3–14.5)
SARS-COV-2 RNA SPEC QL NAA+PROBE: DETECTED
SODIUM SERPL-SCNC: 138 MMOL/L — SIGNIFICANT CHANGE UP (ref 135–145)
WBC # BLD: 6.31 K/UL — SIGNIFICANT CHANGE UP (ref 3.8–10.5)
WBC # FLD AUTO: 6.31 K/UL — SIGNIFICANT CHANGE UP (ref 3.8–10.5)

## 2022-05-06 PROCEDURE — 80053 COMPREHEN METABOLIC PANEL: CPT

## 2022-05-06 PROCEDURE — 94640 AIRWAY INHALATION TREATMENT: CPT

## 2022-05-06 PROCEDURE — 85025 COMPLETE CBC W/AUTO DIFF WBC: CPT

## 2022-05-06 PROCEDURE — 99497 ADVNCD CARE PLAN 30 MIN: CPT | Mod: 25

## 2022-05-06 PROCEDURE — 36415 COLL VENOUS BLD VENIPUNCTURE: CPT

## 2022-05-06 PROCEDURE — 80076 HEPATIC FUNCTION PANEL: CPT

## 2022-05-06 PROCEDURE — 97116 GAIT TRAINING THERAPY: CPT | Mod: GP

## 2022-05-06 PROCEDURE — 85027 COMPLETE CBC AUTOMATED: CPT

## 2022-05-06 PROCEDURE — 85730 THROMBOPLASTIN TIME PARTIAL: CPT

## 2022-05-06 PROCEDURE — 97530 THERAPEUTIC ACTIVITIES: CPT | Mod: GP

## 2022-05-06 PROCEDURE — 97163 PT EVAL HIGH COMPLEX 45 MIN: CPT | Mod: GP

## 2022-05-06 PROCEDURE — 84100 ASSAY OF PHOSPHORUS: CPT

## 2022-05-06 PROCEDURE — 83735 ASSAY OF MAGNESIUM: CPT

## 2022-05-06 PROCEDURE — 99223 1ST HOSP IP/OBS HIGH 75: CPT

## 2022-05-06 PROCEDURE — 93306 TTE W/DOPPLER COMPLETE: CPT

## 2022-05-06 PROCEDURE — 85610 PROTHROMBIN TIME: CPT

## 2022-05-06 PROCEDURE — 80048 BASIC METABOLIC PNL TOTAL CA: CPT

## 2022-05-06 RX ORDER — REMDESIVIR 5 MG/ML
100 INJECTION INTRAVENOUS EVERY 24 HOURS
Refills: 0 | Status: COMPLETED | OUTPATIENT
Start: 2022-05-07 | End: 2022-05-10

## 2022-05-06 RX ORDER — REMDESIVIR 5 MG/ML
INJECTION INTRAVENOUS
Refills: 0 | Status: COMPLETED | OUTPATIENT
Start: 2022-05-06 | End: 2022-05-10

## 2022-05-06 RX ORDER — TIOTROPIUM BROMIDE 18 UG/1
1 CAPSULE ORAL; RESPIRATORY (INHALATION) DAILY
Refills: 0 | Status: DISCONTINUED | OUTPATIENT
Start: 2022-05-06 | End: 2022-05-13

## 2022-05-06 RX ORDER — ALBUTEROL 90 UG/1
2 AEROSOL, METERED ORAL EVERY 4 HOURS
Refills: 0 | Status: DISCONTINUED | OUTPATIENT
Start: 2022-05-06 | End: 2022-05-13

## 2022-05-06 RX ORDER — TAMSULOSIN HYDROCHLORIDE 0.4 MG/1
0.4 CAPSULE ORAL AT BEDTIME
Refills: 0 | Status: DISCONTINUED | OUTPATIENT
Start: 2022-05-06 | End: 2022-05-13

## 2022-05-06 RX ORDER — ASPIRIN/CALCIUM CARB/MAGNESIUM 324 MG
81 TABLET ORAL DAILY
Refills: 0 | Status: DISCONTINUED | OUTPATIENT
Start: 2022-05-06 | End: 2022-05-13

## 2022-05-06 RX ORDER — DEXAMETHASONE 0.5 MG/5ML
6 ELIXIR ORAL DAILY
Refills: 0 | Status: ACTIVE | OUTPATIENT
Start: 2022-05-06 | End: 2022-05-16

## 2022-05-06 RX ORDER — ATORVASTATIN CALCIUM 80 MG/1
20 TABLET, FILM COATED ORAL AT BEDTIME
Refills: 0 | Status: DISCONTINUED | OUTPATIENT
Start: 2022-05-06 | End: 2022-05-13

## 2022-05-06 RX ORDER — BUDESONIDE AND FORMOTEROL FUMARATE DIHYDRATE 160; 4.5 UG/1; UG/1
2 AEROSOL RESPIRATORY (INHALATION)
Refills: 0 | Status: DISCONTINUED | OUTPATIENT
Start: 2022-05-06 | End: 2022-05-13

## 2022-05-06 RX ORDER — ACETAMINOPHEN 500 MG
650 TABLET ORAL EVERY 6 HOURS
Refills: 0 | Status: DISCONTINUED | OUTPATIENT
Start: 2022-05-06 | End: 2022-05-13

## 2022-05-06 RX ORDER — DEXAMETHASONE 0.5 MG/5ML
6 ELIXIR ORAL ONCE
Refills: 0 | Status: COMPLETED | OUTPATIENT
Start: 2022-05-06 | End: 2022-05-06

## 2022-05-06 RX ORDER — METOPROLOL TARTRATE 50 MG
25 TABLET ORAL DAILY
Refills: 0 | Status: DISCONTINUED | OUTPATIENT
Start: 2022-05-06 | End: 2022-05-13

## 2022-05-06 RX ORDER — LANOLIN ALCOHOL/MO/W.PET/CERES
3 CREAM (GRAM) TOPICAL AT BEDTIME
Refills: 0 | Status: DISCONTINUED | OUTPATIENT
Start: 2022-05-06 | End: 2022-05-13

## 2022-05-06 RX ORDER — FAMOTIDINE 10 MG/ML
20 INJECTION INTRAVENOUS DAILY
Refills: 0 | Status: DISCONTINUED | OUTPATIENT
Start: 2022-05-06 | End: 2022-05-13

## 2022-05-06 RX ORDER — ENOXAPARIN SODIUM 100 MG/ML
70 INJECTION SUBCUTANEOUS EVERY 12 HOURS
Refills: 0 | Status: DISCONTINUED | OUTPATIENT
Start: 2022-05-06 | End: 2022-05-11

## 2022-05-06 RX ORDER — CHOLECALCIFEROL (VITAMIN D3) 125 MCG
1000 CAPSULE ORAL DAILY
Refills: 0 | Status: DISCONTINUED | OUTPATIENT
Start: 2022-05-06 | End: 2022-05-13

## 2022-05-06 RX ORDER — REMDESIVIR 5 MG/ML
200 INJECTION INTRAVENOUS EVERY 24 HOURS
Refills: 0 | Status: COMPLETED | OUTPATIENT
Start: 2022-05-06 | End: 2022-05-06

## 2022-05-06 RX ORDER — LATANOPROST 0.05 MG/ML
1 SOLUTION/ DROPS OPHTHALMIC; TOPICAL AT BEDTIME
Refills: 0 | Status: DISCONTINUED | OUTPATIENT
Start: 2022-05-06 | End: 2022-05-13

## 2022-05-06 RX ORDER — GUAIFENESIN/DEXTROMETHORPHAN 600MG-30MG
10 TABLET, EXTENDED RELEASE 12 HR ORAL EVERY 4 HOURS
Refills: 0 | Status: DISCONTINUED | OUTPATIENT
Start: 2022-05-06 | End: 2022-05-13

## 2022-05-06 RX ADMIN — Medication 81 MILLIGRAM(S): at 10:41

## 2022-05-06 RX ADMIN — Medication 1000 UNIT(S): at 10:41

## 2022-05-06 RX ADMIN — TAMSULOSIN HYDROCHLORIDE 0.4 MILLIGRAM(S): 0.4 CAPSULE ORAL at 23:02

## 2022-05-06 RX ADMIN — Medication 10 MILLIGRAM(S): at 23:02

## 2022-05-06 RX ADMIN — REMDESIVIR 500 MILLIGRAM(S): 5 INJECTION INTRAVENOUS at 04:10

## 2022-05-06 RX ADMIN — ENOXAPARIN SODIUM 70 MILLIGRAM(S): 100 INJECTION SUBCUTANEOUS at 01:38

## 2022-05-06 RX ADMIN — Medication 10 MILLILITER(S): at 01:35

## 2022-05-06 RX ADMIN — ALBUTEROL 2 PUFF(S): 90 AEROSOL, METERED ORAL at 01:40

## 2022-05-06 RX ADMIN — Medication 650 MILLIGRAM(S): at 14:10

## 2022-05-06 RX ADMIN — ENOXAPARIN SODIUM 70 MILLIGRAM(S): 100 INJECTION SUBCUTANEOUS at 13:49

## 2022-05-06 RX ADMIN — Medication 10 MILLIGRAM(S): at 10:41

## 2022-05-06 RX ADMIN — Medication 100 MILLIGRAM(S): at 01:35

## 2022-05-06 RX ADMIN — FAMOTIDINE 20 MILLIGRAM(S): 10 INJECTION INTRAVENOUS at 10:41

## 2022-05-06 RX ADMIN — ATORVASTATIN CALCIUM 20 MILLIGRAM(S): 80 TABLET, FILM COATED ORAL at 23:02

## 2022-05-06 RX ADMIN — Medication 25 MILLIGRAM(S): at 10:41

## 2022-05-06 RX ADMIN — BUDESONIDE AND FORMOTEROL FUMARATE DIHYDRATE 2 PUFF(S): 160; 4.5 AEROSOL RESPIRATORY (INHALATION) at 20:25

## 2022-05-06 RX ADMIN — Medication 6 MILLIGRAM(S): at 00:53

## 2022-05-06 RX ADMIN — ENOXAPARIN SODIUM 70 MILLIGRAM(S): 100 INJECTION SUBCUTANEOUS at 23:02

## 2022-05-06 NOTE — ED ADULT NURSE REASSESSMENT NOTE - NS ED NURSE REASSESS COMMENT FT1
Report received from night RN Rose. Pt resting comfortably on stretcher in room. Safety and comfort measures maintained. Will continue to monitor. Pt awaiting bed on unit, TOYA.

## 2022-05-06 NOTE — H&P ADULT - ASSESSMENT
Pt is an 86 yo male who presents from home due to worsening lethargy, shortness of breath, cough, subjective fevers, weakness, in setting of PNA being treated with abx at home, found to be hypoxic to 86% on exertion in ED in setting of +COVID requiring 2-3L supplemental oxygen to achieve 94%, admitted due to:      #Acute hypoxic respiratory failure  #Viral Pneumonia secondary to Novel Coronavirus Infection  #COPD exacerbation  - Maintain on airborne isolation.  - Continue with O2 as needed via nasal cannula and up-titrate as needed.  - Obtain daily room air O2 saturations once O2 requirements stabilize.  - Start remdesivir, dexamethasone  - Acetaminophen 650 mg PO q4h PRN fever. Limit use of NSAIDs.  - HFA albuterol Q6 hour PRN via MDI. Would avoid nebulized preparations to limit risk of aerosol formation.  - tessalon pearls & leticia DM prn  - c/w supplemental O2  - f/u AM labs  - ID consult  - c/w budesonide and tiotropium as therapeutic interchange for home trellegy inhaler    #Acute bilateral pulmonary emboli  - true weight obtained  - lovenox 1mg/kg bid for full AC with plan to bridge to oral AC  - supplemental O2 as above  - CTA w/o evidence of right heart strain  - troponin wnl  - BnP only mildly elevated    #Hyperglycemia  -non fasting  -minimally elevated however as dexamethasone started, will need to monitor closely on serum chemistry and start accuchecks/AISS/carb restriction pending trend and AM result    #Protein calorie malnutrition  -f/u prealbumin  -nutrition consult    #HTN/HLD  -c/w statin  -DASH/TLC diet  -c/w vasotec  -c/w toprol xl    #BPH  -c/w flomax

## 2022-05-06 NOTE — H&P ADULT - MUSCULOSKELETAL
detailed exam ROM intact/no joint swelling/no joint erythema/no joint warmth/no calf tenderness/diminished strength

## 2022-05-06 NOTE — H&P ADULT - CONVERSATION DETAILS
17 min spent discussing advanced care planning and pt is a full code, accepts all resuscitative efforts including cpr, intubation trial, pressors, iv meds/fluids with wife to act as surrogate decision maker.

## 2022-05-06 NOTE — ED ADULT NURSE REASSESSMENT NOTE - NS ED NURSE REASSESS COMMENT FT1
Pt AAOx,3 VSS, denies pain. No resp distress, pt denies sob. AM meds given without issues. Nsg will cont to monitor.

## 2022-05-06 NOTE — CONSULT NOTE ADULT - SUBJECTIVE AND OBJECTIVE BOX
Patient is a 87y old  Male who presents with a chief complaint of Acute hypoxic respiratory failure secondary to COVID 19 viral PNA (06 May 2022 01:08)    HPI:  88 y/o male with h/o HTN, COPD (not on home O2), CAD, HLD, glaucoma was admitted on  for increasing weakness, fatigue, worsening cough and shortness of breath. Pt states his family tested positive for COVID and he went to  5 days PTA where he tested negative and was started on doxy and amoxicillin, later changed to levaquin by home visiting provider for PNA on CXR. Home provider noted pulse ox to be 90% at time. Today patient states he had a loose BM x 1 and nausea, along with his weakness, fatigue and cough. States he is so weak it is difficult for him to walk (uses walker at baseline). In ER he was noted with hypoxia of 85%. He received remdesivir.     PMH: as above  PSH: as above  Meds: per reconciliation sheet, noted below  MEDICATIONS  (STANDING):  aspirin enteric coated 81 milliGRAM(s) Oral daily  atorvastatin 20 milliGRAM(s) Oral at bedtime  budesonide  80 MICROgram(s)/formoterol 4.5 MICROgram(s) Inhaler 2 Puff(s) Inhalation two times a day  cholecalciferol 1000 Unit(s) Oral daily  dexAMETHasone  Injectable 6 milliGRAM(s) IV Push daily  enalapril 10 milliGRAM(s) Oral two times a day  enoxaparin Injectable 70 milliGRAM(s) SubCutaneous every 12 hours  famotidine    Tablet 20 milliGRAM(s) Oral daily  latanoprost 0.005% Ophthalmic Solution 1 Drop(s) Both EYES at bedtime  metoprolol succinate ER 25 milliGRAM(s) Oral daily  remdesivir  IVPB   IV Intermittent   tamsulosin 0.4 milliGRAM(s) Oral at bedtime  tiotropium 18 MICROgram(s) Capsule 1 Capsule(s) Inhalation daily    MEDICATIONS  (PRN):  acetaminophen     Tablet .. 650 milliGRAM(s) Oral every 6 hours PRN Temp greater or equal to 38.5C (101.3F), Mild Pain (1 - 3)  ALBUTerol    90 MICROgram(s) HFA Inhaler 2 Puff(s) Inhalation every 4 hours PRN Shortness of Breath and/or Wheezing  benzonatate 100 milliGRAM(s) Oral three times a day PRN Cough  guaifenesin/dextromethorphan Oral Liquid 10 milliLiter(s) Oral every 4 hours PRN Cough  melatonin 3 milliGRAM(s) Oral at bedtime PRN Insomnia    Allergies    No Known Allergies    Intolerances      Social: no smoking, no alcohol, no illegal drugs; no recent travel, no exposure to TB  FAMILY HISTORY:  Family history of hypertension in mother  no history of premature cardiovascular disease in first degree relatives    ROS: the patient denies fever, no chills, no HA, no seizures, no dizziness, no sore throat, no nasal congestion, no blurry vision, no CP, no palpitations, has SOB, has cough, no abdominal pain, no diarrhea, no N/V, no dysuria, no leg pain, no claudication, no rash, no joint aches, no rectal pain or bleeding, no night sweats; has increased weakness  All other systems reviewed and are negative    Vital Signs Last 24 Hrs  T(C): 36.7 (06 May 2022 08:38), Max: 37 (05 May 2022 19:22)  T(F): 98 (06 May 2022 08:38), Max: 98.6 (05 May 2022 19:22)  HR: 68 (06 May 2022 10:42) (56 - 75)  BP: 132/69 (06 May 2022 10:42) (110/59 - 149/74)  BP(mean): 88 (06 May 2022 10:42) (73 - 95)  RR: 18 (06 May 2022 10:42) (13 - 20)  SpO2: 98% (06 May 2022 08:38) (89% - 98%)  Daily Height in cm: 180.34 (05 May 2022 19:19)    Daily Weight in k.8 (06 May 2022 00:49)    PE:    Constitutional:  No acute distress  HEENT: NC/AT, EOMI, PERRLA, conjunctivae clear; ears and nose atraumatic; pharynx benign  Neck: supple; thyroid not palpable  Back: no tenderness  Respiratory: respiratory effort normal; crackles at bases  Cardiovascular: S1S2 regular, no murmurs  Abdomen: soft, not tender, not distended, positive BS; no liver or spleen organomegaly  Genitourinary: no suprapubic tenderness  Lymphatic: no LN palpable  Musculoskeletal: no muscle tenderness, no joint swelling or tenderness  Extremities: no pedal edema  Neurological/ Psychiatric: AxOx3, judgement and insight normal; moving all extremities  Skin: no rashes; no palpable lesions    Labs: all available labs reviewed                        16.0   6.31  )-----------( 134      ( 06 May 2022 07:14 )             50.2         138  |  103  |  13  ----------------------------<  130<H>  5.0   |  27  |  0.81    Ca    9.1      06 May 2022 07:14    TPro  6.4  /  Alb  2.9<L>  /  TBili  0.4  /  DBili  x   /  AST  17  /  ALT  18  /  AlkPhos  104       LIVER FUNCTIONS - ( 06 May 2022 07:14 )  Alb: 2.9 g/dL / Pro: 6.4 gm/dL / ALK PHOS: 104 U/L / ALT: 18 U/L / AST: 17 U/L / GGT: x           COVID-19 PCR: Detected (22 @ 21:23)    Radiology: all available radiological tests reviewed    < from: CT Angio Chest PE Protocol w/ IV Cont (22 @ 22:47) >  Bilateral upper lobe segmental and subsegmentalpulmonary emboli. Stable   dilated pulmonary arteries which may be on the basis of pulmonary artery hypertension.  No evidence for right heart strain.  < end of copied text >    Advanced directives addressed: full resuscitation Patient is a 87y old  Male who presents with a chief complaint of Acute hypoxic respiratory failure secondary to COVID 19 viral PNA (06 May 2022 01:08)    HPI:  86 y/o male with h/o HTN, COPD (not on home O2), CAD, HLD, glaucoma was admitted on  for increasing weakness, fatigue, worsening cough and shortness of breath. Pt states his family tested positive for COVID and he went to  5 days PTA where he tested negative and was started on doxy and amoxicillin, later changed to levaquin by home visiting provider for PNA on CXR. Home provider noted pulse ox to be 90% at time. Today patient states he had a loose BM x 1 and nausea, along with his weakness, fatigue and cough. States he is so weak it is difficult for him to walk (uses walker at baseline). In ER he was noted with hypoxia of 85%. He received remdesivir.     COVID-19 vaccinated, but not boosted    PMH: as above  PSH: as above  Meds: per reconciliation sheet, noted below  MEDICATIONS  (STANDING):  aspirin enteric coated 81 milliGRAM(s) Oral daily  atorvastatin 20 milliGRAM(s) Oral at bedtime  budesonide  80 MICROgram(s)/formoterol 4.5 MICROgram(s) Inhaler 2 Puff(s) Inhalation two times a day  cholecalciferol 1000 Unit(s) Oral daily  dexAMETHasone  Injectable 6 milliGRAM(s) IV Push daily  enalapril 10 milliGRAM(s) Oral two times a day  enoxaparin Injectable 70 milliGRAM(s) SubCutaneous every 12 hours  famotidine    Tablet 20 milliGRAM(s) Oral daily  latanoprost 0.005% Ophthalmic Solution 1 Drop(s) Both EYES at bedtime  metoprolol succinate ER 25 milliGRAM(s) Oral daily  remdesivir  IVPB   IV Intermittent   tamsulosin 0.4 milliGRAM(s) Oral at bedtime  tiotropium 18 MICROgram(s) Capsule 1 Capsule(s) Inhalation daily    MEDICATIONS  (PRN):  acetaminophen     Tablet .. 650 milliGRAM(s) Oral every 6 hours PRN Temp greater or equal to 38.5C (101.3F), Mild Pain (1 - 3)  ALBUTerol    90 MICROgram(s) HFA Inhaler 2 Puff(s) Inhalation every 4 hours PRN Shortness of Breath and/or Wheezing  benzonatate 100 milliGRAM(s) Oral three times a day PRN Cough  guaifenesin/dextromethorphan Oral Liquid 10 milliLiter(s) Oral every 4 hours PRN Cough  melatonin 3 milliGRAM(s) Oral at bedtime PRN Insomnia    Allergies    No Known Allergies    Intolerances      Social: no smoking, no alcohol, no illegal drugs; no recent travel, no exposure to TB  FAMILY HISTORY:  Family history of hypertension in mother  no history of premature cardiovascular disease in first degree relatives    ROS: the patient denies fever, no chills, no HA, no seizures, no dizziness, no sore throat, no nasal congestion, no blurry vision, no CP, no palpitations, has SOB, has cough, no abdominal pain, no diarrhea, no N/V, no dysuria, no leg pain, no claudication, no rash, no joint aches, no rectal pain or bleeding, no night sweats; has increased weakness  All other systems reviewed and are negative    Vital Signs Last 24 Hrs  T(C): 36.7 (06 May 2022 08:38), Max: 37 (05 May 2022 19:22)  T(F): 98 (06 May 2022 08:38), Max: 98.6 (05 May 2022 19:22)  HR: 68 (06 May 2022 10:42) (56 - 75)  BP: 132/69 (06 May 2022 10:42) (110/59 - 149/74)  BP(mean): 88 (06 May 2022 10:42) (73 - 95)  RR: 18 (06 May 2022 10:42) (13 - 20)  SpO2: 98% (06 May 2022 08:38) (89% - 98%)  Daily Height in cm: 180.34 (05 May 2022 19:19)    Daily Weight in k.8 (06 May 2022 00:49)    PE:    Constitutional:  No acute distress  HEENT: NC/AT, EOMI, PERRLA, conjunctivae clear; ears and nose atraumatic; pharynx benign  Neck: supple; thyroid not palpable  Back: no tenderness  Respiratory: respiratory effort normal; crackles at bases  Cardiovascular: S1S2 regular, no murmurs  Abdomen: soft, not tender, not distended, positive BS; no liver or spleen organomegaly  Genitourinary: no suprapubic tenderness  Lymphatic: no LN palpable  Musculoskeletal: no muscle tenderness, no joint swelling or tenderness  Extremities: no pedal edema  Neurological/ Psychiatric: AxOx3, judgement and insight normal; moving all extremities  Skin: no rashes; no palpable lesions    Labs: all available labs reviewed                        16.0   6.31  )-----------( 134      ( 06 May 2022 07:14 )             50.2         138  |  103  |  13  ----------------------------<  130<H>  5.0   |  27  |  0.81    Ca    9.1      06 May 2022 07:14    TPro  6.4  /  Alb  2.9<L>  /  TBili  0.4  /  DBili  x   /  AST  17  /  ALT  18  /  AlkPhos  104  06     LIVER FUNCTIONS - ( 06 May 2022 07:14 )  Alb: 2.9 g/dL / Pro: 6.4 gm/dL / ALK PHOS: 104 U/L / ALT: 18 U/L / AST: 17 U/L / GGT: x           COVID-19 PCR: Detected (22 @ 21:23)    Radiology: all available radiological tests reviewed    < from: CT Angio Chest PE Protocol w/ IV Cont (22 @ 22:47) >  Bilateral upper lobe segmental and subsegmentalpulmonary emboli. Stable   dilated pulmonary arteries which may be on the basis of pulmonary artery hypertension.  No evidence for right heart strain.  < end of copied text >    Advanced directives addressed: full resuscitation

## 2022-05-06 NOTE — H&P ADULT - HISTORY OF PRESENT ILLNESS
Pt is a 88 yo male with a pmh/o HTN, COPD (not on home O2), CAD, HLD, glaucoma, who presented to ED today due to increasing weakness, fatigue, worsening cough and shortness of breath. Pt states his family tested positive for COVID and he went to  on Saturday where he tested negative and was started on doxy and amoxicillin, later changed to levaquin by home visiting provider for +PNA on CXR. Home provider noted pulse ox to be 90% at time. Today patient states he had a very loose BM x 1 and nausea, along with his weakness, fatigue and cough. States he is so weak it is difficult for him to walk (uses walker at baseline). Wife in ED expressed that she is fearful to take him home as she is also COVID+ and recovering herself and cannot care for him.     ED course: Pt arrived to ED. Given 500cc IVF bolus, tylenol, and returned COVID+. Admitted to medicine. On admission, CTA performed and + for bilateral PE, dexamethasone/lovenox BID started and supplemental O2 given for hypoxia to 86% when exerting self to be weighed for AC. When lying in bed and talking hypoxic to 89%, otherwise maintaining around 94% on RA.     Pt endorses HA, fatigue, malaise, productive cough, subjective fever, chills, nausea, loose bowel movements, shortness of breath.     Denies chest pain, palpitations, leg swelling, calf pain, abd pain, dysuria, melena, hematochezia, rash, vertigo, paresthesias, loss of motor function, change in speech/vision/hearing.

## 2022-05-06 NOTE — PATIENT PROFILE ADULT - FALL HARM RISK - HARM RISK INTERVENTIONS

## 2022-05-06 NOTE — ED ADULT NURSE REASSESSMENT NOTE - NS ED NURSE REASSESS COMMENT FT1
Pt received from ED nurse. Pt stable. VS WNL. No s/s  distress noted. Pt is in room with his wife. Will con't to monitor.

## 2022-05-06 NOTE — PATIENT PROFILE ADULT - FUNCTIONAL ASSESSMENT - DAILY ACTIVITY 4.
2 = A lot of assistance no back pain, no gout, no musculoskeletal pain, no neck pain, and no weakness.

## 2022-05-06 NOTE — H&P ADULT - NEUROLOGICAL DETAILS
alert and oriented x 3/responds to pain/responds to verbal commands/sensation intact/cranial nerves intact/no spontaneous movement/strength decreased

## 2022-05-07 LAB
ADD ON TEST-SPECIMEN IN LAB: SIGNIFICANT CHANGE UP
ANION GAP SERPL CALC-SCNC: 6 MMOL/L — SIGNIFICANT CHANGE UP (ref 5–17)
BUN SERPL-MCNC: 23 MG/DL — SIGNIFICANT CHANGE UP (ref 7–23)
CALCIUM SERPL-MCNC: 8.9 MG/DL — SIGNIFICANT CHANGE UP (ref 8.5–10.1)
CHLORIDE SERPL-SCNC: 106 MMOL/L — SIGNIFICANT CHANGE UP (ref 96–108)
CO2 SERPL-SCNC: 24 MMOL/L — SIGNIFICANT CHANGE UP (ref 22–31)
CREAT SERPL-MCNC: 0.92 MG/DL — SIGNIFICANT CHANGE UP (ref 0.5–1.3)
EGFR: 81 ML/MIN/1.73M2 — SIGNIFICANT CHANGE UP
GLUCOSE SERPL-MCNC: 139 MG/DL — HIGH (ref 70–99)
HCT VFR BLD CALC: 49.7 % — SIGNIFICANT CHANGE UP (ref 39–50)
HGB BLD-MCNC: 15.5 G/DL — SIGNIFICANT CHANGE UP (ref 13–17)
MCHC RBC-ENTMCNC: 27.3 PG — SIGNIFICANT CHANGE UP (ref 27–34)
MCHC RBC-ENTMCNC: 31.2 GM/DL — LOW (ref 32–36)
MCV RBC AUTO: 87.7 FL — SIGNIFICANT CHANGE UP (ref 80–100)
PLATELET # BLD AUTO: 141 K/UL — LOW (ref 150–400)
POTASSIUM SERPL-MCNC: 4.7 MMOL/L — SIGNIFICANT CHANGE UP (ref 3.5–5.3)
POTASSIUM SERPL-SCNC: 4.7 MMOL/L — SIGNIFICANT CHANGE UP (ref 3.5–5.3)
RBC # BLD: 5.67 M/UL — SIGNIFICANT CHANGE UP (ref 4.2–5.8)
RBC # FLD: 15.4 % — HIGH (ref 10.3–14.5)
SODIUM SERPL-SCNC: 136 MMOL/L — SIGNIFICANT CHANGE UP (ref 135–145)
WBC # BLD: 6.38 K/UL — SIGNIFICANT CHANGE UP (ref 3.8–10.5)
WBC # FLD AUTO: 6.38 K/UL — SIGNIFICANT CHANGE UP (ref 3.8–10.5)

## 2022-05-07 PROCEDURE — 99232 SBSQ HOSP IP/OBS MODERATE 35: CPT

## 2022-05-07 RX ADMIN — Medication 1000 UNIT(S): at 10:57

## 2022-05-07 RX ADMIN — Medication 81 MILLIGRAM(S): at 10:57

## 2022-05-07 RX ADMIN — Medication 6 MILLIGRAM(S): at 00:24

## 2022-05-07 RX ADMIN — REMDESIVIR 500 MILLIGRAM(S): 5 INJECTION INTRAVENOUS at 05:39

## 2022-05-07 RX ADMIN — LATANOPROST 1 DROP(S): 0.05 SOLUTION/ DROPS OPHTHALMIC; TOPICAL at 00:23

## 2022-05-07 RX ADMIN — Medication 10 MILLIGRAM(S): at 10:57

## 2022-05-07 RX ADMIN — Medication 6 MILLIGRAM(S): at 10:56

## 2022-05-07 RX ADMIN — Medication 650 MILLIGRAM(S): at 10:58

## 2022-05-07 RX ADMIN — ENOXAPARIN SODIUM 70 MILLIGRAM(S): 100 INJECTION SUBCUTANEOUS at 10:58

## 2022-05-07 RX ADMIN — Medication 650 MILLIGRAM(S): at 11:28

## 2022-05-07 RX ADMIN — LATANOPROST 1 DROP(S): 0.05 SOLUTION/ DROPS OPHTHALMIC; TOPICAL at 20:49

## 2022-05-07 RX ADMIN — ATORVASTATIN CALCIUM 20 MILLIGRAM(S): 80 TABLET, FILM COATED ORAL at 20:48

## 2022-05-07 RX ADMIN — Medication 10 MILLIGRAM(S): at 20:48

## 2022-05-07 RX ADMIN — Medication 25 MILLIGRAM(S): at 10:57

## 2022-05-07 RX ADMIN — BUDESONIDE AND FORMOTEROL FUMARATE DIHYDRATE 2 PUFF(S): 160; 4.5 AEROSOL RESPIRATORY (INHALATION) at 09:33

## 2022-05-07 RX ADMIN — FAMOTIDINE 20 MILLIGRAM(S): 10 INJECTION INTRAVENOUS at 10:57

## 2022-05-07 RX ADMIN — ENOXAPARIN SODIUM 70 MILLIGRAM(S): 100 INJECTION SUBCUTANEOUS at 20:48

## 2022-05-07 RX ADMIN — TIOTROPIUM BROMIDE 1 CAPSULE(S): 18 CAPSULE ORAL; RESPIRATORY (INHALATION) at 09:32

## 2022-05-07 RX ADMIN — TAMSULOSIN HYDROCHLORIDE 0.4 MILLIGRAM(S): 0.4 CAPSULE ORAL at 20:47

## 2022-05-07 NOTE — DIETITIAN NUTRITION RISK NOTIFICATION - TREATMENT: THE FOLLOWING DIET HAS BEEN RECOMMENDED
Diet, Regular (05-07-22 @ 12:52) [Pending Verification By Attending]  Diet, Regular:   DASH/TLC {Sodium & Cholesterol Restricted} (DASH) (05-06-22 @ 01:02) [Active]

## 2022-05-07 NOTE — DIETITIAN INITIAL EVALUATION ADULT - PERTINENT MEDS FT
MEDICATIONS  (STANDING):  aspirin enteric coated 81 milliGRAM(s) Oral daily  atorvastatin 20 milliGRAM(s) Oral at bedtime  budesonide  80 MICROgram(s)/formoterol 4.5 MICROgram(s) Inhaler 2 Puff(s) Inhalation two times a day  cholecalciferol 1000 Unit(s) Oral daily  dexAMETHasone  Injectable 6 milliGRAM(s) IV Push daily  enalapril 10 milliGRAM(s) Oral two times a day  enoxaparin Injectable 70 milliGRAM(s) SubCutaneous every 12 hours  famotidine    Tablet 20 milliGRAM(s) Oral daily  latanoprost 0.005% Ophthalmic Solution 1 Drop(s) Both EYES at bedtime  metoprolol succinate ER 25 milliGRAM(s) Oral daily  remdesivir  IVPB   IV Intermittent   remdesivir  IVPB 100 milliGRAM(s) IV Intermittent every 24 hours  tamsulosin 0.4 milliGRAM(s) Oral at bedtime  tiotropium 18 MICROgram(s) Capsule 1 Capsule(s) Inhalation daily    MEDICATIONS  (PRN):  acetaminophen     Tablet .. 650 milliGRAM(s) Oral every 6 hours PRN Temp greater or equal to 38.5C (101.3F), Mild Pain (1 - 3)  ALBUTerol    90 MICROgram(s) HFA Inhaler 2 Puff(s) Inhalation every 4 hours PRN Shortness of Breath and/or Wheezing  benzonatate 100 milliGRAM(s) Oral three times a day PRN Cough  guaifenesin/dextromethorphan Oral Liquid 10 milliLiter(s) Oral every 4 hours PRN Cough  melatonin 3 milliGRAM(s) Oral at bedtime PRN Insomnia

## 2022-05-07 NOTE — DIETITIAN INITIAL EVALUATION ADULT - OTHER INFO
86 yo male with a pmh/o HTN, COPD (not on home O2), CAD, HLD, glaucoma, who presented to ED today due to increasing weakness, fatigue, worsening cough and shortness of breath. Pt states his family tested positive for COVID. Today patient states he had a very loose BM x 1 and nausea, along with his weakness, fatigue and cough. States he is so weak it is difficult for him to walk (uses walker at baseline). Wife in ED expressed that she is fearful to take him home as she is also COVID+ and recovering herself and cannot care for him. COVID+. Full code    Pleasant pt with fair to poor appetite. Eating small meals. Noted to be thin, frail, papito with severe muscle/ fat wasting; meets criteria for PCM. Denies any current COVID-19 sx. On NC. Reports - 190# but has been losing wt over the last 6 mos 2/2 pain/ poor appetite. RD took current bed scale wt on 5/7 at 151# - unintentional, severe, and clinically significant wt loss of 34#/ 17.8% x 6 mos. Receptive to trialing ensure enlive TID. Encouraged protein-rich foods. See other recommendations below.

## 2022-05-07 NOTE — DIETITIAN INITIAL EVALUATION ADULT - ADD RECOMMEND
1) Liberalize diet to regular to maximize caloric and nutrient intake, 2) Add ensure enlive TID to optimize PO intake, 3) Encourage protein-rich foods, maximize food preferences, 4) Recommend to add MVI w/minerals, Vit C 500 mg BID, add Zinc Sulfate 220 mg x 10 days to promote wound healing, 5) Consider adding thiamine 100 mg daily 2/2 poor PO intake/ malnutrition, 6) Monitor bowel movements, if no BM for >3 days, consider implementing bowel regimen, 7) Confirm goals of care regarding nutrition support RD will continue to monitor PO intake, labs, hydration, and wt prn.

## 2022-05-07 NOTE — DIETITIAN INITIAL EVALUATION ADULT - PERTINENT LABORATORY DATA
05-07    136  |  106  |  23  ----------------------------<  139<H>  4.7   |  24  |  0.92    Ca    8.9      07 May 2022 07:32    TPro  6.1  /  Alb  2.6<L>  /  TBili  0.4  /  DBili  0.1  /  AST  21  /  ALT  18  /  AlkPhos  91  05-07  A1C with Estimated Average Glucose Result: 5.8 % (01-20-22 @ 09:40)

## 2022-05-08 LAB
ALBUMIN SERPL ELPH-MCNC: 2.6 G/DL — LOW (ref 3.3–5)
ALP SERPL-CCNC: 92 U/L — SIGNIFICANT CHANGE UP (ref 40–120)
ALT FLD-CCNC: 19 U/L — SIGNIFICANT CHANGE UP (ref 12–78)
ANION GAP SERPL CALC-SCNC: 7 MMOL/L — SIGNIFICANT CHANGE UP (ref 5–17)
AST SERPL-CCNC: 22 U/L — SIGNIFICANT CHANGE UP (ref 15–37)
BASOPHILS # BLD AUTO: 0.02 K/UL — SIGNIFICANT CHANGE UP (ref 0–0.2)
BASOPHILS NFR BLD AUTO: 0.2 % — SIGNIFICANT CHANGE UP (ref 0–2)
BILIRUB SERPL-MCNC: 0.5 MG/DL — SIGNIFICANT CHANGE UP (ref 0.2–1.2)
BUN SERPL-MCNC: 29 MG/DL — HIGH (ref 7–23)
CALCIUM SERPL-MCNC: 8.8 MG/DL — SIGNIFICANT CHANGE UP (ref 8.5–10.1)
CHLORIDE SERPL-SCNC: 109 MMOL/L — HIGH (ref 96–108)
CO2 SERPL-SCNC: 23 MMOL/L — SIGNIFICANT CHANGE UP (ref 22–31)
CREAT SERPL-MCNC: 0.79 MG/DL — SIGNIFICANT CHANGE UP (ref 0.5–1.3)
EGFR: 86 ML/MIN/1.73M2 — SIGNIFICANT CHANGE UP
EOSINOPHIL # BLD AUTO: 0 K/UL — SIGNIFICANT CHANGE UP (ref 0–0.5)
EOSINOPHIL NFR BLD AUTO: 0 % — SIGNIFICANT CHANGE UP (ref 0–6)
GLUCOSE SERPL-MCNC: 127 MG/DL — HIGH (ref 70–99)
HCT VFR BLD CALC: 49.6 % — SIGNIFICANT CHANGE UP (ref 39–50)
HGB BLD-MCNC: 15.7 G/DL — SIGNIFICANT CHANGE UP (ref 13–17)
IMM GRANULOCYTES NFR BLD AUTO: 0.5 % — SIGNIFICANT CHANGE UP (ref 0–1.5)
LYMPHOCYTES # BLD AUTO: 1.08 K/UL — SIGNIFICANT CHANGE UP (ref 1–3.3)
LYMPHOCYTES # BLD AUTO: 8.3 % — LOW (ref 13–44)
MAGNESIUM SERPL-MCNC: 2.2 MG/DL — SIGNIFICANT CHANGE UP (ref 1.6–2.6)
MCHC RBC-ENTMCNC: 28.3 PG — SIGNIFICANT CHANGE UP (ref 27–34)
MCHC RBC-ENTMCNC: 31.7 GM/DL — LOW (ref 32–36)
MCV RBC AUTO: 89.5 FL — SIGNIFICANT CHANGE UP (ref 80–100)
MONOCYTES # BLD AUTO: 1.19 K/UL — HIGH (ref 0–0.9)
MONOCYTES NFR BLD AUTO: 9.1 % — SIGNIFICANT CHANGE UP (ref 2–14)
NEUTROPHILS # BLD AUTO: 10.69 K/UL — HIGH (ref 1.8–7.4)
NEUTROPHILS NFR BLD AUTO: 81.9 % — HIGH (ref 43–77)
PHOSPHATE SERPL-MCNC: 2.4 MG/DL — LOW (ref 2.5–4.5)
PLATELET # BLD AUTO: 157 K/UL — SIGNIFICANT CHANGE UP (ref 150–400)
POTASSIUM SERPL-MCNC: 4.4 MMOL/L — SIGNIFICANT CHANGE UP (ref 3.5–5.3)
POTASSIUM SERPL-SCNC: 4.4 MMOL/L — SIGNIFICANT CHANGE UP (ref 3.5–5.3)
PROT SERPL-MCNC: 6.1 GM/DL — SIGNIFICANT CHANGE UP (ref 6–8.3)
RBC # BLD: 5.54 M/UL — SIGNIFICANT CHANGE UP (ref 4.2–5.8)
RBC # FLD: 15.3 % — HIGH (ref 10.3–14.5)
SODIUM SERPL-SCNC: 139 MMOL/L — SIGNIFICANT CHANGE UP (ref 135–145)
WBC # BLD: 13.05 K/UL — HIGH (ref 3.8–10.5)
WBC # FLD AUTO: 13.05 K/UL — HIGH (ref 3.8–10.5)

## 2022-05-08 PROCEDURE — 99232 SBSQ HOSP IP/OBS MODERATE 35: CPT

## 2022-05-08 RX ORDER — CALCIUM CARBONATE 500(1250)
2 TABLET ORAL
Refills: 0 | Status: DISCONTINUED | OUTPATIENT
Start: 2022-05-08 | End: 2022-05-13

## 2022-05-08 RX ORDER — SODIUM,POTASSIUM PHOSPHATES 278-250MG
1 POWDER IN PACKET (EA) ORAL
Refills: 0 | Status: COMPLETED | OUTPATIENT
Start: 2022-05-08 | End: 2022-05-09

## 2022-05-08 RX ADMIN — Medication 1 PACKET(S): at 13:47

## 2022-05-08 RX ADMIN — Medication 81 MILLIGRAM(S): at 09:46

## 2022-05-08 RX ADMIN — REMDESIVIR 500 MILLIGRAM(S): 5 INJECTION INTRAVENOUS at 04:58

## 2022-05-08 RX ADMIN — Medication 650 MILLIGRAM(S): at 13:55

## 2022-05-08 RX ADMIN — Medication 10 MILLILITER(S): at 09:48

## 2022-05-08 RX ADMIN — Medication 650 MILLIGRAM(S): at 04:30

## 2022-05-08 RX ADMIN — TIOTROPIUM BROMIDE 1 CAPSULE(S): 18 CAPSULE ORAL; RESPIRATORY (INHALATION) at 09:24

## 2022-05-08 RX ADMIN — Medication 2 TABLET(S): at 22:23

## 2022-05-08 RX ADMIN — Medication 1 PACKET(S): at 21:43

## 2022-05-08 RX ADMIN — Medication 10 MILLIGRAM(S): at 21:27

## 2022-05-08 RX ADMIN — FAMOTIDINE 20 MILLIGRAM(S): 10 INJECTION INTRAVENOUS at 09:46

## 2022-05-08 RX ADMIN — Medication 25 MILLIGRAM(S): at 09:47

## 2022-05-08 RX ADMIN — ENOXAPARIN SODIUM 70 MILLIGRAM(S): 100 INJECTION SUBCUTANEOUS at 09:47

## 2022-05-08 RX ADMIN — Medication 1000 UNIT(S): at 09:47

## 2022-05-08 RX ADMIN — Medication 10 MILLILITER(S): at 05:00

## 2022-05-08 RX ADMIN — Medication 10 MILLIGRAM(S): at 09:46

## 2022-05-08 RX ADMIN — Medication 650 MILLIGRAM(S): at 14:25

## 2022-05-08 RX ADMIN — BUDESONIDE AND FORMOTEROL FUMARATE DIHYDRATE 2 PUFF(S): 160; 4.5 AEROSOL RESPIRATORY (INHALATION) at 20:19

## 2022-05-08 RX ADMIN — BUDESONIDE AND FORMOTEROL FUMARATE DIHYDRATE 2 PUFF(S): 160; 4.5 AEROSOL RESPIRATORY (INHALATION) at 09:24

## 2022-05-08 RX ADMIN — TAMSULOSIN HYDROCHLORIDE 0.4 MILLIGRAM(S): 0.4 CAPSULE ORAL at 21:27

## 2022-05-08 RX ADMIN — ENOXAPARIN SODIUM 70 MILLIGRAM(S): 100 INJECTION SUBCUTANEOUS at 21:28

## 2022-05-08 RX ADMIN — Medication 650 MILLIGRAM(S): at 05:00

## 2022-05-08 RX ADMIN — Medication 6 MILLIGRAM(S): at 09:45

## 2022-05-08 RX ADMIN — ATORVASTATIN CALCIUM 20 MILLIGRAM(S): 80 TABLET, FILM COATED ORAL at 21:27

## 2022-05-09 LAB
ALBUMIN SERPL ELPH-MCNC: 2.4 G/DL — LOW (ref 3.3–5)
ALP SERPL-CCNC: 77 U/L — SIGNIFICANT CHANGE UP (ref 40–120)
ALT FLD-CCNC: 16 U/L — SIGNIFICANT CHANGE UP (ref 12–78)
ANION GAP SERPL CALC-SCNC: 5 MMOL/L — SIGNIFICANT CHANGE UP (ref 5–17)
AST SERPL-CCNC: 17 U/L — SIGNIFICANT CHANGE UP (ref 15–37)
BASOPHILS # BLD AUTO: 0.01 K/UL — SIGNIFICANT CHANGE UP (ref 0–0.2)
BASOPHILS NFR BLD AUTO: 0.1 % — SIGNIFICANT CHANGE UP (ref 0–2)
BILIRUB SERPL-MCNC: 0.4 MG/DL — SIGNIFICANT CHANGE UP (ref 0.2–1.2)
BUN SERPL-MCNC: 24 MG/DL — HIGH (ref 7–23)
CALCIUM SERPL-MCNC: 8.3 MG/DL — LOW (ref 8.5–10.1)
CHLORIDE SERPL-SCNC: 109 MMOL/L — HIGH (ref 96–108)
CO2 SERPL-SCNC: 25 MMOL/L — SIGNIFICANT CHANGE UP (ref 22–31)
CREAT SERPL-MCNC: 0.62 MG/DL — SIGNIFICANT CHANGE UP (ref 0.5–1.3)
EGFR: 93 ML/MIN/1.73M2 — SIGNIFICANT CHANGE UP
EOSINOPHIL # BLD AUTO: 0 K/UL — SIGNIFICANT CHANGE UP (ref 0–0.5)
EOSINOPHIL NFR BLD AUTO: 0 % — SIGNIFICANT CHANGE UP (ref 0–6)
GLUCOSE SERPL-MCNC: 110 MG/DL — HIGH (ref 70–99)
HCT VFR BLD CALC: 46.7 % — SIGNIFICANT CHANGE UP (ref 39–50)
HGB BLD-MCNC: 14.7 G/DL — SIGNIFICANT CHANGE UP (ref 13–17)
IMM GRANULOCYTES NFR BLD AUTO: 0.7 % — SIGNIFICANT CHANGE UP (ref 0–1.5)
LYMPHOCYTES # BLD AUTO: 1.12 K/UL — SIGNIFICANT CHANGE UP (ref 1–3.3)
LYMPHOCYTES # BLD AUTO: 12.9 % — LOW (ref 13–44)
MAGNESIUM SERPL-MCNC: 2.2 MG/DL — SIGNIFICANT CHANGE UP (ref 1.6–2.6)
MCHC RBC-ENTMCNC: 27.6 PG — SIGNIFICANT CHANGE UP (ref 27–34)
MCHC RBC-ENTMCNC: 31.5 GM/DL — LOW (ref 32–36)
MCV RBC AUTO: 87.6 FL — SIGNIFICANT CHANGE UP (ref 80–100)
MONOCYTES # BLD AUTO: 1.05 K/UL — HIGH (ref 0–0.9)
MONOCYTES NFR BLD AUTO: 12.1 % — SIGNIFICANT CHANGE UP (ref 2–14)
NEUTROPHILS # BLD AUTO: 6.41 K/UL — SIGNIFICANT CHANGE UP (ref 1.8–7.4)
NEUTROPHILS NFR BLD AUTO: 74.2 % — SIGNIFICANT CHANGE UP (ref 43–77)
PHOSPHATE SERPL-MCNC: 2.6 MG/DL — SIGNIFICANT CHANGE UP (ref 2.5–4.5)
PLATELET # BLD AUTO: 149 K/UL — LOW (ref 150–400)
POTASSIUM SERPL-MCNC: 4 MMOL/L — SIGNIFICANT CHANGE UP (ref 3.5–5.3)
POTASSIUM SERPL-SCNC: 4 MMOL/L — SIGNIFICANT CHANGE UP (ref 3.5–5.3)
PROT SERPL-MCNC: 5.4 GM/DL — LOW (ref 6–8.3)
RBC # BLD: 5.33 M/UL — SIGNIFICANT CHANGE UP (ref 4.2–5.8)
RBC # FLD: 15.3 % — HIGH (ref 10.3–14.5)
SODIUM SERPL-SCNC: 139 MMOL/L — SIGNIFICANT CHANGE UP (ref 135–145)
WBC # BLD: 8.65 K/UL — SIGNIFICANT CHANGE UP (ref 3.8–10.5)
WBC # FLD AUTO: 8.65 K/UL — SIGNIFICANT CHANGE UP (ref 3.8–10.5)

## 2022-05-09 PROCEDURE — 93306 TTE W/DOPPLER COMPLETE: CPT | Mod: 26

## 2022-05-09 PROCEDURE — 99232 SBSQ HOSP IP/OBS MODERATE 35: CPT

## 2022-05-09 RX ORDER — CHLORHEXIDINE GLUCONATE 213 G/1000ML
1 SOLUTION TOPICAL
Refills: 0 | Status: DISCONTINUED | OUTPATIENT
Start: 2022-05-09 | End: 2022-05-13

## 2022-05-09 RX ADMIN — Medication 81 MILLIGRAM(S): at 10:07

## 2022-05-09 RX ADMIN — Medication 10 MILLILITER(S): at 06:02

## 2022-05-09 RX ADMIN — Medication 1 PACKET(S): at 10:08

## 2022-05-09 RX ADMIN — FAMOTIDINE 20 MILLIGRAM(S): 10 INJECTION INTRAVENOUS at 10:07

## 2022-05-09 RX ADMIN — Medication 650 MILLIGRAM(S): at 06:01

## 2022-05-09 RX ADMIN — REMDESIVIR 500 MILLIGRAM(S): 5 INJECTION INTRAVENOUS at 05:27

## 2022-05-09 RX ADMIN — Medication 2 TABLET(S): at 15:16

## 2022-05-09 RX ADMIN — CHLORHEXIDINE GLUCONATE 1 APPLICATION(S): 213 SOLUTION TOPICAL at 10:10

## 2022-05-09 RX ADMIN — Medication 25 MILLIGRAM(S): at 10:08

## 2022-05-09 RX ADMIN — ENOXAPARIN SODIUM 70 MILLIGRAM(S): 100 INJECTION SUBCUTANEOUS at 21:42

## 2022-05-09 RX ADMIN — Medication 6 MILLIGRAM(S): at 10:09

## 2022-05-09 RX ADMIN — BUDESONIDE AND FORMOTEROL FUMARATE DIHYDRATE 2 PUFF(S): 160; 4.5 AEROSOL RESPIRATORY (INHALATION) at 08:58

## 2022-05-09 RX ADMIN — Medication 2 TABLET(S): at 18:35

## 2022-05-09 RX ADMIN — Medication 10 MILLIGRAM(S): at 10:07

## 2022-05-09 RX ADMIN — Medication 1 PACKET(S): at 12:35

## 2022-05-09 RX ADMIN — Medication 100 MILLIGRAM(S): at 10:07

## 2022-05-09 RX ADMIN — Medication 650 MILLIGRAM(S): at 18:37

## 2022-05-09 RX ADMIN — ATORVASTATIN CALCIUM 20 MILLIGRAM(S): 80 TABLET, FILM COATED ORAL at 21:42

## 2022-05-09 RX ADMIN — TAMSULOSIN HYDROCHLORIDE 0.4 MILLIGRAM(S): 0.4 CAPSULE ORAL at 21:43

## 2022-05-09 RX ADMIN — TIOTROPIUM BROMIDE 1 CAPSULE(S): 18 CAPSULE ORAL; RESPIRATORY (INHALATION) at 08:57

## 2022-05-09 RX ADMIN — LATANOPROST 1 DROP(S): 0.05 SOLUTION/ DROPS OPHTHALMIC; TOPICAL at 21:42

## 2022-05-09 RX ADMIN — Medication 1000 UNIT(S): at 10:08

## 2022-05-09 RX ADMIN — ENOXAPARIN SODIUM 70 MILLIGRAM(S): 100 INJECTION SUBCUTANEOUS at 10:08

## 2022-05-09 NOTE — PHYSICAL THERAPY INITIAL EVALUATION ADULT - PRECAUTIONS/LIMITATIONS, REHAB EVAL
cardiac precautions/isolation precautions 2 L/min NC/cardiac precautions/isolation precautions/oxygen therapy device and L/min

## 2022-05-09 NOTE — PHYSICAL THERAPY INITIAL EVALUATION ADULT - PLANNED THERAPY INTERVENTIONS, PT EVAL
Eval, amb, transfers, bed mobility. Pt left OOB in chair with all observation section equipment/material intact. Nursing informed of pt OOB in chair with no chair alarm. Pt instructed to not get up on his own and to utilize CB if he needs to get OOC. Will cont to follow pt and increase as tolerated./bed mobility training/gait training/strengthening/transfer training Eval, amb, transfers, bed mobility. Pt left OOB in chair with all observation section equipment/material intact. Nursing informed of pt OOB in chair with no chair alarm. Pt instructed to not get up on his own and to utilize CB if he needs to get OOC. Pt with no c/o pain. Will cont to follow pt and increase as tolerated./bed mobility training/gait training/strengthening/transfer training

## 2022-05-09 NOTE — PHYSICAL THERAPY INITIAL EVALUATION ADULT - MANUAL MUSCLE TESTING RESULTS, REHAB EVAL
Right shoulder 2+-3/5 throughout grossly. All other 4/5 throughout grossly. Left UE strength 4/5 throughout grossly. Bilateral LE strength 3+/5 throughout grossly.

## 2022-05-09 NOTE — PHYSICAL THERAPY INITIAL EVALUATION ADULT - ACTIVE RANGE OF MOTION EXAMINATION, REHAB EVAL
Right shoulder flex ~ 90 degrees. Bilateral hip flex ~ 90 degrees and bilateral DF neutral./Left UE Active ROM was WFL (within functional limits)/Right UE Active ROM was WFL (within functional limits)/Left LE Active ROM was WFL (within functional limits)/Right LE Active ROM was WFL (within functional limits)

## 2022-05-09 NOTE — PHYSICAL THERAPY INITIAL EVALUATION ADULT - ADDITIONAL COMMENTS
Info obtained from eval 1/2022 Info obtained from eval 1/2022. Update: Pt amb with RW and does not use stairs.

## 2022-05-09 NOTE — PHYSICAL THERAPY INITIAL EVALUATION ADULT - GENERAL OBSERVATIONS, REHAB EVAL
Pt found supine in bed with IV, 2 L/min NC, O2 sat monitor/tele monitor, and bed alarm activated. Pt with no c/o pain.

## 2022-05-10 PROBLEM — J18.9 COMMUNITY ACQUIRED PNEUMONIA: Status: ACTIVE | Noted: 2022-05-09

## 2022-05-10 PROCEDURE — 99232 SBSQ HOSP IP/OBS MODERATE 35: CPT

## 2022-05-10 RX ADMIN — TIOTROPIUM BROMIDE 1 CAPSULE(S): 18 CAPSULE ORAL; RESPIRATORY (INHALATION) at 08:37

## 2022-05-10 RX ADMIN — Medication 10 MILLILITER(S): at 23:58

## 2022-05-10 RX ADMIN — BUDESONIDE AND FORMOTEROL FUMARATE DIHYDRATE 2 PUFF(S): 160; 4.5 AEROSOL RESPIRATORY (INHALATION) at 20:30

## 2022-05-10 RX ADMIN — Medication 650 MILLIGRAM(S): at 01:29

## 2022-05-10 RX ADMIN — ENOXAPARIN SODIUM 70 MILLIGRAM(S): 100 INJECTION SUBCUTANEOUS at 22:19

## 2022-05-10 RX ADMIN — Medication 1000 UNIT(S): at 09:13

## 2022-05-10 RX ADMIN — Medication 81 MILLIGRAM(S): at 09:11

## 2022-05-10 RX ADMIN — LATANOPROST 1 DROP(S): 0.05 SOLUTION/ DROPS OPHTHALMIC; TOPICAL at 22:16

## 2022-05-10 RX ADMIN — FAMOTIDINE 20 MILLIGRAM(S): 10 INJECTION INTRAVENOUS at 09:13

## 2022-05-10 RX ADMIN — Medication 10 MILLILITER(S): at 03:02

## 2022-05-10 RX ADMIN — Medication 10 MILLIGRAM(S): at 09:12

## 2022-05-10 RX ADMIN — REMDESIVIR 500 MILLIGRAM(S): 5 INJECTION INTRAVENOUS at 04:42

## 2022-05-10 RX ADMIN — Medication 25 MILLIGRAM(S): at 09:13

## 2022-05-10 RX ADMIN — Medication 650 MILLIGRAM(S): at 02:55

## 2022-05-10 RX ADMIN — CHLORHEXIDINE GLUCONATE 1 APPLICATION(S): 213 SOLUTION TOPICAL at 09:11

## 2022-05-10 RX ADMIN — Medication 6 MILLIGRAM(S): at 09:11

## 2022-05-10 RX ADMIN — Medication 100 MILLIGRAM(S): at 14:27

## 2022-05-10 RX ADMIN — Medication 650 MILLIGRAM(S): at 22:20

## 2022-05-10 RX ADMIN — ENOXAPARIN SODIUM 70 MILLIGRAM(S): 100 INJECTION SUBCUTANEOUS at 09:13

## 2022-05-10 RX ADMIN — ATORVASTATIN CALCIUM 20 MILLIGRAM(S): 80 TABLET, FILM COATED ORAL at 22:19

## 2022-05-10 RX ADMIN — BUDESONIDE AND FORMOTEROL FUMARATE DIHYDRATE 2 PUFF(S): 160; 4.5 AEROSOL RESPIRATORY (INHALATION) at 08:36

## 2022-05-10 RX ADMIN — TAMSULOSIN HYDROCHLORIDE 0.4 MILLIGRAM(S): 0.4 CAPSULE ORAL at 22:19

## 2022-05-11 ENCOUNTER — APPOINTMENT (OUTPATIENT)
Dept: CARDIOLOGY | Facility: CLINIC | Age: 87
End: 2022-05-11

## 2022-05-11 ENCOUNTER — TRANSCRIPTION ENCOUNTER (OUTPATIENT)
Age: 87
End: 2022-05-11

## 2022-05-11 PROCEDURE — 99223 1ST HOSP IP/OBS HIGH 75: CPT

## 2022-05-11 PROCEDURE — 99239 HOSP IP/OBS DSCHRG MGMT >30: CPT

## 2022-05-11 RX ORDER — APIXABAN 2.5 MG/1
10 TABLET, FILM COATED ORAL
Refills: 0 | Status: COMPLETED | OUTPATIENT
Start: 2022-05-11 | End: 2022-05-12

## 2022-05-11 RX ORDER — APIXABAN 2.5 MG/1
2 TABLET, FILM COATED ORAL
Qty: 68 | Refills: 0
Start: 2022-05-11 | End: 2022-05-12

## 2022-05-11 RX ORDER — APIXABAN 2.5 MG/1
5 TABLET, FILM COATED ORAL
Refills: 0 | Status: DISCONTINUED | OUTPATIENT
Start: 2022-05-13 | End: 2022-05-13

## 2022-05-11 RX ADMIN — Medication 6 MILLIGRAM(S): at 10:22

## 2022-05-11 RX ADMIN — Medication 10 MILLILITER(S): at 06:17

## 2022-05-11 RX ADMIN — ATORVASTATIN CALCIUM 20 MILLIGRAM(S): 80 TABLET, FILM COATED ORAL at 21:44

## 2022-05-11 RX ADMIN — TAMSULOSIN HYDROCHLORIDE 0.4 MILLIGRAM(S): 0.4 CAPSULE ORAL at 21:44

## 2022-05-11 RX ADMIN — Medication 25 MILLIGRAM(S): at 10:22

## 2022-05-11 RX ADMIN — Medication 10 MILLIGRAM(S): at 10:21

## 2022-05-11 RX ADMIN — Medication 650 MILLIGRAM(S): at 21:44

## 2022-05-11 RX ADMIN — APIXABAN 10 MILLIGRAM(S): 2.5 TABLET, FILM COATED ORAL at 21:44

## 2022-05-11 RX ADMIN — Medication 81 MILLIGRAM(S): at 10:25

## 2022-05-11 RX ADMIN — Medication 1000 UNIT(S): at 10:30

## 2022-05-11 RX ADMIN — CHLORHEXIDINE GLUCONATE 1 APPLICATION(S): 213 SOLUTION TOPICAL at 04:55

## 2022-05-11 RX ADMIN — TIOTROPIUM BROMIDE 1 CAPSULE(S): 18 CAPSULE ORAL; RESPIRATORY (INHALATION) at 08:32

## 2022-05-11 RX ADMIN — BUDESONIDE AND FORMOTEROL FUMARATE DIHYDRATE 2 PUFF(S): 160; 4.5 AEROSOL RESPIRATORY (INHALATION) at 20:38

## 2022-05-11 RX ADMIN — LATANOPROST 1 DROP(S): 0.05 SOLUTION/ DROPS OPHTHALMIC; TOPICAL at 21:42

## 2022-05-11 RX ADMIN — FAMOTIDINE 20 MILLIGRAM(S): 10 INJECTION INTRAVENOUS at 10:22

## 2022-05-11 RX ADMIN — Medication 10 MILLILITER(S): at 21:44

## 2022-05-11 RX ADMIN — APIXABAN 10 MILLIGRAM(S): 2.5 TABLET, FILM COATED ORAL at 12:23

## 2022-05-11 RX ADMIN — BUDESONIDE AND FORMOTEROL FUMARATE DIHYDRATE 2 PUFF(S): 160; 4.5 AEROSOL RESPIRATORY (INHALATION) at 08:32

## 2022-05-11 NOTE — DISCHARGE NOTE PROVIDER - DETAILS OF MALNUTRITION DIAGNOSIS/DIAGNOSES
This patient has been assessed with a concern for Malnutrition and was treated during this hospitalization for the following Nutrition diagnosis/diagnoses:     -  05/07/2022: Severe protein-calorie malnutrition

## 2022-05-11 NOTE — DISCHARGE NOTE PROVIDER - CARE PROVIDER_API CALL
Trey Watts (MD)  Cardiovascular Disease; Internal Medicine; Nuclear Cardiology  [address] [facility] [address]  [place] [address]  Phone: [phone]  Fax: [phone]  Follow Up Time: 1 week

## 2022-05-11 NOTE — DISCHARGE NOTE PROVIDER - HOSPITAL COURSE
CC: SOB  History of Present Illness:   Pt is a 86 yo male with a pmh/o HTN, COPD (not on home O2), CAD, HLD, glaucoma, who presented to ED today due to increasing weakness, fatigue, worsening cough and shortness of breath. Pt states his family tested positive for COVID and he went to  on Saturday where he tested negative and was started on doxy and amoxicillin, later changed to levaquin by home visiting provider for +PNA on CXR. Home provider noted pulse ox to be 90% at time. Today patient states he had a very loose BM x 1 and nausea, along with his weakness, fatigue and cough. States he is so weak it is difficult for him to walk (uses walker at baseline). Wife in ED expressed that she is fearful to take him home as she is also COVID+ and recovering herself and cannot care for him.  ED course: Pt arrived to ED. Given 500cc IVF bolus, tylenol, and returned COVID+. Admitted to medicine. On admission, CTA performed and + for bilateral PE, dexamethasone/lovenox BID started and supplemental O2 given for hypoxia to 86% when exerting self to be weighed for AC. When lying in bed and talking hypoxic to 89%, otherwise maintaining around 94% on RA.  patient is hemodynamically stable. Denies any HA, CP, SOB. No fevers, chills or shakes. Labs and vitals reviewed. continue on NC.    Hospital course:  Pt admitted with acute hypoxemic respiratory failure due to acute covid19 PNA and acute bilateral PE likely related to COVID19.  Pt also with likely superimposed COPD exacerbation.  He was started on and completed 5 days of remdesivir and dexamethasone IV.  pt given inhalers, supportive care.  In terms of bilateral PE which was diagnosed on this admission, he was started on therapeutic lovenox and transitioned to eliquis.  He will continue eliquis as out patient for at least 3-6 months.  Pt HD stable, satting well on room air, afebrile and feeling much better.    REVIEW OF SYSTEMS: All other review of systems is negative unless indicated above.      Vital Signs Last 24 Hrs  T(C): 36.7 (11 May 2022 08:09), Max: 37.1 (10 May 2022 21:25)  T(F): 98.1 (11 May 2022 08:09), Max: 98.8 (10 May 2022 21:25)  HR: 69 (11 May 2022 08:34) (65 - 79)  BP: 131/60 (11 May 2022 08:09) (98/52 - 131/60)  BP(mean): --  RR: 18 (11 May 2022 08:09) (18 - 18)  SpO2: 94% (11 May 2022 08:09) (94% - 96%)    PHYSICAL EXAM:    Constitutional: NAD, awake and alert, well-developed  HEENT: PERR, EOMI, Normal Hearing, MMM  Neck: Soft and supple  Respiratory: Breath sounds are clear bilaterally, No wheezing, rales or rhonchi  Cardiovascular: S1 and S2, regular rate and rhythm, no Murmurs, gallops or rubs  Gastrointestinal: Bowel Sounds present, soft, nontender, nondistended, no guarding, no rebound  Extremities: No peripheral edema  Neurological: A/O x 3, no focal deficits in my limited exam    med/labs: Reviewed and interpreted         A/P:    # Acute hypoxic respiratory failure due to Acute COVID19 pneumonia and acute bilateral PE / COPD exacerbation:  - Satting 94% on room air  - s/p IV remdesivir /  dexamethasone 5 days  - supportive care  - c/w inhalers  - lovenox therapeutic changed to eliquis per anticoag services.  To complete 3-6 month therapy with close out patient follow up.  - echo and CTA w/o evidence of right heart strain    #Severe Protein calorie malnutrition  - nutrition following    #BPH  -c/w flomax        Disposition:   -d/c tomorrow home with home care.    Attending Statement: 40 minutes spent on total encounter and discharge planning.

## 2022-05-11 NOTE — DISCHARGE NOTE NURSING/CASE MANAGEMENT/SOCIAL WORK - PATIENT PORTAL LINK FT
You can access the FollowMyHealth Patient Portal offered by [facility] by registering at the following website: [email or web address]. By joining [facility]’s FollowMyHealth portal, you will also be able to view your health information using other applications (apps) compatible with our system.

## 2022-05-11 NOTE — DISCHARGE NOTE PROVIDER - NSDCFUSCHEDAPPT_GEN_ALL_CORE_FT
Vivienne León  [facility] Physician [facility]  Neurology [address] [facility]  Scheduled Appointment: 06/29/2022

## 2022-05-11 NOTE — DISCHARGE NOTE PROVIDER - NSDCCPCAREPLAN_GEN_ALL_CORE_FT
PRINCIPAL DISCHARGE DIAGNOSIS  Diagnosis: 2019 novel coronavirus disease (COVID-19)  Assessment and Plan of Treatment: Completed course of IV steroid and anti-viral therapy.  Continue supportive care at home.        SECONDARY DISCHARGE DIAGNOSES  Diagnosis: Acute pulmonary embolism  Assessment and Plan of Treatment: Found on both lungs, likely due to COVID.  Take eliquis as prescribed for at least 3-6 months (Sent to pharmacy)  Follow up with your pcp for ongoing management.

## 2022-05-11 NOTE — DISCHARGE NOTE PROVIDER - NSDCHHCONTACT_GEN_ALL_CORE_FT
-- DO NOT REPLY / DO NOT REPLY ALL --  -- Message is from the [facility]--      Patient is requesting a medication refill - medication is on active list    Was Medication Pended? Yes.    Rx Name and Dose:  levothyroxine 112 MCG tablet  Patient has five tablets left.  Patient usually gets a 90day supply.  Last refill was only made for 30days.  Patient is scheduled for complete physical in August 2022.    Duration: 90 days    Pharmacy  [id number] - [place], [place] - [address] [facility] [address]    Patient confirmed the above pharmacy as correct?  Yes    Does this request need an existing or new prescription at a pharmacy to be sent to a new pharmacy location?   No    Caller Information       Type Contact Phone    04/13/2022 03:30 PM CDT Phone (Incoming) Francia Ricketts (Self) [phone] (M)          Alternative phone number: none    Turnaround time given to caller:   \"This message will be sent to [state Provider's name]. The clinical team will fulfill your request as soon as they review your message.\"   As certified below, I, or a nurse practitioner or physician assistant working with me, had a face-to-face encounter that meets the physician face-to-face encounter requirements.

## 2022-05-11 NOTE — CONSULT NOTE ADULT - SUBJECTIVE AND OBJECTIVE BOX
HPI:  Pt is a 86 yo male with a pmh/o HTN, COPD (not on home O2), CAD, HLD, glaucoma, who presented to ED today due to increasing weakness, fatigue, worsening cough and shortness of breath. Pt states his family tested positive for COVID and he went to  on Saturday where he tested negative and was started on doxy and amoxicillin, later changed to levaquin by home visiting provider for +PNA on CXR. Home provider noted pulse ox to be 90% at time. Today patient states he had a very loose BM x 1 and nausea, along with his weakness, fatigue and cough. States he is so weak it is difficult for him to walk (uses walker at baseline). Wife in ED expressed that she is fearful to take him home as she is also COVID+ and recovering herself and cannot care for him.     ED course: Pt arrived to ED. Given 500cc IVF bolus, tylenol, and returned COVID+. Admitted to medicine. On admission, CTA performed and + for bilateral PE, dexamethasone/lovenox BID started and supplemental O2 given for hypoxia to 86% when exerting self to be weighed for AC. When lying in bed and talking hypoxic to 89%, otherwise maintaining around 94% on RA.     Pt endorses HA, fatigue, malaise, productive cough, subjective fever, chills, nausea, loose bowel movements, shortness of breath.     Denies chest pain, palpitations, leg swelling, calf pain, abd pain, dysuria, melena, hematochezia, rash, vertigo, paresthesias, loss of motor function, change in speech/vision/hearing.  (06 May 2022 01:08)      Patient is a 87y old  Male who presents with a chief complaint of Acute hypoxic respiratory failure secondary to COVID 19 viral PNA (10 May 2022 14:51)      Consulted by Dr. Cyril Avila   for VTE prophylaxis, risk stratification, and anticoagulation management.    PAST MEDICAL & SURGICAL HISTORY:  HTN (hypertension)      Hyperlipidemia      COPD (chronic obstructive pulmonary disease)      Glaucoma of both eyes, unspecified glaucoma      CAD (coronary artery disease)      BPH (benign prostatic hyperplasia)      Glaucoma      Swelling of ankle  b/l      Shoulder pain, right      Humerus fracture  proximal, right      Hard of hearing      H/O Clostridium difficile infection  s/p left THR      H/O bilateral inguinal hernia repair  2016      History of tonsillectomy      Status post coronary artery stent placement  reports stents x 4?      History of total hip replacement, left  2011      H/O colonoscopy      S/P cataract surgery  b/l          FAMILY HISTORY:  Family history of hypertension in mother        Interval Note:  5-: Pt seen at bedside on 5 east, discussed him switching from lovenox to Eliquis.  He instructed me to speak with his wife Yoselin. [id number],  I spoke with her discussed risks and benefits and she want =ed me to call Dr Trey Watts.  I spoke with him and he agrees with plan Pt will f/u with him. Literature given to pat and questions answered.        IMPROVE VTE Individual Risk Assessment    RISK                                                                Points    [ x ] Previous VTE                                                  3    [  ] Thrombophilia                                               2    [  ] Lower limb paralysis                                      2        (unable to hold up >15 seconds)      [  ] Current Cancer                                              2         (within 6 months)    [  ] Immobilization > 24 hrs                                1    [  ] ICU/CCU stay > 24 hours                              1    [  x] Age > 60                                                      1    IMPROVE VTE Score _4________    IMPROVE Score 0-1: Low Risk, No VTE prophylaxis required for most patients, encourage ambulation.   IMPROVE Score 2-3: At risk, pharmacologic VTE prophylaxis is indicated for most patients (in the absence of a contraindication)  IMPROVE Score > or = 4: High Risk, pharmacologic VTE prophylaxis is indicated for most patients (in the absence of a contraindication)    IMPROVE Bleeding Risk Score: 2.5    Falls Risk:   High (  )  Mod ( x )  Low (  )  crcl: 82.7        cr:   .62       BMI21.8                 Denies any personal or familial history of clotting or bleeding disorders.    Allergies    No Known Allergies    Intolerances        REVIEW OF SYSTEMS    (  )Fever	     (  )Constipation	(  )SOB				(  )Headache	(  )Dysuria  (  )Chills	     (  )Melena	(  )Dyspnea present on exertion	                    (  )Dizziness                    (  )Polyuria  (  )Nausea	     (  )Hematochezia	(  )Cough			                    (  )Syncope   	(  )Hematuria  (  )Vomiting    (  )Chest Pain	(  )Wheezing			(  )Weakness  (  )Diarrhea     (  )Palpitations	(  )Anorexia			( x )Myalgia    Pertinent positives in HPI and daily subjective.  All other ROS negative.      Vital Signs Last 24 Hrs  T(C): 36.7 (11 May 2022 08:09), Max: 37.1 (10 May 2022 21:25)  T(F): 98.1 (11 May 2022 08:09), Max: 98.8 (10 May 2022 21:25)  HR: 69 (11 May 2022 08:34) (65 - 79)  BP: 131/60 (11 May 2022 08:09) (98/52 - 131/60)  BP(mean): --  RR: 18 (11 May 2022 08:09) (18 - 18)  SpO2: 94% (11 May 2022 08:09) (94% - 96%)    PHYSICAL EXAM:    Constitutional: Appears Well    Neurological: A& O x 3    Skin: Warm    Respiratory and Thorax: normal effort; Breath sounds: bl crackles  	  Cardiovascular: S1, S2, regular, NMBR	    Gastrointestinal: BS + x 4Q, nontender	    Genitourinary:  Bladder nondistended, nontender    Musculoskeletal:   General Right:   no muscle/joint tenderness,   normal tone, no joint swelling,   ROM: full	    General Left:   no muscle/joint tenderness,   normal tone, no joint swelling,   ROM: full      Lower extrems:   Right: no calf tenderness              negative geovanni's sign               + pedal pulses    Left:   no calf tenderness              negative geovanni's sign               + pedal pulses    < from: CT Angio Chest PE Protocol w/ IV Cont (05.05.22 @ 22:47) >  MPRESSION:  Bilateral upper lobe segmental and subsegmentalpulmonary emboli. Stable   dilated pulmonary arteries which may be on the basis of pulmonary artery   hypertension.  No evidence for right heart strain.    < end of copied text >  				    MEDICATIONS  (STANDING):  apixaban 10 milliGRAM(s) Oral two times a day  aspirin enteric coated 81 milliGRAM(s) Oral daily  atorvastatin 20 milliGRAM(s) Oral at bedtime  budesonide  80 MICROgram(s)/formoterol 4.5 MICROgram(s) Inhaler 2 Puff(s) Inhalation two times a day  chlorhexidine 4% Liquid 1 Application(s) Topical <User Schedule>  cholecalciferol 1000 Unit(s) Oral daily  dexAMETHasone  Injectable 6 milliGRAM(s) IV Push daily  enalapril 10 milliGRAM(s) Oral two times a day  famotidine    Tablet 20 milliGRAM(s) Oral daily  latanoprost 0.005% Ophthalmic Solution 1 Drop(s) Both EYES at bedtime  metoprolol succinate ER 25 milliGRAM(s) Oral daily  tamsulosin 0.4 milliGRAM(s) Oral at bedtime  tiotropium 18 MICROgram(s) Capsule 1 Capsule(s) Inhalation daily          DVT Prophylaxis:  LMWH                   (  )  Heparin SQ           (  )  Coumadin             (  )  Xarelto                  (  )  Eliquis                   (x  )  Venodynes           (x  )  Ambulation          (x  )  UFH                       (  )  Contraindicated  (  )  EC Aspirin             (  )

## 2022-05-11 NOTE — DISCHARGE NOTE PROVIDER - NSDCMRMEDTOKEN_GEN_ALL_CORE_FT
albuterol 90 mcg/inh inhalation aerosol: 2 puff(s) inhaled every 4 hours, As Needed -Shortness of Breath and/or Wheezing - for bronchospasm   apixaban 5 mg oral tablet: 2 tab(s) orally 2 times a day MDD:20mg take 10mg twice a day untill the 12th of May   last dose in the pm   start on 1 tablet twice a day on the 13th of May a.m. dose.  aspirin 81 mg oral delayed release tablet: 1 tab(s) orally once a day (at bedtime)  atorvastatin 20 mg oral tablet: 1 tab(s) orally once a day (at bedtime)  enalapril 10 mg oral tablet: 1 tab(s) orally 2 times a day  famotidine 20 mg oral tablet: 1 tab(s) orally once a day  latanoprost 0.005% ophthalmic solution: 1 drop(s) to each affected eye once a day (at bedtime)  Mag-Ox 400 oral tablet: 1 tab(s) orally once a day  melatonin 3 mg oral tablet: 1 tab(s) orally once a day (at bedtime), As needed, Insomnia  metoprolol succinate 25 mg oral tablet, extended release: 1 tab(s) orally once a day  tamsulosin 0.4 mg oral capsule: 1 cap(s) orally once a day (at bedtime)  Trelegy Ellipta 100 mcg-62.5 mcg-25 mcg/inh inhalation powder: 1 puff(s) inhaled once a day  Tylenol 325 mg oral tablet: 2 tab(s) orally every 4 hours, As Needed  Vitamin D3 25 mcg (1000 intl units) oral tablet: 1 tab(s) orally once a day

## 2022-05-12 PROCEDURE — 99231 SBSQ HOSP IP/OBS SF/LOW 25: CPT

## 2022-05-12 PROCEDURE — 99232 SBSQ HOSP IP/OBS MODERATE 35: CPT

## 2022-05-12 RX ADMIN — FAMOTIDINE 20 MILLIGRAM(S): 10 INJECTION INTRAVENOUS at 09:52

## 2022-05-12 RX ADMIN — Medication 10 MILLIGRAM(S): at 09:51

## 2022-05-12 RX ADMIN — LATANOPROST 1 DROP(S): 0.05 SOLUTION/ DROPS OPHTHALMIC; TOPICAL at 21:25

## 2022-05-12 RX ADMIN — Medication 650 MILLIGRAM(S): at 22:24

## 2022-05-12 RX ADMIN — Medication 100 MILLIGRAM(S): at 09:51

## 2022-05-12 RX ADMIN — APIXABAN 10 MILLIGRAM(S): 2.5 TABLET, FILM COATED ORAL at 09:51

## 2022-05-12 RX ADMIN — BUDESONIDE AND FORMOTEROL FUMARATE DIHYDRATE 2 PUFF(S): 160; 4.5 AEROSOL RESPIRATORY (INHALATION) at 08:57

## 2022-05-12 RX ADMIN — ATORVASTATIN CALCIUM 20 MILLIGRAM(S): 80 TABLET, FILM COATED ORAL at 21:24

## 2022-05-12 RX ADMIN — CHLORHEXIDINE GLUCONATE 1 APPLICATION(S): 213 SOLUTION TOPICAL at 05:14

## 2022-05-12 RX ADMIN — Medication 6 MILLIGRAM(S): at 09:50

## 2022-05-12 RX ADMIN — TIOTROPIUM BROMIDE 1 CAPSULE(S): 18 CAPSULE ORAL; RESPIRATORY (INHALATION) at 08:57

## 2022-05-12 RX ADMIN — Medication 25 MILLIGRAM(S): at 09:52

## 2022-05-12 RX ADMIN — APIXABAN 10 MILLIGRAM(S): 2.5 TABLET, FILM COATED ORAL at 21:24

## 2022-05-12 RX ADMIN — Medication 650 MILLIGRAM(S): at 21:25

## 2022-05-12 RX ADMIN — Medication 10 MILLILITER(S): at 05:53

## 2022-05-12 RX ADMIN — TAMSULOSIN HYDROCHLORIDE 0.4 MILLIGRAM(S): 0.4 CAPSULE ORAL at 21:24

## 2022-05-12 RX ADMIN — Medication 81 MILLIGRAM(S): at 09:51

## 2022-05-12 RX ADMIN — Medication 1000 UNIT(S): at 09:52

## 2022-05-12 NOTE — CONSULT NOTE ADULT - ASSESSMENT
87 M with COPD, HTN with COVID with PE      Pulm embolism continue with anticoagulation with initial dose of 10 BID for 7 days and then 5 mg BID there after- will determine duration of AC as an outpatient    HTN- continue metoprolol and enalapril    Hyperlipidemia- continue with lipitor     Patient is hemodynamically stable-    No cardiac contraindication to DC
Pt is a 86 yo male with a pmh/o HTN, COPD (not on home O2), CAD, HLD, glaucoma, who presented to ED today due to increasing weakness, fatigue, worsening cough and shortness of breath. Pt states his family tested positive for COVID and he went to  on Saturday where he tested negative and was started on doxy and amoxicillin, later changed to levaquin by home visiting provider for +PNA on CXR.  Pt also found to have b/l pes.  Pt has high thrombosis risks and needs treatment dose of anticoagulation.     5-: Case discussed plan with Dr Avila and Dr. Trey Watts.       Plan:  dc lovenox  Eliquis 10mg po twice a day for 2more days then on 5- start on 5mg twice a day. Discussed dosage with both pt and his wife.  Rx sent to pharmacy and cost for 68 tabs is $3.00  Daily CBC/BMP  LE Venodyne  :increase mobility as tolerated  :thanks for consult will f/u  Dispo: home pt will f/u with Dr. Watts on discharge. 
88 y/o male with h/o HTN, COPD (not on home O2), CAD, HLD, glaucoma was admitted on 5/5 for increasing weakness, fatigue, worsening cough and shortness of breath. Pt states his family tested positive for COVID and he went to  5 days PTA where he tested negative and was started on doxy and amoxicillin, later changed to levaquin by home visiting provider for PNA on CXR. Home provider noted pulse ox to be 90% at time. Today patient states he had a loose BM x 1 and nausea, along with his weakness, fatigue and cough. States he is so weak it is difficult for him to walk (uses walker at baseline). In ER he was noted with hypoxia of 85%. He received remdesivir.     1. COVID-19 viral syndrome. Acute respiratory failure. Multifocal pneumonia. PE. COPD exacerbation.   -obtain BC x 2  -remdesivir risks and benefits reviewed with patient and he agreed with the use of the antiviral medication  -start remdesivir protocol  -O2 therapy  -steroids  -AC  -droplet isolation  -respiratory care  -old chart reviewed to assess prior cultures  -monitor temps  -f/u CBC  -supportive care  2. Other issues:   -care per medicine

## 2022-05-12 NOTE — CONSULT NOTE ADULT - REASON FOR ADMISSION
Acute hypoxic respiratory failure secondary to COVID 19 viral PNA

## 2022-05-12 NOTE — PROGRESS NOTE ADULT - NUTRITIONAL ASSESSMENT
This patient has been assessed with a concern for Malnutrition and has been determined to have a diagnosis/diagnoses of Severe protein-calorie malnutrition.    This patient is being managed with:   Diet Regular-  Entered: May  7 2022 12:52PM    Diet Regular-  DASH/TLC {Sodium & Cholesterol Restricted} (DASH)  Entered: May  6 2022 12:59AM    The following pending diet order is being considered for treatment of Severe protein-calorie malnutrition:null

## 2022-05-12 NOTE — PROGRESS NOTE ADULT - ASSESSMENT
86 y/o male with h/o HTN, COPD (not on home O2), CAD, HLD, glaucoma was admitted on 5/5 for increasing weakness, fatigue, worsening cough and shortness of breath. Pt states his family tested positive for COVID and he went to  5 days PTA where he tested negative and was started on doxy and amoxicillin, later changed to levaquin by home visiting provider for PNA on CXR. Home provider noted pulse ox to be 90% at time. Today patient states he had a loose BM x 1 and nausea, along with his weakness, fatigue and cough. States he is so weak it is difficult for him to walk (uses walker at baseline). In ER he was noted with hypoxia of 85%. He received remdesivir.     1. COVID-19 viral syndrome. Acute respiratory failure. Multifocal pneumonia. PE. COPD exacerbation.   -respiratory frail, but improving  -BC x 2 noted  -on remdesivir protocol # 4  -tolerating abx well so far; no side effects noted  -continue antiviral therapy  -O2 therapy  -steroids  -AC  -droplet isolation  -respiratory care  -monitor temps  -f/u CBC  -supportive care  2. Other issues:   -care per medicine      
88 y/o male with h/o HTN, COPD (not on home O2), CAD, HLD, glaucoma was admitted on 5/5 for increasing weakness, fatigue, worsening cough and shortness of breath. Pt states his family tested positive for COVID and he went to  5 days PTA where he tested negative and was started on doxy and amoxicillin, later changed to levaquin by home visiting provider for PNA on CXR. Home provider noted pulse ox to be 90% at time. Today patient states he had a loose BM x 1 and nausea, along with his weakness, fatigue and cough. States he is so weak it is difficult for him to walk (uses walker at baseline). In ER he was noted with hypoxia of 85%. He received remdesivir.     1. COVID-19 viral syndrome. Acute respiratory failure. Multifocal pneumonia. PE. COPD exacerbation.   -respiratory frail  -BC x 2 noted  -start remdesivir protocol # 2  -tolerating abx well so far; no side effects noted  -continue antiviral therapy  -O2 therapy  -steroids  -AC  -droplet isolation  -respiratory care  -monitor temps  -f/u CBC  -supportive care  2. Other issues:   -care per medicine      
86 y/o male with h/o HTN, COPD (not on home O2), CAD, HLD, glaucoma was admitted on 5/5 for increasing weakness, fatigue, worsening cough and shortness of breath. Pt states his family tested positive for COVID and he went to  5 days PTA where he tested negative and was started on doxy and amoxicillin, later changed to levaquin by home visiting provider for PNA on CXR. Home provider noted pulse ox to be 90% at time. Today patient states he had a loose BM x 1 and nausea, along with his weakness, fatigue and cough. States he is so weak it is difficult for him to walk (uses walker at baseline). In ER he was noted with hypoxia of 85%. He received remdesivir.     1. COVID-19 viral syndrome. Acute respiratory failure. Multifocal pneumonia. PE. COPD exacerbation.   -respiratory frail  -BC x 2 noted  -on remdesivir protocol # 3  -tolerating abx well so far; no side effects noted  -continue antiviral therapy  -O2 therapy  -steroids  -AC  -droplet isolation  -respiratory care  -monitor temps  -f/u CBC  -supportive care  2. Other issues:   -care per medicine      
88 y/o male with h/o HTN, COPD (not on home O2), CAD, HLD, glaucoma was admitted on 5/5 for increasing weakness, fatigue, worsening cough and shortness of breath. Pt states his family tested positive for COVID and he went to  5 days PTA where he tested negative and was started on doxy and amoxicillin, later changed to levaquin by home visiting provider for PNA on CXR. Home provider noted pulse ox to be 90% at time. Today patient states he had a loose BM x 1 and nausea, along with his weakness, fatigue and cough. States he is so weak it is difficult for him to walk (uses walker at baseline). In ER he was noted with hypoxia of 85%. He received remdesivir.     1. COVID-19 viral syndrome. Acute respiratory failure. Multifocal pneumonia. PE. COPD exacerbation.   -respiratory frail, but improving  -BC x 2 noted  -on remdesivir protocol # 5  -tolerating abx well so far; no side effects noted  -O2 therapy  -steroids  -AC  -droplet isolation  -respiratory care  -monitor temps  -complete antiviral therapy  -f/u CBC  -supportive care  2. Other issues:   -care per medicine      
MEDICATIONS  (STANDING):  aspirin enteric coated 81 milliGRAM(s) Oral daily  atorvastatin 20 milliGRAM(s) Oral at bedtime  budesonide  80 MICROgram(s)/formoterol 4.5 MICROgram(s) Inhaler 2 Puff(s) Inhalation two times a day  cholecalciferol 1000 Unit(s) Oral daily  dexAMETHasone  Injectable 6 milliGRAM(s) IV Push daily  enalapril 10 milliGRAM(s) Oral two times a day  enoxaparin Injectable 70 milliGRAM(s) SubCutaneous every 12 hours  famotidine    Tablet 20 milliGRAM(s) Oral daily  latanoprost 0.005% Ophthalmic Solution 1 Drop(s) Both EYES at bedtime  metoprolol succinate ER 25 milliGRAM(s) Oral daily  remdesivir  IVPB   IV Intermittent   remdesivir  IVPB 100 milliGRAM(s) IV Intermittent every 24 hours  tamsulosin 0.4 milliGRAM(s) Oral at bedtime  tiotropium 18 MICROgram(s) Capsule 1 Capsule(s) Inhalation daily    MEDICATIONS  (PRN):  acetaminophen     Tablet .. 650 milliGRAM(s) Oral every 6 hours PRN Temp greater or equal to 38.5C (101.3F), Mild Pain (1 - 3)  ALBUTerol    90 MICROgram(s) HFA Inhaler 2 Puff(s) Inhalation every 4 hours PRN Shortness of Breath and/or Wheezing  benzonatate 100 milliGRAM(s) Oral three times a day PRN Cough  guaifenesin/dextromethorphan Oral Liquid 10 milliLiter(s) Oral every 4 hours PRN Cough  melatonin 3 milliGRAM(s) Oral at bedtime PRN Insomnia      # Acute hypoxic respiratory failure  # Viral Pneumonia secondary to Novel Coronavirus Infection  # COPD exacerbation  # Pulmonary embolism / ? pulmonary HTN  - Continue with O2    - Obtain daily room air O2 saturations once O2 requirements stabilize.  - IV remdesivir /  dexamethasone  - HFA albuterol Q6 hour PRN via MDI. Would avoid nebulized preparations to limit risk of aerosol formation.  - tessalon pearls & robutussin DM prn  - c/w supplemental O2  - c/w budesonide and tiotropium as therapeutic interchange for home trellegy inhaler    # Acute bilateral pulmonary emboli  - true weight obtained  - lovenox 1mg/kg bid for full AC with plan to bridge to oral AC  - supplemental O2 as above  - CTA w/o evidence of right heart strain  - troponin wnl  - BnP only mildly elevated    # Hyperglycemia  - non fasting  - minimally elevated however as dexamethasone started, will need to monitor closely on serum chemistry and start accuchecks/AISS/carb restriction pending trend and AM result    #Protein calorie malnutrition  -f/u prealbumin  -nutrition consult    #BPH  -c/w flomax    Disposition: Rem/Dec for COVID/Hypoxia. Therapeutic Lovenox for PE/Hypoxia
MEDICATIONS  (STANDING):  aspirin enteric coated 81 milliGRAM(s) Oral daily  atorvastatin 20 milliGRAM(s) Oral at bedtime  budesonide  80 MICROgram(s)/formoterol 4.5 MICROgram(s) Inhaler 2 Puff(s) Inhalation two times a day  cholecalciferol 1000 Unit(s) Oral daily  dexAMETHasone  Injectable 6 milliGRAM(s) IV Push daily  enalapril 10 milliGRAM(s) Oral two times a day  enoxaparin Injectable 70 milliGRAM(s) SubCutaneous every 12 hours  famotidine    Tablet 20 milliGRAM(s) Oral daily  latanoprost 0.005% Ophthalmic Solution 1 Drop(s) Both EYES at bedtime  metoprolol succinate ER 25 milliGRAM(s) Oral daily  remdesivir  IVPB   IV Intermittent   remdesivir  IVPB 100 milliGRAM(s) IV Intermittent every 24 hours  tamsulosin 0.4 milliGRAM(s) Oral at bedtime  tiotropium 18 MICROgram(s) Capsule 1 Capsule(s) Inhalation daily    MEDICATIONS  (PRN):  acetaminophen     Tablet .. 650 milliGRAM(s) Oral every 6 hours PRN Temp greater or equal to 38.5C (101.3F), Mild Pain (1 - 3)  ALBUTerol    90 MICROgram(s) HFA Inhaler 2 Puff(s) Inhalation every 4 hours PRN Shortness of Breath and/or Wheezing  benzonatate 100 milliGRAM(s) Oral three times a day PRN Cough  guaifenesin/dextromethorphan Oral Liquid 10 milliLiter(s) Oral every 4 hours PRN Cough  melatonin 3 milliGRAM(s) Oral at bedtime PRN Insomnia      # Acute hypoxic respiratory failure  # Viral Pneumonia secondary to Novel Coronavirus Infection  # COPD exacerbation  # Pulmonary embolism / ? pulmonary HTN  - Continue with O2    - Obtain daily room air O2 saturations once O2 requirements stabilize.  - IV remdesivir /  dexamethasone  - HFA albuterol Q6 hour PRN via MDI. Would avoid nebulized preparations to limit risk of aerosol formation.  - tessalon pearls & robutussin DM prn  - c/w supplemental O2  - c/w budesonide and tiotropium as therapeutic interchange for home trellegy inhaler    # Acute bilateral pulmonary emboli  - true weight obtained  - lovenox 1mg/kg bid for full AC with plan to bridge to oral AC  - supplemental O2 as above  - CTA w/o evidence of right heart strain  - troponin wnl  - BnP only mildly elevated    # Hyperglycemia  - non fasting  - minimally elevated however as dexamethasone started, will need to monitor closely on serum chemistry and start accuchecks/AISS/carb restriction pending trend and AM result    #Protein calorie malnutrition  -f/u prealbumin  -nutrition consult    #BPH  -c/w flomax    Disposition: Rem/Dec for COVID/Hypoxia. Therapeutic Lovenox for PE/Hypoxia
MEDICATIONS  (STANDING):  aspirin enteric coated 81 milliGRAM(s) Oral daily  atorvastatin 20 milliGRAM(s) Oral at bedtime  budesonide  80 MICROgram(s)/formoterol 4.5 MICROgram(s) Inhaler 2 Puff(s) Inhalation two times a day  cholecalciferol 1000 Unit(s) Oral daily  dexAMETHasone  Injectable 6 milliGRAM(s) IV Push daily  enalapril 10 milliGRAM(s) Oral two times a day  enoxaparin Injectable 70 milliGRAM(s) SubCutaneous every 12 hours  famotidine    Tablet 20 milliGRAM(s) Oral daily  latanoprost 0.005% Ophthalmic Solution 1 Drop(s) Both EYES at bedtime  metoprolol succinate ER 25 milliGRAM(s) Oral daily  remdesivir  IVPB   IV Intermittent   remdesivir  IVPB 100 milliGRAM(s) IV Intermittent every 24 hours  tamsulosin 0.4 milliGRAM(s) Oral at bedtime  tiotropium 18 MICROgram(s) Capsule 1 Capsule(s) Inhalation daily    MEDICATIONS  (PRN):  acetaminophen     Tablet .. 650 milliGRAM(s) Oral every 6 hours PRN Temp greater or equal to 38.5C (101.3F), Mild Pain (1 - 3)  ALBUTerol    90 MICROgram(s) HFA Inhaler 2 Puff(s) Inhalation every 4 hours PRN Shortness of Breath and/or Wheezing  benzonatate 100 milliGRAM(s) Oral three times a day PRN Cough  guaifenesin/dextromethorphan Oral Liquid 10 milliLiter(s) Oral every 4 hours PRN Cough  melatonin 3 milliGRAM(s) Oral at bedtime PRN Insomnia      # Acute hypoxic respiratory failure  # Viral Pneumonia secondary to Novel Coronavirus Infection  # COPD exacerbation  # Pulmonary embolism / ? pulmonary HTN  - Continue with O2    - Obtain daily room air O2 saturations once O2 requirements stabilize.  - IV remdesivir /  dexamethasone  - Procalcitonin low  - HFA albuterol Q6 hour PRN via MDI. Would avoid nebulized preparations to limit risk of aerosol formation.  - tessalon pearls & sarahussin DM prn  - c/w supplemental O2  - c/w budesonide and tiotropium as therapeutic interchange for home trellegy inhaler    # Acute bilateral pulmonary emboli  - true weight obtained  - lovenox 1mg/kg bid for full AC with plan to bridge to oral AC  - supplemental O2 as above  - CTA w/o evidence of right heart strain  - troponin wnl  - BnP only mildly elevated      #Protein calorie malnutrition  -f/u prealbumin  -nutrition consult    #BPH  -c/w flomax        Disposition: Rem/Dec for COVID/Hypoxia. Therapeutic Lovenox for PE/Hypoxia. Plan to switch to DOAC of choice if tolerating AC. ID recs  Of note discussed with wife. She requested work up for chornic bilateral hip pain. Unchanged from baseline. Multiple steroid injections  outpatient with minimal relief. Advised wife that with new PE, evaluation would not be able to warrant any intervention, especially because there is no acute change in reported chronic symptoms. 
Pt is a 88 yo male with a pmh/o HTN, COPD (not on home O2), CAD, HLD, glaucoma, who presented to ED today due to increasing weakness, fatigue, worsening cough and shortness of breath. Pt states his family tested positive for COVID and he went to  on Saturday where he tested negative and was started on doxy and amoxicillin, later changed to levaquin by home visiting provider for +PNA on CXR.  Pt also found to have b/l pes.  Pt has high thrombosis risks and needs treatment dose of anticoagulation.     5-: Case discussed plan with Dr Avila and Dr. Trey Watts.       Plan:  cont on Eliquis 10mg po twice a day for 2more days then on 5- start on 5mg twice a day. Discussed dosage with both pt and his wife.  Rx sent to pharmacy and cost for 68 tabs is $3.00. Pt tolerating well  LE Venodyne  :increase mobility as tolerated  : will sign off case please reconsult if needed. f/u  Dispo: home pt will f/u with Dr. Watts on discharge.

## 2022-05-12 NOTE — PROGRESS NOTE ADULT - PROVIDER SPECIALTY LIST ADULT
Infectious Disease
Anticoag Management
Hospitalist
Infectious Disease
Infectious Disease
Hospitalist
Infectious Disease
Hospitalist

## 2022-05-12 NOTE — PROGRESS NOTE ADULT - SUBJECTIVE AND OBJECTIVE BOX
HPI:  Pt is a 88 yo male with a pmh/o HTN, COPD (not on home O2), CAD, HLD, glaucoma, who presented to ED today due to increasing weakness, fatigue, worsening cough and shortness of breath. Pt states his family tested positive for COVID and he went to  on Saturday where he tested negative and was started on doxy and amoxicillin, later changed to levaquin by home visiting provider for +PNA on CXR. Home provider noted pulse ox to be 90% at time. Today patient states he had a very loose BM x 1 and nausea, along with his weakness, fatigue and cough. States he is so weak it is difficult for him to walk (uses walker at baseline). Wife in ED expressed that she is fearful to take him home as she is also COVID+ and recovering herself and cannot care for him.     ED course: Pt arrived to ED. Given 500cc IVF bolus, tylenol, and returned COVID+. Admitted to medicine. On admission, CTA performed and + for bilateral PE, dexamethasone/lovenox BID started and supplemental O2 given for hypoxia to 86% when exerting self to be weighed for AC. When lying in bed and talking hypoxic to 89%, otherwise maintaining around 94% on RA.     Pt endorses HA, fatigue, malaise, productive cough, subjective fever, chills, nausea, loose bowel movements, shortness of breath.     Denies chest pain, palpitations, leg swelling, calf pain, abd pain, dysuria, melena, hematochezia, rash, vertigo, paresthesias, loss of motor function, change in speech/vision/hearing.  (06 May 2022 01:08)      Patient is a 87y old  Male who presents with a chief complaint of Acute hypoxic respiratory failure secondary to COVID 19 viral PNA (10 May 2022 14:51)      Consulted by Dr. Cyril Avila   for VTE prophylaxis, risk stratification, and anticoagulation management.    PAST MEDICAL & SURGICAL HISTORY:  HTN (hypertension)      Hyperlipidemia      COPD (chronic obstructive pulmonary disease)      Glaucoma of both eyes, unspecified glaucoma      CAD (coronary artery disease)      BPH (benign prostatic hyperplasia)      Glaucoma      Swelling of ankle  b/l      Shoulder pain, right      Humerus fracture  proximal, right      Hard of hearing      H/O Clostridium difficile infection  s/p left THR      H/O bilateral inguinal hernia repair  2016      History of tonsillectomy      Status post coronary artery stent placement  reports stents x 4?      History of total hip replacement, left  2011      H/O colonoscopy      S/P cataract surgery  b/l          FAMILY HISTORY:  Family history of hypertension in mother        Interval Note:  5-: Pt seen at bedside on 5 east, discussed him switching from lovenox to Eliquis.  He instructed me to speak with his wife Yoselin. [phone],  I spoke with her discussed risks and benefits and she want =ed me to call Dr Trey Watts.  I spoke with him and he agrees with plan Pt will f/u with him. Literature given to pat and questions answered.  5- Pt not physically seen today to decrease exposure to COVID-19.  SPOKE WITH PT'S WIFE ON THE PHONE.  DISCUSSED HIS ELIQUIS DOSING AND MADE HER AWARE DR WATTS WILL F/U ON DISCHARGE AND That HE WAS EVALUATED BY DR WATTS TODAY.  QUESTIONS ANSWERED.       IMPROVE VTE Individual Risk Assessment    RISK                                                                Points    [ x ] Previous VTE                                                  3    [  ] Thrombophilia                                               2    [  ] Lower limb paralysis                                      2        (unable to hold up >15 seconds)      [  ] Current Cancer                                              2         (within 6 months)    [  ] Immobilization > 24 hrs                                1    [  ] ICU/CCU stay > 24 hours                              1    [  x] Age > 60                                                      1    IMPROVE VTE Score _4________    IMPROVE Score 0-1: Low Risk, No VTE prophylaxis required for most patients, encourage ambulation.   IMPROVE Score 2-3: At risk, pharmacologic VTE prophylaxis is indicated for most patients (in the absence of a contraindication)  IMPROVE Score > or = 4: High Risk, pharmacologic VTE prophylaxis is indicated for most patients (in the absence of a contraindication)    IMPROVE Bleeding Risk Score: 2.5    Falls Risk:   High (  )  Mod ( x )  Low (  )  crcl: 82.7        cr:   .62       BMI21.8                 Denies any personal or familial history of clotting or bleeding disorders.    Allergies    No Known Allergies    Intolerances        REVIEW OF SYSTEMS see hospitalist note      PHYSICAL EXAM: see hospitalist note        < from: CT Angio Chest PE Protocol w/ IV Cont (05.05.22 @ 22:47) >  MPRESSION:  Bilateral upper lobe segmental and subsegmentalpulmonary emboli. Stable   dilated pulmonary arteries which may be on the basis of pulmonary artery   hypertension.  No evidence for right heart strain.    < end of copied text >  				    MEDICATIONS  (STANDING):  apixaban 10 milliGRAM(s) Oral two times a day  aspirin enteric coated 81 milliGRAM(s) Oral daily  atorvastatin 20 milliGRAM(s) Oral at bedtime  budesonide  80 MICROgram(s)/formoterol 4.5 MICROgram(s) Inhaler 2 Puff(s) Inhalation two times a day  chlorhexidine 4% Liquid 1 Application(s) Topical <User Schedule>  cholecalciferol 1000 Unit(s) Oral daily  enalapril 10 milliGRAM(s) Oral two times a day  famotidine    Tablet 20 milliGRAM(s) Oral daily  latanoprost 0.005% Ophthalmic Solution 1 Drop(s) Both EYES at bedtime  metoprolol succinate ER 25 milliGRAM(s) Oral daily  tamsulosin 0.4 milliGRAM(s) Oral at bedtime  tiotropium 18 MICROgram(s) Capsule 1 Capsule(s) Inhalation daily        DVT Prophylaxis:  LMWH                   (  )  Heparin SQ           (  )  Coumadin             (  )  Xarelto                  (  )  Eliquis                   (x  )  Venodynes           (x  )  Ambulation          (x  )  UFH                       (  )  Contraindicated  (  )  EC Aspirin             (  )        
CC: SOB  History of Present Illness:   Pt is a 86 yo male with a pmh/o HTN, COPD (not on home O2), CAD, HLD, glaucoma, who presented to ED today due to increasing weakness, fatigue, worsening cough and shortness of breath. Pt states his family tested positive for COVID and he went to  on Saturday where he tested negative and was started on doxy and amoxicillin, later changed to levaquin by home visiting provider for +PNA on CXR. Home provider noted pulse ox to be 90% at time. Today patient states he had a very loose BM x 1 and nausea, along with his weakness, fatigue and cough. States he is so weak it is difficult for him to walk (uses walker at baseline). Wife in ED expressed that she is fearful to take him home as she is also COVID+ and recovering herself and cannot care for him.  ED course: Pt arrived to ED. Given 500cc IVF bolus, tylenol, and returned COVID+. Admitted to medicine. On admission, CTA performed and + for bilateral PE, dexamethasone/lovenox BID started and supplemental O2 given for hypoxia to 86% when exerting self to be weighed for AC. When lying in bed and talking hypoxic to 89%, otherwise maintaining around 94% on RA.  patient is hemodynamically stable. Denies any HA, CP, SOB. No fevers, chills or shakes. Labs and vitals reviewed. continue on NC.    Hospital course:  Pt admitted with acute hypoxemic respiratory failure due to acute covid19 PNA and acute bilateral PE likely related to COVID19.  Pt also with likely superimposed COPD exacerbation.  He was started on and completed 5 days of remdesivir and dexamethasone IV.  pt given inhalers, supportive care.  In terms of bilateral PE which was diagnosed on this admission, he was started on therapeutic lovenox and transitioned to eliquis.  He will continue eliquis as out patient for at least 3-6 months.  Pt HD stable, satting well on room air, afebrile and feeling much better.    5/12: No new events, awaiting discharge. Denies fever, chills, N, V, abd pain, CP, SOB.    REVIEW OF SYSTEMS: All other review of systems is negative unless indicated above.    Vital Signs Last 24 Hrs  T(C): 36.4 (12 May 2022 15:45), Max: 37.1 (11 May 2022 21:38)  T(F): 97.6 (12 May 2022 15:45), Max: 98.8 (11 May 2022 21:38)  HR: 92 (12 May 2022 15:45) (59 - 92)  BP: 104/41 (12 May 2022 15:45) (104/41 - 118/62)  BP(mean): --  RR: 18 (12 May 2022 15:45) (17 - 18)  SpO2: 93% (12 May 2022 15:45) (93% - 96%)      PHYSICAL EXAM:    Constitutional: NAD, awake and alert, well-developed  HEENT: PERR, EOMI, Normal Hearing, MMM  Neck: Soft and supple  Respiratory: Breath sounds are clear bilaterally, No wheezing, rales or rhonchi  Cardiovascular: S1 and S2, regular rate and rhythm, no Murmurs, gallops or rubs  Gastrointestinal: Bowel Sounds present, soft, nontender, nondistended, no guarding, no rebound  Extremities: No peripheral edema  Neurological: A/O x 3, no focal deficits in my limited exam    MEDICATIONS  (STANDING):  apixaban 10 milliGRAM(s) Oral two times a day  aspirin enteric coated 81 milliGRAM(s) Oral daily  atorvastatin 20 milliGRAM(s) Oral at bedtime  budesonide  80 MICROgram(s)/formoterol 4.5 MICROgram(s) Inhaler 2 Puff(s) Inhalation two times a day  chlorhexidine 4% Liquid 1 Application(s) Topical <User Schedule>  cholecalciferol 1000 Unit(s) Oral daily  enalapril 10 milliGRAM(s) Oral two times a day  famotidine    Tablet 20 milliGRAM(s) Oral daily  latanoprost 0.005% Ophthalmic Solution 1 Drop(s) Both EYES at bedtime  metoprolol succinate ER 25 milliGRAM(s) Oral daily  tamsulosin 0.4 milliGRAM(s) Oral at bedtime  tiotropium 18 MICROgram(s) Capsule 1 Capsule(s) Inhalation daily    MEDICATIONS  (PRN):  acetaminophen     Tablet .. 650 milliGRAM(s) Oral every 6 hours PRN Temp greater or equal to 38.5C (101.3F), Mild Pain (1 - 3)  ALBUTerol    90 MICROgram(s) HFA Inhaler 2 Puff(s) Inhalation every 4 hours PRN Shortness of Breath and/or Wheezing  benzonatate 100 milliGRAM(s) Oral three times a day PRN Cough  calcium carbonate    500 mG (Tums) Chewable 2 Tablet(s) Chew four times a day PRN Heartburn  guaifenesin/dextromethorphan Oral Liquid 10 milliLiter(s) Oral every 4 hours PRN Cough  melatonin 3 milliGRAM(s) Oral at bedtime PRN Insomnia          A/P:    # Acute hypoxic respiratory failure due to Acute COVID19 pneumonia and acute bilateral PE / COPD exacerbation:  - Satting 94% on room air  - s/p IV remdesivir /  dexamethasone 5 days  - supportive care  - c/w inhalers  - lovenox therapeutic changed to eliquis per anticoag services.  To complete 3-6 month therapy with close out patient follow up.  - echo and CTA w/o evidence of right heart strain    #Severe Protein calorie malnutrition  - nutrition following    #BPH  -c/w flomax        Disposition:   -d/c home with home care.    Attending Statement: 40 minutes spent on total encounter and discharge planning.      
Reason for Admission: Acute hypoxic respiratory failure secondary to COVID 19 viral PNA  History of Present Illness:   Pt is a 86 yo male with a pmh/o HTN, COPD (not on home O2), CAD, HLD, glaucoma, who presented to ED today due to increasing weakness, fatigue, worsening cough and shortness of breath. Pt states his family tested positive for COVID and he went to  on Saturday where he tested negative and was started on doxy and amoxicillin, later changed to levaquin by home visiting provider for +PNA on CXR. Home provider noted pulse ox to be 90% at time. Today patient states he had a very loose BM x 1 and nausea, along with his weakness, fatigue and cough. States he is so weak it is difficult for him to walk (uses walker at baseline). Wife in ED expressed that she is fearful to take him home as she is also COVID+ and recovering herself and cannot care for him.     ED course: Pt arrived to ED. Given 500cc IVF bolus, tylenol, and returned COVID+. Admitted to medicine. On admission, CTA performed and + for bilateral PE, dexamethasone/lovenox BID started and supplemental O2 given for hypoxia to 86% when exerting self to be weighed for AC. When lying in bed and talking hypoxic to 89%, otherwise maintaining around 94% on RA.     patient is hemodynamically stable. Denies any HA, CP, SOB. No fevers, chills or shakes. Labs and vitals reviewed. continue on NC    Subjective: Mild improvement in energy; SOB improved. MOre of an appetite. Chronic bilateral lateral hip pain unchanged; Ambulated with PT and does not  report worsening from baseline. Denies fever, chills, chest pain, nausea, vomiting.    Review of Systems: 14 Point review of systems reviewed and reported as negative unless otherwise stated in Above        Physical Exam:   PHYSICAL EXAM:    T(C): 36.1 (05-09-22 @ 08:50), Max: 36.6 (05-08-22 @ 20:05)  HR: 59 (05-09-22 @ 08:50) (59 - 76)  BP: 129/61 (05-09-22 @ 08:50) (104/52 - 129/61)  RR: 18 (05-09-22 @ 08:50) (18 - 18)  SpO2: 99% (05-09-22 @ 08:50) (96% - 99%)  General: AAOx3; NAD  Head: AT/NC  ENT: Moist Mucous Membranes; No Injury  Eyes: EOMI; PERRL  Neck: Non-tender; No JVD  CVS: RRR, S1&S2, No murmur, No edema  Respiratory: Lungs CTA B/L; Normal Respiratory Effort  Abdomen/GI: Soft, non-tender, non-distended, no guarding, no rebound, normal bowel sounds  Extremities: No cyanosis, No clubbing, No edema  MSK: No CVA tenderness, no injury  Neuro: AAOx3, CNII-XII grossly intact, non-focal  Psych: Appropriate, Cooperative, No depression, No anxiety  Skin: Clean, Dry and Intact                               14.7   8.65  )-----------( 149      ( 09 May 2022 07:07 )             46.7     05-09    139  |  109<H>  |  24<H>  ----------------------------<  110<H>  4.0   |  25  |  0.62    Ca    8.3<L>      09 May 2022 07:07  Phos  2.6     05-09  Mg     2.2     05-09    TPro  5.4<L>  /  Alb  2.4<L>  /  TBili  0.4  /  DBili  x   /  AST  17  /  ALT  16  /  AlkPhos  77  05-09    COVID-19 PCR: Detected (05 May 2022 21:23)  COVID-19 PCR: NotDetec (26 Jan 2022 06:08)  SARS-CoV-2: NotDetec (20 Jan 2022 12:30)  COVID-19 PCR: NotDetec (19 Jan 2022 17:37)  COVID-19 PCR: NotDetec (17 Nov 2021 11:02)    CAPILLARY BLOOD GLUCOSE                                15.7   13.05 )-----------( 157      ( 08 May 2022 08:52 )             49.6     05-08    139  |  109<H>  |  29<H>  ----------------------------<  127<H>  4.4   |  23  |  0.79    Ca    8.8      08 May 2022 08:52  Phos  2.4     05-08  Mg     2.2     05-08    TPro  6.1  /  Alb  2.6<L>  /  TBili  0.5  /  DBili  x   /  AST  22  /  ALT  19  /  AlkPhos  92  05-08    COVID-19 PCR: Detected (05 May 2022 21:23)  COVID-19 PCR: NotDetec (26 Jan 2022 06:08)  SARS-CoV-2: NotDetec (20 Jan 2022 12:30)  COVID-19 PCR: NotDetec (19 Jan 2022 17:37)  COVID-19 PCR: NotDetec (17 Nov 2021 11:02)    CAPILLARY BLOOD GLUCOSE      Procalcitonin, Serum: 0.06: NSerum Pro-Brain Natriuretic Peptide: 575 pg/mL (05.05.22 @ 20:16) D-Dimer Assay, Quantitative: 1080:C-Reactive Protein, Serum: 20 mg/L (05.05.22 @ 20:16)      < from: CT Angio Chest PE Protocol w/ IV Cont (05.05.22 @ 22:47) >  IMPRESSION:  Bilateral upper lobe segmental and subsegmentalpulmonary emboli. Stable   dilated pulmonary arteries which may be on the basis of pulmonary artery   hypertension.  No evidence for right heart strain.    Findings were discussed with Dr. Davalos 5/6/2022 12:35 AM by Dr. Justin with read back confirmation.    < end of copied text >  < from: TTE Echo Complete w/o Contrast w/ Doppler (05.09.22 @ 12:16) >     Limited study.   Estimated left ventricular ejection fraction is >70 %.   The left atrium appears normal.   Normal appearing right atrium.   Limited study to assess right heart pressures.   No pulmonary hypertension is noted.   Trace tricuspid valve regurgitation is present.   No pulmonary hypertension.   The IVC appears underdistended.    < end of copied text >  
Reason for Admission: Acute hypoxic respiratory failure secondary to COVID 19 viral PNA  History of Present Illness:   Pt is a 86 yo male with a pmh/o HTN, COPD (not on home O2), CAD, HLD, glaucoma, who presented to ED today due to increasing weakness, fatigue, worsening cough and shortness of breath. Pt states his family tested positive for COVID and he went to  on Saturday where he tested negative and was started on doxy and amoxicillin, later changed to levaquin by home visiting provider for +PNA on CXR. Home provider noted pulse ox to be 90% at time. Today patient states he had a very loose BM x 1 and nausea, along with his weakness, fatigue and cough. States he is so weak it is difficult for him to walk (uses walker at baseline). Wife in ED expressed that she is fearful to take him home as she is also COVID+ and recovering herself and cannot care for him.     ED course: Pt arrived to ED. Given 500cc IVF bolus, tylenol, and returned COVID+. Admitted to medicine. On admission, CTA performed and + for bilateral PE, dexamethasone/lovenox BID started and supplemental O2 given for hypoxia to 86% when exerting self to be weighed for AC. When lying in bed and talking hypoxic to 89%, otherwise maintaining around 94% on RA.     patient is hemodynamically stable. Denies any HA, CP, SOB. No fevers, chills or shakes. Labs and vitals reviewed. continue on NC    Subjective: Mild improvement in energy; SOB improved. MOre of an appetite. Denies fever, chills, chest pain, nausea, vomiting.    Review of Systems: 14 Point review of systems reviewed and reported as negative unless otherwise stated in Above        Physical Exam:   PHYSICAL EXAM:    T(C): 36.2 (05-07-22 @ 08:40), Max: 36.3 (05-06-22 @ 21:14)  HR: 55 (05-07-22 @ 08:40) (55 - 70)  BP: 121/55 (05-07-22 @ 08:40) (113/51 - 121/55)  RR: 17 (05-07-22 @ 08:40) (17 - 17)  SpO2: 97% (05-07-22 @ 08:40) (96% - 98%)    General: AAOx3; NAD  Head: AT/NC  ENT: Moist Mucous Membranes; No Injury  Eyes: EOMI; PERRL  Neck: Non-tender; No JVD  CVS: RRR, S1&S2, No murmur, No edema  Respiratory: Lungs CTA B/L; Normal Respiratory Effort  Abdomen/GI: Soft, non-tender, non-distended, no guarding, no rebound, normal bowel sounds  Extremities: No cyanosis, No clubbing, No edema  MSK: No CVA tenderness, Normal ROM, No injury  Neuro: AAOx3, CNII-XII grossly intact, non-focal  Psych: Appropriate, Cooperative, No depression, No anxiety  Skin: Clean, Dry and Intact                        15.5   6.38  )-----------( 141      ( 07 May 2022 07:32 )             49.7     05-07    136  |  106  |  23  ----------------------------<  139<H>  4.7   |  24  |  0.92    Ca    8.9      07 May 2022 07:32    TPro  6.1  /  Alb  2.6<L>  /  TBili  0.4  /  DBili  0.1  /  AST  21  /  ALT  18  /  AlkPhos  91  05-07    COVID-19 PCR: Detected (05 May 2022 21:23)  COVID-19 PCR: NotDetec (26 Jan 2022 06:08)  SARS-CoV-2: NotDetec (20 Jan 2022 12:30)  COVID-19 PCR: NotDetec (19 Jan 2022 17:37)  COVID-19 PCR: NotDetec (17 Nov 2021 11:02)    CAPILLARY BLOOD GLUCO        Procalcitonin, Serum: 0.06: NSerum Pro-Brain Natriuretic Peptide: 575 pg/mL (05.05.22 @ 20:16) D-Dimer Assay, Quantitative: 1080:C-Reactive Protein, Serum: 20 mg/L (05.05.22 @ 20:16)      < from: CT Angio Chest PE Protocol w/ IV Cont (05.05.22 @ 22:47) >  IMPRESSION:  Bilateral upper lobe segmental and subsegmentalpulmonary emboli. Stable   dilated pulmonary arteries which may be on the basis of pulmonary artery   hypertension.  No evidence for right heart strain.    Findings were discussed with Dr. Davalos 5/6/2022 12:35 AM by Dr. Justin with read back confirmation.    < end of copied text >  
Reason for Admission: Acute hypoxic respiratory failure secondary to COVID 19 viral PNA  History of Present Illness:   Pt is a 86 yo male with a pmh/o HTN, COPD (not on home O2), CAD, HLD, glaucoma, who presented to ED today due to increasing weakness, fatigue, worsening cough and shortness of breath. Pt states his family tested positive for COVID and he went to  on Saturday where he tested negative and was started on doxy and amoxicillin, later changed to levaquin by home visiting provider for +PNA on CXR. Home provider noted pulse ox to be 90% at time. Today patient states he had a very loose BM x 1 and nausea, along with his weakness, fatigue and cough. States he is so weak it is difficult for him to walk (uses walker at baseline). Wife in ED expressed that she is fearful to take him home as she is also COVID+ and recovering herself and cannot care for him.     ED course: Pt arrived to ED. Given 500cc IVF bolus, tylenol, and returned COVID+. Admitted to medicine. On admission, CTA performed and + for bilateral PE, dexamethasone/lovenox BID started and supplemental O2 given for hypoxia to 86% when exerting self to be weighed for AC. When lying in bed and talking hypoxic to 89%, otherwise maintaining around 94% on RA.     patient is hemodynamically stable. Denies any HA, CP, SOB. No fevers, chills or shakes. Labs and vitals reviewed. continue on NC    Subjective: Mild improvement in energy; SOB improved. MOre of an appetite. Denies fever, chills, chest pain, nausea, vomiting.    Review of Systems: 14 Point review of systems reviewed and reported as negative unless otherwise stated in Above        Physical Exam:   PHYSICAL EXAM:    T(C): 36.2 (05-08-22 @ 09:59), Max: 36.7 (05-07-22 @ 19:44)  HR: 63 (05-08-22 @ 09:59) (63 - 67)  BP: 118/61 (05-08-22 @ 09:59) (118/61 - 124/48)  RR: 17 (05-08-22 @ 09:59) (17 - 17)  SpO2: 96% (05-08-22 @ 09:59) (95% - 96%)    General: AAOx3; NAD  Head: AT/NC  ENT: Moist Mucous Membranes; No Injury  Eyes: EOMI; PERRL  Neck: Non-tender; No JVD  CVS: RRR, S1&S2, No murmur, No edema  Respiratory: Lungs CTA B/L; Normal Respiratory Effort  Abdomen/GI: Soft, non-tender, non-distended, no guarding, no rebound, normal bowel sounds  Extremities: No cyanosis, No clubbing, No edema  MSK: No CVA tenderness, Normal ROM, No injury  Neuro: AAOx3, CNII-XII grossly intact, non-focal  Psych: Appropriate, Cooperative, No depression, No anxiety  Skin: Clean, Dry and Intact                               15.7   13.05 )-----------( 157      ( 08 May 2022 08:52 )             49.6     05-08    139  |  109<H>  |  29<H>  ----------------------------<  127<H>  4.4   |  23  |  0.79    Ca    8.8      08 May 2022 08:52  Phos  2.4     05-08  Mg     2.2     05-08    TPro  6.1  /  Alb  2.6<L>  /  TBili  0.5  /  DBili  x   /  AST  22  /  ALT  19  /  AlkPhos  92  05-08    COVID-19 PCR: Detected (05 May 2022 21:23)  COVID-19 PCR: NotDetec (26 Jan 2022 06:08)  SARS-CoV-2: NotDetec (20 Jan 2022 12:30)  COVID-19 PCR: NotDetec (19 Jan 2022 17:37)  COVID-19 PCR: NotDetec (17 Nov 2021 11:02)    CAPILLARY BLOOD GLUCOSE                           15.5   6.38  )-----------( 141      ( 07 May 2022 07:32 )             49.7     05-07    136  |  106  |  23  ----------------------------<  139<H>  4.7   |  24  |  0.92    Ca    8.9      07 May 2022 07:32    TPro  6.1  /  Alb  2.6<L>  /  TBili  0.4  /  DBili  0.1  /  AST  21  /  ALT  18  /  AlkPhos  91  05-07    COVID-19 PCR: Detected (05 May 2022 21:23)  COVID-19 PCR: NotDetec (26 Jan 2022 06:08)  SARS-CoV-2: NotDetec (20 Jan 2022 12:30)  COVID-19 PCR: NotDetec (19 Jan 2022 17:37)  COVID-19 PCR: NotDetec (17 Nov 2021 11:02)    CAPILLARY BLOOD GLUCO        Procalcitonin, Serum: 0.06: NSerum Pro-Brain Natriuretic Peptide: 575 pg/mL (05.05.22 @ 20:16) D-Dimer Assay, Quantitative: 1080:C-Reactive Protein, Serum: 20 mg/L (05.05.22 @ 20:16)      < from: CT Angio Chest PE Protocol w/ IV Cont (05.05.22 @ 22:47) >  IMPRESSION:  Bilateral upper lobe segmental and subsegmentalpulmonary emboli. Stable   dilated pulmonary arteries which may be on the basis of pulmonary artery   hypertension.  No evidence for right heart strain.    Findings were discussed with Dr. Davalos 5/6/2022 12:35 AM by Dr. Justin with read back confirmation.    < end of copied text >  
Date of service: 05-08-22 @ 11:00    Sitting in bed in NAD  Has dry cough  SOB with light exercise    ROS: no fever or chills; denies dizziness, no HA, no abdominal pain, no diarrhea or constipation; no dysuria, no legs pain, no rashes    MEDICATIONS  (STANDING):  aspirin enteric coated 81 milliGRAM(s) Oral daily  atorvastatin 20 milliGRAM(s) Oral at bedtime  budesonide  80 MICROgram(s)/formoterol 4.5 MICROgram(s) Inhaler 2 Puff(s) Inhalation two times a day  cholecalciferol 1000 Unit(s) Oral daily  dexAMETHasone  Injectable 6 milliGRAM(s) IV Push daily  enalapril 10 milliGRAM(s) Oral two times a day  enoxaparin Injectable 70 milliGRAM(s) SubCutaneous every 12 hours  famotidine    Tablet 20 milliGRAM(s) Oral daily  latanoprost 0.005% Ophthalmic Solution 1 Drop(s) Both EYES at bedtime  metoprolol succinate ER 25 milliGRAM(s) Oral daily  remdesivir  IVPB   IV Intermittent   remdesivir  IVPB 100 milliGRAM(s) IV Intermittent every 24 hours  tamsulosin 0.4 milliGRAM(s) Oral at bedtime  tiotropium 18 MICROgram(s) Capsule 1 Capsule(s) Inhalation daily    Vital Signs Last 24 Hrs  T(C): 36.2 (08 May 2022 09:59), Max: 36.7 (07 May 2022 19:44)  T(F): 97.2 (08 May 2022 09:59), Max: 98 (07 May 2022 19:44)  HR: 63 (08 May 2022 09:59) (63 - 67)  BP: 118/61 (08 May 2022 09:59) (118/61 - 124/48)  BP(mean): --  RR: 17 (08 May 2022 09:59) (17 - 17)  SpO2: 96% (08 May 2022 09:59) (95% - 96%)     Physical exam:    Constitutional:  No acute distress  HEENT: NC/AT, EOMI, PERRLA, conjunctivae clear; ears and nose atraumatic  Neck: supple; thyroid not palpable  Back: no tenderness  Respiratory: respiratory effort normal; few crackles at bases  Cardiovascular: S1S2 regular, no murmurs  Abdomen: soft, not tender, not distended, positive BS  Genitourinary: no suprapubic tenderness  Lymphatic: no LN palpable  Musculoskeletal: no muscle tenderness, no joint swelling or tenderness  Extremities: no pedal edema  Neurological/ Psychiatric: AxOx3, moving all extremities  Skin: no rashes; no palpable lesions    Labs: reviewed                        15.7   13.05 )-----------( 157      ( 08 May 2022 08:52 )             49.6     05-08    139  |  109<H>  |  29<H>  ----------------------------<  127<H>  4.4   |  23  |  0.79    Ca    8.8      08 May 2022 08:52  Phos  2.4     05-08  Mg     2.2     05-08    TPro  6.1  /  Alb  2.6<L>  /  TBili  0.5  /  DBili  x   /  AST  22  /  ALT  19  /  AlkPhos  92  05-08    C-Reactive Protein, Serum: 20 mg/L (05-05-22 @ 20:16)  D-Dimer Assay, Quantitative: 1080 ng/mL DDU (05-05-22 @ 20:16)  Ferritin, Serum: 169 ng/mL (05-05-22 @ 20:16)                        16.0   6.31  )-----------( 134      ( 06 May 2022 07:14 )             50.2     05-06    138  |  103  |  13  ----------------------------<  130<H>  5.0   |  27  |  0.81    Ca    9.1      06 May 2022 07:14    TPro  6.4  /  Alb  2.9<L>  /  TBili  0.4  /  DBili  x   /  AST  17  /  ALT  18  /  AlkPhos  104  05-06     LIVER FUNCTIONS - ( 06 May 2022 07:14 )  Alb: 2.9 g/dL / Pro: 6.4 gm/dL / ALK PHOS: 104 U/L / ALT: 18 U/L / AST: 17 U/L / GGT: x           COVID-19 PCR: Detected (05-05-22 @ 21:23)    Radiology: all available radiological tests reviewed    < from: CT Angio Chest PE Protocol w/ IV Cont (05.05.22 @ 22:47) >  Bilateral upper lobe segmental and subsegmentalpulmonary emboli. Stable   dilated pulmonary arteries which may be on the basis of pulmonary artery hypertension.  No evidence for right heart strain.  < end of copied text >    Advanced directives addressed: full resuscitation
Date of service: 05-09-22 @ 12:34    Sitting in bed in NAD  SOB with light exercise  Has dry cough    ROS: no fever or chills; denies dizziness, no HA, no abdominal pain, no diarrhea or constipation; no dysuria, no legs pain, no rashes    MEDICATIONS  (STANDING):  aspirin enteric coated 81 milliGRAM(s) Oral daily  atorvastatin 20 milliGRAM(s) Oral at bedtime  budesonide  80 MICROgram(s)/formoterol 4.5 MICROgram(s) Inhaler 2 Puff(s) Inhalation two times a day  chlorhexidine 4% Liquid 1 Application(s) Topical <User Schedule>  cholecalciferol 1000 Unit(s) Oral daily  dexAMETHasone  Injectable 6 milliGRAM(s) IV Push daily  enalapril 10 milliGRAM(s) Oral two times a day  enoxaparin Injectable 70 milliGRAM(s) SubCutaneous every 12 hours  famotidine    Tablet 20 milliGRAM(s) Oral daily  latanoprost 0.005% Ophthalmic Solution 1 Drop(s) Both EYES at bedtime  metoprolol succinate ER 25 milliGRAM(s) Oral daily  potassium phosphate / sodium phosphate Powder (PHOS-NaK) 1 Packet(s) Oral three times a day with meals  remdesivir  IVPB   IV Intermittent   remdesivir  IVPB 100 milliGRAM(s) IV Intermittent every 24 hours  tamsulosin 0.4 milliGRAM(s) Oral at bedtime  tiotropium 18 MICROgram(s) Capsule 1 Capsule(s) Inhalation daily    Vital Signs Last 24 Hrs  T(C): 36.1 (09 May 2022 08:50), Max: 36.6 (08 May 2022 20:05)  T(F): 97 (09 May 2022 08:50), Max: 97.9 (08 May 2022 20:05)  HR: 59 (09 May 2022 08:50) (59 - 67)  BP: 129/61 (09 May 2022 08:50) (104/52 - 129/61)  BP(mean): 65 (08 May 2022 20:05) (65 - 65)  RR: 18 (09 May 2022 08:50) (18 - 18)  SpO2: 99% (09 May 2022 08:50) (96% - 99%)     Physical exam:    Constitutional:  No acute distress  HEENT: NC/AT, EOMI, PERRLA, conjunctivae clear; ears and nose atraumatic  Neck: supple; thyroid not palpable  Back: no tenderness  Respiratory: respiratory effort normal; few crackles at bases  Cardiovascular: S1S2 regular, no murmurs  Abdomen: soft, not tender, not distended, positive BS  Genitourinary: no suprapubic tenderness  Lymphatic: no LN palpable  Musculoskeletal: no muscle tenderness, no joint swelling or tenderness  Extremities: no pedal edema  Neurological/ Psychiatric: Alert, moving all extremities  Skin: no rashes; no palpable lesions    Labs: reviewed                        14.7   8.65  )-----------( 149      ( 09 May 2022 07:07 )             46.7     05-09    139  |  109<H>  |  24<H>  ----------------------------<  110<H>  4.0   |  25  |  0.62    Ca    8.3<L>      09 May 2022 07:07  Phos  2.6     05-09  Mg     2.2     05-09    TPro  5.4<L>  /  Alb  2.4<L>  /  TBili  0.4  /  DBili  x   /  AST  17  /  ALT  16  /  AlkPhos  77  05-09    C-Reactive Protein, Serum: 20 mg/L (05-05-22 @ 20:16)  D-Dimer Assay, Quantitative: 1080 ng/mL DDU (05-05-22 @ 20:16)  Ferritin, Serum: 169 ng/mL (05-05-22 @ 20:16)                        15.7   13.05 )-----------( 157      ( 08 May 2022 08:52 )             49.6     05-08    139  |  109<H>  |  29<H>  ----------------------------<  127<H>  4.4   |  23  |  0.79    Ca    8.8      08 May 2022 08:52  Phos  2.4     05-08  Mg     2.2     05-08    TPro  6.1  /  Alb  2.6<L>  /  TBili  0.5  /  DBili  x   /  AST  22  /  ALT  19  /  AlkPhos  92  05-08    C-Reactive Protein, Serum: 20 mg/L (05-05-22 @ 20:16)  D-Dimer Assay, Quantitative: 1080 ng/mL DDU (05-05-22 @ 20:16)  Ferritin, Serum: 169 ng/mL (05-05-22 @ 20:16)                        16.0   6.31  )-----------( 134      ( 06 May 2022 07:14 )             50.2     05-06    138  |  103  |  13  ----------------------------<  130<H>  5.0   |  27  |  0.81    Ca    9.1      06 May 2022 07:14    TPro  6.4  /  Alb  2.9<L>  /  TBili  0.4  /  DBili  x   /  AST  17  /  ALT  18  /  AlkPhos  104  05-06     LIVER FUNCTIONS - ( 06 May 2022 07:14 )  Alb: 2.9 g/dL / Pro: 6.4 gm/dL / ALK PHOS: 104 U/L / ALT: 18 U/L / AST: 17 U/L / GGT: x           COVID-19 PCR: Detected (05-05-22 @ 21:23)    Radiology: all available radiological tests reviewed    < from: CT Angio Chest PE Protocol w/ IV Cont (05.05.22 @ 22:47) >  Bilateral upper lobe segmental and subsegmentalpulmonary emboli. Stable   dilated pulmonary arteries which may be on the basis of pulmonary artery hypertension.  No evidence for right heart strain.  < end of copied text >    Advanced directives addressed: full resuscitation
Reason for Admission: Acute hypoxic respiratory failure secondary to COVID 19 viral PNA  History of Present Illness:   Pt is a 88 yo male with a pmh/o HTN, COPD (not on home O2), CAD, HLD, glaucoma, who presented to ED today due to increasing weakness, fatigue, worsening cough and shortness of breath. Pt states his family tested positive for COVID and he went to  on Saturday where he tested negative and was started on doxy and amoxicillin, later changed to levaquin by home visiting provider for +PNA on CXR. Home provider noted pulse ox to be 90% at time. Today patient states he had a very loose BM x 1 and nausea, along with his weakness, fatigue and cough. States he is so weak it is difficult for him to walk (uses walker at baseline). Wife in ED expressed that she is fearful to take him home as she is also COVID+ and recovering herself and cannot care for him.     ED course: Pt arrived to ED. Given 500cc IVF bolus, tylenol, and returned COVID+. Admitted to medicine. On admission, CTA performed and + for bilateral PE, dexamethasone/lovenox BID started and supplemental O2 given for hypoxia to 86% when exerting self to be weighed for AC. When lying in bed and talking hypoxic to 89%, otherwise maintaining around 94% on RA.     patient is hemodynamically stable. Denies any HA, CP, SOB. No fevers, chills or shakes. Labs and vitals reviewed. continue on NC    REVIEW OF SYSTEMS:  General: NAD, hemodynamically stable   HEENT:  Eyes:  No visual loss   SKIN:  No rash or itching.  CARDIOVASCULAR:  No chest pain   RESPIRATORY:  No shortness of breath   GASTROINTESTINAL:  No anorexia, nausea, vomiting or diarrhea. No abdominal pain or blood.  NEUROLOGICAL:  No headache, dizziness, syncope, paralysis, ataxia, numbness or tingling in the extremities. No change in bowel or bladder control.  MUSCULOSKELETAL:  No muscle, back pain, joint pain or stiffness.  HEMATOLOGIC:  No anemia, bleeding or bruising.  LYMPHATICS:  No enlarged nodes. No history of splenectomy.  ENDOCRINOLOGIC:  No reports of sweating, cold or heat intolerance. No polyuria or polydipsia.  ALLERGIES:  No history of asthma, hives, eczema or rhinitis.    Physical Exam:   GENERAL APPEARANCE:  NAD, hemodynamically stable  T(C): 36.4 (05-06-22 @ 11:24), Max: 37 (05-05-22 @ 19:22)  HR: 68 (05-06-22 @ 11:24) (56 - 75)  BP: 120/59 (05-06-22 @ 11:24) (110/59 - 149/74)  RR: 15 (05-06-22 @ 11:24) (13 - 20)  SpO2: 98% (05-06-22 @ 11:24) (89% - 98%)  HEENT:  Head is normocephalic    Skin:  Warm and dry without any rash   NECK:  Supple without lymphadenopathy.   HEART:  Regular rate and rhythm. normal S1 and S2, No M/R/G  LUNGS:  Good ins/exp effort, no W/R/R/C  ABDOMEN:  Soft, nontender, nondistended with good bowel sounds heard  EXTREMITIES:  Without cyanosis, clubbing or edema.   NEUROLOGICAL:  Gross nonfocal       # Acute hypoxic respiratory failure  # Viral Pneumonia secondary to Novel Coronavirus Infection  # COPD exacerbation  # Pulmonary embolism / ? pulmonary HTN  - Continue with O2    - Obtain daily room air O2 saturations once O2 requirements stabilize.  - IV remdesivir /  dexamethasone  - HFA albuterol Q6 hour PRN via [facility]. Would avoid nebulized preparations to limit risk of aerosol formation.  - tessalon pearls & robutussin DM prn  - c/w supplemental O2  - c/w budesonide and tiotropium as therapeutic interchange for home trellegy inhaler    # Acute bilateral pulmonary emboli  - true weight obtained  - lovenox 1mg/kg bid for full AC with plan to bridge to oral AC  - supplemental O2 as above  - CTA w/o evidence of right heart strain  - troponin wnl  - BnP only mildly elevated    # Hyperglycemia  - non fasting  - minimally elevated however as dexamethasone started, will need to monitor closely on serum chemistry and start accuchecks/AISS/carb restriction pending trend and AM result    #Protein calorie malnutrition  -f/u prealbumin  -nutrition consult    #BPH  -c/w flomax       
CC: SOB  History of Present Illness:   Pt is a 86 yo male with a pmh/o HTN, COPD (not on home O2), CAD, HLD, glaucoma, who presented to ED today due to increasing weakness, fatigue, worsening cough and shortness of breath. Pt states his family tested positive for COVID and he went to  on Saturday where he tested negative and was started on doxy and amoxicillin, later changed to levaquin by home visiting provider for +PNA on CXR. Home provider noted pulse ox to be 90% at time. Today patient states he had a very loose BM x 1 and nausea, along with his weakness, fatigue and cough. States he is so weak it is difficult for him to walk (uses walker at baseline). Wife in ED expressed that she is fearful to take him home as she is also COVID+ and recovering herself and cannot care for him.  ED course: Pt arrived to ED. Given 500cc IVF bolus, tylenol, and returned COVID+. Admitted to medicine. On admission, CTA performed and + for bilateral PE, dexamethasone/lovenox BID started and supplemental O2 given for hypoxia to 86% when exerting self to be weighed for AC. When lying in bed and talking hypoxic to 89%, otherwise maintaining around 94% on RA.  patient is hemodynamically stable. Denies any HA, CP, SOB. No fevers, chills or shakes. Labs and vitals reviewed. continue on NC      5/10: Feeling better, satting well on room air.  Denies fever, chills, N, V, abd pain, CP, SOB.    REVIEW OF SYSTEMS: All other review of systems is negative unless indicated above.    Vital Signs Last 24 Hrs  T(C): 37 (10 May 2022 08:19), Max: 37 (10 May 2022 08:19)  T(F): 98.6 (10 May 2022 08:19), Max: 98.6 (10 May 2022 08:19)  HR: 72 (10 May 2022 08:38) (68 - 73)  BP: 139/56 (10 May 2022 08:19) (105/56 - 139/56)  BP(mean): --  RR: 18 (10 May 2022 08:19) (18 - 18)  SpO2: 94% (10 May 2022 08:19) (94% - 97%)    PHYSICAL EXAM:    Constitutional: NAD, awake and alert, well-developed  HEENT: PERR, EOMI, Normal Hearing, MMM  Neck: Soft and supple  Respiratory: Breath sounds are clear bilaterally, No wheezing, rales or rhonchi  Cardiovascular: S1 and S2, regular rate and rhythm, no Murmurs, gallops or rubs  Gastrointestinal: Bowel Sounds present, soft, nontender, nondistended, no guarding, no rebound  Extremities: No peripheral edema  Neurological: A/O x 3, no focal deficits in my limited exam      MEDICATIONS  (STANDING):  aspirin enteric coated 81 milliGRAM(s) Oral daily  atorvastatin 20 milliGRAM(s) Oral at bedtime  budesonide  80 MICROgram(s)/formoterol 4.5 MICROgram(s) Inhaler 2 Puff(s) Inhalation two times a day  chlorhexidine 4% Liquid 1 Application(s) Topical <User Schedule>  cholecalciferol 1000 Unit(s) Oral daily  dexAMETHasone  Injectable 6 milliGRAM(s) IV Push daily  enalapril 10 milliGRAM(s) Oral two times a day  enoxaparin Injectable 70 milliGRAM(s) SubCutaneous every 12 hours  famotidine    Tablet 20 milliGRAM(s) Oral daily  latanoprost 0.005% Ophthalmic Solution 1 Drop(s) Both EYES at bedtime  metoprolol succinate ER 25 milliGRAM(s) Oral daily  tamsulosin 0.4 milliGRAM(s) Oral at bedtime  tiotropium 18 MICROgram(s) Capsule 1 Capsule(s) Inhalation daily    MEDICATIONS  (PRN):  acetaminophen     Tablet .. 650 milliGRAM(s) Oral every 6 hours PRN Temp greater or equal to 38.5C (101.3F), Mild Pain (1 - 3)  ALBUTerol    90 MICROgram(s) HFA Inhaler 2 Puff(s) Inhalation every 4 hours PRN Shortness of Breath and/or Wheezing  benzonatate 100 milliGRAM(s) Oral three times a day PRN Cough  calcium carbonate    500 mG (Tums) Chewable 2 Tablet(s) Chew four times a day PRN Heartburn  guaifenesin/dextromethorphan Oral Liquid 10 milliLiter(s) Oral every 4 hours PRN Cough  melatonin 3 milliGRAM(s) Oral at bedtime PRN Insomnia                                14.7   8.65  )-----------( 149      ( 09 May 2022 07:07 )             46.7     05-09    139  |  109<H>  |  24<H>  ----------------------------<  110<H>  4.0   |  25  |  0.62    Ca    8.3<L>      09 May 2022 07:07  Phos  2.6     05-09  Mg     2.2     05-09    TPro  5.4<L>  /  Alb  2.4<L>  /  TBili  0.4  /  DBili  x   /  AST  17  /  ALT  16  /  AlkPhos  77  05-09    CAPILLARY BLOOD GLUCOSE        LIVER FUNCTIONS - ( 09 May 2022 07:07 )  Alb: 2.4 g/dL / Pro: 5.4 gm/dL / ALK PHOS: 77 U/L / ALT: 16 U/L / AST: 17 U/L / GGT: x                 A/P:    # Acute hypoxic respiratory failure due to Acute COVID19 pneumonia and acute bilateral PE / COPD exacerbation:  - Satting 94% on room air, continue to monitor  - IV remdesivir /  dexamethasone day 5  - supportive care  - c/w budesonide and tiotropium as therapeutic interchange for home trellegy inhaler  - lovenox 1mg/kg bid for full AC with plan to bridge to oral AC per anticoag services recommendations  - echo and CTA w/o evidence of right heart strain    #Severe Protein calorie malnutrition  - nutrition following    #BPH  -c/w flomax        Disposition:   -d/c tomorrow, PT recommends MUNIR      Nutritional Assessment:  · Nutritional Assessment	This patient has been assessed with a concern for Malnutrition and has been determined to have a diagnosis/diagnoses of Severe protein-calorie malnutrition.    This patient is being managed with:   Diet Regular-  Entered: May  7 2022 12:52PM    Diet Regular-  DASH/TLC {Sodium & Cholesterol Restricted} (DASH)  Entered: May  6 2022 12:59AM    The following pending diet order is being considered for treatment of Severe protein-calorie malnutrition:null    Last Updated: 09-May-2022 18:00 by Clarke Nagy)
Date of service: 05-07-22 @ 11:39    Sitting in bed in NAD  Has dry cough  On O2 therapy  SOB with light exercise    ROS: no fever or chills; denies dizziness, no HA, no abdominal pain, no diarrhea or constipation; no dysuria, no legs pain, no rashes    MEDICATIONS  (STANDING):  aspirin enteric coated 81 milliGRAM(s) Oral daily  atorvastatin 20 milliGRAM(s) Oral at bedtime  budesonide  80 MICROgram(s)/formoterol 4.5 MICROgram(s) Inhaler 2 Puff(s) Inhalation two times a day  cholecalciferol 1000 Unit(s) Oral daily  dexAMETHasone  Injectable 6 milliGRAM(s) IV Push daily  enalapril 10 milliGRAM(s) Oral two times a day  enoxaparin Injectable 70 milliGRAM(s) SubCutaneous every 12 hours  famotidine    Tablet 20 milliGRAM(s) Oral daily  latanoprost 0.005% Ophthalmic Solution 1 Drop(s) Both EYES at bedtime  metoprolol succinate ER 25 milliGRAM(s) Oral daily  remdesivir  IVPB   IV Intermittent   remdesivir  IVPB 100 milliGRAM(s) IV Intermittent every 24 hours  tamsulosin 0.4 milliGRAM(s) Oral at bedtime  tiotropium 18 MICROgram(s) Capsule 1 Capsule(s) Inhalation daily    Vital Signs Last 24 Hrs  T(C): 36.2 (07 May 2022 08:40), Max: 36.3 (06 May 2022 21:14)  T(F): 97.1 (07 May 2022 08:40), Max: 97.4 (06 May 2022 21:14)  HR: 55 (07 May 2022 08:40) (55 - 70)  BP: 121/55 (07 May 2022 08:40) (113/51 - 121/55)  BP(mean): --  RR: 17 (07 May 2022 08:40) (17 - 17)  SpO2: 97% (07 May 2022 08:40) (96% - 98%)     Physical exam:    Constitutional:  No acute distress  HEENT: NC/AT, EOMI, PERRLA, conjunctivae clear; ears and nose atraumatic  Neck: supple; thyroid not palpable  Back: no tenderness  Respiratory: respiratory effort normal; crackles at bases  Cardiovascular: S1S2 regular, no murmurs  Abdomen: soft, not tender, not distended, positive BS  Genitourinary: no suprapubic tenderness  Lymphatic: no LN palpable  Musculoskeletal: no muscle tenderness, no joint swelling or tenderness  Extremities: no pedal edema  Neurological/ Psychiatric: AxOx3, moving all extremities  Skin: no rashes; no palpable lesions    Labs: all available labs reviewed                        16.0   6.31  )-----------( 134      ( 06 May 2022 07:14 )             50.2     05-06    138  |  103  |  13  ----------------------------<  130<H>  5.0   |  27  |  0.81    Ca    9.1      06 May 2022 07:14    TPro  6.4  /  Alb  2.9<L>  /  TBili  0.4  /  DBili  x   /  AST  17  /  ALT  18  /  AlkPhos  104  05-06     LIVER FUNCTIONS - ( 06 May 2022 07:14 )  Alb: 2.9 g/dL / Pro: 6.4 gm/dL / ALK PHOS: 104 U/L / ALT: 18 U/L / AST: 17 U/L / GGT: x           COVID-19 PCR: Detected (05-05-22 @ 21:23)    Radiology: all available radiological tests reviewed    < from: CT Angio Chest PE Protocol w/ IV Cont (05.05.22 @ 22:47) >  Bilateral upper lobe segmental and subsegmentalpulmonary emboli. Stable   dilated pulmonary arteries which may be on the basis of pulmonary artery hypertension.  No evidence for right heart strain.  < end of copied text >    Advanced directives addressed: full resuscitation
Date of service: 05-10-22 @ 11:33    Lying in bed in NAD  Has SOB with light exercise  Has dry cough    ROS: no fever or chills; denies dizziness, no HA, no abdominal pain, no diarrhea or constipation; no dysuria, no legs pain, no rashes    MEDICATIONS  (STANDING):  aspirin enteric coated 81 milliGRAM(s) Oral daily  atorvastatin 20 milliGRAM(s) Oral at bedtime  budesonide  80 MICROgram(s)/formoterol 4.5 MICROgram(s) Inhaler 2 Puff(s) Inhalation two times a day  chlorhexidine 4% Liquid 1 Application(s) Topical <User Schedule>  cholecalciferol 1000 Unit(s) Oral daily  dexAMETHasone  Injectable 6 milliGRAM(s) IV Push daily  enalapril 10 milliGRAM(s) Oral two times a day  enoxaparin Injectable 70 milliGRAM(s) SubCutaneous every 12 hours  famotidine    Tablet 20 milliGRAM(s) Oral daily  latanoprost 0.005% Ophthalmic Solution 1 Drop(s) Both EYES at bedtime  metoprolol succinate ER 25 milliGRAM(s) Oral daily  tamsulosin 0.4 milliGRAM(s) Oral at bedtime  tiotropium 18 MICROgram(s) Capsule 1 Capsule(s) Inhalation daily    Vital Signs Last 24 Hrs  T(C): 37 (10 May 2022 08:19), Max: 37 (10 May 2022 08:19)  T(F): 98.6 (10 May 2022 08:19), Max: 98.6 (10 May 2022 08:19)  HR: 72 (10 May 2022 08:38) (68 - 73)  BP: 139/56 (10 May 2022 08:19) (105/56 - 139/56)  BP(mean): --  RR: 18 (10 May 2022 08:19) (18 - 18)  SpO2: 94% (10 May 2022 08:19) (94% - 97%)     Physical exam:    Constitutional:  No acute distress  HEENT: NC/AT, EOMI, PERRLA, conjunctivae clear; ears and nose atraumatic  Neck: supple; thyroid not palpable  Back: no tenderness  Respiratory: respiratory effort normal; few crackles at bases  Cardiovascular: S1S2 regular, no murmurs  Abdomen: soft, not tender, not distended, positive BS  Genitourinary: no suprapubic tenderness  Lymphatic: no LN palpable  Musculoskeletal: no muscle tenderness, no joint swelling or tenderness  Extremities: no pedal edema  Neurological/ Psychiatric: Alert, moving all extremities  Skin: no rashes; no palpable lesions    Labs: reviewed                        14.7   8.65  )-----------( 149      ( 09 May 2022 07:07 )             46.7     05-09    139  |  109<H>  |  24<H>  ----------------------------<  110<H>  4.0   |  25  |  0.62    Ca    8.3<L>      09 May 2022 07:07  Phos  2.6     05-09  Mg     2.2     05-09    TPro  5.4<L>  /  Alb  2.4<L>  /  TBili  0.4  /  DBili  x   /  AST  17  /  ALT  16  /  AlkPhos  77  05-09    C-Reactive Protein, Serum: 20 mg/L (05-05-22 @ 20:16)  D-Dimer Assay, Quantitative: 1080 ng/mL DDU (05-05-22 @ 20:16)  Ferritin, Serum: 169 ng/mL (05-05-22 @ 20:16)                        15.7   13.05 )-----------( 157      ( 08 May 2022 08:52 )             49.6     05-08    139  |  109<H>  |  29<H>  ----------------------------<  127<H>  4.4   |  23  |  0.79    Ca    8.8      08 May 2022 08:52  Phos  2.4     05-08  Mg     2.2     05-08    TPro  6.1  /  Alb  2.6<L>  /  TBili  0.5  /  DBili  x   /  AST  22  /  ALT  19  /  AlkPhos  92  05-08    C-Reactive Protein, Serum: 20 mg/L (05-05-22 @ 20:16)  D-Dimer Assay, Quantitative: 1080 ng/mL DDU (05-05-22 @ 20:16)  Ferritin, Serum: 169 ng/mL (05-05-22 @ 20:16)                        16.0   6.31  )-----------( 134      ( 06 May 2022 07:14 )             50.2     05-06    138  |  103  |  13  ----------------------------<  130<H>  5.0   |  27  |  0.81    Ca    9.1      06 May 2022 07:14    TPro  6.4  /  Alb  2.9<L>  /  TBili  0.4  /  DBili  x   /  AST  17  /  ALT  18  /  AlkPhos  104  05-06     LIVER FUNCTIONS - ( 06 May 2022 07:14 )  Alb: 2.9 g/dL / Pro: 6.4 gm/dL / ALK PHOS: 104 U/L / ALT: 18 U/L / AST: 17 U/L / GGT: x           COVID-19 PCR: Detected (05-05-22 @ 21:23)    Radiology: all available radiological tests reviewed    < from: CT Angio Chest PE Protocol w/ IV Cont (05.05.22 @ 22:47) >  Bilateral upper lobe segmental and subsegmentalpulmonary emboli. Stable   dilated pulmonary arteries which may be on the basis of pulmonary artery hypertension.  No evidence for right heart strain.  < end of copied text >    Advanced directives addressed: full resuscitation

## 2022-05-12 NOTE — CONSULT NOTE ADULT - SUBJECTIVE AND OBJECTIVE BOX
CHIEF COMPLAINT: Patient is a 87y old  Male who presents with a chief complaint of Acute hypoxic respiratory failure secondary to COVID 19 viral PNA (11 May 2022 12:34)      HPI:  Pt is a 86 yo male with a pmh/o HTN, COPD (not on home O2), CAD, HLD, glaucoma, who presented to ED today due to increasing weakness, fatigue, worsening cough and shortness of breath. Pt states his family tested positive for COVID and he went to  on Saturday where he tested negative and was started on doxy and amoxicillin, later changed to levaquin by home visiting provider for +PNA on CXR. Home provider noted pulse ox to be 90% at time. Today patient states he had a very loose BM x 1 and nausea, along with his weakness, fatigue and cough. States he is so weak it is difficult for him to walk (uses walker at baseline). Wife in ED expressed that she is fearful to take him home as she is also COVID+ and recovering herself and cannot care for him.     ED course: Pt arrived to ED. Given 500cc IVF bolus, tylenol, and returned COVID+. Admitted to medicine. On admission, CTA performed and + for bilateral PE, dexamethasone/lovenox BID started and supplemental O2 given for hypoxia to 86% when exerting self to be weighed for AC. When lying in bed and talking hypoxic to 89%, otherwise maintaining around 94% on RA.     Pt endorses HA, fatigue, malaise, productive cough, subjective fever, chills, nausea, loose bowel movements, shortness of breath.     Denies chest pain, palpitations, leg swelling, calf pain, abd pain, dysuria, melena, hematochezia, rash, vertigo, paresthesias, loss of motor function, change in speech/vision/hearing.  (06 May 2022 01:08)      Finding of PE-- started on lovenox--> eliquis     PMHx: PAST MEDICAL & SURGICAL HISTORY:  HTN (hypertension)      Hyperlipidemia      COPD (chronic obstructive pulmonary disease)      Glaucoma of both eyes, unspecified glaucoma      CAD (coronary artery disease)      BPH (benign prostatic hyperplasia)      Glaucoma      Swelling of ankle  b/l      Shoulder pain, right      Humerus fracture  proximal, right      Hard of hearing      H/O Clostridium difficile infection  s/p left THR      H/O bilateral inguinal hernia repair  2016      History of tonsillectomy      Status post coronary artery stent placement  reports stents x 4?      History of total hip replacement, left  2011      H/O colonoscopy      S/P cataract surgery  b/l            Soc Hx:  No toxic habits       Allergies: Allergies    No Known Allergies    Intolerances          Vital Signs Last 24 Hrs  T(C): 37.1 (11 May 2022 21:38), Max: 37.1 (11 May 2022 21:38)  T(F): 98.8 (11 May 2022 21:38), Max: 98.8 (11 May 2022 21:38)  HR: 64 (11 May 2022 21:38) (63 - 72)  BP: 110/58 (11 May 2022 21:38) (110/58 - 131/60)  BP(mean): --  RR: 17 (11 May 2022 21:38) (17 - 18)  SpO2: 94% (11 May 2022 21:38) (94% - 96%)    I&O's Summary          PHYSICAL EXAM:   Constitutional: NAD,  Respiratory: Breath sounds are clear bilaterally, No wheezing, rales or rhonchi  Cardiovascular: S1 and S2, regular rate and rhythm,  Extremities: No peripheral edema  Vascular: 2+ peripheral pulses  Neurological: Alert    MEDICATIONS:  MEDICATIONS  (STANDING):  apixaban 10 milliGRAM(s) Oral two times a day  aspirin enteric coated 81 milliGRAM(s) Oral daily  atorvastatin 20 milliGRAM(s) Oral at bedtime  budesonide  80 MICROgram(s)/formoterol 4.5 MICROgram(s) Inhaler 2 Puff(s) Inhalation two times a day  chlorhexidine 4% Liquid 1 Application(s) Topical <User Schedule>  cholecalciferol 1000 Unit(s) Oral daily  dexAMETHasone  Injectable 6 milliGRAM(s) IV Push daily  enalapril 10 milliGRAM(s) Oral two times a day  famotidine    Tablet 20 milliGRAM(s) Oral daily  latanoprost 0.005% Ophthalmic Solution 1 Drop(s) Both EYES at bedtime  metoprolol succinate ER 25 milliGRAM(s) Oral daily  tamsulosin 0.4 milliGRAM(s) Oral at bedtime  tiotropium 18 MICROgram(s) Capsule 1 Capsule(s) Inhalation daily          RADIOLOGY:    EKG:  < from: 12 Lead ECG (05.05.22 @ 20:12) >    Diagnosis Line Sinus rhythm with Premature atrial complexes  Right bundle branch block  Left anterior fascicular block  *** Bifascicular block ***  Minimal voltage criteria for LVH, may be normal variant ( R in aVL )  Abnormal ECG  When compared with ECG of 20-JAN-2022 07:39,  Premature atrial complexesare now Present  Confirmed by MD SCHROEDER MARK ([id number]) on 5/6/2022 8:17:09 AM    < end of copied text >  < from: 12 Lead ECG (05.05.22 @ 20:12) >    Diagnosis Line Sinus rhythm with Premature atrial complexes  Right bundle branch block  Left anterior fascicular block  *** Bifascicular block ***  Minimal voltage criteria for LVH, may be normal variant ( R in aVL )  Abnormal ECG  When compared with ECG of 20-JAN-2022 07:39,  Premature atrial complexesare now Present  Confirmed by MD SCHROEDER MARK ([id number]) on 5/6/2022 8:17:09 AM    < end of copied text >      ECHO:ec< from: TTE Echo Complete w/o Contrast w/ Doppler (05.09.22 @ 12:16) >     Summary     Limited study.   Estimated left ventricular ejection fraction is >70 %.   The left atrium appears normal.   Normal appearing right atrium.   Limited study to assess right heart pressures.   No pulmonary hypertension is noted.   Trace tricuspid valve regurgitation is present.   No pulmonary hypertension.   The IVC appears underdistended.     Signature     ----------------------------------------------------------------   Electronically signed by Tho Schroeder MD(Interpreting   physician) on 05/09/2022 04:50 PM   ----------------------------------------------------------------    < end of copied text >

## 2022-05-12 NOTE — PROGRESS NOTE ADULT - REASON FOR ADMISSION
Acute hypoxic respiratory failure secondary to COVID 19 viral PNA

## 2022-05-13 VITALS
HEART RATE: 61 BPM | TEMPERATURE: 98 F | DIASTOLIC BLOOD PRESSURE: 59 MMHG | SYSTOLIC BLOOD PRESSURE: 144 MMHG | OXYGEN SATURATION: 93 %

## 2022-05-13 RX ADMIN — CHLORHEXIDINE GLUCONATE 1 APPLICATION(S): 213 SOLUTION TOPICAL at 10:26

## 2022-05-13 RX ADMIN — FAMOTIDINE 20 MILLIGRAM(S): 10 INJECTION INTRAVENOUS at 10:23

## 2022-05-13 RX ADMIN — Medication 25 MILLIGRAM(S): at 10:22

## 2022-05-13 RX ADMIN — BUDESONIDE AND FORMOTEROL FUMARATE DIHYDRATE 2 PUFF(S): 160; 4.5 AEROSOL RESPIRATORY (INHALATION) at 09:58

## 2022-05-13 RX ADMIN — TIOTROPIUM BROMIDE 1 CAPSULE(S): 18 CAPSULE ORAL; RESPIRATORY (INHALATION) at 10:00

## 2022-05-13 RX ADMIN — APIXABAN 5 MILLIGRAM(S): 2.5 TABLET, FILM COATED ORAL at 10:23

## 2022-05-13 RX ADMIN — Medication 10 MILLIGRAM(S): at 10:23

## 2022-05-13 RX ADMIN — Medication 1000 UNIT(S): at 10:22

## 2022-05-13 RX ADMIN — Medication 81 MILLIGRAM(S): at 10:22

## 2022-05-17 ENCOUNTER — TRANSCRIPTION ENCOUNTER (OUTPATIENT)
Age: 87
End: 2022-05-17

## 2022-05-17 ENCOUNTER — NON-APPOINTMENT (OUTPATIENT)
Age: 87
End: 2022-05-17

## 2022-05-18 ENCOUNTER — TRANSCRIPTION ENCOUNTER (OUTPATIENT)
Age: 87
End: 2022-05-18

## 2022-05-19 ENCOUNTER — TRANSCRIPTION ENCOUNTER (OUTPATIENT)
Age: 87
End: 2022-05-19

## 2022-05-20 ENCOUNTER — TRANSCRIPTION ENCOUNTER (OUTPATIENT)
Age: 87
End: 2022-05-20

## 2022-05-22 DIAGNOSIS — J44.0 CHRONIC OBSTRUCTIVE PULMONARY DISEASE WITH (ACUTE) LOWER RESPIRATORY INFECTION: ICD-10-CM

## 2022-05-22 DIAGNOSIS — I26.99 OTHER PULMONARY EMBOLISM WITHOUT ACUTE COR PULMONALE: ICD-10-CM

## 2022-05-22 DIAGNOSIS — U07.1 COVID-19: ICD-10-CM

## 2022-05-22 DIAGNOSIS — N40.0 BENIGN PROSTATIC HYPERPLASIA WITHOUT LOWER URINARY TRACT SYMPTOMS: ICD-10-CM

## 2022-05-22 DIAGNOSIS — Z95.5 PRESENCE OF CORONARY ANGIOPLASTY IMPLANT AND GRAFT: ICD-10-CM

## 2022-05-22 DIAGNOSIS — H40.9 UNSPECIFIED GLAUCOMA: ICD-10-CM

## 2022-05-22 DIAGNOSIS — J44.1 CHRONIC OBSTRUCTIVE PULMONARY DISEASE WITH (ACUTE) EXACERBATION: ICD-10-CM

## 2022-05-22 DIAGNOSIS — Z96.642 PRESENCE OF LEFT ARTIFICIAL HIP JOINT: ICD-10-CM

## 2022-05-22 DIAGNOSIS — Z79.82 LONG TERM (CURRENT) USE OF ASPIRIN: ICD-10-CM

## 2022-05-22 DIAGNOSIS — Z79.51 LONG TERM (CURRENT) USE OF INHALED STEROIDS: ICD-10-CM

## 2022-05-22 DIAGNOSIS — E78.5 HYPERLIPIDEMIA, UNSPECIFIED: ICD-10-CM

## 2022-05-22 DIAGNOSIS — I25.10 ATHEROSCLEROTIC HEART DISEASE OF NATIVE CORONARY ARTERY WITHOUT ANGINA PECTORIS: ICD-10-CM

## 2022-05-22 DIAGNOSIS — I10 ESSENTIAL (PRIMARY) HYPERTENSION: ICD-10-CM

## 2022-05-22 DIAGNOSIS — J12.82 PNEUMONIA DUE TO CORONAVIRUS DISEASE 2019: ICD-10-CM

## 2022-05-22 DIAGNOSIS — E43 UNSPECIFIED SEVERE PROTEIN-CALORIE MALNUTRITION: ICD-10-CM

## 2022-05-22 DIAGNOSIS — R73.9 HYPERGLYCEMIA, UNSPECIFIED: ICD-10-CM

## 2022-05-22 DIAGNOSIS — J96.01 ACUTE RESPIRATORY FAILURE WITH HYPOXIA: ICD-10-CM

## 2022-05-25 ENCOUNTER — APPOINTMENT (OUTPATIENT)
Dept: HOME HEALTH SERVICES | Facility: HOME HEALTH | Age: 87
End: 2022-05-25
Payer: MEDICARE

## 2022-05-25 ENCOUNTER — APPOINTMENT (OUTPATIENT)
Dept: HOME HEALTH SERVICES | Facility: HOME HEALTH | Age: 87
End: 2022-05-25

## 2022-05-25 VITALS
SYSTOLIC BLOOD PRESSURE: 122 MMHG | OXYGEN SATURATION: 93 % | HEART RATE: 73 BPM | RESPIRATION RATE: 15 BRPM | TEMPERATURE: 98.6 F | DIASTOLIC BLOOD PRESSURE: 70 MMHG

## 2022-05-25 DIAGNOSIS — I26.99 OTHER PULMONARY EMBOLISM W/OUT ACUTE COR PULMONALE: ICD-10-CM

## 2022-05-25 DIAGNOSIS — S32.000A WEDGE COMPRESSION FRACTURE OF UNSPECIFIED LUMBAR VERTEBRA, INITIAL ENCOUNTER FOR CLOSED FRACTURE: ICD-10-CM

## 2022-05-25 DIAGNOSIS — J43.9 EMPHYSEMA, UNSPECIFIED: ICD-10-CM

## 2022-05-25 DIAGNOSIS — I27.20 PULMONARY HYPERTENSION, UNSPECIFIED: ICD-10-CM

## 2022-05-25 DIAGNOSIS — I73.9 PERIPHERAL VASCULAR DISEASE, UNSPECIFIED: ICD-10-CM

## 2022-05-25 DIAGNOSIS — I70.0 ATHEROSCLEROSIS OF AORTA: ICD-10-CM

## 2022-05-25 PROCEDURE — 99495 TRANSJ CARE MGMT MOD F2F 14D: CPT

## 2022-05-25 RX ORDER — PREDNISONE 10 MG/1
10 TABLET ORAL
Qty: 60 | Refills: 2 | Status: ACTIVE | COMMUNITY
Start: 2022-05-25 | End: 1900-01-01

## 2022-05-25 RX ORDER — LEVOFLOXACIN 750 MG/1
750 TABLET, FILM COATED ORAL DAILY
Qty: 5 | Refills: 1 | Status: DISCONTINUED | COMMUNITY
Start: 2022-05-04 | End: 2022-05-25

## 2022-05-25 RX ORDER — ALBUTEROL SULFATE 90 UG/1
108 (90 BASE) INHALANT RESPIRATORY (INHALATION) EVERY 6 HOURS
Qty: 3 | Refills: 2 | Status: ACTIVE | COMMUNITY
Start: 2022-05-25 | End: 1900-01-01

## 2022-05-25 RX ORDER — ASPIRIN 81 MG
81 TABLET, DELAYED RELEASE (ENTERIC COATED) ORAL
Refills: 0 | Status: DISCONTINUED | COMMUNITY
End: 2022-05-25

## 2022-05-25 NOTE — COUNSELING
[Normal Weight - ( BMI  <25 )] : normal weight - ( BMI  <25 ) [Mediterranean diet recommended] : Mediterranean diet recommended [Non - Smoker] : non-smoker [Use assistive device to avoid falls] : use assistive device to avoid falls [] : cervical cancer screening [Improve mobility] : improve mobility [Decrease stress] : decrease stress [Decrease hospital use] : decrease hospital use [Minimize unnecessary interventions] : minimize unnecessary interventions [Maintain functional ability] : maintain functional ability [Discussed disease trajectory with patient/caregiver] : discussed disease trajectory with patient/caregiver [Likely to achieve goals/desired outcomes] : likely to achieve goals/desired outcomes [Patient/Caregiver has ___ understanding of disease process] : patient/caregiver has [unfilled] understanding of disease process [Patient/Caregiver is unclear of wishes] : patient/caregiver is unclear of wishes [Advanced Directives discussed: ____] : Advanced directives discussed: [unfilled]

## 2022-05-25 NOTE — CHRONIC CARE ASSESSMENT
[PPS Score: ____] : Palliative Performance Scale (PPS) Score: [unfilled] [FAST Score: ____] : Functional Assessment Scale (FAST) Score: [unfilled] [Class I] : [employer] Association Class Output: Class I [Patient Non-adherent to care plan] : patient non-adherent to care plan [Resistant to using DME in place] : resistant to using DME in place [Less than 3 Times Per Week] : exercises less than 3 times per week [Strength Training] : strength training [General Adherence] : and is generally adherent

## 2022-05-31 PROBLEM — I27.20 MILD PULMONARY HYPERTENSION: Status: ACTIVE | Noted: 2022-02-27

## 2022-05-31 NOTE — REASON FOR VISIT
[Post Hospitalization] : a post hospitalization visit [Pre-Visit Preparation] : pre-visit preparation was done [Intercurrent Specialty/Sub-specialty Visits] : the patient has intercurrent specialty/sub-specialty visits

## 2022-05-31 NOTE — PHYSICAL EXAM
[No Acute Distress] : no acute distress [Normal Sclera/Conjunctiva] : normal sclera/conjunctiva [Normal Outer Ear/Nose] : the ears and nose were normal in appearance [Supple] : the neck was supple [Normal Rate] : heart rate was normal  [Regular Rhythm] : with a regular rhythm [Breast Exam Declined] : patient declined to have breast exam done [Non Tender] : non-tender [Soft] : abdomen soft [Kyphosis] :  kyphosis present [No Motor Deficits] : the motor exam was normal [Oriented x3] : oriented to person, place, and time [Normal Affect] : the affect was normal [Foot Ulcers] : no foot ulcers [de-identified] :    and cooperative   looking  stated age  prefers  recumbent position  due to back pain  [de-identified] : upper  dentures  [de-identified] : rhonchi and wheezes  [de-identified] :  trace edema   venous stasis  [de-identified] :  soft  reducible   umbilical hernia  [de-identified] : . [de-identified] : venous  stasis  hyperpigmentation  dry skin but no ulceration   long elongated  brittle nails

## 2022-05-31 NOTE — HISTORY OF PRESENT ILLNESS
[Patient] : patient [Spouse] : spouse [FreeTextEntry1] :  copd  weakness  compression  fractures  [FreeTextEntry2] :  Patient denies fever, cough, trouble breathing, rash, vomiting and diarrhea. Patient has not been in close contact with someone Covid positive.\par Mask ,gloves and eyewear used during visit  Total  face to  face time  is  30 minutes \par   patient received  Covid vaccine  with no adverse effects  still continues to take all precautions and  safety  measures \par \par patient is being seen today  after discharge home from  hospital \par was admitted on 05/06/2022 for [ PNA  and discharged home on 05/11/2022. Discharge medications were reconciled with the current medication list.\par all  available  blood work  and radiology reviewed   along with discharge summary \par Hospital Course: \par Reason for Admission	Acute hypoxic respiratory failure secondary to COVID 19 \par viral PNA  and pulmonary embolus  \par Pt is a 88 yo male with a pmh/o HTN, COPD (not on home O2), CAD, HLD, glaucoma,  who presented to ED today due to increasing weakness, fatigue, worsening cough  and shortness of breath. Pt states his family tested positive for COVID   COVID+. Admitted to medicine. On  admission, CTA performed and + for bilateral PE, dexamethasone/lovenox BID  started and supplemental O2 given for hypoxia to 86% when exerting self to be \par weighed for AC. When lying in bed and talking hypoxic to 89%, otherwise  maintaining around 94% on RA.  patient is hemodynamically stable. \par Pt admitted with acute hypoxemic respiratory failure due to acute covid19 PNA \par and acute bilateral PE likely related to COVID19.  Pt also with likely \par superimposed COPD exacerbation.  He was started on and completed 5 days of \par remdesivir and dexamethasone IV.  pt given inhalers, supportive care.  In terms \par of bilateral PE which was diagnosed on this admission, he was started on \par therapeutic lovenox and transitioned to eliquis.  He will continue eliquis as \par out patient for at least 3-6 months.  Pt HD stable, satting well on room air, \par afebrile and feeling much better. \par  # Acute hypoxic respiratory failure due to Acute COVID19 pneumonia and acute \par bilateral PE / COPD exacerbation: \par - Satting 94% on room air \par - s/p IV remdesivir /  dexamethasone 5 days \par - supportive care \par \par #Severe Protein calorie malnutrition \par - nutrition following \par \par #BPH \par -c/w flomax \par \par aspirin 81 mg oral delayed release tablet: 1 tab(s) orally once a day (at \par bedtime) \par atorvastatin 20 mg oral tablet: 1 tab(s) orally once a day (at bedtime) \par enalapril 10 mg oral tablet: 1 tab(s) orally 2 times a day \par famotidine 20 mg oral tablet: 1 tab(s) orally once a day \par latanoprost 0.005% ophthalmic solution: 1 drop(s) to each affected eye once a \par day (at bedtime) \par Mag-Ox 400 oral tablet: 1 tab(s) orally once a day \par melatonin 3 mg oral tablet: 1 tab(s) orally once a day (at bedtime), As needed, \par Insomnia \par metoprolol succinate 25 mg oral tablet, extended release: 1 tab(s) orally once \par a day \par tamsulosin 0.4 mg oral capsule: 1 cap(s) orally once a day (at bedtime) \par Trelegy Ellipta 100 mcg-62.5 mcg-25 mcg/inh inhalation powder: 1 puff(s) \par inhaled once a day \par Tylenol 325 mg oral tablet: 2 tab(s) orally every 4 hours, As Needed \par Vitamin D3 25 mcg (1000 intl units) oral tablet: 1 tab(s) orally once a day \par  today patient has  chronic  complaints   hip pain  already addressed      previously \par dysphagia none  but occasionally  coughs  with meals \par Weight some  30 pounds  does not like ensure  \par constipation every  3 days  uses  laxatives  \par incontinence  patient   has depends uses commode \par pressure/bed sores none \par Ambulates with  rolling  walker  has back  brace but does   not wear it consistently \par falls frequent  has PT \par sensory deficits  vision  compromised    due to glaucoma  \par pain  yes  back pain  had seen spinal  surgeon  and ortho in the past  advised on Tylenol \par Medication refills done and reconciliation  done \par \par

## 2022-06-09 ENCOUNTER — RX RENEWAL (OUTPATIENT)
Age: 87
End: 2022-06-09

## 2022-06-09 RX ORDER — APIXABAN 5 MG/1
5 TABLET, FILM COATED ORAL
Qty: 68 | Refills: 0 | Status: ACTIVE | COMMUNITY
Start: 2022-05-25 | End: 1900-01-01

## 2022-06-14 ENCOUNTER — APPOINTMENT (OUTPATIENT)
Dept: HOME HEALTH SERVICES | Facility: HOME HEALTH | Age: 87
End: 2022-06-14

## 2022-06-29 ENCOUNTER — APPOINTMENT (OUTPATIENT)
Dept: NEUROLOGY | Facility: CLINIC | Age: 87
End: 2022-06-29

## 2022-10-27 NOTE — DISCHARGE NOTE PROVIDER - NSTOBACCOUSAGEY/N_GEN_A_CS
No Cantharidin Counseling: Calcipotriene Counseling:  I discussed with the patient the risks of calcipotriene including but not limited to erythema, scaling, itching, and irritation.

## 2023-01-01 NOTE — ED ADULT NURSE NOTE - NS ED NURSE LEVEL OF CONSCIOUSNESS AFFECT
This note was copied from the mother's chart.  Lactation in to see patient. Baby at breast nursing in side lying. Patient feels she is confident in breastfeeding.  Does not need a pump for home. Has a Medela and an Taylor pump at home. Has used her Taylor with her previous child with good result. Wanting discharge home today.   Calm

## 2023-01-03 ENCOUNTER — NON-APPOINTMENT (OUTPATIENT)
Age: 88
End: 2023-01-03

## 2023-03-20 NOTE — DISCHARGE NOTE ADULT - ADDITIONAL INSTRUCTIONS
[FreeTextEntry6] : cough for 1 week. dx with MPV this weekend. tylenol persistent 100.4  though originally higher\par Breast feeding slightly less. .not taking solids. not herself\par new rash this morning   Follow up with primary physician in 1-2 weeks. Complete full course of antibiotics. Continue low residue diet. Return to hospital if you develop increased abdominal pain or swelling at sites, increased weakness or fatigue, fever >101.

## 2023-03-24 NOTE — PATIENT PROFILE ADULT - BRAND OF FIRST COVID-19 BOOSTER
----- Message from Sapna [address] sent at 3/24/2023  1:43 PM CDT -----  Who Called: Pt    What is the request in detail: Requesting call back to discuss left leg being swollen with liquid coming out, no pain. Pt wants something prescribed for the swelling and to reduce fluid.       [id number] - [place] - [address] AT [facility] [address]  [facility] [place] [address]  Phone: [phone] Fax: [phone]      Can the clinic reply by [facility]? No    Best Call Back Number: [phone]      Additional Information:    
Called and spoke with pt. Pt states she was on her feet all  day yesterday and her left leg is swollen and seeping. Pt states she has elevated it since yesterday evening and the swelling has went down by 70%. Pt is requesting a medication to help with the swelling. Informed pt that she would need to be seen in in order to get a medication. Informed pt that we do not have any appts available until next week and that Dr. Dubois advises that she go to an UC or ER to be evaluated. Pt states that she is in her pajamas resting on the sofa and she isnt going any where today. Pt states if the swelling progresses she will go to a UC or ER to be evaluated and she will call us on Monday for an appt. Verbalized understanding.  
[employer]

## 2023-03-30 ENCOUNTER — TRANSCRIPTION ENCOUNTER (OUTPATIENT)
Age: 88
End: 2023-03-30

## 2023-03-31 ENCOUNTER — INPATIENT (INPATIENT)
Facility: HOSPITAL | Age: 88
LOS: 9 days | Discharge: NURSING, REHAB OR LONG-TERM CARE | DRG: 551 | End: 2023-04-10
Attending: INTERNAL MEDICINE | Admitting: SURGERY
Payer: MEDICARE

## 2023-03-31 VITALS
OXYGEN SATURATION: 94 % | TEMPERATURE: 98 F | RESPIRATION RATE: 18 BRPM | DIASTOLIC BLOOD PRESSURE: 88 MMHG | HEART RATE: 72 BPM | WEIGHT: 160.06 LBS | SYSTOLIC BLOOD PRESSURE: 186 MMHG | HEIGHT: 72 IN

## 2023-03-31 DIAGNOSIS — H40.9 UNSPECIFIED GLAUCOMA: ICD-10-CM

## 2023-03-31 DIAGNOSIS — R40.2412 GLASGOW COMA SCALE SCORE 13-15, AT ARRIVAL TO EMERGENCY DEPARTMENT: ICD-10-CM

## 2023-03-31 DIAGNOSIS — E43 UNSPECIFIED SEVERE PROTEIN-CALORIE MALNUTRITION: ICD-10-CM

## 2023-03-31 DIAGNOSIS — E78.5 HYPERLIPIDEMIA, UNSPECIFIED: ICD-10-CM

## 2023-03-31 DIAGNOSIS — Z98.49 CATARACT EXTRACTION STATUS, UNSPECIFIED EYE: Chronic | ICD-10-CM

## 2023-03-31 DIAGNOSIS — R29.700 NIHSS SCORE 0: ICD-10-CM

## 2023-03-31 DIAGNOSIS — R29.6 REPEATED FALLS: ICD-10-CM

## 2023-03-31 DIAGNOSIS — N40.0 BENIGN PROSTATIC HYPERPLASIA WITHOUT LOWER URINARY TRACT SYMPTOMS: ICD-10-CM

## 2023-03-31 DIAGNOSIS — S32.10XA UNSPECIFIED FRACTURE OF SACRUM, INITIAL ENCOUNTER FOR CLOSED FRACTURE: ICD-10-CM

## 2023-03-31 DIAGNOSIS — E86.0 DEHYDRATION: ICD-10-CM

## 2023-03-31 DIAGNOSIS — Z86.711 PERSONAL HISTORY OF PULMONARY EMBOLISM: ICD-10-CM

## 2023-03-31 DIAGNOSIS — Z98.890 OTHER SPECIFIED POSTPROCEDURAL STATES: Chronic | ICD-10-CM

## 2023-03-31 DIAGNOSIS — Z90.89 ACQUIRED ABSENCE OF OTHER ORGANS: Chronic | ICD-10-CM

## 2023-03-31 DIAGNOSIS — Z79.01 LONG TERM (CURRENT) USE OF ANTICOAGULANTS: ICD-10-CM

## 2023-03-31 DIAGNOSIS — Z96.642 PRESENCE OF LEFT ARTIFICIAL HIP JOINT: Chronic | ICD-10-CM

## 2023-03-31 DIAGNOSIS — J44.9 CHRONIC OBSTRUCTIVE PULMONARY DISEASE, UNSPECIFIED: ICD-10-CM

## 2023-03-31 DIAGNOSIS — H91.91 UNSPECIFIED HEARING LOSS, RIGHT EAR: ICD-10-CM
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
## 2023-03-31 DIAGNOSIS — Z79.82 LONG TERM (CURRENT) USE OF ASPIRIN: ICD-10-CM

## 2023-03-31 DIAGNOSIS — Z95.5 PRESENCE OF CORONARY ANGIOPLASTY IMPLANT AND GRAFT: Chronic | ICD-10-CM

## 2023-03-31 DIAGNOSIS — S32.009A UNSPECIFIED FRACTURE OF UNSPECIFIED LUMBAR VERTEBRA, INITIAL ENCOUNTER FOR CLOSED FRACTURE: ICD-10-CM

## 2023-03-31 DIAGNOSIS — I25.10 ATHEROSCLEROTIC HEART DISEASE OF NATIVE CORONARY ARTERY WITHOUT ANGINA PECTORIS: ICD-10-CM
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
## 2023-03-31 DIAGNOSIS — Z20.822 CONTACT WITH AND (SUSPECTED) EXPOSURE TO COVID-19: ICD-10-CM

## 2023-03-31 DIAGNOSIS — K59.00 CONSTIPATION, UNSPECIFIED: ICD-10-CM

## 2023-03-31 DIAGNOSIS — Z79.899 OTHER LONG TERM (CURRENT) DRUG THERAPY: ICD-10-CM

## 2023-03-31 LAB
ADD ON TEST-SPECIMEN IN LAB: SIGNIFICANT CHANGE UP
ALBUMIN SERPL ELPH-MCNC: 2.7 G/DL — LOW (ref 3.3–5)
ALBUMIN SERPL ELPH-MCNC: 2.8 G/DL — LOW (ref 3.3–5)
ALP SERPL-CCNC: 108 U/L — SIGNIFICANT CHANGE UP (ref 40–120)
ALP SERPL-CCNC: 110 U/L — SIGNIFICANT CHANGE UP (ref 40–120)
ALT FLD-CCNC: 14 U/L — SIGNIFICANT CHANGE UP (ref 12–78)
ALT FLD-CCNC: 14 U/L — SIGNIFICANT CHANGE UP (ref 12–78)
ANION GAP SERPL CALC-SCNC: 7 MMOL/L — SIGNIFICANT CHANGE UP (ref 5–17)
ANION GAP SERPL CALC-SCNC: 9 MMOL/L — SIGNIFICANT CHANGE UP (ref 5–17)
APPEARANCE UR: CLEAR — SIGNIFICANT CHANGE UP
APTT BLD: 38.4 SEC — HIGH (ref 27.5–35.5)
AST SERPL-CCNC: 14 U/L — LOW (ref 15–37)
AST SERPL-CCNC: 18 U/L — SIGNIFICANT CHANGE UP (ref 15–37)
BACTERIA # UR AUTO: ABNORMAL
BASOPHILS # BLD AUTO: 0.07 K/UL — SIGNIFICANT CHANGE UP (ref 0–0.2)
BASOPHILS NFR BLD AUTO: 0.6 % — SIGNIFICANT CHANGE UP (ref 0–2)
BILIRUB SERPL-MCNC: 1.1 MG/DL — SIGNIFICANT CHANGE UP (ref 0.2–1.2)
BILIRUB SERPL-MCNC: 1.2 MG/DL — SIGNIFICANT CHANGE UP (ref 0.2–1.2)
BILIRUB UR-MCNC: NEGATIVE — SIGNIFICANT CHANGE UP
BLD GP AB SCN SERPL QL: SIGNIFICANT CHANGE UP
BUN SERPL-MCNC: 13 MG/DL — SIGNIFICANT CHANGE UP (ref 7–23)
BUN SERPL-MCNC: 13 MG/DL — SIGNIFICANT CHANGE UP (ref 7–23)
CALCIUM SERPL-MCNC: 8.7 MG/DL — SIGNIFICANT CHANGE UP (ref 8.5–10.1)
CALCIUM SERPL-MCNC: 8.8 MG/DL — SIGNIFICANT CHANGE UP (ref 8.5–10.1)
CHLORIDE SERPL-SCNC: 103 MMOL/L — SIGNIFICANT CHANGE UP (ref 96–108)
CHLORIDE SERPL-SCNC: 104 MMOL/L — SIGNIFICANT CHANGE UP (ref 96–108)
CK SERPL-CCNC: 38 U/L — SIGNIFICANT CHANGE UP (ref 26–308)
CO2 SERPL-SCNC: 22 MMOL/L — SIGNIFICANT CHANGE UP (ref 22–31)
CO2 SERPL-SCNC: 25 MMOL/L — SIGNIFICANT CHANGE UP (ref 22–31)
COLOR SPEC: YELLOW — SIGNIFICANT CHANGE UP
CREAT SERPL-MCNC: 0.84 MG/DL — SIGNIFICANT CHANGE UP (ref 0.5–1.3)
CREAT SERPL-MCNC: 0.94 MG/DL — SIGNIFICANT CHANGE UP (ref 0.5–1.3)
DIFF PNL FLD: ABNORMAL
EGFR: 78 ML/MIN/1.73M2 — SIGNIFICANT CHANGE UP
EGFR: 84 ML/MIN/1.73M2 — SIGNIFICANT CHANGE UP
EOSINOPHIL # BLD AUTO: 0.09 K/UL — SIGNIFICANT CHANGE UP (ref 0–0.5)
EOSINOPHIL NFR BLD AUTO: 0.7 % — SIGNIFICANT CHANGE UP (ref 0–6)
EPI CELLS # UR: SIGNIFICANT CHANGE UP
FLUAV AG NPH QL: SIGNIFICANT CHANGE UP
FLUBV AG NPH QL: SIGNIFICANT CHANGE UP
GLUCOSE SERPL-MCNC: 106 MG/DL — HIGH (ref 70–99)
GLUCOSE SERPL-MCNC: 113 MG/DL — HIGH (ref 70–99)
GLUCOSE UR QL: NEGATIVE — SIGNIFICANT CHANGE UP
HCT VFR BLD CALC: 48.5 % — SIGNIFICANT CHANGE UP (ref 39–50)
HGB BLD-MCNC: 15.8 G/DL — SIGNIFICANT CHANGE UP (ref 13–17)
IMM GRANULOCYTES NFR BLD AUTO: 0.5 % — SIGNIFICANT CHANGE UP (ref 0–0.9)
INR BLD: 1.99 RATIO — HIGH (ref 0.88–1.16)
KETONES UR-MCNC: ABNORMAL
LEUKOCYTE ESTERASE UR-ACNC: NEGATIVE — SIGNIFICANT CHANGE UP
LIDOCAIN IGE QN: 41 U/L — LOW (ref 73–393)
LYMPHOCYTES # BLD AUTO: 1.31 K/UL — SIGNIFICANT CHANGE UP (ref 1–3.3)
LYMPHOCYTES # BLD AUTO: 10.6 % — LOW (ref 13–44)
MCHC RBC-ENTMCNC: 26.6 PG — LOW (ref 27–34)
MCHC RBC-ENTMCNC: 32.6 GM/DL — SIGNIFICANT CHANGE UP (ref 32–36)
MCV RBC AUTO: 81.6 FL — SIGNIFICANT CHANGE UP (ref 80–100)
MONOCYTES # BLD AUTO: 1.33 K/UL — HIGH (ref 0–0.9)
MONOCYTES NFR BLD AUTO: 10.8 % — SIGNIFICANT CHANGE UP (ref 2–14)
NEUTROPHILS # BLD AUTO: 9.49 K/UL — HIGH (ref 1.8–7.4)
NEUTROPHILS NFR BLD AUTO: 76.8 % — SIGNIFICANT CHANGE UP (ref 43–77)
NITRITE UR-MCNC: NEGATIVE — SIGNIFICANT CHANGE UP
PH UR: 5 — SIGNIFICANT CHANGE UP (ref 5–8)
PLATELET # BLD AUTO: 182 K/UL — SIGNIFICANT CHANGE UP (ref 150–400)
POTASSIUM SERPL-MCNC: 4.3 MMOL/L — SIGNIFICANT CHANGE UP (ref 3.5–5.3)
POTASSIUM SERPL-MCNC: 4.7 MMOL/L — SIGNIFICANT CHANGE UP (ref 3.5–5.3)
POTASSIUM SERPL-SCNC: 4.3 MMOL/L — SIGNIFICANT CHANGE UP (ref 3.5–5.3)
POTASSIUM SERPL-SCNC: 4.7 MMOL/L — SIGNIFICANT CHANGE UP (ref 3.5–5.3)
PROT SERPL-MCNC: 6.7 GM/DL — SIGNIFICANT CHANGE UP (ref 6–8.3)
PROT SERPL-MCNC: 6.7 GM/DL — SIGNIFICANT CHANGE UP (ref 6–8.3)
PROT UR-MCNC: NEGATIVE — SIGNIFICANT CHANGE UP
PROTHROM AB SERPL-ACNC: 23.3 SEC — HIGH (ref 10.5–13.4)
RBC # BLD: 5.94 M/UL — HIGH (ref 4.2–5.8)
RBC # FLD: 15.8 % — HIGH (ref 10.3–14.5)
RBC CASTS # UR COMP ASSIST: SIGNIFICANT CHANGE UP /HPF (ref 0–4)
RSV RNA NPH QL NAA+NON-PROBE: SIGNIFICANT CHANGE UP
SARS-COV-2 RNA SPEC QL NAA+PROBE: SIGNIFICANT CHANGE UP
SODIUM SERPL-SCNC: 134 MMOL/L — LOW (ref 135–145)
SODIUM SERPL-SCNC: 136 MMOL/L — SIGNIFICANT CHANGE UP (ref 135–145)
SP GR SPEC: 1.02 — SIGNIFICANT CHANGE UP (ref 1.01–1.02)
UROBILINOGEN FLD QL: NEGATIVE — SIGNIFICANT CHANGE UP
WBC # BLD: 12.35 K/UL — HIGH (ref 3.8–10.5)
WBC # FLD AUTO: 12.35 K/UL — HIGH (ref 3.8–10.5)
WBC UR QL: SIGNIFICANT CHANGE UP /HPF (ref 0–5)

## 2023-03-31 PROCEDURE — 94640 AIRWAY INHALATION TREATMENT: CPT

## 2023-03-31 PROCEDURE — 72125 CT NECK SPINE W/O DYE: CPT | Mod: 26,MA

## 2023-03-31 PROCEDURE — 97116 GAIT TRAINING THERAPY: CPT | Mod: GP

## 2023-03-31 PROCEDURE — 84100 ASSAY OF PHOSPHORUS: CPT

## 2023-03-31 PROCEDURE — 71250 CT THORAX DX C-: CPT | Mod: MG

## 2023-03-31 PROCEDURE — 72158 MRI LUMBAR SPINE W/O & W/DYE: CPT | Mod: MA

## 2023-03-31 PROCEDURE — 71250 CT THORAX DX C-: CPT | Mod: 26,MG

## 2023-03-31 PROCEDURE — 85025 COMPLETE CBC W/AUTO DIFF WBC: CPT

## 2023-03-31 PROCEDURE — 71045 X-RAY EXAM CHEST 1 VIEW: CPT

## 2023-03-31 PROCEDURE — 93970 EXTREMITY STUDY: CPT | Mod: 26

## 2023-03-31 PROCEDURE — 72100 X-RAY EXAM L-S SPINE 2/3 VWS: CPT | Mod: 26

## 2023-03-31 PROCEDURE — G1004: CPT

## 2023-03-31 PROCEDURE — 74177 CT ABD & PELVIS W/CONTRAST: CPT | Mod: 26,MG

## 2023-03-31 PROCEDURE — A9579: CPT

## 2023-03-31 PROCEDURE — 81001 URINALYSIS AUTO W/SCOPE: CPT

## 2023-03-31 PROCEDURE — 97530 THERAPEUTIC ACTIVITIES: CPT | Mod: GP

## 2023-03-31 PROCEDURE — 97163 PT EVAL HIGH COMPLEX 45 MIN: CPT | Mod: GP

## 2023-03-31 PROCEDURE — 70450 CT HEAD/BRAIN W/O DYE: CPT | Mod: 26,MG

## 2023-03-31 PROCEDURE — 99285 EMERGENCY DEPT VISIT HI MDM: CPT

## 2023-03-31 PROCEDURE — 85027 COMPLETE CBC AUTOMATED: CPT

## 2023-03-31 PROCEDURE — 99223 1ST HOSP IP/OBS HIGH 75: CPT

## 2023-03-31 PROCEDURE — 87635 SARS-COV-2 COVID-19 AMP PRB: CPT

## 2023-03-31 PROCEDURE — 72128 CT CHEST SPINE W/O DYE: CPT | Mod: 26,MA

## 2023-03-31 PROCEDURE — 93970 EXTREMITY STUDY: CPT

## 2023-03-31 PROCEDURE — 86140 C-REACTIVE PROTEIN: CPT

## 2023-03-31 PROCEDURE — 83735 ASSAY OF MAGNESIUM: CPT

## 2023-03-31 PROCEDURE — 36415 COLL VENOUS BLD VENIPUNCTURE: CPT

## 2023-03-31 PROCEDURE — 87086 URINE CULTURE/COLONY COUNT: CPT

## 2023-03-31 PROCEDURE — 87040 BLOOD CULTURE FOR BACTERIA: CPT

## 2023-03-31 PROCEDURE — 80048 BASIC METABOLIC PNL TOTAL CA: CPT

## 2023-03-31 PROCEDURE — 73030 X-RAY EXAM OF SHOULDER: CPT | Mod: LT

## 2023-03-31 PROCEDURE — 85652 RBC SED RATE AUTOMATED: CPT

## 2023-03-31 RX ORDER — ACETAMINOPHEN 500 MG
1000 TABLET ORAL ONCE
Refills: 0 | Status: COMPLETED | OUTPATIENT
Start: 2023-03-31 | End: 2023-03-31

## 2023-03-31 RX ORDER — FAMOTIDINE 10 MG/ML
20 INJECTION INTRAVENOUS DAILY
Refills: 0 | Status: DISCONTINUED | OUTPATIENT
Start: 2023-03-31 | End: 2023-04-10

## 2023-03-31 RX ORDER — ACETAMINOPHEN 500 MG
650 TABLET ORAL EVERY 6 HOURS
Refills: 0 | Status: DISCONTINUED | OUTPATIENT
Start: 2023-03-31 | End: 2023-04-10

## 2023-03-31 RX ORDER — TAMSULOSIN HYDROCHLORIDE 0.4 MG/1
0.4 CAPSULE ORAL AT BEDTIME
Refills: 0 | Status: DISCONTINUED | OUTPATIENT
Start: 2023-03-31 | End: 2023-04-10

## 2023-03-31 RX ORDER — ASPIRIN/CALCIUM CARB/MAGNESIUM 324 MG
81 TABLET ORAL DAILY
Refills: 0 | Status: DISCONTINUED | OUTPATIENT
Start: 2023-03-31 | End: 2023-04-10

## 2023-03-31 RX ORDER — ONDANSETRON 8 MG/1
4 TABLET, FILM COATED ORAL EVERY 6 HOURS
Refills: 0 | Status: DISCONTINUED | OUTPATIENT
Start: 2023-03-31 | End: 2023-04-10

## 2023-03-31 RX ORDER — APIXABAN 2.5 MG/1
2.5 TABLET, FILM COATED ORAL ONCE
Refills: 0 | Status: DISCONTINUED | OUTPATIENT
Start: 2023-03-31 | End: 2023-03-31

## 2023-03-31 RX ORDER — SODIUM CHLORIDE 9 MG/ML
500 INJECTION INTRAMUSCULAR; INTRAVENOUS; SUBCUTANEOUS ONCE
Refills: 0 | Status: COMPLETED | OUTPATIENT
Start: 2023-03-31 | End: 2023-03-31

## 2023-03-31 RX ORDER — CEFTRIAXONE 500 MG/1
1000 INJECTION, POWDER, FOR SOLUTION INTRAMUSCULAR; INTRAVENOUS ONCE
Refills: 0 | Status: COMPLETED | OUTPATIENT
Start: 2023-03-31 | End: 2023-03-31

## 2023-03-31 RX ORDER — MAGNESIUM OXIDE 400 MG ORAL TABLET 241.3 MG
1 TABLET ORAL
Qty: 0 | Refills: 0 | DISCHARGE

## 2023-03-31 RX ORDER — METOPROLOL TARTRATE 50 MG
25 TABLET ORAL DAILY
Refills: 0 | Status: DISCONTINUED | OUTPATIENT
Start: 2023-03-31 | End: 2023-04-10

## 2023-03-31 RX ORDER — LATANOPROST 0.05 MG/ML
1 SOLUTION/ DROPS OPHTHALMIC; TOPICAL AT BEDTIME
Refills: 0 | Status: DISCONTINUED | OUTPATIENT
Start: 2023-03-31 | End: 2023-04-10

## 2023-03-31 RX ORDER — ALBUTEROL 90 UG/1
2 AEROSOL, METERED ORAL EVERY 6 HOURS
Refills: 0 | Status: DISCONTINUED | OUTPATIENT
Start: 2023-03-31 | End: 2023-04-10

## 2023-03-31 RX ORDER — OXYCODONE HYDROCHLORIDE 5 MG/1
5 TABLET ORAL EVERY 6 HOURS
Refills: 0 | Status: DISCONTINUED | OUTPATIENT
Start: 2023-03-31 | End: 2023-04-07

## 2023-03-31 RX ADMIN — Medication 400 MILLIGRAM(S): at 20:27

## 2023-03-31 RX ADMIN — Medication 1000 MILLIGRAM(S): at 20:30

## 2023-03-31 RX ADMIN — SODIUM CHLORIDE 500 MILLILITER(S): 9 INJECTION INTRAMUSCULAR; INTRAVENOUS; SUBCUTANEOUS at 18:11

## 2023-03-31 NOTE — CONSULT NOTE ADULT - SUBJECTIVE AND OBJECTIVE BOX
Patient is a 88y Male home ambulator with walker who presents to  ED w/ a c/o of "dehydration" and "hematuria" per wife/daughter. Patient was found to have suspected unstable L2 vertebral plana on CT of the A/P. Per prior documentation, patient was seen 2022 per Dr. Riggs of Neurosurgery for VCF T8, L2 (documented unstable per Neurology PA from convo w/ Rads), and Chronic L4 VCF. At that time patient was refusing further intervention/imaging, but was agreeable to a brace. Please reference noted from that time per NSx for further details. Patient did not follow up per family at bedside tonight.    Patient upon further questioning reports to have fallen onto his buttocks >2 weeks prior (prior to a dinner on 3/17 at [facility] where patient was ambulating with his walker at baseline, per Daughter).  Patient was brought to PCP on Tuesday of this week for routine testing, where the pcp found blood in his urine. He was prescribed bactrim for a suspected UTI per Dr. Watts his PCP, the wife was hesitant b/c of hx of c/ diff after surgery on his left hip. Wife picked up Rx and gave to patient Wednesday. Wednesday patient was found to be weak and Thursday was unable to ambulate to the bathroom without assistance. Normally ambulated to the bathroom w/o assistance. [place] so wife moved his bed to the first floor bc he has difficulty w/ stairs. Patient was then wearing depens because of his decreased ability to ambulate and fall risk. So was urinating in the diaper overnight    Denies HS/LOC. Localizing pain to "buttocks", denies radiation of pain elsewhere. Denies pain down legs, denies numbness/tingling/paresthesias/weakness, denies incontinence of bowel/bladder.  Denies having any other pain elsewhere. No other orthopaedic concerns at this time.       On Eliquis for hx of b/l PEs    PAST MEDICAL & SURGICAL HISTORY:  HTN (hypertension)      Hyperlipidemia      COPD (chronic obstructive pulmonary disease)      Glaucoma of both eyes, unspecified glaucoma      CAD (coronary artery disease)      BPH (benign prostatic hyperplasia)      Glaucoma      Swelling of ankle  b/l      Shoulder pain, right      Humerus fracture  proximal, right      Hard of hearing      H/O Clostridium difficile infection  s/p left THR      H/O bilateral inguinal hernia repair        History of tonsillectomy      Status post coronary artery stent placement  reports stents x 4?      History of total hip replacement, left  2011      H/O colonoscopy      S/P cataract surgery  b/l          Vital Signs Last 24 Hrs  T(C): 36.9 (31 Mar 2023 19:40), Max: 36.9 (31 Mar 2023 19:40)  T(F): 98.5 (31 Mar 2023 19:40), Max: 98.5 (31 Mar 2023 19:40)  HR: 75 (31 Mar 2023 19:40) (71 - 75)  BP: 173/74 (31 Mar 2023 19:40) (161/62 - 186/88)  BP(mean): --  RR: 16 (31 Mar 2023 19:40) (16 - 18)  SpO2: 95% (31 Mar 2023 19:40) (94% - 95%)    Parameters below as of 31 Mar 2023 19:40  Patient On (Oxygen Delivery Method): room air      I&O's Detail      LABS:                        15.8   12.35 )-----------( 182      ( 31 Mar 2023 18:00 )             48.5         136  |  104  |  13  ----------------------------<  113<H>  4.7   |  25  |  0.94    Ca    8.8      31 Mar 2023 20:48    TPro  6.7  /  Alb  2.7<L>  /  TBili  1.1  /  DBili  x   /  AST  14<L>  /  ALT  14  /  AlkPhos  110      PT/INR - ( 31 Mar 2023 18:00 )   PT: 23.3 sec;   INR: 1.99 ratio         PTT - ( 31 Mar 2023 18:00 )  PTT:38.4 sec  Urinalysis Basic - ( 31 Mar 2023 18:23 )    Color: Yellow / Appearance: Clear / S.020 / pH: x  Gluc: x / Ketone: Moderate  / Bili: Negative / Urobili: Negative   Blood: x / Protein: Negative / Nitrite: Negative   Leuk Esterase: Negative / RBC: 0-2 /HPF / WBC 0-2 /HPF   Sq Epi: x / Non Sq Epi: Occasional / Bacteria: Occasional        PHYSICAL EXAM:  General; Awake and alert, Oriented x 3  Spine: No TTP over spinous processes or paraspinal muscles at C/T spine. No palpable step off.     Able to actively SLR BL, w/ pain in his "buttocks"    PHYSICAL EXAM  GEN: NAD, AAOx3    SPINE:    TTP over the bony prominences of the upper lumbar spine  NTTP over the bony prominences of the cervical / thoracic / sacral spine  NO Paraspinal TTP of the cervical / thoracic / lumbar / sacral spine  No bony step-offs  Grossly moving all extremities  + Radial Pulse  + DP/PT Pulses  No saddle anesthesia  + rectal tone      MOTOR EXAM:                          Elbow Flex (C5)     Wrist Ext (C6)     Elbow Ext (C7)      Finger Flex (C8)    Finger Abduction (T1)  RIGHT                 5/5                         5/5                         5/5                          5/5                              5/5  LEFT                    5/5                         5/5                         5/5                          5/5                              5/5                           Hip Flex (L2)      Knee Ext (L3)      Ank Dorsiflex (L4)     Hallux Ext (L5)     Ank PlantarFlex (S1)  RIGHT               4/5                      5/5                          5/5                            5/5                           5/5  LEFT                  4/5                      5/5                          5/5                            5/5                           5/5      SENSORY EXAM:                        C5      C6      C7      C8       T1          RIGHT          2         2        2         2         2          (0=absent, 1=impaired, 2=normal, NT=not testable)  LEFT             2         2        2         2         2          (0=absent, 1=impaired, 2=normal, NT=not testable)                        L2        L3       L4      L5       S1          RIGHT        2          2         2        2        2           (0=absent, 1=impaired, 2=normal, NT=not testable)  LEFT           2          2         2        2        2           (0=absent, 1=impaired, 2=normal, NT=not testable)    Negative Bell's sign bilaterally  Negative Babinski bilaterally   Negative Myoclonus bilaterally        Secondary  No erythema/ecchymosis. No TTP over bony prominences, SILT, palpable pulses, full/painless A/PROM, compartments soft. No other injuries or complaints. Negative logroll/heelstrike BL.     Imaging:                                          A/P :   Patient is a 88y Male w/ L2 vertebra plana, Hx Chr T8, 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 Keely Hermosillo, family instructed 2022 possibly unstable and ?abscess however refused further work up per prior documentation)    -Clinical presentation and physical exam are not consistent w/ acute cord compression/cauda equina. Does not demonstrate red flag symptoms such as bowel/bladder incontinence, saddle anesthesia, fevers/chills, or weight loss.  -Plan for CT of the cervical, thoracic spine  -MRI of the Lumbar spine, will order with and without contrast to rule out pathologic injury or infection  -Patient was extensively educated about the signs and symptoms of osteomyelitis, epidural abscess, discitis, acute cord compression. The patient was advised to inform provider/nurse/etc if he develops neurological symptoms such as progressing weakness, numbness/tingling/paresthesias, worsening pain, bowel/bladder incontinence, or fevers/chills.  -Recommend bedrest until the MRI  -Recommend post void residual --> please bladder scan patient 15-30 min after patient voids and either record in flow sheets, note, or call orthopaedic surgery directly -- Thank you!   -Patient has TLSO brace, family to bring  -No heavy lifting/twisting  -Multimodal analgesia - recommend low dose opioids, acetaminophen, muscle relaxant as tolerated  -All patient's questions answered. Patient understands and agrees w/ above plan.  -Case discussed w/ Dr. Beaulieu  -Will advise as plan changes, definitive plan pending further imaging  -At present family / patient adamantly stating they will not pursue surgical intervention; Plan would be T11-L5 decompression, PSF, possible L2 corpectomy  Patient is a 88y Male home ambulator with walker who presents to  ED w/ a c/o of "dehydration" and "hematuria" per wife/daughter. Patient was found to have suspected unstable L2 vertebral plana on CT of the A/P. Per prior documentation, patient was seen 2022 per Dr. Riggs of Neurosurgery for VCF T8, L2 (documented unstable per Neurology PA from convo w/ Rads), and Chronic L4 VCF. At that time patient was refusing further intervention/imaging, but was agreeable to a brace. Please reference noted from that time per NSx for further details. Patient did not follow up per family at bedside tonight.    Patient upon further questioning reports to have fallen onto his buttocks >2 weeks prior (prior to a dinner on 3/17 at [facility] where patient was ambulating with his walker at baseline, per Daughter).  Patient was brought to PCP on Tuesday of this week for routine testing, where the pcp found blood in his urine. He was prescribed bactrim for a suspected UTI per Dr. Watts his PCP, the wife was hesitant b/c of hx of c/ diff after surgery on his left hip. Wife picked up Rx and gave to patient Wednesday. Wednesday patient was found to be weak and Thursday was unable to ambulate to the bathroom without assistance. Normally ambulated to the bathroom w/o assistance. [place] so wife moved his bed to the first floor bc he has difficulty w/ stairs. Patient was then wearing depens because of his decreased ability to ambulate and fall risk. So was urinating in the diaper overnight    Denies HS/LOC. Localizing pain to "buttocks", denies radiation of pain elsewhere. Denies pain down legs, denies numbness/tingling/paresthesias/weakness, denies incontinence of bowel/bladder.  Denies having any other pain elsewhere. No other orthopaedic concerns at this time.       On Eliquis for hx of b/l PEs    PAST MEDICAL & SURGICAL HISTORY:  HTN (hypertension)      Hyperlipidemia      COPD (chronic obstructive pulmonary disease)      Glaucoma of both eyes, unspecified glaucoma      CAD (coronary artery disease)      BPH (benign prostatic hyperplasia)      Glaucoma      Swelling of ankle  b/l      Shoulder pain, right      Humerus fracture  proximal, right      Hard of hearing      H/O Clostridium difficile infection  s/p left THR      H/O bilateral inguinal hernia repair        History of tonsillectomy      Status post coronary artery stent placement  reports stents x 4?      History of total hip replacement, left  2011      H/O colonoscopy      S/P cataract surgery  b/l          Vital Signs Last 24 Hrs  T(C): 36.9 (31 Mar 2023 19:40), Max: 36.9 (31 Mar 2023 19:40)  T(F): 98.5 (31 Mar 2023 19:40), Max: 98.5 (31 Mar 2023 19:40)  HR: 75 (31 Mar 2023 19:40) (71 - 75)  BP: 173/74 (31 Mar 2023 19:40) (161/62 - 186/88)  BP(mean): --  RR: 16 (31 Mar 2023 19:40) (16 - 18)  SpO2: 95% (31 Mar 2023 19:40) (94% - 95%)    Parameters below as of 31 Mar 2023 19:40  Patient On (Oxygen Delivery Method): room air      I&O's Detail      LABS:                        15.8   12.35 )-----------( 182      ( 31 Mar 2023 18:00 )             48.5         136  |  104  |  13  ----------------------------<  113<H>  4.7   |  25  |  0.94    Ca    8.8      31 Mar 2023 20:48    TPro  6.7  /  Alb  2.7<L>  /  TBili  1.1  /  DBili  x   /  AST  14<L>  /  ALT  14  /  AlkPhos  110      PT/INR - ( 31 Mar 2023 18:00 )   PT: 23.3 sec;   INR: 1.99 ratio         PTT - ( 31 Mar 2023 18:00 )  PTT:38.4 sec  Urinalysis Basic - ( 31 Mar 2023 18:23 )    Color: Yellow / Appearance: Clear / S.020 / pH: x  Gluc: x / Ketone: Moderate  / Bili: Negative / Urobili: Negative   Blood: x / Protein: Negative / Nitrite: Negative   Leuk Esterase: Negative / RBC: 0-2 /HPF / WBC 0-2 /HPF   Sq Epi: x / Non Sq Epi: Occasional / Bacteria: Occasional        PHYSICAL EXAM:  General; Awake and alert, Oriented x 3  Spine: No TTP over spinous processes or paraspinal muscles at C/T spine. No palpable step off.     Able to actively SLR BL, w/ pain in his "buttocks"    PHYSICAL EXAM  GEN: NAD, AAOx3    SPINE:    TTP over the bony prominences of the upper lumbar spine  NTTP over the bony prominences of the cervical / thoracic / sacral spine  NO Paraspinal TTP of the cervical / thoracic / lumbar / sacral spine  No bony step-offs  Grossly moving all extremities  + Radial Pulse  + DP/PT Pulses  No saddle anesthesia  + rectal tone      MOTOR EXAM:                          Elbow Flex (C5)     Wrist Ext (C6)     Elbow Ext (C7)      Finger Flex (C8)    Finger Abduction (T1)  RIGHT                 5/5                         5/5                         5/5                          5/5                              5/5  LEFT                    5/5                         5/5                         5/5                          5/5                              5/5                           Hip Flex (L2)      Knee Ext (L3)      Ank Dorsiflex (L4)     Hallux Ext (L5)     Ank PlantarFlex (S1)  RIGHT               4/5                      5/5                          5/5                            5/5                           5/5  LEFT                  4/5                      5/5                          5/5                            5/5                           5/5      SENSORY EXAM:                        C5      C6      C7      C8       T1          RIGHT          2         2        2         2         2          (0=absent, 1=impaired, 2=normal, NT=not testable)  LEFT             2         2        2         2         2          (0=absent, 1=impaired, 2=normal, NT=not testable)                        L2        L3       L4      L5       S1          RIGHT        2          2         2        2        2           (0=absent, 1=impaired, 2=normal, NT=not testable)  LEFT           2          2         2        2        2           (0=absent, 1=impaired, 2=normal, NT=not testable)    Negative Bell's sign bilaterally  Negative Babinski bilaterally   Negative Myoclonus bilaterally        Secondary  No erythema/ecchymosis. No TTP over bony prominences, SILT, palpable pulses, full/painless A/PROM, compartments soft. No other injuries or complaints. Negative logroll/heelstrike BL.     Imaging:                                          A/P :   Patient is a 88y Male w/ L2 vertebra plana, Hx Chr T8, 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 Keely Hermosillo, family instructed 2022 possibly unstable and ?abscess however refused further work up per prior documentation)    -Clinical presentation and physical exam are not consistent w/ acute cord compression/cauda equina. Does not demonstrate red flag symptoms such as bowel/bladder incontinence, saddle anesthesia, fevers/chills, or weight loss. Patient does not have acute neurological deficits --> b/l hip flexion weakness pain limited 2/2 pain in buttocks  -Plan for CT of the cervical, thoracic spine, to r/o other noncontinuous inj in AS/DISH type picture  -MRI of the Lumbar spine, will order with and without contrast to rule out pathologic injury or infection; MRI will ascertain acuity of injury w/ presence of edema as well and inform upon stability   -Patient was extensively educated about the signs and symptoms of osteomyelitis, epidural abscess, discitis, acute cord compression. The patient was advised to inform provider/nurse/etc if he develops neurological symptoms such as progressing weakness, numbness/tingling/paresthesias, worsening pain, bowel/bladder incontinence, or fevers/chills.  -Recommend bedrest until the MRI  -Recommend post void residual --> please bladder scan patient 15-30 min after patient voids and either record in flow sheets, note, or call orthopaedic surgery directly -- Thank you!   -Patient has TLSO brace, family to bring; Not for use when in bed/chair  -DVT ppx: Per primary, can consider holding until after mri completed to determine acuity of injury; Pt on home Justina and A81 and has taken since fall >2 weeks prior. Minimal risk epidural hematoma based upon hx per family/pt   -No heavy lifting/twisting  -Multimodal analgesia - recommend low dose opioids, acetaminophen, muscle relaxant as tolerated, would caution polypharmacy in elderly pt; patient comfortable in bed, not c/o pain upon exam   -All patient's questions answered. Patient understands and agrees w/ above plan.  -Case discussed w/ Dr. Beaulieu  -Will advise as plan changes, definitive plan pending further imaging  -At present family / patient adamantly stating they will not pursue surgical intervention; Plan would be T11-L5 decompression, PSF, possible L2 corpectomy  Patient is a 88y Male home ambulator with walker who presents to  ED w/ a c/o of "dehydration" and "hematuria" per wife/daughter. Patient was found to have suspected unstable L2 vertebral plana on CT of the A/P. Per prior documentation, patient was seen 2022 per Dr. Riggs of Neurosurgery for VCF T8, L2 (documented unstable per Neurology PA from convo w/ Rads), and Chronic L4 VCF. At that time patient was refusing further intervention/imaging, but was agreeable to a brace. Please reference noted from that time per NSx for further details. Patient did not follow up per family at bedside tonight.    Patient upon further questioning reports to have fallen onto his buttocks >2 weeks prior (prior to a dinner on 3/17 at [facility] where patient was ambulating with his walker at baseline, per Daughter).  Patient was brought to PCP on Tuesday of this week for routine testing, where the pcp found blood in his urine. He was prescribed bactrim for a suspected UTI per Dr. Watts his PCP, the wife was hesitant b/c of hx of c/ diff after surgery on his left hip. Wife picked up Rx and gave to patient Wednesday. Wednesday patient was found to be weak and Thursday was unable to ambulate to the bathroom without assistance. Normally ambulated to the bathroom w/o assistance. [place] old [place] house so wife moved his bed to the first floor bc he has difficulty w/ stairs. Patient was then wearing depens because of his decreased ability to ambulate and fall risk. So was urinating in the diaper overnight    Denies HS/LOC. Localizing pain to "buttocks", denies radiation of pain elsewhere. Denies pain down legs, denies numbness/tingling/paresthesias/weakness, denies incontinence of bowel/bladder.  Denies having any other pain elsewhere. No other orthopaedic concerns at this time.       On Eliquis for hx of b/l PEs    PAST MEDICAL & SURGICAL HISTORY:  HTN (hypertension)      Hyperlipidemia      COPD (chronic obstructive pulmonary disease)      Glaucoma of both eyes, unspecified glaucoma      CAD (coronary artery disease)      BPH (benign prostatic hyperplasia)      Glaucoma      Swelling of ankle  b/l      Shoulder pain, right      Humerus fracture  proximal, right      Hard of hearing      H/O Clostridium difficile infection  s/p left THR      H/O bilateral inguinal hernia repair        History of tonsillectomy      Status post coronary artery stent placement  reports stents x 4?      History of total hip replacement, left  2011      H/O colonoscopy      S/P cataract surgery  b/l          Vital Signs Last 24 Hrs  T(C): 36.9 (31 Mar 2023 19:40), Max: 36.9 (31 Mar 2023 19:40)  T(F): 98.5 (31 Mar 2023 19:40), Max: 98.5 (31 Mar 2023 19:40)  HR: 75 (31 Mar 2023 19:40) (71 - 75)  BP: 173/74 (31 Mar 2023 19:40) (161/62 - 186/88)  BP(mean): --  RR: 16 (31 Mar 2023 19:40) (16 - 18)  SpO2: 95% (31 Mar 2023 19:40) (94% - 95%)    Parameters below as of 31 Mar 2023 19:40  Patient On (Oxygen Delivery Method): room air      I&O's Detail      LABS:                        15.8   12.35 )-----------( 182      ( 31 Mar 2023 18:00 )             48.5         136  |  104  |  13  ----------------------------<  113<H>  4.7   |  25  |  0.94    Ca    8.8      31 Mar 2023 20:48    TPro  6.7  /  Alb  2.7<L>  /  TBili  1.1  /  DBili  x   /  AST  14<L>  /  ALT  14  /  AlkPhos  110      PT/INR - ( 31 Mar 2023 18:00 )   PT: 23.3 sec;   INR: 1.99 ratio         PTT - ( 31 Mar 2023 18:00 )  PTT:38.4 sec  Urinalysis Basic - ( 31 Mar 2023 18:23 )    Color: Yellow / Appearance: Clear / S.020 / pH: x  Gluc: x / Ketone: Moderate  / Bili: Negative / Urobili: Negative   Blood: x / Protein: Negative / Nitrite: Negative   Leuk Esterase: Negative / RBC: 0-2 /HPF / WBC 0-2 /HPF   Sq Epi: x / Non Sq Epi: Occasional / Bacteria: Occasional        PHYSICAL EXAM:  General; Awake and alert, Oriented x 3  Spine: No TTP over spinous processes or paraspinal muscles at C/T spine. No palpable step off.     Able to actively SLR BL, w/ pain in his "buttocks"    PHYSICAL EXAM  GEN: NAD, AAOx3    SPINE:    TTP over the bony prominences of the upper lumbar spine  NTTP over the bony prominences of the cervical / thoracic / sacral spine  NO Paraspinal TTP of the cervical / thoracic / lumbar / sacral spine  No bony step-offs  Grossly moving all extremities  + Radial Pulse  + DP/PT Pulses  No saddle anesthesia  + rectal tone      MOTOR EXAM:                          Elbow Flex (C5)     Wrist Ext (C6)     Elbow Ext (C7)      Finger Flex (C8)    Finger Abduction (T1)  RIGHT                 5/5                         5/5                         5/5                          5/5                              5/5  LEFT                    5/5                         5/5                         5/5                          5/5                              5/5                           Hip Flex (L2)      Knee Ext (L3)      Ank Dorsiflex (L4)     Hallux Ext (L5)     Ank PlantarFlex (S1)  RIGHT               4/5                      5/5                          5/5                            5/5                           5/5  LEFT                  4/5                      5/5                          5/5                            5/5                           5/5      SENSORY EXAM:                        C5      C6      C7      C8       T1          RIGHT          2         2        2         2         2          (0=absent, 1=impaired, 2=normal, NT=not testable)  LEFT             2         2        2         2         2          (0=absent, 1=impaired, 2=normal, NT=not testable)                        L2        L3       L4      L5       S1          RIGHT        2          2         2        2        2           (0=absent, 1=impaired, 2=normal, NT=not testable)  LEFT           2          2         2        2        2           (0=absent, 1=impaired, 2=normal, NT=not testable)    Negative Bell's sign bilaterally  Negative Babinski bilaterally   Negative Myoclonus bilaterally        Secondary  No erythema/ecchymosis. No TTP over bony prominences, SILT, palpable pulses, full/painless A/PROM, compartments soft. No other injuries or complaints. Negative logroll/heelstrike BL.     Imaging:                                          A/P :   Patient is a 88y Male w/ L2 vertebra plana, Hx Chr T8, 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 Keely Hermosillo, family instructed 2022 possibly unstable and ?abscess however refused further work up per prior documentation)    -Clinical presentation and physical exam are not consistent w/ acute cord compression/cauda equina. Does not demonstrate red flag symptoms such as bowel/bladder incontinence, saddle anesthesia, fevers/chills, or weight loss. Patient does not have acute neurological deficits --> b/l hip flexion weakness pain limited 2/2 pain in buttocks  -Plan for CT of the cervical, thoracic spine, to r/o other noncontinuous inj in AS/DISH type picture  -MRI of the Lumbar spine, will order with and without contrast to rule out pathologic injury or infection; MRI will ascertain acuity of injury w/ presence of edema as well and inform upon stability   -Patient was extensively educated about the signs and symptoms of osteomyelitis, epidural abscess, discitis, acute cord compression. The patient was advised to inform provider/nurse/etc if he develops neurological symptoms such as progressing weakness, numbness/tingling/paresthesias, worsening pain, bowel/bladder incontinence, or fevers/chills.  -Recommend bedrest until the MRI  -Recommend post void residual --> please bladder scan patient 15-30 min after patient voids and either record in flow sheets, note, or call orthopaedic surgery directly -- Thank you!   -Patient has TLSO brace, family to bring; Not for use when in bed/chair  -DVT ppx: Would rec holding in event patient needs more urgent surgical intervention pending MRI completion & to determine acuity of injury; Will leave up to primary team risk v benefits w/ hx PE ; Pt on home Justina and A81 and has taken since fall >2 weeks prior per family, minimal risk epidural hematoma based upon hx provided per family/pt   -No heavy lifting/twisting  -Multimodal analgesia - recommend low dose opioids, acetaminophen, muscle relaxant as tolerated, would caution polypharmacy in elderly pt; patient comfortable in bed, not c/o pain upon exam   -All patient's questions answered. Patient understands and agrees w/ above plan.  -Case discussed w/ Dr. Beaulieu  -Will advise as plan changes, definitive plan pending further imaging  -At present family / patient adamantly stating they will not pursue surgical intervention; Plan would be T11-L5 decompression, PSF, possible L2 corpectomy  Patient is a 88y Male home ambulator with walker who presents to  ED w/ a c/o of "dehydration" and "hematuria" per wife/daughter. Patient was found to have suspected unstable L2 vertebral plana on CT of the A/P. Per prior documentation, patient was seen 2022 per Dr. Riggs of Neurosurgery for VCF T8, L2 (documented unstable per Neurology PA from convo w/ Rads), and Chronic L4 VCF. At that time patient was refusing further intervention/imaging, but was agreeable to a brace. Please reference noted from that time per NSx for further details. Patient did not follow up per family at bedside tonight.    Patient upon further questioning reports to have fallen onto his buttocks >2 weeks prior (prior to a dinner on 3/17 at [facility] where patient was ambulating with his walker at baseline, per Daughter).  Patient was brought to PCP on Tuesday of this week for routine testing, where the pcp found blood in his urine. He was prescribed bactrim for a suspected UTI per Dr. Watts his PCP, the wife was hesitant b/c of hx of c/ diff after surgery on his left hip. Wife picked up Rx and gave to patient Wednesday. Wednesday patient was found to be weak and Thursday was unable to ambulate to the bathroom without assistance. Normally ambulated to the bathroom w/o assistance. [place] old [place] house so wife moved his bed to the first floor bc he has difficulty w/ stairs. Patient was then wearing depens because of his decreased ability to ambulate and fall risk. So was urinating in the diaper overnight    Denies HS/LOC. Localizing pain to "buttocks", denies radiation of pain elsewhere. Denies pain down legs, denies numbness/tingling/paresthesias/weakness, denies incontinence of bowel/bladder.  Denies having any other pain elsewhere. No other orthopaedic concerns at this time.       On Eliquis for hx of b/l PEs    PAST MEDICAL & SURGICAL HISTORY:  HTN (hypertension)      Hyperlipidemia      COPD (chronic obstructive pulmonary disease)      Glaucoma of both eyes, unspecified glaucoma      CAD (coronary artery disease)      BPH (benign prostatic hyperplasia)      Glaucoma      Swelling of ankle  b/l      Shoulder pain, right      Humerus fracture  proximal, right      Hard of hearing      H/O Clostridium difficile infection  s/p left THR      H/O bilateral inguinal hernia repair        History of tonsillectomy      Status post coronary artery stent placement  reports stents x 4?      History of total hip replacement, left  2011      H/O colonoscopy      S/P cataract surgery  b/l          Vital Signs Last 24 Hrs  T(C): 36.9 (31 Mar 2023 19:40), Max: 36.9 (31 Mar 2023 19:40)  T(F): 98.5 (31 Mar 2023 19:40), Max: 98.5 (31 Mar 2023 19:40)  HR: 75 (31 Mar 2023 19:40) (71 - 75)  BP: 173/74 (31 Mar 2023 19:40) (161/62 - 186/88)  BP(mean): --  RR: 16 (31 Mar 2023 19:40) (16 - 18)  SpO2: 95% (31 Mar 2023 19:40) (94% - 95%)    Parameters below as of 31 Mar 2023 19:40  Patient On (Oxygen Delivery Method): room air      I&O's Detail      LABS:                        15.8   12.35 )-----------( 182      ( 31 Mar 2023 18:00 )             48.5         136  |  104  |  13  ----------------------------<  113<H>  4.7   |  25  |  0.94    Ca    8.8      31 Mar 2023 20:48    TPro  6.7  /  Alb  2.7<L>  /  TBili  1.1  /  DBili  x   /  AST  14<L>  /  ALT  14  /  AlkPhos  110      PT/INR - ( 31 Mar 2023 18:00 )   PT: 23.3 sec;   INR: 1.99 ratio         PTT - ( 31 Mar 2023 18:00 )  PTT:38.4 sec  Urinalysis Basic - ( 31 Mar 2023 18:23 )    Color: Yellow / Appearance: Clear / S.020 / pH: x  Gluc: x / Ketone: Moderate  / Bili: Negative / Urobili: Negative   Blood: x / Protein: Negative / Nitrite: Negative   Leuk Esterase: Negative / RBC: 0-2 /HPF / WBC 0-2 /HPF   Sq Epi: x / Non Sq Epi: Occasional / Bacteria: Occasional        PHYSICAL EXAM:  General; Awake and alert, Oriented x 3  Spine: No TTP over spinous processes or paraspinal muscles at C/T spine. No palpable step off.     Able to actively SLR BL, w/ pain in his "buttocks"    PHYSICAL EXAM  GEN: NAD, AAOx3    SPINE:    TTP over the bony prominences of the upper lumbar spine  NTTP over the bony prominences of the cervical / thoracic / sacral spine  No bony step-offs  Grossly moving all extremities  + Radial Pulse  + DP/PT Pulses  No saddle anesthesia  + rectal tone      MOTOR EXAM:                          Elbow Flex (C5)     Wrist Ext (C6)     Elbow Ext (C7)      Finger Flex (C8)    Finger Abduction (T1)  RIGHT                 5/5                         5/5                         5/5                          5/5                              5/5  LEFT                    5/5                         5/5                         5/5                          5/5                              5/5                           Hip Flex (L2)      Knee Ext (L3)      Ank Dorsiflex (L4)     Hallux Ext (L5)     Ank PlantarFlex (S1)  RIGHT               4/5                      5/5                          5/5                            5/5                           5/5  LEFT                  4/5                      5/5                          5/5                            5/5                           5/5      SENSORY EXAM:                        C5      C6      C7      C8       T1          RIGHT          2         2        2         2         2          (0=absent, 1=impaired, 2=normal, NT=not testable)  LEFT             2         2        2         2         2          (0=absent, 1=impaired, 2=normal, NT=not testable)                        L2        L3       L4      L5       S1          RIGHT        2          2         2        2        2           (0=absent, 1=impaired, 2=normal, NT=not testable)  LEFT           2          2         2        2        2           (0=absent, 1=impaired, 2=normal, NT=not testable)    Negative Bell's sign bilaterally  Negative Babinski bilaterally   Negative Myoclonus bilaterally        Secondary  No erythema/ecchymosis. No TTP over bony prominences, SILT, palpable pulses, full/painless A/PROM, compartments soft. No other injuries or complaints. Negative logroll/heelstrike BL.     Imaging:                                          A/P :   Patient is a 88y Male w/ L2 extension distraction fracture    -Recommend bedrest until the MRI  -Recommend post void residual --> please bladder scan patient 15-30 min after patient voids and either record in flow sheets, note, or call orthopaedic surgery directly -- Thank you!   -Patient has TLSO brace, family to bring  -DVT ppx: Would rec holding in event patient needs more urgent surgical intervention pending MRI completion & to determine acuity of injury; Will leave up to primary team risk v benefits w/ hx PE ; Pt on home Justina and A81 and has taken since fall >2 weeks prior per family, minimal risk epidural hematoma based upon hx provided per family/pt   -No heavy lifting/twisting  -Multimodal analgesia - recommend low dose opioids, acetaminophen, muscle relaxant as tolerated, would caution polypharmacy in elderly pt; patient comfortable in bed, not c/o pain upon exam   -All patient's questions answered. Patient understands and agrees w/ above plan.  -Case discussed w/ Dr. Beaulieu  -Will advise as plan changes, definitive plan pending further imaging  -At present family / patient adamantly stating they will not pursue surgical intervention; Plan would be T11-L5 decompression, PSF, possible L2 corpectomy  Patient is a 88y Male home ambulator with walker who presents to  ED w/ a c/o of "dehydration" and "hematuria" per wife/daughter. Patient was found to have suspected unstable L2 vertebral plana on CT of the A/P. Per prior documentation, patient was seen 2022 per Dr. Riggs of Neurosurgery for VCF T8, L2 (documented unstable per Neurology PA from convo w/ Rads), and Chronic L4 VCF. At that time patient was refusing further intervention/imaging, but was agreeable to a brace. Please reference noted from that time per NSx for further details. Patient did not follow up per family at bedside tonight.    Patient upon further questioning reports to have fallen onto his buttocks >2 weeks prior (prior to a dinner on 3/17 at [facility] where patient was ambulating with his walker at baseline, per Daughter).  Patient was brought to PCP on Tuesday of this week for routine testing, where the pcp found blood in his urine. He was prescribed bactrim for a suspected UTI per Dr. Watts his PCP, the wife was hesitant b/c of hx of c/ diff after surgery on his left hip. Wife picked up Rx and gave to patient Wednesday. Wednesday patient was found to be weak and Thursday was unable to ambulate to the bathroom without assistance. Normally ambulated to the bathroom w/o assistance. [place] old [place] house so wife moved his bed to the first floor bc he has difficulty w/ stairs. Patient was then wearing depens because of his decreased ability to ambulate and fall risk. So was urinating in the diaper overnight    Denies HS/LOC. Localizing pain to "buttocks", denies radiation of pain elsewhere. Denies pain down legs, denies numbness/tingling/paresthesias/weakness, denies incontinence of bowel/bladder.  Denies having any other pain elsewhere. No other orthopaedic concerns at this time.       On Eliquis for hx of b/l PEs    PAST MEDICAL & SURGICAL HISTORY:  HTN (hypertension)      Hyperlipidemia      COPD (chronic obstructive pulmonary disease)      Glaucoma of both eyes, unspecified glaucoma      CAD (coronary artery disease)      BPH (benign prostatic hyperplasia)      Glaucoma      Swelling of ankle  b/l      Shoulder pain, right      Humerus fracture  proximal, right      Hard of hearing      H/O Clostridium difficile infection  s/p left THR      H/O bilateral inguinal hernia repair        History of tonsillectomy      Status post coronary artery stent placement  reports stents x 4?      History of total hip replacement, left  2011      H/O colonoscopy      S/P cataract surgery  b/l          Vital Signs Last 24 Hrs  T(C): 36.9 (31 Mar 2023 19:40), Max: 36.9 (31 Mar 2023 19:40)  T(F): 98.5 (31 Mar 2023 19:40), Max: 98.5 (31 Mar 2023 19:40)  HR: 75 (31 Mar 2023 19:40) (71 - 75)  BP: 173/74 (31 Mar 2023 19:40) (161/62 - 186/88)  BP(mean): --  RR: 16 (31 Mar 2023 19:40) (16 - 18)  SpO2: 95% (31 Mar 2023 19:40) (94% - 95%)    Parameters below as of 31 Mar 2023 19:40  Patient On (Oxygen Delivery Method): room air      I&O's Detail      LABS:                        15.8   12.35 )-----------( 182      ( 31 Mar 2023 18:00 )             48.5         136  |  104  |  13  ----------------------------<  113<H>  4.7   |  25  |  0.94    Ca    8.8      31 Mar 2023 20:48    TPro  6.7  /  Alb  2.7<L>  /  TBili  1.1  /  DBili  x   /  AST  14<L>  /  ALT  14  /  AlkPhos  110      PT/INR - ( 31 Mar 2023 18:00 )   PT: 23.3 sec;   INR: 1.99 ratio         PTT - ( 31 Mar 2023 18:00 )  PTT:38.4 sec  Urinalysis Basic - ( 31 Mar 2023 18:23 )    Color: Yellow / Appearance: Clear / S.020 / pH: x  Gluc: x / Ketone: Moderate  / Bili: Negative / Urobili: Negative   Blood: x / Protein: Negative / Nitrite: Negative   Leuk Esterase: Negative / RBC: 0-2 /HPF / WBC 0-2 /HPF   Sq Epi: x / Non Sq Epi: Occasional / Bacteria: Occasional        PHYSICAL EXAM:  General; Awake and alert, Oriented x 3  Spine: No TTP over spinous processes or paraspinal muscles at C/T spine. No palpable step off.     Able to actively SLR BL, w/ pain in his "buttocks"    PHYSICAL EXAM  GEN: NAD, AAOx3    SPINE:    TTP over the bony prominences of the upper lumbar spine  NTTP over the bony prominences of the cervical / thoracic / sacral spine  No bony step-offs  Grossly moving all extremities  + Radial Pulse  + DP/PT Pulses  No saddle anesthesia  + rectal tone      MOTOR EXAM:                          Elbow Flex (C5)     Wrist Ext (C6)     Elbow Ext (C7)      Finger Flex (C8)    Finger Abduction (T1)  RIGHT                 5/5                         5/5                         5/5                          5/5                              5/5  LEFT                    5/5                         5/5                         5/5                          5/5                              5/5                           Hip Flex (L2)      Knee Ext (L3)      Ank Dorsiflex (L4)     Hallux Ext (L5)     Ank PlantarFlex (S1)  RIGHT               4/5                      5/5                          5/5                            5/5                           5/5  LEFT                  4/5                      5/5                          5/5                            5/5                           5/5      SENSORY EXAM:                        C5      C6      C7      C8       T1          RIGHT          2         2        2         2         2          (0=absent, 1=impaired, 2=normal, NT=not testable)  LEFT             2         2        2         2         2          (0=absent, 1=impaired, 2=normal, NT=not testable)                        L2        L3       L4      L5       S1          RIGHT        2          2         2        2        2           (0=absent, 1=impaired, 2=normal, NT=not testable)  LEFT           2          2         2        2        2           (0=absent, 1=impaired, 2=normal, NT=not testable)    Negative Bell's sign bilaterally  Negative Babinski bilaterally   Negative Myoclonus bilaterally        Secondary  No erythema/ecchymosis. No TTP over bony prominences, SILT, palpable pulses, full/painless A/PROM, compartments soft. No other injuries or complaints. Negative logroll/heelstrike BL.     IA/P :   Patient is a 88y Male w/ L2 extension distraction fracture    -Patient has TLSO brace, family to bring  -DVT ppx: Would rec holding in event patient needs more urgent surgical intervention pending MRI completion & to determine acuity of injury; Will leave up to primary team risk v benefits w/ hx PE ; Pt on home Justina and A81 and has taken since fall >2 weeks prior per family, minimal risk epidural hematoma based upon hx provided per family/pt   -No heavy lifting/twisting  -Multimodal analgesia - recommend low dose opioids, acetaminophen, muscle relaxant as tolerated, would caution polypharmacy in elderly pt; patient comfortable in bed, not c/o pain upon exam   -All patient's questions answered. Patient understands and agrees w/ above plan.  -Case discussed w/ Dr. Beaulieu  -Will advise as plan changes, definitive plan pending further imaging  -At present family / patient adamantly stating they will not pursue surgical intervention; Plan would be T11-L5 decompression, PSF, possible L2 corpectomy

## 2023-03-31 NOTE — ED PROVIDER NOTE - OBJECTIVE STATEMENT
87 yo male with a pmh/o HTN, COPD (not on home O2), CAD, HLD, bilateral PE May 2022, HTN, glaucoma, who presents to the ED BIBA c/o weakness, decreased PO intake and blood in urine for 2-3 days. Pt c/o buttock pain s/p fall on Thursday. Pt walks with a walker at baseline. Pt states with the walker pt fell, pt was able to walk after but with pain. Pt states he has had increased difficulty ambulating due to pain. Denies numbness to feet, abd pain. No decrease in appetite but decrease to PO intake due to ambulation issue. 89 yo male with a pmh/o HTN, COPD (not on home O2), CAD, HLD, bilateral PE May 2022 on Eliquis, HTN, glaucoma, who presents to the ED BIBA c/o weakness, decreased PO intake and blood in urine for 2-3 days. Pt c/o buttock pain s/p fall on Thursday. Pt walks with a walker at baseline. Pt states with the walker pt fell, pt was able to walk after but with pain. Pt states he has had increased difficulty ambulating due to pain. Denies numbness to feet, abd pain. No decrease in appetite but decrease to PO intake due to ambulation issue. 89 yo male with a PMHx HTN, COPD (not on home O2), CAD, HLD, bilateral PE May 2022 on Eliquis, HTN, glaucoma, who  BIBA from home to ED c/o weakness, decreased PO intake and blood in urine for 2-3 days. Pt c/o buttock pain s/p fall yesterday. Pt walks with a walker at baseline. Pt states while ambulating with the walker pt fell d/t general weakness, pt was able to walk afterwards but with pain to low back/buttocks. Pt states he has had increased difficulty ambulating due to pain & general weakness. Denies numbness to feet, abd pain, HA, pain to neck/upper back/extremities from the fall. No decrease in appetite but decrease to PO intake due to ambulation issue.

## 2023-03-31 NOTE — PHARMACOTHERAPY INTERVENTION NOTE - COMMENTS
Medication history complete, reviewed medications with patient wife at bedside and confirmed with doctor first med hx.

## 2023-03-31 NOTE — ED PROVIDER NOTE - CLINICAL SUMMARY MEDICAL DECISION MAKING FREE TEXT BOX
87 yo male with a pmh/o HTN, COPD (not on home O2), CAD, HLD, bilateral PE May 2022 on Eliquis, HTN, glaucoma, who presents to the ED BIBA c/o weakness, decreased PO intake and blood in urine for 2-3 days. Pt c/o bilateral buttock pain s/p fall yesterday. + tender posterior pelvis/buttock no evidence SMALLWOOD x4, good bilateral SLR with + buttock pain appears dehydrated, poorly ambulated at home due to weakness. Plan: labs including CBC CT head, CT pelvis, IV fluid fall precautions monitor, observe, reassess 89 yo male with PMHx incl.  HTN, COPD (not on home O2), CAD, bilateral PE May 2022 on Eliquis, HTN BIBA from home to ED c/o weakness, decreased PO intake and blood in urine for 2-3 days. Pt c/o bilateral buttock pain s/p fall yesterday. + Tender posterior pelvis/buttock, SMALLWOOD x4, good bilateral SLR with + buttock pain, appears mildly dehydrated, poorly ambulated at home due to general weakness.   Plan: labs including CBC CT head, CT pelvis, IV fluid fall precautions monitor, observe, reassess.    Refer to Progress Notes for case progression, further management & disposition.

## 2023-03-31 NOTE — H&P ADULT - HISTORY OF PRESENT ILLNESS
Trauma Consult, notified 3/31/23 815pm, attending bedside 832pm    Pt seen and examined at bedside with chaperone/wife/daughter. Pt is AAOx3, pt in no acute distress. Pt's family reports reason for bringing pt to hospital is "dehydration" as he has been recently diagnosed with an UTI and does not "drink enough water". Pt and family state mechanical fall approximately 2 weeks prior to admission and no subsequent trauma since that incident. Pt c/o buttock pain post fall but denied any other complaints. Pt ambulatory with walker at home. 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   Pt denied c/o fever, chills, chest pain, SOB, abd pain, N/V/D, extremity pain or dysfunction, hemoptysis, hematemesis, hematuria, hematochexia, headache, diplopia, vertigo, dizzyness. Pt tolerating diet, (+) void, (+) ambulation, (+) bowel function    Pt on eliquis anticoagulation for h/o pE

## 2023-03-31 NOTE — CONSULT NOTE ADULT - ATTENDING COMMENTS
Above note reviewed.    Patient's clinical presentation and available spinal imaging reviewed.  The patient presents with an L2 extension distraction fracture, which appears to have progressed from a prior visualized L2 extension distraction fracture seen on MRI 1/22/2022.    The patient and patient's family were agreeable to an MRI of the lumbar spine, but refused an MRI of the cervical and thoracic spine.    We had held an extensive discussion with the patient and patient's family regarding operative versus nonoperative management of the patient condition.  I discussed that the radiographic appearance of this fracture is that the patient possesses an unstable spinal fracture.  We discussed extensively risk versus benefits of operative versus nonoperative management.  The patient and patient's family refused surgical intervention and wished to proceed with TLSO bracing at all times.  We discussed that external bracing may not adequately support this fracture, which could displace even while compliant with TLSO bracing at all times.  We discussed risks of nonoperative management including but not limited to paralysis, which would likely be irreversible if this event were to occur.  Questions were sought and answered satisfactorily.  The patient and patient's family reported understanding.    Rosalio Beaulieu DO  Orthopedic and Spine Surgeon  [facility]

## 2023-03-31 NOTE — ED PROVIDER NOTE - SKIN, MLM
superficial abrasions bilateral shins, scattered ecchymosis superficial abrasions bilateral shins, scattered ecchymoses.  No tactile warmth.

## 2023-03-31 NOTE — ED ADULT TRIAGE NOTE - CHIEF COMPLAINT QUOTE
patient BIBA complaining of weakness, decreased PO intake and bld in urine x2-3days. patient complaining of buttock pain s/p fall on thursday

## 2023-03-31 NOTE — ED PROVIDER NOTE - MUSCULOSKELETAL, MLM
neck non tender supple without pain, back no focal tenderness including midline and ribs, no discoloration ecchymosis, mild posterior hip tenderness, SMALLWOOD x4 bilateral SLR 30 4/5 motor, + buttock pain during SLR testing no focal extremity tenderness or swelling Neck: nontender supple without pain.  Back no obvious focal tenderness including midline and ribs, no discoloration/ecchymosis.  Mild posterior hip tenderness. SMALLWOOD x4 bilateral SLR 30 with 4/5 motor, + buttock pain during SLR testing no focal extremity tenderness or swelling otherwise.

## 2023-03-31 NOTE — H&P ADULT - ASSESSMENT
A/P:  Inga subacute on chronic L2 fracture  Possible sacral fracture  Ortho spine consulted, pending plan per ortho spine; per spine surgery resident staff, OK to keep on eliquis at this time  Fall risk precautions  Monitor neurological exams q2 hours  ICU consult for surgical step down critical care mgmt  GI/DVT prophylaxis  Pain control  F/U labs, radiologic studies  Pt will be monitored for signs of evolution/resolution of pathology and surgical intervention as required and warranted  Pt and family aware of and agrees with all of the above

## 2023-03-31 NOTE — ED ADULT NURSE REASSESSMENT NOTE - COMFORT CARE
plan of care explained/repositioned/side rails up/warm blanket provided
plan of care explained/repositioned/side rails up/warm blanket provided

## 2023-03-31 NOTE — ED PROVIDER NOTE - ENMT, MLM
Airway patent, Nasal mucosa clear. Throat has no vesicles, no oropharyngeal exudates and uvula is midline. oropharynx clear mucus membrane mild to moderate dry few shallow lacerations to upper pallet NC/AT.  Airway patent, Nasal mucosa clear. Throat has no vesicles, no oropharyngeal exudates and uvula is midline. oropharynx clear mucus membrane mild to moderate dry, + few shallow ulcerations to upper palate

## 2023-03-31 NOTE — ED PROVIDER NOTE - CARE PLAN
Principal Discharge DX:	Frequent falls  Secondary Diagnosis:	Closed traumatic compression fracture of lumbar vertebra  Secondary Diagnosis:	Closed sacral fracture   1

## 2023-03-31 NOTE — H&P ADULT - NSHPPHYSICALEXAM_GEN_ALL_CORE
GCS of 15  Airway is patent  Breathing is symmetric and unlabored  Neuro: CNII-XII grossly intact  Psych: normal affect  HEENT: Normocephalic, atraumatic, WILLIAM, EOM wnl, no otorrhea or hemotympanum b/l, no epistaxis or d/c b/l nares, no craniofacial bony pathology or tenderness b/l  Neck: Soft and supple, nontender to passove/active ROM exam. No crepitus, no ecchymosis, no hematoma, to exam, no JVD, no tracheal deviation  Cspine/thoracolumbrosacral spine: mild sacral tenderness to exam, no gross bony pathology or tenderness to exam otherwise  Cardiovascular: S1S2 Present  Chest: no gross rib pathology or tenderness to exam. No sternal pathology or tenderness to exam. No crepitus, no ecchymosis, no hematoma. No penetrating thorcoabdominal trauma  Respiratory: Rate is 18; Respiratory Effort normal; no wheezes, rales or rhonchi to exam  ABD: bowel sounds (+), soft, nontender, non distended, no rebound, no guarding, no rigidity, no skin changes to exam. No pelvic instability to exam, no skin changes  Musculoskeletal: Pt has palpable b/l radial, femoral, dorsalis pedis pulses. All digits are warm and well perfused. No gross long bone pathology or tenderness to exam. Pt demonstrates grossly intact sensoromotor function. Pt has good capillary refill to digits, no calf edema or tenderness to exam.  Skin: no lesions or rashes to exam  ICU Vital Signs Last 24 Hrs  T(C): 36.9 (31 Mar 2023 19:40), Max: 36.9 (31 Mar 2023 19:40)  T(F): 98.5 (31 Mar 2023 19:40), Max: 98.5 (31 Mar 2023 19:40)  HR: 75 (31 Mar 2023 19:40) (71 - 75)  BP: 173/74 (31 Mar 2023 19:40) (161/62 - 186/88)  BP(mean): --  ABP: --  ABP(mean): --  RR: 16 (31 Mar 2023 19:40) (16 - 18)  SpO2: 95% (31 Mar 2023 19:40) (94% - 95%)    O2 Parameters below as of 31 Mar 2023 19:40  Patient On (Oxygen Delivery Method): room air

## 2023-03-31 NOTE — ED PROVIDER NOTE - CONSTITUTIONAL, MLM
normal... elderly white male alert no obvious respiratory distress non toxic elderly white male alert no obvious respiratory distress, non-toxic

## 2023-03-31 NOTE — H&P ADULT - NSHPLABSRESULTS_GEN_ALL_CORE
Labs:  CARDIAC MARKERS ( 31 Mar 2023 22:13 )  x     / x     / 38 U/L / x     / x                            15.8   12.35 )-----------( 182      ( 31 Mar 2023 18:00 )             48.5   CBC Full  -  ( 31 Mar 2023 18:00 )  WBC Count : 12.35 K/uL  RBC Count : 5.94 M/uL  Hemoglobin : 15.8 g/dL  Hematocrit : 48.5 %  Platelet Count - Automated : 182 K/uL  Mean Cell Volume : 81.6 fl  Mean Cell Hemoglobin : 26.6 pg  Mean Cell Hemoglobin Concentration : 32.6 gm/dL  Auto Neutrophil # : 9.49 K/uL  Auto Lymphocyte # : 1.31 K/uL  Auto Monocyte # : 1.33 K/uL  Auto Eosinophil # : 0.09 K/uL  Auto Basophil # : 0.07 K/uL  Auto Neutrophil % : 76.8 %  Auto Lymphocyte % : 10.6 %  Auto Monocyte % : 10.8 %  Auto Eosinophil % : 0.7 %  Auto Basophil % : 0.6 %  136  |  104  |  13  ----------------------------<  113<H>  4.7   |  25  |  0.94  Ca    8.8      31 Mar 2023 20:48  TPro  6.7  /  Alb  2.7<L>  /  TBili  1.1  /  DBili  x   /  AST  14<L>  /  ALT  14  /  AlkPhos  110  03-31  LIVER FUNCTIONS - ( 31 Mar 2023 20:48 )  Alb: 2.7 g/dL / Pro: 6.7 gm/dL / ALK PHOS: 110 U/L / ALT: 14 U/L / AST: 14 U/L / GGT: x         PT/INR - ( 31 Mar 2023 18:00 )   PT: 23.3 sec;   INR: 1.99 ratio    PTT - ( 31 Mar 2023 18:00 )  PTT:38.4 sec    Radiology:  Pending CT cspine and chest, b/l venous duplex (h/o PE in past)    ACC: [id number] EXAM:  CT ABDOMEN AND PELVIS IC   ORDERED BY: RUIZ KAYE   PROCEDURE DATE:  03/31/2023    INTERPRETATION:  CLINICAL INFORMATION: Buttock pain following fall.  COMPARISON: CT scan abdomen pelvis 5/5/2017.  IMPRESSION:  Horizontal, 3 column fracture of the L2 vertebral body, with wide   distraction of fracture fragments.  There is posterior displacement with moderate central canal narrowing.  Paravertebral soft tissue density could reflect hematoma.  Mottled sclerotic appearance of the vertebral body, cannot exclude   pathologic fracture.  Recommend further evaluation with MRI if there are no clinical   contraindications.  Possible nondisplaced sacral fracture.  Other findings as discussed above.    ACC: [id number] EXAM:  CT BRAIN   ORDERED BY: RUIZ KAYE   PROCEDURE DATE:  03/31/2023    INTERPRETATION:  CLINICAL INFORMATION: fall yesterday, ? head injury, +   Eliquis; r/o trauma. JCT. .  IMPRESSION:No acute intracranial hemorrhage or mass effect.    *****OF HISTORICAL NOTE*****  < from: CT Lumbar Spine w/ IV Cont (01.24.22 @ 11:15) >  ACC: [id number] EXAM:  CT LUMBAR SPINE IC                        PROCEDURE DATE:  01/24/2022    INTERPRETATION:  CLINICAL HISTORY: Leukocytosis. Status post fall with L2   fracture. Evaluate for  IMPRESSION:  Acute comminuted compression fracture of L2. No abnormal paraspinal   enhancement or evidence for abnormal epidural enhancement tosuggest   infection or tumor. Consider follow-up reimaging of the lumbar spine in   one month after the acute edema resolves with contrast-enhanced MRI for   better evaluation.

## 2023-03-31 NOTE — ED PROVIDER NOTE - PROGRESS NOTE DETAILS
Arslan Sierra   WBC 12 HGB 15.8 normal INR 2 UA moderate blood but 0 RBC, ct head negative CT + L2  fracture and possible sacral fracture, case discussed with Dr. Syed trauma needs trauma surgeon for ED evaluation Arslan Sierra   Family at bedside, wife and son state the fall was not yesterday but 2 weeks ago, wife called ambulance because pt was dehydrated. Arslan Barrera for Dr. Sierra   pt elevated by trauma Dr. Syed in context of sub acute trauma consulting spine if spine clear from their stand point trauma will also clear. Spine sending ortho resident for ED consult Arslan Sierra   pt evaluated by trauma Dr. Syed in context of subacute trauma: requests consulting spine if spine clear from their stand point trauma will also clear. Spine sending ortho resident for ED consult JUNIOR Sierra MD:  Received call from surgery resident: admission to Dr. Syed accepted to SICU, requested IV Ceftriaxone for possible UTI. Arslan Sierra   WBC 12, HGB 15.8 normal, INR 2, UA moderate blood but 0 RBC, CT head negative, CT + L2  fracture and possible sacral fracture, case discussed with Dr. Syed Trauma attdg: advises surgery resident to start ED evaluation Arslan Sierra   Family at bedside, wife and son state the fall was not yesterday but 2 weeks ago, wife called ambulance because pt seemed dehydrated. Arslan Sierra   Pt evaluated by trauma Dr. Syed: in context of subacute trauma requests consulting spine: if spine clears pt from their standpoint trauma will also clear. Spine attdg sending Ortho resident for ED consult

## 2023-03-31 NOTE — ED PROVIDER NOTE - RESPIRATORY, MLM
Breath sounds clear and equal bilaterally. Breath sounds clear and equal bilaterally.  Normal radial pulse.

## 2023-03-31 NOTE — ED ADULT NURSE NOTE - OBJECTIVE STATEMENT
pt is 87 yo male bibems from home for generalized weakness, pt reports feeling dehydrated.  pt aaox4, pt denies any recent falls, fever, chills, or nvd.  pt lives with wife, ambulates with walker.  pt aaox4.  [place].

## 2023-04-01 LAB
ANION GAP SERPL CALC-SCNC: 6 MMOL/L — SIGNIFICANT CHANGE UP (ref 5–17)
BASOPHILS # BLD AUTO: 0.07 K/UL — SIGNIFICANT CHANGE UP (ref 0–0.2)
BASOPHILS NFR BLD AUTO: 0.7 % — SIGNIFICANT CHANGE UP (ref 0–2)
BUN SERPL-MCNC: 15 MG/DL — SIGNIFICANT CHANGE UP (ref 7–23)
CALCIUM SERPL-MCNC: 9 MG/DL — SIGNIFICANT CHANGE UP (ref 8.5–10.1)
CHLORIDE SERPL-SCNC: 105 MMOL/L — SIGNIFICANT CHANGE UP (ref 96–108)
CO2 SERPL-SCNC: 23 MMOL/L — SIGNIFICANT CHANGE UP (ref 22–31)
CREAT SERPL-MCNC: 0.84 MG/DL — SIGNIFICANT CHANGE UP (ref 0.5–1.3)
CRP SERPL-MCNC: 129 MG/L — HIGH
EGFR: 84 ML/MIN/1.73M2 — SIGNIFICANT CHANGE UP
EOSINOPHIL # BLD AUTO: 0.16 K/UL — SIGNIFICANT CHANGE UP (ref 0–0.5)
EOSINOPHIL NFR BLD AUTO: 1.6 % — SIGNIFICANT CHANGE UP (ref 0–6)
ERYTHROCYTE [SEDIMENTATION RATE] IN BLOOD: 18 MM/HR — SIGNIFICANT CHANGE UP (ref 0–20)
GLUCOSE SERPL-MCNC: 94 MG/DL — SIGNIFICANT CHANGE UP (ref 70–99)
HCT VFR BLD CALC: 46 % — SIGNIFICANT CHANGE UP (ref 39–50)
HGB BLD-MCNC: 14.5 G/DL — SIGNIFICANT CHANGE UP (ref 13–17)
IMM GRANULOCYTES NFR BLD AUTO: 0.5 % — SIGNIFICANT CHANGE UP (ref 0–0.9)
LYMPHOCYTES # BLD AUTO: 1.3 K/UL — SIGNIFICANT CHANGE UP (ref 1–3.3)
LYMPHOCYTES # BLD AUTO: 13.3 % — SIGNIFICANT CHANGE UP (ref 13–44)
MCHC RBC-ENTMCNC: 26 PG — LOW (ref 27–34)
MCHC RBC-ENTMCNC: 31.5 GM/DL — LOW (ref 32–36)
MCV RBC AUTO: 82.6 FL — SIGNIFICANT CHANGE UP (ref 80–100)
MONOCYTES # BLD AUTO: 1.29 K/UL — HIGH (ref 0–0.9)
MONOCYTES NFR BLD AUTO: 13.2 % — SIGNIFICANT CHANGE UP (ref 2–14)
NEUTROPHILS # BLD AUTO: 6.92 K/UL — SIGNIFICANT CHANGE UP (ref 1.8–7.4)
NEUTROPHILS NFR BLD AUTO: 70.7 % — SIGNIFICANT CHANGE UP (ref 43–77)
PLATELET # BLD AUTO: 166 K/UL — SIGNIFICANT CHANGE UP (ref 150–400)
POTASSIUM SERPL-MCNC: 4.6 MMOL/L — SIGNIFICANT CHANGE UP (ref 3.5–5.3)
POTASSIUM SERPL-SCNC: 4.6 MMOL/L — SIGNIFICANT CHANGE UP (ref 3.5–5.3)
RBC # BLD: 5.57 M/UL — SIGNIFICANT CHANGE UP (ref 4.2–5.8)
RBC # FLD: 15.9 % — HIGH (ref 10.3–14.5)
SODIUM SERPL-SCNC: 134 MMOL/L — LOW (ref 135–145)
WBC # BLD: 9.79 K/UL — SIGNIFICANT CHANGE UP (ref 3.8–10.5)
WBC # FLD AUTO: 9.79 K/UL — SIGNIFICANT CHANGE UP (ref 3.8–10.5)

## 2023-04-01 PROCEDURE — 99221 1ST HOSP IP/OBS SF/LOW 40: CPT

## 2023-04-01 PROCEDURE — 99232 SBSQ HOSP IP/OBS MODERATE 35: CPT | Mod: GC

## 2023-04-01 PROCEDURE — 72158 MRI LUMBAR SPINE W/O & W/DYE: CPT | Mod: 26

## 2023-04-01 RX ADMIN — FAMOTIDINE 20 MILLIGRAM(S): 10 INJECTION INTRAVENOUS at 00:03

## 2023-04-01 RX ADMIN — TAMSULOSIN HYDROCHLORIDE 0.4 MILLIGRAM(S): 0.4 CAPSULE ORAL at 21:09

## 2023-04-01 RX ADMIN — FAMOTIDINE 20 MILLIGRAM(S): 10 INJECTION INTRAVENOUS at 09:18

## 2023-04-01 RX ADMIN — CEFTRIAXONE 1000 MILLIGRAM(S): 500 INJECTION, POWDER, FOR SOLUTION INTRAMUSCULAR; INTRAVENOUS at 00:03

## 2023-04-01 RX ADMIN — TAMSULOSIN HYDROCHLORIDE 0.4 MILLIGRAM(S): 0.4 CAPSULE ORAL at 00:03

## 2023-04-01 RX ADMIN — Medication 81 MILLIGRAM(S): at 09:18

## 2023-04-01 RX ADMIN — Medication 81 MILLIGRAM(S): at 00:11

## 2023-04-01 RX ADMIN — LATANOPROST 1 DROP(S): 0.05 SOLUTION/ DROPS OPHTHALMIC; TOPICAL at 22:21

## 2023-04-01 RX ADMIN — Medication 25 MILLIGRAM(S): at 09:18

## 2023-04-01 NOTE — CHART NOTE - NSCHARTNOTEFT_GEN_A_CORE
Discussion with patient/family regarding discussion of MRI results and explanation that patient's injury is an unstable three column that has not healed on its own and that we recommend surgery for definitive fixation to this injury. Discussed with pt/family that patient would be on bedrest with TLSO in place if opting for non-operative management as unstable three column injury can cause permanent life altering injury including but not limited to paralysis. Pt family stating they will have a family meeting to discuss operative management option but will likely opt for non-operative/conservative therapy. Will continue to follow up with family decision and continue to recommend surgery for definitive fixation.  D/w Dr. Beaulieu Discussion with patient/family regarding discussion of MRI results and explanation that patient's injury is an unstable three column that has not healed on its own and that we recommend surgery for definitive fixation to this injury. Discussed with pt/family that patient would be on bedrest with TLSO in place if opting for non-operative management as unstable three column injury can cause permanent life altering injury including but not limited to paralysis. Pt family stating they will have a family meeting to discuss operative management option but will likely opt for non-operative/conservative therapy. Will continue to follow up with family decision and continue to recommend surgery for definitive fixation.  D/w Dr. Beaulieu      Above note reviewed.    Patient's clinical presentation and available spinal imaging reviewed.  The patient presents with an L2 extension distraction fracture, which appears to have progressed from a prior visualized L2 extension distraction fracture seen on MRI 1/22/2022.    The patient and patient's family were agreeable to an MRI of the lumbar spine, but refused an MRI of the cervical and thoracic spine.    We had held an extensive discussion with the patient and patient's family regarding operative versus nonoperative management of the patient condition.  I discussed that the radiographic appearance of this fracture is that the patient possesses an unstable spinal fracture and that surgical intervention would best stabilize this injury.  We discussed extensively risk versus benefits of operative versus nonoperative management.  The patient and patient's family refused surgical intervention and wished to proceed with TLSO bracing at all times.  We discussed that external bracing may not adequately support this fracture, which could displace even while compliant with TLSO bracing at all times.  We discussed risks of nonoperative management including but not limited to paralysis, which would likely be irreversible if this event were to occur.  Questions were sought and answered satisfactorily.  The patient and patient's family reported understanding.    Rosalio Beaulieu DO  Orthopedic and Spine Surgeon  [facility]

## 2023-04-01 NOTE — PHYSICAL THERAPY INITIAL EVALUATION ADULT - CRITERIA FOR SKILLED THERAPEUTIC INTERVENTIONS
will attempt completion of Eval @ later date upon upgrade of activity orders; pt currently on strict bedrest; further course of PT intervention pending

## 2023-04-01 NOTE — ED ADULT NURSE REASSESSMENT NOTE - NS ED NURSE REASSESS COMMENT FT1
Purposeful rounding completed. PT in close range to nursing stating. Pt sleeping, no distress noted. Pt awaiting bed assignment.

## 2023-04-01 NOTE — PHYSICAL THERAPY INITIAL EVALUATION ADULT - GENERAL OBSERVATIONS, REHAB EVAL
O2 3L/min nc; HM; BP cuff; pulse oxym; pt rec'd in bed supine; HR 82; /55; O2 Sat 99%; RR 16; pt denied pain; MILES day

## 2023-04-01 NOTE — PATIENT PROFILE ADULT - FALL HARM RISK - HARM RISK INTERVENTIONS
Assistance with ambulation/Assistance OOB with selected safe patient handling equipment/Communicate Risk of Fall with Harm to all staff/Discuss with provider need for PT consult/Monitor gait and stability/Reinforce activity limits and safety measures with patient and family/Tailored Fall Risk Interventions/Use of alarms - bed, chair and/or voice tab/Visual Cue: Yellow wristband and red socks/Bed in lowest position, wheels locked, appropriate side rails in place/Call bell, personal items and telephone in reach/Instruct patient to call for assistance before getting out of bed or chair/Non-slip footwear when patient is out of bed/[place] to call system/Physically safe environment - no spills, clutter or unnecessary equipment/Purposeful Proactive Rounding/Room/bathroom lighting operational, light cord in reach

## 2023-04-01 NOTE — PHYSICAL THERAPY INITIAL EVALUATION ADULT - MODALITIES TREATMENT COMMENTS
pt left in bed supine post Eval; bed alarm on; all above lines/monitors intact; callbell in reach; yaakov well; denied pain

## 2023-04-01 NOTE — PROGRESS NOTE ADULT - SUBJECTIVE AND OBJECTIVE BOX
Critical care consult     asked to see Patient by Dr. Pagan         HPI:    This pateint is an 88 year old male with PMH significant for HTN, COPD, CAD, PE in  who reportedly  fell 2 weeks ago , slipping with shopping cart. he had been walking since. Yesterday family brought him to hospital for weakness  and felt he was dehydrated. he had recently been diagnosies with UTI.     Pt c/o buttock pain post fall but denied any other complaints. Pt was ambulating at home with walker at home. 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   Pt denied c/o fever, chills, chest pain, SOB, abd pain, N/V/D, extremity pain or dysfunction, hemoptysis,   headache,syncope Pt tolerating diet,    patient had trauma w/u which demonstrated L2 vertebral body fracture  Pt on eliquis anticoagulation for h/o pE (31 Mar 2023 22:42)  venous dopplers negative      PAST MEDICAL & SURGICAL HISTORY:  HTN (hypertension)  Hyperlipidemia  COPD (chronic obstructive pulmonary disease)  Glaucoma of both eyes, unspecified glaucoma  CAD (coronary artery disease)  BPH (benign prostatic hyperplasia)  Glaucoma   Shoulder pain, right  Humerus fracture  proximal, right  H/O Clostridium difficile infection  s/p left THR  H/O bilateral inguinal hernia repair  2016  istory of tonsillectomy  Status post coronary artery stent placement  reports stents x 4?  History of total hip replacement, left    H/O colonoscopy  S/P cataract surgery     FAMILY HISTORY:  Family history of hypertension in mother      Social hx. lives at home, non smoker, non drinker    MEDICATIONS  (STANDING):  aspirin  chewable 81 milliGRAM(s) Oral daily  famotidine    Tablet 20 milliGRAM(s) Oral daily  latanoprost 0.005% Ophthalmic Solution 1 Drop(s) Both EYES at bedtime  metoprolol succinate ER 25 milliGRAM(s) Oral daily  tamsulosin 0.4 milliGRAM(s) Oral at bedtime    MEDICATIONS  (PRN):  acetaminophen     Tablet .. 650 milliGRAM(s) Oral every 6 hours PRN Temp greater or equal to 38C (100.4F), Mild Pain (1 - 3)  albuterol    90 MICROgram(s) HFA Inhaler 2 Puff(s) Inhalation every 6 hours PRN Shortness of Breath and/or Wheezing  ondansetron Injectable 4 milliGRAM(s) IV Push every 6 hours PRN Nausea  oxyCODONE    IR 5 milliGRAM(s) Oral every 6 hours PRN Severe Pain (7 - 10)        Vital Signs Last 24 Hrs  T(C): 36.3 (2023 09:18), Max: 36.9 (31 Mar 2023 19:40)  T(F): 97.4 (2023 09:18), Max: 98.5 (31 Mar 2023 19:40)  HR: 63 (2023 08:00) (58 - 79)  BP: 151/56 (2023 08:00) (97/47 - 186/88)  BP(mean): 77 (2023 08:00) (58 - 78)  RR: 16 (2023 08:00) (15 - 23)  SpO2: 99% (2023 08:00) (92% - 100%)    Parameters below as of 2023 07:00  Patient On (Oxygen Delivery Method): nasal cannula  O2 Flow (L/min): 2    Daily Height in cm: 182.88 (31 Mar 2023 16:20)    Daily Weight in k.1 (2023 07:04)    PHYSICAL EXAM:    General: Frail no distreess  Neuro: awake , alert no distress ,good strength bilateral extremtieis  HEENT: N JVD, nc/at  Cardiovascular rrr, no murmur   Lungs:  Lungs clear. no rales, no wheezing, .  Abdomen:  Normoactive bowel sounds. Soft, flat, non-tender, and non-distended.  No hepatosplenomegaly, positive bowel sounds  Skin:  Warm, dry, well-perfused. No rashes or other lesions.   Extremities:  2+ pulses in upper and lower extremities. No lower extremity pain or  edema; legs are symmetric in appearance.    LABS:                        14.5   9.79  )-----------( 166      ( 2023 06:13 )             46.0     04-    134<L>  |  105  |  15  ----------------------------<  94  4.6   |  23  |  0.84    Ca    9.0      2023 06:13    TPro  6.7  /  Alb  2.7<L>  /  TBili  1.1  /  DBili  x   /  AST  14<L>  /  ALT  14  /  AlkPhos  110  03-31    PT/INR - ( 31 Mar 2023 18:00 )   PT: 23.3 sec;   INR: 1.99 ratio         PTT - ( 31 Mar 2023 18:00 )  PTT:38.4 sec  Urinalysis Basic - ( 31 Mar 2023 18:23 )    Color: Yellow / Appearance: Clear / S.020 / pH: x  Gluc: x / Ketone: Moderate  / Bili: Negative / Urobili: Negative   Blood: x / Protein: Negative / Nitrite: Negative   Leuk Esterase: Negative / RBC: 0-2 /HPF / WBC 0-2 /HPF   Sq Epi: x / Non Sq Epi: Occasional / Bacteria: Occasional

## 2023-04-01 NOTE — PROGRESS NOTE ADULT - ASSESSMENT
A/P:  Inga subacute on chronic L2 fracture  Possible sacral fracture  Ortho spine consulted, pending plan, family discussion about surgical management  Fall risk precautions  GI/DVT prophylaxis  Pain control  F/U labs, radiologic studies  Pt will be monitored for signs of evolution/resolution of pathology and surgical intervention as required and warranted  Pt and family aware of and agrees with all of the above A/P:  Inga subacute on chronic L2 fracture  Possible sacral fracture  Ortho spine consulted, pending plan, family discussion about surgical management  Fall risk precautions  GI/DVT prophylaxis  Pain control

## 2023-04-01 NOTE — PROGRESS NOTE ADULT - ASSESSMENT
Patient admitted with weakness, buttock pain  hx. PE last year now with L2 fracture subacute  admitted to CICU for neuro checks    1. L2 fracture for MRI plan per ortho  2. HTN on bblocker, bp ok  3. hx. of PE from last year, venous dopplers negative, no urgency to resume, can start when cleared by ortho  4. tolerating diet  5. hx. recent UTI but u/a now negative    when no longer needs q2 neuro checks can be downgraded

## 2023-04-01 NOTE — PROGRESS NOTE ADULT - SUBJECTIVE AND OBJECTIVE BOX
Patient seen and examined at bedside. No acute complaints at this time. Pain well controlled. Denies weakness, numbness or tingling. Denies chest pain, shortness of breath, nausea or vomiting. Patient states he is subjectively feeling better.     PE:  Vital Signs Last 24 Hrs  T(C): 36.9 (03-31-23 @ 23:00), Max: 36.9 (03-31-23 @ 19:40)  T(F): 98.5 (03-31-23 @ 23:00), Max: 98.5 (03-31-23 @ 19:40)  HR: 71 (04-01-23 @ 06:00) (60 - 79)  BP: 127/62 (04-01-23 @ 06:00) (97/47 - 186/88)  BP(mean): 72 (04-01-23 @ 06:00) (58 - 78)  RR: 20 (04-01-23 @ 06:00) (15 - 23)  SpO2: 99% (04-01-23 @ 06:00) (92% - 99%)    General: NAD, resting comfortably in bed    2+ radial pulses  2+ DP Pulses    Motor:                   C5                C6              C7               C8           T1   R             5/5                5/5            5/5              5/5          5/5  L             5/5                5/5            5/5              5/5          5/5                    L2                  L3             L4              L5            S1  R            4/5                5/5             5/5            5/5          5/5  L             4/5                5/5            5/5            5/5          5/5    Sensory:            C5         C6         C7      C8       T1        (0=absent, 1=impaired, 2=normal, NT=not testable)  R         2            2           2        2         2  L          2            2           2        2         2               L2          L3         L4      L5       S1         (0=absent, 1=impaired, 2=normal, NT=not testable)  R         2            2            2        2        2  L          2            2           2        2         2    **not acute weakness, pain limited                          14.5   9.79  )-----------( 166      ( 01 Apr 2023 06:13 )             46.0     04-01    134<L>  |  105  |  15  ----------------------------<  94  4.6   |  23  |  0.84    Ca    9.0      01 Apr 2023 06:13    TPro  6.7  /  Alb  2.7<L>  /  TBili  1.1  /  DBili  x   /  AST  14<L>  /  ALT  14  /  AlkPhos  110  03-31    PT/INR - ( 31 Mar 2023 18:00 )   PT: 23.3 sec;   INR: 1.99 ratio         PTT - ( 31 Mar 2023 18:00 )  PTT:38.4 sec                               A/P :   Patient is a 88y Male w/ L2 vertebra plana, Hx Chr T8, 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 Keely Hermosillo, family instructed 1/2022 possibly unstable and ?abscess however refused further work up per prior documentation)    -Clinical presentation and physical exam are not consistent w/ acute cord compression/cauda equina. Does not demonstrate red flag symptoms such as bowel/bladder incontinence, saddle anesthesia, fevers/chills, or weight loss. Patient does not have acute neurological deficits --> b/l hip flexion weakness pain limited 2/2 pain in buttocks  -CT of the cervical, thoracic spine, reviewed  -MRI of the Lumbar spine, will order with and without contrast to rule out pathologic injury or infection; MRI will ascertain acuity of injury w/ presence of edema as well and inform upon stability   -Patient was extensively educated about the signs and symptoms of osteomyelitis, epidural abscess, discitis, acute cord compression. The patient was advised to inform provider/nurse/etc if he develops neurological symptoms such as progressing weakness, numbness/tingling/paresthesias, worsening pain, bowel/bladder incontinence, or fevers/chills.  -Recommend bedrest until the MRI  -Recommend post void residual --> please bladder scan patient 15-30 min after patient voids and either record in flow sheets, note, or call orthopaedic surgery directly -- Thank you!   -Patient has TLSO brace, family to bring; Not for use when in bed/chair  -DVT ppx: Would rec holding in event patient needs more urgent surgical intervention pending MRI completion & to determine acuity of injury; Will leave up to primary team risk v benefits w/ hx PE 5/22; Pt on home Justina and A81 and has taken since fall >2 weeks prior per family, minimal risk epidural hematoma based upon hx provided per family/pt   -No heavy lifting/twisting  -Multimodal analgesia - recommend low dose opioids, acetaminophen, muscle relaxant as tolerated, would caution polypharmacy in elderly pt; patient comfortable in bed, not c/o pain upon exam   -At present family / patient adamantly stating they will not pursue surgical intervention; Plan would be T11-L5 decompression, PSF, possible L2 corpectomy   -Tsx mgmt appreciated  -All patient's questions answered. Patient understands and agrees w/ above plan.  -Case discussed w/ Dr. Beaulieu  -Will advise as plan changes, definitive plan pending further imaging Patient seen and examined at bedside. No acute complaints at this time. Pain well controlled. Denies weakness, numbness or tingling. Denies chest pain, shortness of breath, nausea or vomiting. Patient states he is subjectively feeling better.     PE:  Vital Signs Last 24 Hrs  T(C): 36.9 (03-31-23 @ 23:00), Max: 36.9 (03-31-23 @ 19:40)  T(F): 98.5 (03-31-23 @ 23:00), Max: 98.5 (03-31-23 @ 19:40)  HR: 71 (04-01-23 @ 06:00) (60 - 79)  BP: 127/62 (04-01-23 @ 06:00) (97/47 - 186/88)  BP(mean): 72 (04-01-23 @ 06:00) (58 - 78)  RR: 20 (04-01-23 @ 06:00) (15 - 23)  SpO2: 99% (04-01-23 @ 06:00) (92% - 99%)    General: NAD, resting comfortably in bed    2+ radial pulses  2+ DP Pulses    Motor:                   C5                C6              C7               C8           T1   R             5/5                5/5            5/5              5/5          5/5  L             5/5                5/5            5/5              5/5          5/5                    L2                  L3             L4              L5            S1  R            4/5                5/5             5/5            5/5          5/5  L             4/5                5/5            5/5            5/5          5/5    Sensory:            C5         C6         C7      C8       T1        (0=absent, 1=impaired, 2=normal, NT=not testable)  R         2            2           2        2         2  L          2            2           2        2         2               L2          L3         L4      L5       S1         (0=absent, 1=impaired, 2=normal, NT=not testable)  R         2            2            2        2        2  L          2            2           2        2         2                          14.5   9.79  )-----------( 166      ( 01 Apr 2023 06:13 )             46.0     04-01    134<L>  |  105  |  15  ----------------------------<  94  4.6   |  23  |  0.84    Ca    9.0      01 Apr 2023 06:13    TPro  6.7  /  Alb  2.7<L>  /  TBili  1.1  /  DBili  x   /  AST  14<L>  /  ALT  14  /  AlkPhos  110  03-31    PT/INR - ( 31 Mar 2023 18:00 )   PT: 23.3 sec;   INR: 1.99 ratio         PTT - ( 31 Mar 2023 18:00 )  PTT:38.4 sec                               A/P :   Patient is a 88y Male w/ L2 extension distraction fracture    -Patient has TLSO brace, family to bring  -DVT ppx: Would rec holding in event patient needs more urgent surgical intervention pending MRI completion & to determine acuity of injury; Will leave up to primary team risk v benefits w/ hx PE 5/22; Pt on home Justina and A81 and has taken since fall >2 weeks prior per family, minimal risk epidural hematoma based upon hx provided per family/pt   -No heavy lifting/twisting  -Multimodal analgesia - recommend low dose opioids, acetaminophen, muscle relaxant as tolerated, would caution polypharmacy in elderly pt; patient comfortable in bed, not c/o pain upon exam   -All patient's questions answered. Patient understands and agrees w/ above plan.  -Case discussed w/ Dr. Beaulieu  -Will advise as plan changes, definitive plan pending further imaging  -At present family / patient adamantly stating they will not pursue surgical intervention; Plan would be T11-L5 decompression, PSF, possible L2 corpectomy

## 2023-04-01 NOTE — PROGRESS NOTE ADULT - SUBJECTIVE AND OBJECTIVE BOX
Patient seen and examined at bedside. No acute complaints at this time. Pain well controlled. Denies weakness, numbness or tingling. Denies chest pain, shortness of breath, nausea or vomiting. Patient states he is subjectively feeling better.     PE:  Vital Signs Last 24 Hrs  T(C): 36.9 (03-31-23 @ 23:00), Max: 36.9 (03-31-23 @ 19:40)  T(F): 98.5 (03-31-23 @ 23:00), Max: 98.5 (03-31-23 @ 19:40)  HR: 71 (04-01-23 @ 06:00) (60 - 79)  BP: 127/62 (04-01-23 @ 06:00) (97/47 - 186/88)  BP(mean): 72 (04-01-23 @ 06:00) (58 - 78)  RR: 20 (04-01-23 @ 06:00) (15 - 23)  SpO2: 99% (04-01-23 @ 06:00) (92% - 99%)    General: NAD, resting comfortably in bed  Pulses: 2+ radial pulses  2+ DP Pulses  Abd: soft, ND, NT  Extremities : strength preserved                         14.5   9.79  )-----------( 166      ( 01 Apr 2023 06:13 )             46.0     04-01    134<L>  |  105  |  15  ----------------------------<  94  4.6   |  23  |  0.84    Ca    9.0      01 Apr 2023 06:13    TPro  6.7  /  Alb  2.7<L>  /  TBili  1.1  /  DBili  x   /  AST  14<L>  /  ALT  14  /  AlkPhos  110  03-31    PT/INR - ( 31 Mar 2023 18:00 )   PT: 23.3 sec;   INR: 1.99 ratio         PTT - ( 31 Mar 2023 18:00 )  PTT:38.4 sec

## 2023-04-01 NOTE — CHART NOTE - NSCHARTNOTEFT_GEN_A_CORE
Pt refusing MR last night and this morning. Recommending bedrest with spine precautions at all times. Pt needs TLSO brace to be worn at all times. Pt advised that instability of the spine can occur even while in brace and the risks include but are not limited to permanent disability and or paralysis. Pt family states patient has a TLSO at home that they are planning on bringing to patient today. Pt family refusing surgery at this time, risks of refusal explained and patient/family demonstrate understanding. Will attempt MRI again today. D/w Dr. Beaulieu. Pt refusing MR last night and this morning. Recommending bedrest with spine precautions at all times. Pt needs TLSO brace to be worn at all times. Pt advised that instability of the spine can occur even while in brace and the risks include but are not limited to permanent disability and or paralysis. Pt family states patient has a TLSO at home that they are planning on bringing to patient today. Pt family refusing surgery at this time, risks of refusal explained and patient/family demonstrate understanding. Will attempt MRI again today. D/w Dr. Beaulieu.      Above note reviewed.    Patient's clinical presentation and available spinal imaging reviewed.  The patient presents with an L2 extension distraction fracture, which appears to have progressed from a prior visualized L2 extension distraction fracture seen on MRI 1/22/2022.    The patient and patient's family were agreeable to an MRI of the lumbar spine, but refused an MRI of the cervical and thoracic spine.    We had held an extensive discussion with the patient and patient's family regarding operative versus nonoperative management of the patient condition.  I discussed that the radiographic appearance of this fracture is that the patient possesses an unstable spinal fracture and that surgical intervention would best stabilize this injury.  We discussed extensively risk versus benefits of operative versus nonoperative management.  The patient and patient's family refused surgical intervention and wished to proceed with TLSO bracing at all times.  We discussed that external bracing may not adequately support this fracture, which could displace even while compliant with TLSO bracing at all times.  We discussed risks of nonoperative management including but not limited to paralysis, which would likely be irreversible if this event were to occur.  Questions were sought and answered satisfactorily.  The patient and patient's family reported understanding.    Rosalio Beaulieu DO  Orthopedic and Spine Surgeon  [facility]

## 2023-04-02 LAB
ANION GAP SERPL CALC-SCNC: 5 MMOL/L — SIGNIFICANT CHANGE UP (ref 5–17)
BUN SERPL-MCNC: 21 MG/DL — SIGNIFICANT CHANGE UP (ref 7–23)
CALCIUM SERPL-MCNC: 8.6 MG/DL — SIGNIFICANT CHANGE UP (ref 8.5–10.1)
CHLORIDE SERPL-SCNC: 108 MMOL/L — SIGNIFICANT CHANGE UP (ref 96–108)
CO2 SERPL-SCNC: 25 MMOL/L — SIGNIFICANT CHANGE UP (ref 22–31)
CREAT SERPL-MCNC: 0.85 MG/DL — SIGNIFICANT CHANGE UP (ref 0.5–1.3)
EGFR: 84 ML/MIN/1.73M2 — SIGNIFICANT CHANGE UP
GLUCOSE SERPL-MCNC: 98 MG/DL — SIGNIFICANT CHANGE UP (ref 70–99)
HCT VFR BLD CALC: 46.1 % — SIGNIFICANT CHANGE UP (ref 39–50)
HGB BLD-MCNC: 14.3 G/DL — SIGNIFICANT CHANGE UP (ref 13–17)
MCHC RBC-ENTMCNC: 26 PG — LOW (ref 27–34)
MCHC RBC-ENTMCNC: 31 GM/DL — LOW (ref 32–36)
MCV RBC AUTO: 84 FL — SIGNIFICANT CHANGE UP (ref 80–100)
PLATELET # BLD AUTO: 174 K/UL — SIGNIFICANT CHANGE UP (ref 150–400)
POTASSIUM SERPL-MCNC: 4.1 MMOL/L — SIGNIFICANT CHANGE UP (ref 3.5–5.3)
POTASSIUM SERPL-SCNC: 4.1 MMOL/L — SIGNIFICANT CHANGE UP (ref 3.5–5.3)
RBC # BLD: 5.49 M/UL — SIGNIFICANT CHANGE UP (ref 4.2–5.8)
RBC # FLD: 15.9 % — HIGH (ref 10.3–14.5)
SODIUM SERPL-SCNC: 138 MMOL/L — SIGNIFICANT CHANGE UP (ref 135–145)
WBC # BLD: 8.88 K/UL — SIGNIFICANT CHANGE UP (ref 3.8–10.5)
WBC # FLD AUTO: 8.88 K/UL — SIGNIFICANT CHANGE UP (ref 3.8–10.5)

## 2023-04-02 PROCEDURE — 99232 SBSQ HOSP IP/OBS MODERATE 35: CPT | Mod: GC

## 2023-04-02 RX ADMIN — FAMOTIDINE 20 MILLIGRAM(S): 10 INJECTION INTRAVENOUS at 09:14

## 2023-04-02 RX ADMIN — Medication 25 MILLIGRAM(S): at 09:14

## 2023-04-02 RX ADMIN — LATANOPROST 1 DROP(S): 0.05 SOLUTION/ DROPS OPHTHALMIC; TOPICAL at 21:02

## 2023-04-02 RX ADMIN — Medication 81 MILLIGRAM(S): at 09:14

## 2023-04-02 NOTE — CONSULT NOTE ADULT - SUBJECTIVE AND OBJECTIVE BOX
CHIEF COMPLAINT: Patient is a 88y old  Male who presents with a chief complaint of fall 2 weeks ago, sacral fracture, l2 fracture (01 Apr 2023 12:37)      HPI:  Trauma Consult, notified 3/31/23 815pm, attending bedside 832pm    Pt seen and examined at bedside with chaperone/wife/daughter. Pt is AAOx3, pt in no acute distress. Pt's family reports reason for bringing pt to hospital is "dehydration" as he has been recently diagnosed with an UTI and does not "drink enough water". Pt and family state mechanical fall approximately 2 weeks prior to admission and no subsequent trauma since that incident. Pt c/o buttock pain post fall but denied any other complaints. Pt ambulatory with walker at home. 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   Pt denied c/o fever, chills, chest pain, SOB, abd pain, N/V/D, extremity pain or dysfunction, hemoptysis, hematemesis, hematuria, hematochexia, headache, diplopia, vertigo, dizzyness. Pt tolerating diet, (+) void, (+) ambulation, (+) bowel function    Pt on eliquis anticoagulation for h/o pE (31 Mar 2023 22:42)      PMHx: PAST MEDICAL & SURGICAL HISTORY:  HTN (hypertension)      Hyperlipidemia      COPD (chronic obstructive pulmonary disease)      Glaucoma of both eyes, unspecified glaucoma      CAD (coronary artery disease)      BPH (benign prostatic hyperplasia)      Glaucoma      Swelling of ankle  b/l      Shoulder pain, right      Humerus fracture  proximal, right      Hard of hearing      H/O Clostridium difficile infection  s/p left THR      H/O bilateral inguinal hernia repair  2016      History of tonsillectomy      Status post coronary artery stent placement  reports stents x 4?      History of total hip replacement, left  2011      H/O colonoscopy      S/P cataract surgery  b/l          Allergies: Allergies    No Known Allergies    Intolerances        Vital Signs Last 24 Hrs  T(C): 36.3 (02 Apr 2023 05:00), Max: 36.3 (01 Apr 2023 09:18)  T(F): 97.4 (02 Apr 2023 05:00), Max: 97.4 (01 Apr 2023 09:18)  HR: 70 (02 Apr 2023 07:00) (60 - 72)  BP: 144/67 (02 Apr 2023 07:00) (112/50 - 151/56)  BP(mean): 85 (02 Apr 2023 07:00) (63 - 88)  RR: 17 (02 Apr 2023 07:00) (12 - 20)  SpO2: 93% (02 Apr 2023 07:00) (93% - 100%)    Parameters below as of 02 Apr 2023 07:00  Patient On (Oxygen Delivery Method): nasal cannula  O2 Flow (L/min): 2      I&O's Summary    01 Apr 2023 07:01  -  02 Apr 2023 07:00  --------------------------------------------------------  IN: 0 mL / OUT: 250 mL / NET: -250 mL            PHYSICAL EXAM:   Constitutional: NAD, awake and alert, well-developed  Respiratory: Breath sounds are clear bilaterally, No wheezing, rales or rhonchi  Cardiovascular: S1 and S2, regular rate and rhythm,2.6 HSM  Murmur  Extremities: No peripheral edema  Vascular: 2+ peripheral pulses  Neurological: Alert      MEDICATIONS:  MEDICATIONS  (STANDING):  aspirin  chewable 81 milliGRAM(s) Oral daily  famotidine    Tablet 20 milliGRAM(s) Oral daily  latanoprost 0.005% Ophthalmic Solution 1 Drop(s) Both EYES at bedtime  metoprolol succinate ER 25 milliGRAM(s) Oral daily  tamsulosin 0.4 milliGRAM(s) Oral at bedtime      LABS: All Labs Reviewed:                        14.5   9.79  )-----------( 166      ( 01 Apr 2023 06:13 )             46.0     04-01    134<L>  |  105  |  15  ----------------------------<  94  4.6   |  23  |  0.84    Ca    9.0      01 Apr 2023 06:13    TPro  6.7  /  Alb  2.7<L>  /  TBili  1.1  /  DBili  x   /  AST  14<L>  /  ALT  14  /  AlkPhos  110  03-31    PT/INR - ( 31 Mar 2023 18:00 )   PT: 23.3 sec;   INR: 1.99 ratio         PTT - ( 31 Mar 2023 18:00 )  PTT:38.4 sec  CARDIAC MARKERS ( 31 Mar 2023 22:13 )  x     / x     / 38 U/L / x     / x            RADIOLOGY:< from: MR Lumbar Spine w/wo IV Cont (04.01.23 @ 12:03) >    IMPRESSION:    There is partially visualized T2 signal hyperintensity within the   bilateral sacral ala, raising suspicion for acute/subacute sacral   deficiency fractures. Underlying marrow replacing lesion cannot be   entirely excluded, but is less likely.    There is an evolving fracture deformity of the L2 vertebral body with   fluid occupying the fracture cleft.  No new lumbar vertebral body   compression deformity.    No evidence for acute osteomyelitis.    There is no significant change in multilevel discogenic degenerative   disease and facet arthropathy of the lumbar spine, most pronounced at   L2-L3 where there is moderate to severe bilateral neural foraminal   narrowing and moderate central canal narrowing.    --- End ofReport ---            BRENNEN AGUILERA MD; Attending Radiologist  This document has been electronically signed. Apr 1 2023  1:03PM    < end of copied text >      Telemetry:  SR    ECHO:< from: TTE Echo Complete w/o Contrast w/ Doppler (05.09.22 @ 12:16) >     Summary     Limited study.   Estimated left ventricular ejection fraction is >70 %.   The left atrium appears normal.   Normal appearing right atrium.   Limited study to assess right heart pressures.   No pulmonary hypertension is noted.   Trace tricuspid valve regurgitation is present.   No pulmonary hypertension.   The IVC appears underdistended.     Signature     ----------------------------------------------------------------   Electronically signed by Tho Schroeder MD(Interpreting   physician) on 05/09/2022 04:50 PM   ----------------------------------------------------------------    < end of copied text >
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
## 2023-04-02 NOTE — CHART NOTE - NSCHARTNOTEFT_GEN_A_CORE
Discussion with patient and family at bedside,. Family opting for nonoperative management at this time. Patient family was described the risks and benefits of the procedure and risks of refusal. Pt family demonstrating adequate understanding. Pt family brought TLSO. Pt placed in TLSO. Pt will be WBAT in TLSO at all times.    No acute orthopaedic surgical intervention indicated at this time. This patient is orthopaedically stable for discharge.   Patient to follow up with Dr. Beaulieu as an outpatient for further evaluation and management.   All of the patient's/family's questions and concerns were answered and addressed.  D/w Dr. Beaulieu Discussion with patient and family at bedside,. Family opting for nonoperative management at this time. Patient family was described the risks and benefits of the procedure and risks of refusal. Pt family demonstrating adequate understanding. Pt family brought TLSO. Pt placed in TLSO. Pt will be WBAT in TLSO at all times.    No acute orthopaedic surgical intervention indicated at this time. This patient is orthopaedically stable for discharge.   Patient to follow up with Dr. Beaulieu as an outpatient for further evaluation and management.   All of the patient's/family's questions and concerns were answered and addressed.  D/w Dr. Beaulieu      Above note reviewed.    Patient's clinical presentation and available spinal imaging reviewed.  The patient presents with an L2 extension distraction fracture, which appears to have progressed from a prior visualized L2 extension distraction fracture seen on MRI 1/22/2022.    The patient and patient's family were agreeable to an MRI of the lumbar spine, but refused an MRI of the cervical and thoracic spine.    We had held an extensive discussion with the patient and patient's family regarding operative versus nonoperative management of the patient condition.  I discussed that the radiographic appearance of this fracture is that the patient possesses an unstable spinal fracture and that surgical intervention would best stabilize this injury.  We discussed extensively risk versus benefits of operative versus nonoperative management.  The patient and patient's family refused surgical intervention and wished to proceed with TLSO bracing at all times.  We discussed that external bracing may not adequately support this fracture, which could displace even while compliant with TLSO bracing at all times.  We discussed risks of nonoperative management including but not limited to paralysis, which would likely be irreversible if this event were to occur.  Questions were sought and answered satisfactorily.  The patient and patient's family reported understanding.    Rosalio Beaulieu DO  Orthopedic and Spine Surgeon  [facility]

## 2023-04-03 ENCOUNTER — TRANSCRIPTION ENCOUNTER (OUTPATIENT)
Age: 88
End: 2023-04-03

## 2023-04-03 LAB
ANION GAP SERPL CALC-SCNC: 3 MMOL/L — LOW (ref 5–17)
BASOPHILS # BLD AUTO: 0.07 K/UL — SIGNIFICANT CHANGE UP (ref 0–0.2)
BASOPHILS NFR BLD AUTO: 0.7 % — SIGNIFICANT CHANGE UP (ref 0–2)
BUN SERPL-MCNC: 25 MG/DL — HIGH (ref 7–23)
CALCIUM SERPL-MCNC: 8.5 MG/DL — SIGNIFICANT CHANGE UP (ref 8.5–10.1)
CHLORIDE SERPL-SCNC: 110 MMOL/L — HIGH (ref 96–108)
CO2 SERPL-SCNC: 24 MMOL/L — SIGNIFICANT CHANGE UP (ref 22–31)
CREAT SERPL-MCNC: 0.89 MG/DL — SIGNIFICANT CHANGE UP (ref 0.5–1.3)
CULTURE RESULTS: SIGNIFICANT CHANGE UP
EGFR: 82 ML/MIN/1.73M2 — SIGNIFICANT CHANGE UP
EOSINOPHIL # BLD AUTO: 0.1 K/UL — SIGNIFICANT CHANGE UP (ref 0–0.5)
EOSINOPHIL NFR BLD AUTO: 0.9 % — SIGNIFICANT CHANGE UP (ref 0–6)
GLUCOSE SERPL-MCNC: 136 MG/DL — HIGH (ref 70–99)
HCT VFR BLD CALC: 46.4 % — SIGNIFICANT CHANGE UP (ref 39–50)
HGB BLD-MCNC: 14.5 G/DL — SIGNIFICANT CHANGE UP (ref 13–17)
IMM GRANULOCYTES NFR BLD AUTO: 0.7 % — SIGNIFICANT CHANGE UP (ref 0–0.9)
LYMPHOCYTES # BLD AUTO: 1.1 K/UL — SIGNIFICANT CHANGE UP (ref 1–3.3)
LYMPHOCYTES # BLD AUTO: 10.2 % — LOW (ref 13–44)
MAGNESIUM SERPL-MCNC: 2.2 MG/DL — SIGNIFICANT CHANGE UP (ref 1.6–2.6)
MCHC RBC-ENTMCNC: 26.5 PG — LOW (ref 27–34)
MCHC RBC-ENTMCNC: 31.3 GM/DL — LOW (ref 32–36)
MCV RBC AUTO: 84.7 FL — SIGNIFICANT CHANGE UP (ref 80–100)
MONOCYTES # BLD AUTO: 1.3 K/UL — HIGH (ref 0–0.9)
MONOCYTES NFR BLD AUTO: 12.1 % — SIGNIFICANT CHANGE UP (ref 2–14)
NEUTROPHILS # BLD AUTO: 8.1 K/UL — HIGH (ref 1.8–7.4)
NEUTROPHILS NFR BLD AUTO: 75.4 % — SIGNIFICANT CHANGE UP (ref 43–77)
PHOSPHATE SERPL-MCNC: 3 MG/DL — SIGNIFICANT CHANGE UP (ref 2.5–4.5)
PLATELET # BLD AUTO: 187 K/UL — SIGNIFICANT CHANGE UP (ref 150–400)
POTASSIUM SERPL-MCNC: 4.1 MMOL/L — SIGNIFICANT CHANGE UP (ref 3.5–5.3)
POTASSIUM SERPL-SCNC: 4.1 MMOL/L — SIGNIFICANT CHANGE UP (ref 3.5–5.3)
RBC # BLD: 5.48 M/UL — SIGNIFICANT CHANGE UP (ref 4.2–5.8)
RBC # FLD: 15.9 % — HIGH (ref 10.3–14.5)
SODIUM SERPL-SCNC: 137 MMOL/L — SIGNIFICANT CHANGE UP (ref 135–145)
SPECIMEN SOURCE: SIGNIFICANT CHANGE UP
WBC # BLD: 10.74 K/UL — HIGH (ref 3.8–10.5)
WBC # FLD AUTO: 10.74 K/UL — HIGH (ref 3.8–10.5)

## 2023-04-03 PROCEDURE — 99231 SBSQ HOSP IP/OBS SF/LOW 25: CPT

## 2023-04-03 RX ORDER — FLUTICASONE FUROATE, UMECLIDINIUM BROMIDE AND VILANTEROL TRIFENATATE 200; 62.5; 25 UG/1; UG/1; UG/1
1 POWDER RESPIRATORY (INHALATION) DAILY
Refills: 0 | Status: DISCONTINUED | OUTPATIENT
Start: 2023-04-03 | End: 2023-04-10

## 2023-04-03 RX ADMIN — Medication 25 MILLIGRAM(S): at 09:13

## 2023-04-03 RX ADMIN — Medication 81 MILLIGRAM(S): at 09:13

## 2023-04-03 RX ADMIN — TAMSULOSIN HYDROCHLORIDE 0.4 MILLIGRAM(S): 0.4 CAPSULE ORAL at 21:16

## 2023-04-03 RX ADMIN — LATANOPROST 1 DROP(S): 0.05 SOLUTION/ DROPS OPHTHALMIC; TOPICAL at 21:15

## 2023-04-03 RX ADMIN — FAMOTIDINE 20 MILLIGRAM(S): 10 INJECTION INTRAVENOUS at 09:13

## 2023-04-03 NOTE — DISCHARGE NOTE PROVIDER - NSDCMRMEDTOKEN_GEN_ALL_CORE_FT
albuterol 90 mcg/inh inhalation aerosol: 2 puff(s) inhaled every 4 hours, As Needed -Shortness of Breath and/or Wheezing - for bronchospasm   aspirin 81 mg oral delayed release tablet: 1 tab(s) orally once a day (at bedtime)  Eliquis 5 mg oral tablet: 1 tab(s) orally 2 times a day  enalapril 10 mg oral tablet: 1 tab(s) orally 2 times a day  famotidine 20 mg oral tablet: 1 tab(s) orally once a day  latanoprost 0.005% ophthalmic solution: 1 drop(s) to each affected eye once a day (at bedtime)  melatonin 3 mg oral tablet: 1 tab(s) orally once a day (at bedtime), As needed, Insomnia  metoprolol succinate 25 mg oral tablet, extended release: 1 tab(s) orally once a day  tamsulosin 0.4 mg oral capsule: 1 cap(s) orally once a day (at bedtime)  Trelegy Ellipta 100 mcg-62.5 mcg-25 mcg/inh inhalation powder: 1 puff(s) inhaled once a day  Tylenol 325 mg oral tablet: 2 tab(s) orally every 4 hours, As Needed  Vitamin D3 25 mcg (1000 intl units) oral tablet: 1 tab(s) orally once a day   albuterol 90 mcg/inh inhalation aerosol: 2 puff(s) inhaled every 4 hours, As Needed -Shortness of Breath and/or Wheezing - for bronchospasm   aspirin 81 mg oral delayed release tablet: 1 tab(s) orally once a day (at bedtime)  Eliquis 5 mg oral tablet: 1 tab(s) orally 2 times a day  enalapril 10 mg oral tablet: 1 tab(s) orally 2 times a day  famotidine 20 mg oral tablet: 1 tab(s) orally once a day  latanoprost 0.005% ophthalmic solution: 1 drop(s) to each affected eye once a day (at bedtime)  lidocaine 4% topical film: Apply topically to affected area once a day  melatonin 3 mg oral tablet: 1 tab(s) orally once a day (at bedtime), As needed, Insomnia  metoprolol succinate 25 mg oral tablet, extended release: 1 tab(s) orally once a day  polyethylene glycol 3350 oral powder for reconstitution: 17 gram(s) orally once a day  tamsulosin 0.4 mg oral capsule: 1 cap(s) orally once a day (at bedtime)  Trelegy Ellipta 100 mcg-62.5 mcg-25 mcg/inh inhalation powder: 1 puff(s) inhaled once a day  Tylenol 325 mg oral tablet: 2 tab(s) orally every 4 hours, As Needed  Vitamin D3 25 mcg (1000 intl units) oral tablet: 1 tab(s) orally once a day

## 2023-04-03 NOTE — DISCHARGE NOTE PROVIDER - DETAILS OF MALNUTRITION DIAGNOSIS/DIAGNOSES
This patient has been assessed with a concern for Malnutrition and was treated during this hospitalization for the following Nutrition diagnosis/diagnoses:     -  04/06/2023: Severe protein-calorie malnutrition

## 2023-04-03 NOTE — DISCHARGE NOTE PROVIDER - NSDCCPCAREPLAN_GEN_ALL_CORE_FT
PRINCIPAL DISCHARGE DIAGNOSIS  Diagnosis: Frequent falls  Assessment and Plan of Treatment:       SECONDARY DISCHARGE DIAGNOSES  Diagnosis: Closed traumatic compression fracture of lumbar vertebra  Assessment and Plan of Treatment:     Diagnosis: Closed sacral fracture  Assessment and Plan of Treatment:      PRINCIPAL DISCHARGE DIAGNOSIS  Diagnosis: Frequent falls  Assessment and Plan of Treatment: fall precautions      SECONDARY DISCHARGE DIAGNOSES  Diagnosis: Closed traumatic compression fracture of lumbar vertebra  Assessment and Plan of Treatment: TSLO brace  rehab    Diagnosis: Closed sacral fracture  Assessment and Plan of Treatment: MUNIR

## 2023-04-03 NOTE — DISCHARGE NOTE PROVIDER - ATTENDING DISCHARGE PHYSICAL EXAMINATION:
GCS of 15  Airway is patent  Breathing is symmetric and unlabored  Neuro: CNII-XII grossly intact  Psych: normal affect  HEENT: Normocephalic, atraumatic, WILLIAM, EOM wnl  Neck: No crepitus, no ecchymosis, no hematoma, to exam, no JVD, no tracheal deviation  Cspine/thoracolumbrosacral spine: in TLSO brace, known L2 chronic fracture and subacute sacral fracture  Cardiovascular: S1S2 Present  Chest: no gross rib pathology or tenderness to exam. No sternal pathology or tenderness to exam. No crepitus, no ecchymosis, no hematoma.   Respiratory: Rate is 18; Respiratory Effort normal; no wheezes, rales or rhonchi to exam  ABD: bowel sounds (+), soft, nontender, non distended, no rebound, no guarding, no rigidity, no skin changes to exam. No pelvic instability to exam, no skin changes  Musculoskeletal: Pt has palpable b/l radial, femoral, dorsalis pedis pulses. All digits are warm and well perfused. No gross long bone pathology or tenderness to exam. Pt demonstrates grossly intact sensoromotor function to limited exam. Pt has good capillary refill to digits, no calf edema or tenderness to exam.  Skin: no lesions or rashes to exam

## 2023-04-03 NOTE — DISCHARGE NOTE PROVIDER - PROVIDER TOKENS
PROVIDER:[TOKEN:[[id number]],FOLLOWUP:[2 weeks]] PROVIDER:[TOKEN:[[id number]],FOLLOWUP:[2 weeks]],FREE:[LAST:[Primary Medical Doctor],PHONE:[(   )    -],FAX:[(   )    -],FOLLOWUP:[1-3 days]] PROVIDER:[TOKEN:[[id number]],FOLLOWUP:[2 weeks]],PROVIDER:[TOKEN:[[id number]]]

## 2023-04-03 NOTE — DISCHARGE NOTE PROVIDER - HOSPITAL COURSE
The patient presents with an L2 extension distraction fracture, which appears to have progressed from a prior visualized L2 extension distraction fracture seen on MRI 1/22/2022.   Family opting for nonoperative management at this time. Patient family was described the risks and benefits of the procedure and risks of refusal. Pt family demonstrating adequate understanding. Pt family brought TLSO. Pt placed in TLSO. Pt will be WBAT in TLSO at all times. PHYSICAL EXAM:    Vital Signs Last 24 Hrs  T(C): 37.1 (10 Apr 2023 08:23), Max: 37.1 (10 Apr 2023 08:23)  T(F): 98.7 (10 Apr 2023 08:23), Max: 98.7 (10 Apr 2023 08:23)  HR: 67 (10 Apr 2023 08:23) (67 - 78)  BP: 129/65 (10 Apr 2023 08:23) (129/65 - 142/62)  BP(mean): --  RR: 18 (10 Apr 2023 08:23) (18 - 18)  SpO2: 95% (10 Apr 2023 08:23) (93% - 95%)    Constitutional: Weak  appearing  HEENT: Atraumatic, WILLIAM, Normal, No congestion  Respiratory: Breath Sounds normal, no rhonchi/wheeze  Cardiovascular: N S1S2;   Gastrointestinal: Abdomen soft, non tender, Bowel Sounds present  Extremities: No edema, peripheral pulses present  Neurological: AAO x 3, no gross focal motor deficits  Skin: Non cellulitic, no rash, ulcers  Lymph Nodes: No lymphadenopathy noted  Back: No CVA tenderness   Musculoskeletal: non tender  Breasts: Deferred  Genitourinary: deferred  Rectal: Deferred      88/M admitted with :     * Acute chance fracture of L2 with comminuted fragments of the vertebral body with central distraction and edema. Fractures appear to be present within the pedicles and articular bodies with edema within the L2-3 interspinous space and disruption of the ligamentum flavum. This is consistent with an unstable fracture.   - pt and family refused surgery.   The patient presents with an L2 extension distraction fracture, which appears to have progressed from a prior visualized L2 extension distraction fracture seen on MRI 1/22/2022.   Family opting for nonoperative management at this time. Patient family was described the risks and benefits of the procedure and risks of refusal. Pt family demonstrating adequate understanding. Pt family brought TLSO. Pt placed in TLSO. Pt will be WBAT in TLSO at all times.    * Somnolence maybe narcs related  lethargy resolved  ceftriaxone dced; No UTI; urine cx neg    * Shoulder pain  this dx is present in Dr Watts's note so it must be a chronic problem  Xray left shoulder- tendinitis/tendinosis.  prn Tylenol   Lidocaine    * Constipation: add  Miralax  document BM      d/c to MUNIR    time spent 45 min

## 2023-04-03 NOTE — PROGRESS NOTE ADULT - SUBJECTIVE AND OBJECTIVE BOX
Patient was seen and examined at the bedside this morning  No acute events overnight  Pain is better controlled  No nausea or vomiting  Tolerating diet   TLSO brace in place  Patient and family still refusing surgery    O/E:  T(C): 36.6 (04-03-23 @ 08:29), Max: 36.8 (04-02-23 @ 19:29)  HR: 76 (04-03-23 @ 10:27) (63 - 81)  BP: 121/51 (04-03-23 @ 10:27) (116/51 - 145/51)  RR: 15 (04-03-23 @ 10:27) (15 - 24)  SpO2: 92% (04-03-23 @ 10:27) (91% - 99%)  Alert, conscious, oriented  No pallor, jaundice or cyanosis  Not in pain or distress  Chest clear, equal air entry bilaterally  Abdomen soft and lax, no tenderness  Extremities: No swelling or tenderness    04-03-23 @ 07:01  -  04-03-23 @ 10:30  --------------------------------------------------------  IN: 480 mL / OUT: 0 mL / NET: 480 mL                            14.5   10.74 )-----------( 187      ( 03 Apr 2023 06:50 )             46.4   04-03    137  |  110<H>  |  25<H>  ----------------------------<  136<H>  4.1   |  24  |  0.89    Ca    8.5      03 Apr 2023 06:50  Phos  3.0     04-03  Mg     2.2     04-03    MEDICATIONS  (STANDING):  aspirin  chewable 81 milliGRAM(s) Oral daily  famotidine    Tablet 20 milliGRAM(s) Oral daily  latanoprost 0.005% Ophthalmic Solution 1 Drop(s) Both EYES at bedtime  metoprolol succinate ER 25 milliGRAM(s) Oral daily  tamsulosin 0.4 milliGRAM(s) Oral at bedtime    MEDICATIONS  (PRN):  acetaminophen     Tablet .. 650 milliGRAM(s) Oral every 6 hours PRN Temp greater or equal to 38C (100.4F), Mild Pain (1 - 3)  albuterol    90 MICROgram(s) HFA Inhaler 2 Puff(s) Inhalation every 6 hours PRN Shortness of Breath and/or Wheezing  ondansetron Injectable 4 milliGRAM(s) IV Push every 6 hours PRN Nausea  oxyCODONE    IR 5 milliGRAM(s) Oral every 6 hours PRN Severe Pain (7 - 10)

## 2023-04-03 NOTE — DISCHARGE NOTE PROVIDER - NSDCFUADDINST_GEN_ALL_CORE_FT
USE TLSO brace at all times USE TLSO brace at all times as instructed by spine surgeon  Please take all appropriate fall risk precautions  Please follow up as indicated  Please seek immediate medical attention for chest pain, shortness of breath, any adverse changes to health

## 2023-04-03 NOTE — DISCHARGE NOTE PROVIDER - NSDCACTIVITY_GEN_ALL_CORE
Bathing allowed Bathing allowed/Do not drive or operate machinery/Showering allowed/Do not make important decisions/Stairs allowed/Walking - Indoors allowed/No heavy lifting/straining/Walking - Outdoors allowed/Follow Instructions Provided by your Surgical Team

## 2023-04-03 NOTE — DISCHARGE NOTE PROVIDER - CARE PROVIDER_API CALL
Rosalio Beaulieu (DO)  Orthopaedic Surgery Surgery  [address] [place] [address]  Phone: [phone]  Fax: [phone]  Follow Up Time: 2 weeks   Rosalio Beaulieu (DO)  Orthopaedic Surgery Surgery  [address] [place] [address]  Phone: [phone]  Fax: [phone]  Follow Up Time: 2 weeks    Primary Medical Doctor,   Phone: (   )    -  Fax: (   )    -  Follow Up Time: 1-3 days   Rosalio Beaulieu (DO)  Orthopaedic Surgery Surgery  [address] [place] [address]  Phone: [phone]  Fax: [phone]  Follow Up Time: 2 weeks    Trey Watts)  Cardiovascular Disease; Internal Medicine; Nuclear Cardiology  [address] [facility], [address]  [place] [address]  Phone: [phone]  Fax: [phone]  Follow Up Time:

## 2023-04-04 LAB
ANION GAP SERPL CALC-SCNC: 5 MMOL/L — SIGNIFICANT CHANGE UP (ref 5–17)
BASOPHILS # BLD AUTO: 0.08 K/UL — SIGNIFICANT CHANGE UP (ref 0–0.2)
BASOPHILS NFR BLD AUTO: 0.7 % — SIGNIFICANT CHANGE UP (ref 0–2)
BUN SERPL-MCNC: 22 MG/DL — SIGNIFICANT CHANGE UP (ref 7–23)
CALCIUM SERPL-MCNC: 9.1 MG/DL — SIGNIFICANT CHANGE UP (ref 8.5–10.1)
CHLORIDE SERPL-SCNC: 109 MMOL/L — HIGH (ref 96–108)
CO2 SERPL-SCNC: 21 MMOL/L — LOW (ref 22–31)
CREAT SERPL-MCNC: 0.81 MG/DL — SIGNIFICANT CHANGE UP (ref 0.5–1.3)
EGFR: 85 ML/MIN/1.73M2 — SIGNIFICANT CHANGE UP
EOSINOPHIL # BLD AUTO: 0.07 K/UL — SIGNIFICANT CHANGE UP (ref 0–0.5)
EOSINOPHIL NFR BLD AUTO: 0.6 % — SIGNIFICANT CHANGE UP (ref 0–6)
GLUCOSE SERPL-MCNC: 125 MG/DL — HIGH (ref 70–99)
HCT VFR BLD CALC: 46.8 % — SIGNIFICANT CHANGE UP (ref 39–50)
HGB BLD-MCNC: 14.3 G/DL — SIGNIFICANT CHANGE UP (ref 13–17)
IMM GRANULOCYTES NFR BLD AUTO: 0.7 % — SIGNIFICANT CHANGE UP (ref 0–0.9)
LYMPHOCYTES # BLD AUTO: 1.13 K/UL — SIGNIFICANT CHANGE UP (ref 1–3.3)
LYMPHOCYTES # BLD AUTO: 9.9 % — LOW (ref 13–44)
MAGNESIUM SERPL-MCNC: 2.3 MG/DL — SIGNIFICANT CHANGE UP (ref 1.6–2.6)
MCHC RBC-ENTMCNC: 26 PG — LOW (ref 27–34)
MCHC RBC-ENTMCNC: 30.6 GM/DL — LOW (ref 32–36)
MCV RBC AUTO: 85.2 FL — SIGNIFICANT CHANGE UP (ref 80–100)
MONOCYTES # BLD AUTO: 1.42 K/UL — HIGH (ref 0–0.9)
MONOCYTES NFR BLD AUTO: 12.4 % — SIGNIFICANT CHANGE UP (ref 2–14)
NEUTROPHILS # BLD AUTO: 8.64 K/UL — HIGH (ref 1.8–7.4)
NEUTROPHILS NFR BLD AUTO: 75.7 % — SIGNIFICANT CHANGE UP (ref 43–77)
PHOSPHATE SERPL-MCNC: 3 MG/DL — SIGNIFICANT CHANGE UP (ref 2.5–4.5)
PLATELET # BLD AUTO: 190 K/UL — SIGNIFICANT CHANGE UP (ref 150–400)
POTASSIUM SERPL-MCNC: 4 MMOL/L — SIGNIFICANT CHANGE UP (ref 3.5–5.3)
POTASSIUM SERPL-SCNC: 4 MMOL/L — SIGNIFICANT CHANGE UP (ref 3.5–5.3)
RBC # BLD: 5.49 M/UL — SIGNIFICANT CHANGE UP (ref 4.2–5.8)
RBC # FLD: 16.1 % — HIGH (ref 10.3–14.5)
SODIUM SERPL-SCNC: 135 MMOL/L — SIGNIFICANT CHANGE UP (ref 135–145)
WBC # BLD: 11.42 K/UL — HIGH (ref 3.8–10.5)
WBC # FLD AUTO: 11.42 K/UL — HIGH (ref 3.8–10.5)

## 2023-04-04 PROCEDURE — 99231 SBSQ HOSP IP/OBS SF/LOW 25: CPT

## 2023-04-04 RX ADMIN — Medication 25 MILLIGRAM(S): at 09:26

## 2023-04-04 RX ADMIN — FLUTICASONE FUROATE, UMECLIDINIUM BROMIDE AND VILANTEROL TRIFENATATE 1 PUFF(S): 200; 62.5; 25 POWDER RESPIRATORY (INHALATION) at 14:27

## 2023-04-04 RX ADMIN — FAMOTIDINE 20 MILLIGRAM(S): 10 INJECTION INTRAVENOUS at 09:26

## 2023-04-04 RX ADMIN — Medication 81 MILLIGRAM(S): at 09:26

## 2023-04-04 RX ADMIN — TAMSULOSIN HYDROCHLORIDE 0.4 MILLIGRAM(S): 0.4 CAPSULE ORAL at 21:53

## 2023-04-04 RX ADMIN — LATANOPROST 1 DROP(S): 0.05 SOLUTION/ DROPS OPHTHALMIC; TOPICAL at 21:53

## 2023-04-04 NOTE — PROGRESS NOTE ADULT - SUBJECTIVE AND OBJECTIVE BOX
Patient was seen and examined at the bedside this morning  No acute events overnight  Pain is better controlled  No nausea or vomiting  Tolerating diet   TLSO brace in place        MEDICATIONS  (STANDING):  aspirin  chewable 81 milliGRAM(s) Oral daily  famotidine    Tablet 20 milliGRAM(s) Oral daily  fluticasone furoate/umeclidinium/vilanterol 100-62.5-25 MICROgram(s) Inhaler 1 Puff(s) Inhalation daily  latanoprost 0.005% Ophthalmic Solution 1 Drop(s) Both EYES at bedtime  metoprolol succinate ER 25 milliGRAM(s) Oral daily  tamsulosin 0.4 milliGRAM(s) Oral at bedtime    MEDICATIONS  (PRN):  acetaminophen     Tablet .. 650 milliGRAM(s) Oral every 6 hours PRN Temp greater or equal to 38C (100.4F), Mild Pain (1 - 3)  albuterol    90 MICROgram(s) HFA Inhaler 2 Puff(s) Inhalation every 6 hours PRN Shortness of Breath and/or Wheezing  ondansetron Injectable 4 milliGRAM(s) IV Push every 6 hours PRN Nausea  oxyCODONE    IR 5 milliGRAM(s) Oral every 6 hours PRN Severe Pain (7 - 10)      PAST MEDICAL & SURGICAL HISTORY:  HTN (hypertension)      Hyperlipidemia      COPD (chronic obstructive pulmonary disease)      Glaucoma of both eyes, unspecified glaucoma      CAD (coronary artery disease)      BPH (benign prostatic hyperplasia)      Glaucoma      Swelling of ankle  b/l      Shoulder pain, right      Humerus fracture  proximal, right      Hard of hearing      H/O Clostridium difficile infection  s/p left THR      H/O bilateral inguinal hernia repair  2016      History of tonsillectomy      Status post coronary artery stent placement  reports stents x 4?      History of total hip replacement, left  2011      H/O colonoscopy      S/P cataract surgery  b/l          Vital Signs Last 24 Hrs  T(C): 36.7 (04 Apr 2023 08:00), Max: 36.9 (03 Apr 2023 12:00)  T(F): 98 (04 Apr 2023 08:00), Max: 98.4 (03 Apr 2023 12:00)  HR: 73 (04 Apr 2023 08:00) (68 - 78)  BP: 152/75 (04 Apr 2023 08:00) (88/71 - 152/75)  BP(mean): 93 (04 Apr 2023 08:00) (60 - 95)  RR: 25 (03 Apr 2023 11:00) (25 - 25)  SpO2: 97% (04 Apr 2023 08:00) (89% - 97%)        I&O's Summary    03 Apr 2023 07:01  -  04 Apr 2023 07:00  --------------------------------------------------------  IN: 480 mL / OUT: 0 mL / NET: 480 mL    04 Apr 2023 07:01  -  04 Apr 2023 10:37  --------------------------------------------------------  IN: 480 mL / OUT: 0 mL / NET: 480 mL                              14.3   11.42 )-----------( 190      ( 04 Apr 2023 07:02 )             46.8     04-04    135  |  109<H>  |  22  ----------------------------<  125<H>  4.0   |  21<L>  |  0.81    Ca    9.1      04 Apr 2023 07:02  Phos  3.0     04-04  Mg     2.3     04-04            Culture - Urine (collected 04-02-23 @ 17:00)  Source: Clean Catch Clean Catch (Midstream)  Final Report (04-03-23 @ 23:04):    <10,000 CFU/mL Normal Urogenital Diana    Culture - Blood (collected 04-01-23 @ 09:33)  Source: .Blood None  Preliminary Report (04-02-23 @ 17:02):    No growth to date.

## 2023-04-04 NOTE — PROGRESS NOTE ADULT - ASSESSMENT
A/P:  Chronic L2 fracture, 3 column injury, pt and family refusing surgery  Subacte sacral fracture  Subacute/chronic T8 compression deformity  Pt pending subacute rehab placement  Pt stable and cleared from trauma surgical standpoint  Spine surgery on consult, family refusing intervention, pt to wear TLSO brace

## 2023-04-04 NOTE — PROGRESS NOTE ADULT - ASSESSMENT
88 year old male with L2 fracture, refusing surgery    Plan:  PT evaluation  SW for placement  Analgesia  stable for dc    Plan discussed w/ Dr. Syed

## 2023-04-04 NOTE — PROGRESS NOTE ADULT - SUBJECTIVE AND OBJECTIVE BOX
CC:Patient is a 88y old  Male who presents with a chief complaint of fall 2 weeks ago, sacral fracture, l2 fracture (04 Apr 2023 10:36)      Subjective:  Pt seen and examined at bedside. Pt is AAOx3, pt in no acute distress. Pt pending rehab placement. Pt denied c/o fever, chills, chest pain, SOB, abd pain, N/V/D, extremity pain or dysfunction, hemoptysis, hematemesis, hematuria, hematochexia, headache, diplopia, vertigo, dizzyness. Pt tolerating diet, (+) void, (+) bowel function    ROS:  otherwise as abovementioned ROS    Vital Signs Last 24 Hrs  T(C): 36.4 (04 Apr 2023 15:04), Max: 36.9 (04 Apr 2023 06:20)  T(F): 97.5 (04 Apr 2023 15:04), Max: 98.4 (04 Apr 2023 06:20)  HR: 66 (04 Apr 2023 15:04) (66 - 73)  BP: 136/51 (04 Apr 2023 15:04) (103/92 - 152/75)  BP(mean): 71 (04 Apr 2023 15:04) (64 - 95)  RR: 18 (04 Apr 2023 15:04) (18 - 18)  SpO2: 94% (04 Apr 2023 15:04) (93% - 97%)        Labs:                                14.3   11.42 )-----------( 190      ( 04 Apr 2023 07:02 )             46.8     CBC Full  -  ( 04 Apr 2023 07:02 )  WBC Count : 11.42 K/uL  RBC Count : 5.49 M/uL  Hemoglobin : 14.3 g/dL  Hematocrit : 46.8 %  Platelet Count - Automated : 190 K/uL  Mean Cell Volume : 85.2 fl  Mean Cell Hemoglobin : 26.0 pg  Mean Cell Hemoglobin Concentration : 30.6 gm/dL  Auto Neutrophil # : 8.64 K/uL  Auto Lymphocyte # : 1.13 K/uL  Auto Monocyte # : 1.42 K/uL  Auto Eosinophil # : 0.07 K/uL  Auto Basophil # : 0.08 K/uL  Auto Neutrophil % : 75.7 %  Auto Lymphocyte % : 9.9 %  Auto Monocyte % : 12.4 %  Auto Eosinophil % : 0.6 %  Auto Basophil % : 0.7 %    04-04    135  |  109<H>  |  22  ----------------------------<  125<H>  4.0   |  21<L>  |  0.81    Ca    9.1      04 Apr 2023 07:02  Phos  3.0     04-04  Mg     2.3     04-04              Meds:  acetaminophen     Tablet .. 650 milliGRAM(s) Oral every 6 hours PRN  albuterol    90 MICROgram(s) HFA Inhaler 2 Puff(s) Inhalation every 6 hours PRN  aspirin  chewable 81 milliGRAM(s) Oral daily  famotidine    Tablet 20 milliGRAM(s) Oral daily  fluticasone furoate/umeclidinium/vilanterol 100-62.5-25 MICROgram(s) Inhaler 1 Puff(s) Inhalation daily  latanoprost 0.005% Ophthalmic Solution 1 Drop(s) Both EYES at bedtime  metoprolol succinate ER 25 milliGRAM(s) Oral daily  ondansetron Injectable 4 milliGRAM(s) IV Push every 6 hours PRN  oxyCODONE    IR 5 milliGRAM(s) Oral every 6 hours PRN  tamsulosin 0.4 milliGRAM(s) Oral at bedtime      Radiology:      Physical exam:  GCS of 15  Airway is patent  Breathing is symmetric and unlabored  Neuro: CNII-XII grossly intact  Psych: normal affect  HEENT: Normocephalic, atraumatic, WILLIAM, EOM wnl  Neck: No crepitus, no ecchymosis, no hematoma, to exam, no JVD, no tracheal deviation  Cspine/thoracolumbrosacral spine: in TLSO brace, known L2 chronic fracture and subacute sacral fracture  Cardiovascular: S1S2 Present  Chest: no gross rib pathology or tenderness to exam. No sternal pathology or tenderness to exam. No crepitus, no ecchymosis, no hematoma.   Respiratory: Rate is 18; Respiratory Effort normal; no wheezes, rales or rhonchi to exam  ABD: bowel sounds (+), soft, nontender, non distended, no rebound, no guarding, no rigidity, no skin changes to exam. No pelvic instability to exam, no skin changes  Musculoskeletal: Pt has palpable b/l radial, femoral, dorsalis pedis pulses. All digits are warm and well perfused. No gross long bone pathology or tenderness to exam. Pt demonstrates grossly intact sensoromotor function to limited exam. Pt has good capillary refill to digits, no calf edema or tenderness to exam.  Skin: no lesions or rashes to exam

## 2023-04-05 LAB
ANION GAP SERPL CALC-SCNC: 3 MMOL/L — LOW (ref 5–17)
BASOPHILS # BLD AUTO: 0.09 K/UL — SIGNIFICANT CHANGE UP (ref 0–0.2)
BASOPHILS NFR BLD AUTO: 0.8 % — SIGNIFICANT CHANGE UP (ref 0–2)
BUN SERPL-MCNC: 19 MG/DL — SIGNIFICANT CHANGE UP (ref 7–23)
CALCIUM SERPL-MCNC: 8.8 MG/DL — SIGNIFICANT CHANGE UP (ref 8.5–10.1)
CHLORIDE SERPL-SCNC: 110 MMOL/L — HIGH (ref 96–108)
CO2 SERPL-SCNC: 24 MMOL/L — SIGNIFICANT CHANGE UP (ref 22–31)
CREAT SERPL-MCNC: 0.7 MG/DL — SIGNIFICANT CHANGE UP (ref 0.5–1.3)
EGFR: 89 ML/MIN/1.73M2 — SIGNIFICANT CHANGE UP
EOSINOPHIL # BLD AUTO: 0.06 K/UL — SIGNIFICANT CHANGE UP (ref 0–0.5)
EOSINOPHIL NFR BLD AUTO: 0.5 % — SIGNIFICANT CHANGE UP (ref 0–6)
GLUCOSE SERPL-MCNC: 118 MG/DL — HIGH (ref 70–99)
HCT VFR BLD CALC: 45.9 % — SIGNIFICANT CHANGE UP (ref 39–50)
HGB BLD-MCNC: 14.5 G/DL — SIGNIFICANT CHANGE UP (ref 13–17)
IMM GRANULOCYTES NFR BLD AUTO: 0.6 % — SIGNIFICANT CHANGE UP (ref 0–0.9)
LYMPHOCYTES # BLD AUTO: 1.09 K/UL — SIGNIFICANT CHANGE UP (ref 1–3.3)
LYMPHOCYTES # BLD AUTO: 9.6 % — LOW (ref 13–44)
MAGNESIUM SERPL-MCNC: 2.3 MG/DL — SIGNIFICANT CHANGE UP (ref 1.6–2.6)
MCHC RBC-ENTMCNC: 26.5 PG — LOW (ref 27–34)
MCHC RBC-ENTMCNC: 31.6 GM/DL — LOW (ref 32–36)
MCV RBC AUTO: 83.8 FL — SIGNIFICANT CHANGE UP (ref 80–100)
MONOCYTES # BLD AUTO: 1.45 K/UL — HIGH (ref 0–0.9)
MONOCYTES NFR BLD AUTO: 12.8 % — SIGNIFICANT CHANGE UP (ref 2–14)
NEUTROPHILS # BLD AUTO: 8.61 K/UL — HIGH (ref 1.8–7.4)
NEUTROPHILS NFR BLD AUTO: 75.7 % — SIGNIFICANT CHANGE UP (ref 43–77)
PHOSPHATE SERPL-MCNC: 2.8 MG/DL — SIGNIFICANT CHANGE UP (ref 2.5–4.5)
PLATELET # BLD AUTO: 199 K/UL — SIGNIFICANT CHANGE UP (ref 150–400)
POTASSIUM SERPL-MCNC: 3.9 MMOL/L — SIGNIFICANT CHANGE UP (ref 3.5–5.3)
POTASSIUM SERPL-SCNC: 3.9 MMOL/L — SIGNIFICANT CHANGE UP (ref 3.5–5.3)
RBC # BLD: 5.48 M/UL — SIGNIFICANT CHANGE UP (ref 4.2–5.8)
RBC # FLD: 15.8 % — HIGH (ref 10.3–14.5)
SODIUM SERPL-SCNC: 137 MMOL/L — SIGNIFICANT CHANGE UP (ref 135–145)
WBC # BLD: 11.37 K/UL — HIGH (ref 3.8–10.5)
WBC # FLD AUTO: 11.37 K/UL — HIGH (ref 3.8–10.5)

## 2023-04-05 PROCEDURE — 71045 X-RAY EXAM CHEST 1 VIEW: CPT | Mod: 26

## 2023-04-05 RX ADMIN — Medication 650 MILLIGRAM(S): at 16:46

## 2023-04-05 RX ADMIN — LATANOPROST 1 DROP(S): 0.05 SOLUTION/ DROPS OPHTHALMIC; TOPICAL at 21:10

## 2023-04-05 RX ADMIN — Medication 81 MILLIGRAM(S): at 09:24

## 2023-04-05 RX ADMIN — OXYCODONE HYDROCHLORIDE 5 MILLIGRAM(S): 5 TABLET ORAL at 08:30

## 2023-04-05 RX ADMIN — FAMOTIDINE 20 MILLIGRAM(S): 10 INJECTION INTRAVENOUS at 09:24

## 2023-04-05 RX ADMIN — OXYCODONE HYDROCHLORIDE 5 MILLIGRAM(S): 5 TABLET ORAL at 07:54

## 2023-04-05 RX ADMIN — Medication 25 MILLIGRAM(S): at 09:24

## 2023-04-05 RX ADMIN — Medication 650 MILLIGRAM(S): at 17:30

## 2023-04-05 NOTE — PROGRESS NOTE ADULT - ASSESSMENT
A/P:  Chronic L2 fracture, 3 column injury, pt and family refusing surgery  Subacute sacral fracture  Subacute/chronic T8 compression deformity  Pt pending subacute rehab placement  Pt stable and cleared from trauma surgical standpoint  Spine surgery on consult, family refusing intervention, pt to wear TLSO brace at all times    Case discussed with Dr. Curry

## 2023-04-05 NOTE — PROGRESS NOTE ADULT - SUBJECTIVE AND OBJECTIVE BOX
CC:Patient is a 88y old  Male who presents with a chief complaint of fall 2 weeks ago, sacral fracture, l2 fracture (2023 10:36)      Subjective:  Pt seen and examined at bedside. Pt is AAOx3, pt in no acute distress.   Pt denied c/o fever, chills, chest pain, SOB, abd pain, N/V/D, extremity pain or dysfunction, hemoptysis, hematemesis, hematuria, hematochexia, headache, diplopia, vertigo, dizzyness. Pt tolerating diet, (+) void, (+) bowel function    ROS:  otherwise as abovementioned ROS    Physical exam:  GCS of 15  Airway is patent  Breathing is symmetric and unlabored  Neuro: CNII-XII grossly intact  Psych: normal affect  HEENT: Normocephalic, atraumatic, WILLIAM, EOM wnl  Neck: No crepitus, no ecchymosis, no hematoma, to exam, no JVD, no tracheal deviation  Cspine/thoracolumbrosacral spine: in TLSO brace, known L2 chronic fracture and subacute sacral fracture  Cardiovascular: S1S2 Present  Chest: no gross rib pathology or tenderness to exam. No sternal pathology or tenderness to exam. No crepitus, no ecchymosis, no hematoma.   Respiratory: Rate is 18; Respiratory Effort normal; no wheezes, rales or rhonchi to exam  ABD: bowel sounds (+), soft, nontender, non distended, no rebound, no guarding, no rigidity, no skin changes to exam. No pelvic instability to exam, no skin changes  Musculoskeletal: TLSO brace in place, Pt has palpable b/l radial, femoral, dorsalis pedis pulses. All digits are warm and well perfused. No gross long bone pathology or tenderness to exam. Pt demonstrates grossly intact sensorimotor function to limited exam. Pt has good capillary refill to digits, no calf edema or tenderness to exam.  Skin: no lesions or rashes to exam    Vitals:  T(C): 37.1 ( @ 22:23), Max: 37.1 ( @ 22:23)  HR: 66 ( @ 15:04) (66 - 73)  BP: 153/72 ( @ 04:00) (94/61 - 155/47)  RR: 18 ( @ 15:04) (18 - 18)  SpO2: 94% ( @ 15:04) (94% - 97%)     @ 07:01  -   @ 07:00  --------------------------------------------------------  IN:    Oral Fluid: 480 mL  Total IN: 480 mL    OUT:  Total OUT: 0 mL    Total NET: 480 mL       @ 07:01  -   @ 05:06  --------------------------------------------------------  IN:    Oral Fluid: 480 mL  Total IN: 480 mL    OUT:  Total OUT: 0 mL    Total NET: 480 mL       @ 07:02                    14.3  CBC: 11.42>)-------(<190                     46.8                 135 | 109 | 22    CMP:  ----------------------< 125               4.0 | 21 | 0.81                      Ca:9.1  Phos:3.0  M.3               -|      |-        LFTs:  ------|-|-----             -|      |-      Culture - Urine (collected 23 @ 17:00)  Source: Clean Catch Clean Catch (Midstream)  Final Report (23 @ 23:04):    <10,000 CFU/mL Normal Urogenital Diana    Culture - Blood (collected 23 @ 09:33)  Source: .Blood None  Preliminary Report (23 @ 17:02):    No growth to date.      Current Inpatient Medications:  acetaminophen     Tablet .. 650 milliGRAM(s) Oral every 6 hours PRN  albuterol    90 MICROgram(s) HFA Inhaler 2 Puff(s) Inhalation every 6 hours PRN  aspirin  chewable 81 milliGRAM(s) Oral daily  famotidine    Tablet 20 milliGRAM(s) Oral daily  fluticasone furoate/umeclidinium/vilanterol 100-62.5-25 MICROgram(s) Inhaler 1 Puff(s) Inhalation daily  latanoprost 0.005% Ophthalmic Solution 1 Drop(s) Both EYES at bedtime  metoprolol succinate ER 25 milliGRAM(s) Oral daily  ondansetron Injectable 4 milliGRAM(s) IV Push every 6 hours PRN  oxyCODONE    IR 5 milliGRAM(s) Oral every 6 hours PRN  tamsulosin 0.4 milliGRAM(s) Oral at bedtime

## 2023-04-06 LAB
CULTURE RESULTS: SIGNIFICANT CHANGE UP
SARS-COV-2 RNA SPEC QL NAA+PROBE: SIGNIFICANT CHANGE UP
SPECIMEN SOURCE: SIGNIFICANT CHANGE UP

## 2023-04-06 PROCEDURE — 99231 SBSQ HOSP IP/OBS SF/LOW 25: CPT

## 2023-04-06 RX ADMIN — OXYCODONE HYDROCHLORIDE 5 MILLIGRAM(S): 5 TABLET ORAL at 10:20

## 2023-04-06 RX ADMIN — Medication 25 MILLIGRAM(S): at 10:20

## 2023-04-06 RX ADMIN — FAMOTIDINE 20 MILLIGRAM(S): 10 INJECTION INTRAVENOUS at 10:20

## 2023-04-06 RX ADMIN — LATANOPROST 1 DROP(S): 0.05 SOLUTION/ DROPS OPHTHALMIC; TOPICAL at 21:28

## 2023-04-06 RX ADMIN — TAMSULOSIN HYDROCHLORIDE 0.4 MILLIGRAM(S): 0.4 CAPSULE ORAL at 21:28

## 2023-04-06 RX ADMIN — Medication 81 MILLIGRAM(S): at 10:20

## 2023-04-06 NOTE — PROGRESS NOTE ADULT - ASSESSMENT
A/P:  Chronic L2 fracture, 3 column injury, pt and family refusing surgery  Subacute sacral fracture  Subacute/chronic T8 compression deformity  Pt pending subacute rehab placement    Pt stable and cleared from trauma surgical standpoint  Spine surgery on consult, family refusing intervention, pt to wear TLSO brace at all times    Case discussed with Dr. Barrientos

## 2023-04-06 NOTE — DIETITIAN INITIAL EVALUATION ADULT - ORAL INTAKE PTA/DIET HISTORY
Lives at home w/ wife who cooks and grocery shops for the pt. Reports of eating "ok" PTA. Denies ONS use and NKFA

## 2023-04-06 NOTE — DIETITIAN NUTRITION RISK NOTIFICATION - OTHER RISK FACTORS
Pt sleeping in no apparent distress. Respirations even and unlabored.  1:1 observer in sight of patient.   Not applicable

## 2023-04-06 NOTE — DIETITIAN INITIAL EVALUATION ADULT - OTHER INFO
87 y/o Male home ambulator with walker who presents to  ED w/ a c/o of "dehydration" and "hematuria" per wife/daughter. PMH significant for [place], CAD, BPH, glaucoma, COPD, HTN, HLD, humerus fracture, frequent falls.     Pt known to RD service. Upon assessment, consumed 100% breakfast tray this morning. UBW stated at 160#, stable wt "for a while." As per lasliset RD note (5/7/22): dx w/ severe PCM and wt obtained at 151#. Bed scale wt of 157# taken by RD on 4/6. Appeared thin, frail, and papito. NFPE reveals severe muscle/fat wasting. Pt declined ONS at this time. Encourage protein-rich foods and maximize food preferences.  87 y/o Male home ambulator with walker who presents to  ED w/ a c/o of "dehydration" and "hematuria" per wife/daughter. PMH significant for [place], CAD, BPH, glaucoma, COPD, HTN, HLD, humerus fracture, frequent falls.     Pt known to RD service. Upon assessment, consumed 100% breakfast tray this morning. UBW stated at 160#, stable wt "for a while." As per last RD note (5/7/22): dx w/ severe PCM and wt obtained at 151#. Bed scale wt of 157# taken by RD on 4/6. Appeared thin, frail, and papito. NFPE reveals severe muscle/fat wasting. Pt declined ONS at this time. Encourage protein-rich foods and maximize food preferences. SW following case for safe d/c planning. See recommendations below.

## 2023-04-06 NOTE — DIETITIAN INITIAL EVALUATION ADULT - ADD RECOMMEND
1) Liberalize diet to regular to maximize caloric and nutrient intake.   2) Encourage protein-rich foods, maximize food preferences   3) Monitor bowel movements, if no BM for >3 days, consider implementing bowel regimen.   4) MVI w/ minerals daily to ensure 100% RDA met   5) In v/o malnutrition, consider adding thiamine 100 mg daily   6) Obtain weekly wt to track/trend changes   7) Confirm goals of care regarding nutrition support   RD will continue to monitor PO intake, labs, hydration, and wt prn.

## 2023-04-06 NOTE — DIETITIAN INITIAL EVALUATION ADULT - PERTINENT LABORATORY DATA
04-05    137  |  110<H>  |  19  ----------------------------<  118<H>  3.9   |  24  |  0.70    Ca    8.8      05 Apr 2023 06:30  Phos  2.8     04-05  Mg     2.3     04-05     04-05    137  |  110<H>  |  19  ----------------------------<  118<H>  3.9   |  24  |  0.70    Ca    8.8      05 Apr 2023 06:30  Phos  2.8     04-05  Mg     2.3     04-05    Vitamin B12, Serum: 1110 pg/mL (01-21-22 @ 08:20)  Vitamin B12, Serum: 1124 pg/mL (01-20-22 @ 09:40)

## 2023-04-06 NOTE — PROGRESS NOTE ADULT - SUBJECTIVE AND OBJECTIVE BOX
MEDICATIONS  (STANDING):  aspirin  chewable 81 milliGRAM(s) Oral daily  famotidine    Tablet 20 milliGRAM(s) Oral daily  fluticasone furoate/umeclidinium/vilanterol 100-62.5-25 MICROgram(s) Inhaler 1 Puff(s) Inhalation daily  latanoprost 0.005% Ophthalmic Solution 1 Drop(s) Both EYES at bedtime  metoprolol succinate ER 25 milliGRAM(s) Oral daily  tamsulosin 0.4 milliGRAM(s) Oral at bedtime    MEDICATIONS  (PRN):  acetaminophen     Tablet .. 650 milliGRAM(s) Oral every 6 hours PRN Temp greater or equal to 38C (100.4F), Mild Pain (1 - 3)  albuterol    90 MICROgram(s) HFA Inhaler 2 Puff(s) Inhalation every 6 hours PRN Shortness of Breath and/or Wheezing  ondansetron Injectable 4 milliGRAM(s) IV Push every 6 hours PRN Nausea  oxyCODONE    IR 5 milliGRAM(s) Oral every 6 hours PRN Severe Pain (7 - 10)      PAST MEDICAL & SURGICAL HISTORY:  HTN (hypertension)      Hyperlipidemia      COPD (chronic obstructive pulmonary disease)      Glaucoma of both eyes, unspecified glaucoma      CAD (coronary artery disease)      BPH (benign prostatic hyperplasia)      Glaucoma      Swelling of ankle  b/l      Shoulder pain, right      Humerus fracture  proximal, right      Hard of hearing      H/O Clostridium difficile infection  s/p left THR      H/O bilateral inguinal hernia repair  2016      History of tonsillectomy      Status post coronary artery stent placement  reports stents x 4?      History of total hip replacement, left  2011      H/O colonoscopy      S/P cataract surgery  b/l          Vital Signs Last 24 Hrs  T(C): 37.1 (06 Apr 2023 06:13), Max: 38.2 (05 Apr 2023 17:00)  T(F): 98.8 (06 Apr 2023 06:13), Max: 100.7 (05 Apr 2023 17:00)  HR: 74 (05 Apr 2023 18:09) (74 - 79)  BP: 133/49 (06 Apr 2023 08:00) (11/45 - 141/55)  BP(mean): 71 (06 Apr 2023 08:00) (60 - 99)  RR: 16 (05 Apr 2023 18:09) (16 - 18)  SpO2: 93% (06 Apr 2023 08:00) (89% - 98%)    Parameters below as of 05 Apr 2023 21:00  Patient On (Oxygen Delivery Method): nasal cannula  O2 Flow (L/min): 2      I&O's Summary                            14.5   11.37 )-----------( 199      ( 05 Apr 2023 06:30 )             45.9     04-05    137  |  110<H>  |  19  ----------------------------<  118<H>  3.9   |  24  |  0.70    Ca    8.8      05 Apr 2023 06:30  Phos  2.8     04-05  Mg     2.3     04-05            Culture - Urine (collected 04-02-23 @ 17:00)  Source: Clean Catch Clean Catch (Midstream)  Final Report (04-03-23 @ 23:04):    <10,000 CFU/mL Normal Urogenital Diana    Culture - Blood (collected 04-01-23 @ 09:33)  Source: .Blood None  Preliminary Report (04-02-23 @ 17:02):    No growth to date.            88y old  Male who presents with a chief complaint of fall 2 weeks ago, sacral fracture, l2 fracture      Subjective:  Pt seen and examined at bedside. Pt is AAOx3, pt in no acute distress.   Pt denied c/o fever, chills, chest pain, SOB, abd pain, N/V/D, extremity pain or dysfunction, hemoptysis, hematemesis, hematuria, hematochexia, headache, diplopia, vertigo, dizzyness. Pt tolerating diet, (+) void, (+) bowel function    ROS:  otherwise as abovementioned ROS    Physical exam:  GCS of 15  Airway is patent  Breathing is symmetric and unlabored  Neuro: CNII-XII grossly intact  Psych: normal affect  HEENT: Normocephalic, atraumatic, WILLIAM, EOM wnl  Neck: No crepitus, no ecchymosis, no hematoma, to exam, no JVD, no tracheal deviation  Cspine/thoracolumbrosacral spine: in TLSO brace, known L2 chronic fracture and subacute sacral fracture  Cardiovascular: S1S2 Present  Chest: no gross rib pathology or tenderness to exam. No sternal pathology or tenderness to exam. No crepitus, no ecchymosis, no hematoma.   Respiratory: Rate is 18; Respiratory Effort normal; no wheezes, rales or rhonchi to exam  ABD: bowel sounds (+), soft, nontender, non distended, no rebound, no guarding, no rigidity, no skin changes to exam. No pelvic instability to exam, no skin changes  Musculoskeletal: TLSO brace in place, Pt has palpable b/l radial, femoral, dorsalis pedis pulses. All digits are warm and well perfused. No gross long bone pathology or tenderness to exam. Pt demonstrates grossly intact sensorimotor function to limited exam. Pt has good capillary refill to digits, no calf edema or tenderness to exam.  Skin: no lesions or rashes to exam

## 2023-04-06 NOTE — DIETITIAN INITIAL EVALUATION ADULT - LAB (SPECIFY)
New dx, symptomatic, out of the window for kyphoplasty, reviewed pain control is the treatment, declined medications, declined osteoporosis treatment Obtain vitamin D 25OH level to assess nutriture

## 2023-04-07 LAB
ANION GAP SERPL CALC-SCNC: 6 MMOL/L — SIGNIFICANT CHANGE UP (ref 5–17)
APPEARANCE UR: CLEAR — SIGNIFICANT CHANGE UP
BILIRUB UR-MCNC: NEGATIVE — SIGNIFICANT CHANGE UP
BUN SERPL-MCNC: 23 MG/DL — SIGNIFICANT CHANGE UP (ref 7–23)
CALCIUM SERPL-MCNC: 8.9 MG/DL — SIGNIFICANT CHANGE UP (ref 8.5–10.1)
CHLORIDE SERPL-SCNC: 107 MMOL/L — SIGNIFICANT CHANGE UP (ref 96–108)
CO2 SERPL-SCNC: 23 MMOL/L — SIGNIFICANT CHANGE UP (ref 22–31)
COLOR SPEC: YELLOW — SIGNIFICANT CHANGE UP
CREAT SERPL-MCNC: 1.08 MG/DL — SIGNIFICANT CHANGE UP (ref 0.5–1.3)
DIFF PNL FLD: ABNORMAL
EGFR: 66 ML/MIN/1.73M2 — SIGNIFICANT CHANGE UP
GLUCOSE SERPL-MCNC: 175 MG/DL — HIGH (ref 70–99)
GLUCOSE UR QL: NEGATIVE — SIGNIFICANT CHANGE UP
HCT VFR BLD CALC: 44.2 % — SIGNIFICANT CHANGE UP (ref 39–50)
HGB BLD-MCNC: 14.1 G/DL — SIGNIFICANT CHANGE UP (ref 13–17)
KETONES UR-MCNC: ABNORMAL
LEUKOCYTE ESTERASE UR-ACNC: ABNORMAL
MCHC RBC-ENTMCNC: 26.2 PG — LOW (ref 27–34)
MCHC RBC-ENTMCNC: 31.9 GM/DL — LOW (ref 32–36)
MCV RBC AUTO: 82.2 FL — SIGNIFICANT CHANGE UP (ref 80–100)
NITRITE UR-MCNC: NEGATIVE — SIGNIFICANT CHANGE UP
PH UR: 5 — SIGNIFICANT CHANGE UP (ref 5–8)
PLATELET # BLD AUTO: 259 K/UL — SIGNIFICANT CHANGE UP (ref 150–400)
POTASSIUM SERPL-MCNC: 4.1 MMOL/L — SIGNIFICANT CHANGE UP (ref 3.5–5.3)
POTASSIUM SERPL-SCNC: 4.1 MMOL/L — SIGNIFICANT CHANGE UP (ref 3.5–5.3)
PROT UR-MCNC: SIGNIFICANT CHANGE UP MG/DL
RBC # BLD: 5.38 M/UL — SIGNIFICANT CHANGE UP (ref 4.2–5.8)
RBC # FLD: 15.9 % — HIGH (ref 10.3–14.5)
SODIUM SERPL-SCNC: 136 MMOL/L — SIGNIFICANT CHANGE UP (ref 135–145)
SP GR SPEC: 1.02 — SIGNIFICANT CHANGE UP (ref 1.01–1.02)
UROBILINOGEN FLD QL: NEGATIVE — SIGNIFICANT CHANGE UP
WBC # BLD: 14.3 K/UL — HIGH (ref 3.8–10.5)
WBC # FLD AUTO: 14.3 K/UL — HIGH (ref 3.8–10.5)

## 2023-04-07 PROCEDURE — 99231 SBSQ HOSP IP/OBS SF/LOW 25: CPT

## 2023-04-07 PROCEDURE — 73030 X-RAY EXAM OF SHOULDER: CPT | Mod: 26,LT

## 2023-04-07 PROCEDURE — 99221 1ST HOSP IP/OBS SF/LOW 40: CPT

## 2023-04-07 RX ORDER — CEFTRIAXONE 500 MG/1
1000 INJECTION, POWDER, FOR SOLUTION INTRAMUSCULAR; INTRAVENOUS EVERY 24 HOURS
Refills: 0 | Status: DISCONTINUED | OUTPATIENT
Start: 2023-04-07 | End: 2023-04-07

## 2023-04-07 RX ORDER — CEFTRIAXONE 500 MG/1
1000 INJECTION, POWDER, FOR SOLUTION INTRAMUSCULAR; INTRAVENOUS EVERY 24 HOURS
Refills: 0 | Status: DISCONTINUED | OUTPATIENT
Start: 2023-04-07 | End: 2023-04-09

## 2023-04-07 RX ORDER — SODIUM CHLORIDE 9 MG/ML
1000 INJECTION INTRAMUSCULAR; INTRAVENOUS; SUBCUTANEOUS
Refills: 0 | Status: DISCONTINUED | OUTPATIENT
Start: 2023-04-07 | End: 2023-04-09

## 2023-04-07 RX ORDER — LIDOCAINE 4 G/100G
1 CREAM TOPICAL DAILY
Refills: 0 | Status: DISCONTINUED | OUTPATIENT
Start: 2023-04-07 | End: 2023-04-10

## 2023-04-07 RX ADMIN — FAMOTIDINE 20 MILLIGRAM(S): 10 INJECTION INTRAVENOUS at 11:00

## 2023-04-07 RX ADMIN — OXYCODONE HYDROCHLORIDE 5 MILLIGRAM(S): 5 TABLET ORAL at 07:37

## 2023-04-07 RX ADMIN — LATANOPROST 1 DROP(S): 0.05 SOLUTION/ DROPS OPHTHALMIC; TOPICAL at 21:53

## 2023-04-07 RX ADMIN — CEFTRIAXONE 1000 MILLIGRAM(S): 500 INJECTION, POWDER, FOR SOLUTION INTRAMUSCULAR; INTRAVENOUS at 16:59

## 2023-04-07 RX ADMIN — Medication 25 MILLIGRAM(S): at 11:00

## 2023-04-07 RX ADMIN — Medication 650 MILLIGRAM(S): at 11:00

## 2023-04-07 RX ADMIN — Medication 650 MILLIGRAM(S): at 19:22

## 2023-04-07 RX ADMIN — TAMSULOSIN HYDROCHLORIDE 0.4 MILLIGRAM(S): 0.4 CAPSULE ORAL at 21:48

## 2023-04-07 RX ADMIN — Medication 81 MILLIGRAM(S): at 10:59

## 2023-04-07 RX ADMIN — LIDOCAINE 1 PATCH: 4 CREAM TOPICAL at 15:29

## 2023-04-07 RX ADMIN — SODIUM CHLORIDE 80 MILLILITER(S): 9 INJECTION INTRAMUSCULAR; INTRAVENOUS; SUBCUTANEOUS at 15:32

## 2023-04-07 RX ADMIN — LIDOCAINE 1 PATCH: 4 CREAM TOPICAL at 19:00

## 2023-04-07 NOTE — PROGRESS NOTE ADULT - ATTENDING COMMENTS
Above note reviewed.    Patient's clinical presentation and available spinal imaging reviewed.  The patient presents with an L2 extension distraction fracture, which appears to have progressed from a prior visualized L2 extension distraction fracture seen on MRI 1/22/2022.    The patient and patient's family were agreeable to an MRI of the lumbar spine, but refused an MRI of the cervical and thoracic spine.    We had held an extensive discussion with the patient and patient's family regarding operative versus nonoperative management of the patient condition.  I discussed that the radiographic appearance of this fracture is that the patient possesses an unstable spinal fracture and that surgical intervention would best stabilize this injury.  We discussed extensively risk versus benefits of operative versus nonoperative management.  The patient and patient's family refused surgical intervention and wished to proceed with TLSO bracing at all times.  We discussed that external bracing may not adequately support this fracture, which could displace even while compliant with TLSO bracing at all times.  We discussed risks of nonoperative management including but not limited to paralysis, which would likely be irreversible if this event were to occur.  Questions were sought and answered satisfactorily.  The patient and patient's family reported understanding.    Rosalio Beaulieu DO  Orthopedic and Spine Surgeon  [facility]
agree w above
Above note reviewed.    Patient's clinical presentation and available spinal imaging reviewed.  The patient presents with an L2 extension distraction fracture, which appears to have progressed from a prior visualized L2 extension distraction fracture seen on MRI 1/22/2022.    The patient and patient's family were agreeable to an MRI of the lumbar spine, but refused an MRI of the cervical and thoracic spine.    We had held an extensive discussion with the patient and patient's family regarding operative versus nonoperative management of the patient condition.  I discussed that the radiographic appearance of this fracture is that the patient possesses an unstable spinal fracture and that surgical intervention would best stabilize this injury.  We discussed extensively risk versus benefits of operative versus nonoperative management.  The patient and patient's family refused surgical intervention and wished to proceed with TLSO bracing at all times.  We discussed that external bracing may not adequately support this fracture, which could displace even while compliant with TLSO bracing at all times.  We discussed risks of nonoperative management including but not limited to paralysis, which would likely be irreversible if this event were to occur.  Questions were sought and answered satisfactorily.  The patient and patient's family reported understanding.    Rosalio Beaulieu DO  Orthopedic and Spine Surgeon  [facility]
as above
A/P:  Chronic L2 fracture, 3 column injury, pt and family refusing surgery  Subacte sacral fracture  Subacute/chronic T8 compression deformity  Dr Keenan on 4/7/23 at 235pm, Hospitalist accepted pt for transfer to their service  Pt stable and cleared from trauma surgical standpoint  Spine surgery on consult, family refusing intervention, pt to wear TLSO brace  No acute trauma surgical intervention for pt

## 2023-04-07 NOTE — PROGRESS NOTE ADULT - SUBJECTIVE AND OBJECTIVE BOX
Pt seen and examined at bedside. Pt is AAOx3, pt in no acute distress.   Pt denied c/o fever, chills, chest pain, SOB, abd pain, N/V/D, extremity pain or dysfunction, hemoptysis, hematemesis, hematuria, hematochexia, headache, diplopia, vertigo, dizzyness. Pt tolerating diet, (+) void, (+) bowel function    Physical exam:  GCS of 15  Airway is patent  Breathing is symmetric and unlabored  Neuro: CNII-XII grossly intact  Psych: normal affect  HEENT: Normocephalic, atraumatic, WILLIAM, EOM wnl  Neck: No crepitus, no ecchymosis, no hematoma, to exam, no JVD, no tracheal deviation  Cspine/thoracolumbrosacral spine: in TLSO brace, known L2 chronic fracture and subacute sacral fracture  Cardiovascular: S1S2 Present  Chest: no gross rib pathology or tenderness to exam. No sternal pathology or tenderness to exam. No crepitus, no ecchymosis, no hematoma.   Respiratory: Rate is 18; Respiratory Effort normal; no wheezes, rales or rhonchi to exam  ABD: bowel sounds (+), soft, nontender, non distended, no rebound, no guarding, no rigidity, no skin changes to exam. No pelvic instability to exam, no skin changes  Musculoskeletal: TLSO brace in place, Pt has palpable b/l radial, femoral, dorsalis pedis pulses. All digits are warm and well perfused. No gross long bone pathology or tenderness to exam. Pt demonstrates grossly intact sensorimotor function to limited exam. Pt has good capillary refill to digits, no calf edema or tenderness to exam.  Skin: no lesions or rashes to exam      MEDICATIONS  (STANDING):  aspirin  chewable 81 milliGRAM(s) Oral daily  famotidine    Tablet 20 milliGRAM(s) Oral daily  fluticasone furoate/umeclidinium/vilanterol 100-62.5-25 MICROgram(s) Inhaler 1 Puff(s) Inhalation daily  latanoprost 0.005% Ophthalmic Solution 1 Drop(s) Both EYES at bedtime  metoprolol succinate ER 25 milliGRAM(s) Oral daily  tamsulosin 0.4 milliGRAM(s) Oral at bedtime    MEDICATIONS  (PRN):  acetaminophen     Tablet .. 650 milliGRAM(s) Oral every 6 hours PRN Temp greater or equal to 38C (100.4F), Mild Pain (1 - 3)  albuterol    90 MICROgram(s) HFA Inhaler 2 Puff(s) Inhalation every 6 hours PRN Shortness of Breath and/or Wheezing  ondansetron Injectable 4 milliGRAM(s) IV Push every 6 hours PRN Nausea  oxyCODONE    IR 5 milliGRAM(s) Oral every 6 hours PRN Severe Pain (7 - 10)      PAST MEDICAL & SURGICAL HISTORY:  HTN (hypertension)      Hyperlipidemia      COPD (chronic obstructive pulmonary disease)      Glaucoma of both eyes, unspecified glaucoma      CAD (coronary artery disease)      BPH (benign prostatic hyperplasia)      Glaucoma      Swelling of ankle  b/l      Shoulder pain, right      Humerus fracture  proximal, right      Hard of hearing      H/O Clostridium difficile infection  s/p left THR      H/O bilateral inguinal hernia repair  2016      History of tonsillectomy      Status post coronary artery stent placement  reports stents x 4?      History of total hip replacement, left  2011      H/O colonoscopy      S/P cataract surgery  b/l          Vital Signs Last 24 Hrs  T(C): 36 (06 Apr 2023 22:19), Max: 38.2 (06 Apr 2023 12:18)  T(F): 96.8 (06 Apr 2023 22:19), Max: 100.8 (06 Apr 2023 12:18)  HR: --  BP: 151/57 (06 Apr 2023 16:00) (107/41 - 151/57)  BP(mean): 80 (06 Apr 2023 16:00) (56 - 80)  RR: --  SpO2: 91% (06 Apr 2023 16:00) (89% - 94%)        I&O's Summary                            14.5   11.37 )-----------( 199      ( 05 Apr 2023 06:30 )             45.9     04-05    137  |  110<H>  |  19  ----------------------------<  118<H>  3.9   |  24  |  0.70    Ca    8.8      05 Apr 2023 06:30  Phos  2.8     04-05  Mg     2.3     04-05            Culture - Urine (collected 04-02-23 @ 17:00)  Source: Clean Catch Clean Catch (Midstream)  Final Report (04-03-23 @ 23:04):    <10,000 CFU/mL Normal Urogenital Diana    Culture - Blood (collected 04-01-23 @ 09:33)  Source: .Blood None  Final Report (04-06-23 @ 17:01):    No Growth Final                     CC: Patient is a 88y old  Male who presents with a chief complaint of fall 2 weeks ago, sacral fracture, l2 fracture (07 Apr 2023 14:30)    Pt seen and examined at bedside. Pt is AAOx3, pt in no acute distress.   Pt denied c/o fever, chills, chest pain, SOB, abd pain, N/V/D, extremity pain or dysfunction, hemoptysis, hematemesis, hematuria, hematochexia, headache, diplopia, vertigo, dizzyness. Pt tolerating diet, (+) void, (+) bowel function. + back/sacral pain control with meds    ROS: as above otherwise negative    Physical exam:  GCS of 15  Airway is patent  Breathing is symmetric and unlabored  Neuro: CNII-XII grossly intact  Psych: normal affect  HEENT: Normocephalic, atraumatic, WILLIAM, EOM wnl  Neck: No crepitus, no ecchymosis, no hematoma, to exam, no JVD, no tracheal deviation  Cspine/thoracolumbrosacral spine: in TLSO brace, known L2 chronic fracture and subacute sacral fracture  Cardiovascular: S1S2 Present  Chest: no gross rib pathology or tenderness to exam. No sternal pathology or tenderness to exam. No crepitus, no ecchymosis, no hematoma.   Respiratory: Rate is 18; Respiratory Effort normal; no wheezes, rales or rhonchi to exam  ABD: bowel sounds (+), soft, nontender, non distended, no rebound, no guarding, no rigidity, no skin changes to exam. No pelvic instability to exam, no skin changes  Musculoskeletal: TLSO brace in place, Pt has palpable b/l radial, femoral, dorsalis pedis pulses. All digits are warm and well perfused. No gross long bone pathology or tenderness to exam. Pt demonstrates grossly intact sensorimotor function to limited exam. Pt has good capillary refill to digits, no calf edema or tenderness to exam.  Skin: no lesions or rashes to exam      MEDICATIONS  (STANDING):  aspirin  chewable 81 milliGRAM(s) Oral daily  famotidine    Tablet 20 milliGRAM(s) Oral daily  fluticasone furoate/umeclidinium/vilanterol 100-62.5-25 MICROgram(s) Inhaler 1 Puff(s) Inhalation daily  latanoprost 0.005% Ophthalmic Solution 1 Drop(s) Both EYES at bedtime  metoprolol succinate ER 25 milliGRAM(s) Oral daily  tamsulosin 0.4 milliGRAM(s) Oral at bedtime    MEDICATIONS  (PRN):  acetaminophen     Tablet .. 650 milliGRAM(s) Oral every 6 hours PRN Temp greater or equal to 38C (100.4F), Mild Pain (1 - 3)  albuterol    90 MICROgram(s) HFA Inhaler 2 Puff(s) Inhalation every 6 hours PRN Shortness of Breath and/or Wheezing  ondansetron Injectable 4 milliGRAM(s) IV Push every 6 hours PRN Nausea  oxyCODONE    IR 5 milliGRAM(s) Oral every 6 hours PRN Severe Pain (7 - 10)      PAST MEDICAL & SURGICAL HISTORY:  HTN (hypertension)      Hyperlipidemia      COPD (chronic obstructive pulmonary disease)      Glaucoma of both eyes, unspecified glaucoma      CAD (coronary artery disease)      BPH (benign prostatic hyperplasia)      Glaucoma      Swelling of ankle  b/l      Shoulder pain, right      Humerus fracture  proximal, right      Hard of hearing      H/O Clostridium difficile infection  s/p left THR      H/O bilateral inguinal hernia repair  2016      History of tonsillectomy      Status post coronary artery stent placement  reports stents x 4?      History of total hip replacement, left  2011      H/O colonoscopy      S/P cataract surgery  b/l          Vital Signs Last 24 Hrs  T(C): 36 (06 Apr 2023 22:19), Max: 38.2 (06 Apr 2023 12:18)  T(F): 96.8 (06 Apr 2023 22:19), Max: 100.8 (06 Apr 2023 12:18)  HR: --  BP: 151/57 (06 Apr 2023 16:00) (107/41 - 151/57)  BP(mean): 80 (06 Apr 2023 16:00) (56 - 80)  RR: --  SpO2: 91% (06 Apr 2023 16:00) (89% - 94%)        I&O's Summary                            14.5   11.37 )-----------( 199      ( 05 Apr 2023 06:30 )             45.9     04-05    137  |  110<H>  |  19  ----------------------------<  118<H>  3.9   |  24  |  0.70    Ca    8.8      05 Apr 2023 06:30  Phos  2.8     04-05  Mg     2.3     04-05            Culture - Urine (collected 04-02-23 @ 17:00)  Source: Clean Catch Clean Catch (Midstream)  Final Report (04-03-23 @ 23:04):    <10,000 CFU/mL Normal Urogenital Diana    Culture - Blood (collected 04-01-23 @ 09:33)  Source: .Blood None  Final Report (04-06-23 @ 17:01):    No Growth Final              Radiology:  < from: Xray Chest 1 View- PORTABLE-Urgent (Xray Chest 1 View- PORTABLE-Urgent .) (04.05.23 @ 18:28) >    ACC: [id number] EXAM:  XR CHEST PORTABLE URGENT 1V   ORDERED BY: JULIO CRANE     PROCEDURE DATE:  04/05/2023          INTERPRETATION:  TIME OF EXAM: April 5, 2023 at 6:15 PM.    CLINICAL INFORMATION: Cough. Fever.    COMPARISON:  CT scan of the chest from March 31, 2023.    TECHNIQUE:   AP Portable chest x-ray.    INTERPRETATION:    Heart size and the mediastinum cannot be accurately evaluated on this   projection.  Low lung volumes.  Medial right lower lung and left mid and lower lung linear atelectasis   versus scar.  Question right retrocardiac lung opacities versus vascular structures.  No pleural effusion or pneumothorax seen.  Incompletely imaged right shoulder replacement.      IMPRESSION: Low lung volumes.    Medial right lowerlung and left mid and lower lung linear atelectasis   versus scar.    Question right retrocardiac lung opacities versus vascular structures.   Repeat chest x-ray, preferably PA and lateral or AP chest x-ray and   deeper inspiration, could be obtainedfor further evaluation, if felt to   be clinically indicated.    --- End of Report ---            STEF TORRE MD; Attending Radiologist  This document has been electronically signed. Apr 6 2023 10:11AM    < end of copied text >

## 2023-04-07 NOTE — PROGRESS NOTE ADULT - ASSESSMENT
Chronic L2 fracture, 3 column injury, pt and family refusing surgery  Subacute sacral fracture  Subacute/chronic T8 compression deformity  Pt pending subacute rehab placement  Pt pending fever w/u   f/u Urine, sputum, blood cx  pt to wear TLSO brace at all times    Case discussed with Dr. Syed

## 2023-04-07 NOTE — CONSULT NOTE ADULT - ASSESSMENT
88 M with CAD , COPD,  hx of PE with lumbar fracture with weakness and non healing L:2 fracture       CAD- no evidence for ACS- continue asprin and statin ( on lipitor 20 at home)    Continue eliquis for hx of PE after COVID last year--     Patient has multiple risk factors for surgery, but no absolute contraindications . At this point patient is refusing surgery     please call as needed 
    * Acute chance fracture of L2 with comminuted fragments of the vertebral body with central distraction and edema. Fractures appear to be present within the pedicles and articular bodies with edema within the L2-3 interspinous space and disruption of the ligamentum flavum. This is consistent with an unstable fracture.   - pt and fam refused sx  in brace    * Somnolence maybe narcs related  reduce doses  add Lido  pending am labs    * Shoulder pain  this dx is present in Dr Watts's note so it must be a chronic problem  xray    trnasfer care to hospitalist

## 2023-04-07 NOTE — CONSULT NOTE ADULT - SUBJECTIVE AND OBJECTIVE BOX
Patient is a 88y old  Male who presents with a chief complaint of fall 2 weeks ago, sacral fracture, l2 fracture (07 Apr 2023 00:20)      Triage;  BIBA complaining of weakness, decreased PO intake and bld in urine x2-3days. patient complaining of buttock pain s/p fall 2weeks prior to ed visit  weakness      Med consult called; pt refused Orhto surgery he was supposed to go to Rehab but today he somnolent  Wife present, pt is AAO, answers quickly my questions and doesn't remember whenL arm pain started; pain is exacerbated by movement        ICU Vital Signs Last 24 Hrs  T(C): 36.8 (07 Apr 2023 08:00), Max: 36.8 (07 Apr 2023 08:00)  T(F): 98.2 (07 Apr 2023 08:00), Max: 98.2 (07 Apr 2023 08:00)  HR: --  BP: 141/62 (07 Apr 2023 11:00) (129/60 - 152/57)  BP(mean): 83 (07 Apr 2023 11:00) (68 - 83)  ABP: --  ABP(mean): --  RR: 18 (07 Apr 2023 04:00) (18 - 18)  SpO2: 92% (07 Apr 2023 11:00) (91% - 94%)    O2 Parameters below as of 07 Apr 2023 04:00  Patient On (Oxygen Delivery Method): room air              MEDICATIONS  (STANDING):  aspirin  chewable 81 milliGRAM(s) Oral daily  famotidine    Tablet 20 milliGRAM(s) Oral daily  fluticasone furoate/umeclidinium/vilanterol 100-62.5-25 MICROgram(s) Inhaler 1 Puff(s) Inhalation daily  latanoprost 0.005% Ophthalmic Solution 1 Drop(s) Both EYES at bedtime  lidocaine   4% Patch 1 Patch Transdermal daily  metoprolol succinate ER 25 milliGRAM(s) Oral daily  sodium chloride 0.9%. 1000 milliLiter(s) (80 mL/Hr) IV Continuous <Continuous>  tamsulosin 0.4 milliGRAM(s) Oral at bedtime    MEDICATIONS  (PRN):  acetaminophen     Tablet .. 650 milliGRAM(s) Oral every 6 hours PRN Temp greater or equal to 38C (100.4F), Mild Pain (1 - 3)  albuterol    90 MICROgram(s) HFA Inhaler 2 Puff(s) Inhalation every 6 hours PRN Shortness of Breath and/or Wheezing  ondansetron Injectable 4 milliGRAM(s) IV Push every 6 hours PRN Nausea  oxyCODONE    IR 5 milliGRAM(s) Oral every 6 hours PRN Severe Pain (7 - 10)        PHYSICAL EXAM:    GENERAL: NAD, well-groomed, well-developed  NERVOUS SYSTEM:  Alert & Oriented X3, Motor Strength 4/5 B/L upper and lower extremities except LUE- 1/5;   CHEST/LUNG: Clear to auscultation bilaterally; No rales, rhonchi, wheezing, or rubs  HEART: Regular rate and rhythm; No murmurs, rubs, or gallops  ABDOMEN: Soft, Nontender, Nondistended; Bowel sounds present  EXTREMITIES:  unable to move  L arm due to pain    LABS:    pending labs    < from: MR Lumbar Spine w/wo IV Cont (04.01.23 @ 12:03) >    IMPRESSION:    There is partially visualized T2 signal hyperintensity within the   bilateral sacral ala, raising suspicion for acute/subacute sacral   deficiency fractures. Underlying marrow replacing lesion cannot be   entirely excluded, but is less likely.    There is an evolving fracture deformity of the L2 vertebral body with   fluid occupying the fracture cleft.  No new lumbar vertebral body   compression deformity.    No evidence for acute osteomyelitis.    There is no significant change in multilevel discogenic degenerative   disease and facet arthropathy of the lumbar spine, most pronounced at   L2-L3 where there is moderate to severe bilateral neural foraminal   narrowing and moderate central canal narrowing.    < end of copied text >  < from: MR Thoracic Spine No Cont (01.22.22 @ 10:10) >  Thoracic vertebral alignment is significant for mild kyphosis with apex   at T8.  Thoracic vertebral body heights show a chronic compression   fracture of T8 with loss of 90% of vertebral body height and minimal   posterior bony retropulsion without compression.  Marrow signal intensity   within thoracic vertebral bodies and posterior elements is unremarkable.    There is no abnormal vertebral or paraspinal enhancement.  No osseous   expansion, epidural disease or paraspinal abnormality is found.      < end of copied text >  < from: MR Thoracic Spine No Cont (01.22.22 @ 10:10) >     3.   Lumbar spine:   acute chance fracture of L2 with comminuted   fragments of the vertebral body with central distraction and edema.   Fractures appear to be present within the pedicles and articular bodies   with edema within the L2-3 interspinous space and disruption of the   ligamentum flavum. This is consistent with an unstable fracture. CT   lumbar spine recommended for complete evaluation. Chronic compression   deformity of L4 with loss of 40% vertebral body height centrally.        * Lethargy seems back to nl  start IVF  Xray left shoulder  prn Tylenol  add Lido

## 2023-04-08 LAB
ANION GAP SERPL CALC-SCNC: 3 MMOL/L — LOW (ref 5–17)
BUN SERPL-MCNC: 23 MG/DL — SIGNIFICANT CHANGE UP (ref 7–23)
CALCIUM SERPL-MCNC: 8.5 MG/DL — SIGNIFICANT CHANGE UP (ref 8.5–10.1)
CHLORIDE SERPL-SCNC: 110 MMOL/L — HIGH (ref 96–108)
CO2 SERPL-SCNC: 25 MMOL/L — SIGNIFICANT CHANGE UP (ref 22–31)
CREAT SERPL-MCNC: 0.79 MG/DL — SIGNIFICANT CHANGE UP (ref 0.5–1.3)
CULTURE RESULTS: NO GROWTH — SIGNIFICANT CHANGE UP
EGFR: 85 ML/MIN/1.73M2 — SIGNIFICANT CHANGE UP
GLUCOSE SERPL-MCNC: 110 MG/DL — HIGH (ref 70–99)
HCT VFR BLD CALC: 41.7 % — SIGNIFICANT CHANGE UP (ref 39–50)
HGB BLD-MCNC: 13.1 G/DL — SIGNIFICANT CHANGE UP (ref 13–17)
MAGNESIUM SERPL-MCNC: 2.2 MG/DL — SIGNIFICANT CHANGE UP (ref 1.6–2.6)
MCHC RBC-ENTMCNC: 25.9 PG — LOW (ref 27–34)
MCHC RBC-ENTMCNC: 31.4 GM/DL — LOW (ref 32–36)
MCV RBC AUTO: 82.6 FL — SIGNIFICANT CHANGE UP (ref 80–100)
PHOSPHATE SERPL-MCNC: 3.2 MG/DL — SIGNIFICANT CHANGE UP (ref 2.5–4.5)
PLATELET # BLD AUTO: 226 K/UL — SIGNIFICANT CHANGE UP (ref 150–400)
POTASSIUM SERPL-MCNC: 3.9 MMOL/L — SIGNIFICANT CHANGE UP (ref 3.5–5.3)
POTASSIUM SERPL-SCNC: 3.9 MMOL/L — SIGNIFICANT CHANGE UP (ref 3.5–5.3)
RBC # BLD: 5.05 M/UL — SIGNIFICANT CHANGE UP (ref 4.2–5.8)
RBC # FLD: 16 % — HIGH (ref 10.3–14.5)
SODIUM SERPL-SCNC: 138 MMOL/L — SIGNIFICANT CHANGE UP (ref 135–145)
SPECIMEN SOURCE: SIGNIFICANT CHANGE UP
WBC # BLD: 10.46 K/UL — SIGNIFICANT CHANGE UP (ref 3.8–10.5)
WBC # FLD AUTO: 10.46 K/UL — SIGNIFICANT CHANGE UP (ref 3.8–10.5)

## 2023-04-08 PROCEDURE — 99233 SBSQ HOSP IP/OBS HIGH 50: CPT

## 2023-04-08 RX ADMIN — LIDOCAINE 1 PATCH: 4 CREAM TOPICAL at 03:05

## 2023-04-08 RX ADMIN — LIDOCAINE 1 PATCH: 4 CREAM TOPICAL at 21:43

## 2023-04-08 RX ADMIN — CEFTRIAXONE 1000 MILLIGRAM(S): 500 INJECTION, POWDER, FOR SOLUTION INTRAMUSCULAR; INTRAVENOUS at 17:35

## 2023-04-08 RX ADMIN — SODIUM CHLORIDE 80 MILLILITER(S): 9 INJECTION INTRAMUSCULAR; INTRAVENOUS; SUBCUTANEOUS at 23:29

## 2023-04-08 RX ADMIN — Medication 650 MILLIGRAM(S): at 08:00

## 2023-04-08 RX ADMIN — Medication 650 MILLIGRAM(S): at 07:35

## 2023-04-08 RX ADMIN — Medication 25 MILLIGRAM(S): at 09:09

## 2023-04-08 RX ADMIN — Medication 81 MILLIGRAM(S): at 09:09

## 2023-04-08 RX ADMIN — LIDOCAINE 1 PATCH: 4 CREAM TOPICAL at 09:09

## 2023-04-08 RX ADMIN — FAMOTIDINE 20 MILLIGRAM(S): 10 INJECTION INTRAVENOUS at 09:09

## 2023-04-08 RX ADMIN — TAMSULOSIN HYDROCHLORIDE 0.4 MILLIGRAM(S): 0.4 CAPSULE ORAL at 21:41

## 2023-04-08 RX ADMIN — LATANOPROST 1 DROP(S): 0.05 SOLUTION/ DROPS OPHTHALMIC; TOPICAL at 21:42

## 2023-04-08 NOTE — PROGRESS NOTE ADULT - ASSESSMENT
* Acute chance fracture of L2 with comminuted fragments of the vertebral body with central distraction and edema. Fractures appear to be present within the pedicles and articular bodies with edema within the L2-3 interspinous space and disruption of the ligamentum flavum. This is consistent with an unstable fracture.   - pt and fam refused sx  in brace    * Somnolence maybe narcs related  lethargy seems back to nl poss sec to UTI  ceftriaxone    * Shoulder pain  this dx is present in Dr Watts's note so it must be a chronic problem  Xray left shoulder- tendinitis/tendinosis.  prn Tylenol   Lido

## 2023-04-08 NOTE — PROGRESS NOTE ADULT - SUBJECTIVE AND OBJECTIVE BOX
Patient is a 88y old  Male who presents with a chief complaint of fall 2 weeks ago, sacral fracture, l2 fracture (07 Apr 2023 00:20)      Triage;  BIBA complaining of weakness, decreased PO intake and bld in urine x2-3days. patient complaining of buttock pain s/p fall 2weeks prior to ed visit  weakness  More awake and alert on Rx for uti      Vital Signs Last 24 Hrs  T(C): 36.7 (08 Apr 2023 09:05), Max: 38.2 (07 Apr 2023 18:00)  T(F): 98 (08 Apr 2023 09:05), Max: 100.8 (07 Apr 2023 18:00)  HR: 65 (08 Apr 2023 08:00) (60 - 80)  BP: 129/48 (08 Apr 2023 08:00) (88/75 - 129/48)  BP(mean): 66 (08 Apr 2023 08:00) (53 - 78)  RR: --  SpO2: 94% (08 Apr 2023 08:00) (92% - 98%)    Parameters below as of 08 Apr 2023 08:00  Patient On (Oxygen Delivery Method): room air          MEDICATIONS  (STANDING):  aspirin  chewable 81 milliGRAM(s) Oral daily  famotidine    Tablet 20 milliGRAM(s) Oral daily  fluticasone furoate/umeclidinium/vilanterol 100-62.5-25 MICROgram(s) Inhaler 1 Puff(s) Inhalation daily  latanoprost 0.005% Ophthalmic Solution 1 Drop(s) Both EYES at bedtime  lidocaine   4% Patch 1 Patch Transdermal daily  metoprolol succinate ER 25 milliGRAM(s) Oral daily  sodium chloride 0.9%. 1000 milliLiter(s) (80 mL/Hr) IV Continuous <Continuous>  tamsulosin 0.4 milliGRAM(s) Oral at bedtime    MEDICATIONS  (PRN):  acetaminophen     Tablet .. 650 milliGRAM(s) Oral every 6 hours PRN Temp greater or equal to 38C (100.4F), Mild Pain (1 - 3)  albuterol    90 MICROgram(s) HFA Inhaler 2 Puff(s) Inhalation every 6 hours PRN Shortness of Breath and/or Wheezing  ondansetron Injectable 4 milliGRAM(s) IV Push every 6 hours PRN Nausea  oxyCODONE    IR 5 milliGRAM(s) Oral every 6 hours PRN Severe Pain (7 - 10)        PHYSICAL EXAM:    GENERAL: NAD, well-groomed, well-developed  NERVOUS SYSTEM:  Alert & Oriented X3, Motor Strength 4/5 B/L upper and lower extremities except LUE- 1/5;   CHEST/LUNG: Clear to auscultation bilaterally; No rales, rhonchi, wheezing, or rubs  HEART: Regular rate and rhythm; No murmurs, rubs, or gallops  ABDOMEN: Soft, Nontender, Nondistended; Bowel sounds present  EXTREMITIES:  unable to move  L arm due to pain    LABS:    pending labs    < from: MR Lumbar Spine w/wo IV Cont (04.01.23 @ 12:03) >    IMPRESSION:    There is partially visualized T2 signal hyperintensity within the   bilateral sacral ala, raising suspicion for acute/subacute sacral   deficiency fractures. Underlying marrow replacing lesion cannot be   entirely excluded, but is less likely.    There is an evolving fracture deformity of the L2 vertebral body with   fluid occupying the fracture cleft.  No new lumbar vertebral body   compression deformity.    No evidence for acute osteomyelitis.    There is no significant change in multilevel discogenic degenerative   disease and facet arthropathy of the lumbar spine, most pronounced at   L2-L3 where there is moderate to severe bilateral neural foraminal   narrowing and moderate central canal narrowing.    < end of copied text >  < from: MR Thoracic Spine No Cont (01.22.22 @ 10:10) >  Thoracic vertebral alignment is significant for mild kyphosis with apex   at T8.  Thoracic vertebral body heights show a chronic compression   fracture of T8 with loss of 90% of vertebral body height and minimal   posterior bony retropulsion without compression.  Marrow signal intensity   within thoracic vertebral bodies and posterior elements is unremarkable.    There is no abnormal vertebral or paraspinal enhancement.  No osseous   expansion, epidural disease or paraspinal abnormality is found.        < from: MR Thoracic Spine No Cont (01.22.22 @ 10:10) >     3.   Lumbar spine:   acute chance fracture of L2 with comminuted   fragments of the vertebral body with central distraction and edema.   Fractures appear to be present within the pedicles and articular bodies   with edema within the L2-3 interspinous space and disruption of the   ligamentum flavum. This is consistent with an unstable fracture. CT   lumbar spine recommended for complete evaluation. Chronic compression   deformity of L4 with loss of 40% vertebral body height centrally.        < from: Xray Shoulder 2 Views, Left (04.07.23 @ 19:19) >  IMPRESSION: Small calcifications adjacent to the greater tuberosity    indicate tendinitis/tendinosis.   No acute/chronic radiographic osseous pathology.    If pain persists despite conservative therapy and soft tissue internal   derangement or occult fracture is clinically suspected follow-up MRI   recommended.            * Lethargy seems back to nl poss sec to UTI  start IVF  Xray left shoulder- tendinitis/tendinosis.  prn Tylenol   Lido  ceftriaxone

## 2023-04-09 PROCEDURE — 99232 SBSQ HOSP IP/OBS MODERATE 35: CPT

## 2023-04-09 RX ORDER — ENOXAPARIN SODIUM 100 MG/ML
40 INJECTION SUBCUTANEOUS EVERY 24 HOURS
Refills: 0 | Status: DISCONTINUED | OUTPATIENT
Start: 2023-04-09 | End: 2023-04-10

## 2023-04-09 RX ORDER — POLYETHYLENE GLYCOL 3350 17 G/17G
17 POWDER, FOR SOLUTION ORAL DAILY
Refills: 0 | Status: DISCONTINUED | OUTPATIENT
Start: 2023-04-09 | End: 2023-04-10

## 2023-04-09 RX ADMIN — ENOXAPARIN SODIUM 40 MILLIGRAM(S): 100 INJECTION SUBCUTANEOUS at 18:38

## 2023-04-09 RX ADMIN — Medication 81 MILLIGRAM(S): at 10:35

## 2023-04-09 RX ADMIN — FAMOTIDINE 20 MILLIGRAM(S): 10 INJECTION INTRAVENOUS at 10:35

## 2023-04-09 RX ADMIN — LIDOCAINE 1 PATCH: 4 CREAM TOPICAL at 22:06

## 2023-04-09 RX ADMIN — LATANOPROST 1 DROP(S): 0.05 SOLUTION/ DROPS OPHTHALMIC; TOPICAL at 21:34

## 2023-04-09 RX ADMIN — POLYETHYLENE GLYCOL 3350 17 GRAM(S): 17 POWDER, FOR SOLUTION ORAL at 14:44

## 2023-04-09 RX ADMIN — TAMSULOSIN HYDROCHLORIDE 0.4 MILLIGRAM(S): 0.4 CAPSULE ORAL at 21:34

## 2023-04-09 RX ADMIN — Medication 25 MILLIGRAM(S): at 10:35

## 2023-04-09 RX ADMIN — LIDOCAINE 1 PATCH: 4 CREAM TOPICAL at 10:38

## 2023-04-09 NOTE — PROGRESS NOTE ADULT - ASSESSMENT
88/M admitted with :     * Acute chance fracture of L2 with comminuted fragments of the vertebral body with central distraction and edema. Fractures appear to be present within the pedicles and articular bodies with edema within the L2-3 interspinous space and disruption of the ligamentum flavum. This is consistent with an unstable fracture.   - pt and family refused sx  in brace    * Somnolence maybe narcs related  lethargy resolved  ceftriaxone dced; No UTI; urine cx neg    * Shoulder pain  this dx is present in Dr Watts's note so it must be a chronic problem  Xray left shoulder- tendinitis/tendinosis.  prn Tylenol   Lidocaine    * Constipation: add  Miralax  document BM    DVT PPX: lovenox.

## 2023-04-09 NOTE — PROGRESS NOTE ADULT - PROVIDER SPECIALTY LIST ADULT
Hospitalist
Orthopedics
Orthopedics
Surgery
Trauma Surgery
Surgery
Trauma Surgery
Critical Care
Trauma Surgery
Trauma Surgery
Hospitalist

## 2023-04-09 NOTE — PROGRESS NOTE ADULT - REASON FOR ADMISSION
fall 2 weeks ago, sacral fracture, l2 fracture

## 2023-04-09 NOTE — PROGRESS NOTE ADULT - NUTRITIONAL ASSESSMENT
This patient has been assessed with a concern for Malnutrition and has been determined to have a diagnosis/diagnoses of Severe protein-calorie malnutrition.    This patient is being managed with:   Diet DASH/TLC-  Sodium & Cholesterol Restricted  Entered: Mar 31 2023 10:53PM  

## 2023-04-09 NOTE — PROGRESS NOTE ADULT - SUBJECTIVE AND OBJECTIVE BOX
Patient is a 88y old  Male who presents with a chief complaint of fall 2 weeks ago, sacral fracture, l2 fracture (2023 00:20)      Triage;  BIBA complaining of weakness, decreased PO intake and bld in urine x2-3days. patient complaining of buttock pain s/p fall 2weeks prior to ed visit  weakness  More awake and alert on Rx for uti    : c/o constipation      PHYSICAL EXAM:    Daily     Daily     Vital Signs Last 24 Hrs  T(C): 37 (2023 07:23), Max: 37.4 (2023 22:47)  T(F): 98.6 (2023 07:23), Max: 99.3 (2023 22:47)  HR: 72 (2023 07:23) (57 - 72)  BP: 148/60 (2023 07:23) (117/66 - 148/60)  BP(mean): --  RR: 17 (2023 07:23) (17 - 18)  SpO2: 93% (2023 07:23) (93% - 94%)    Constitutional: Weak  appearing  HEENT: Atraumatic, WILLIAM, Normal, No congestion  Respiratory: Breath Sounds normal, no rhonchi/wheeze  Cardiovascular: N S1S2;   Gastrointestinal: Abdomen soft, non tender, Bowel Sounds present  Extremities: No edema, peripheral pulses present  Neurological: AAO x 3, no gross focal motor deficits  Skin: Non cellulitic, no rash, ulcers  Lymph Nodes: No lymphadenopathy noted  Back: No CVA tenderness   Musculoskeletal: non tender  Breasts: Deferred  Genitourinary: deferred  Rectal: Deferred    All Labs/EKG/Radiology/Meds reviewed    Lab Results:  CBC  CBC Full  -  ( 2023 06:46 )  WBC Count : 10.46 K/uL  RBC Count : 5.05 M/uL  Hemoglobin : 13.1 g/dL  Hematocrit : 41.7 %  Platelet Count - Automated : 226 K/uL  Mean Cell Volume : 82.6 fl  Mean Cell Hemoglobin : 25.9 pg  Mean Cell Hemoglobin Concentration : 31.4 gm/dL  Auto Neutrophil # : x  Auto Lymphocyte # : x  Auto Monocyte # : x  Auto Eosinophil # : x  Auto Basophil # : x  Auto Neutrophil % : x  Auto Lymphocyte % : x  Auto Monocyte % : x  Auto Eosinophil % : x  Auto Basophil % : x    .		Differential:	[] Automated		[] Manual  Chemistry      138  |  110<H>  |  23  ----------------------------<  110<H>  3.9   |  25  |  0.79    Ca    8.5      2023 06:46  Phos  3.2       Mg     2.2     -08          Urinalysis Basic - ( 2023 13:35 )    Color: Yellow / Appearance: Clear / S.025 / pH: x  Gluc: x / Ketone: Trace  / Bili: Negative / Urobili: Negative   Blood: x / Protein: TRACE mg/dL / Nitrite: Negative   Leuk Esterase: Trace / RBC: 6-10 /HPF / WBC 6-10 /HPF   Sq Epi: x / Non Sq Epi: x / Bacteria: Many        MICROBIOLOGY/CULTURES:  Culture Results:   No growth ( @ 13:35)  Culture Results:   No growth to date. ( @ 14:43)  Culture Results:   <10,000 CFU/mL Normal Urogenital Diana ( @ 17:00)          < from: MR Lumbar Spine w/wo IV Cont (23 @ 12:03) >    IMPRESSION:    There is partially visualized T2 signal hyperintensity within the   bilateral sacral ala, raising suspicion for acute/subacute sacral   deficiency fractures. Underlying marrow replacing lesion cannot be   entirely excluded, but is less likely.        There is an evolving fracture deformity of the L2 vertebral body with   fluid occupying the fracture cleft.  No new lumbar vertebral body   compression deformity.    No evidence for acute osteomyelitis.    There is no significant change in multilevel discogenic degenerative   disease and facet arthropathy of the lumbar spine, most pronounced at   L2-L3 where there is moderate to severe bilateral neural foraminal   narrowing and moderate central canal narrowing.    < end of copied text >  < from: MR Thoracic Spine No Cont (22 @ 10:10) >  Thoracic vertebral alignment is significant for mild kyphosis with apex   at T8.  Thoracic vertebral body heights show a chronic compression   fracture of T8 with loss of 90% of vertebral body height and minimal   posterior bony retropulsion without compression.  Marrow signal intensity   within thoracic vertebral bodies and posterior elements is unremarkable.    There is no abnormal vertebral or paraspinal enhancement.  No osseous   expansion, epidural disease or paraspinal abnormality is found.        < from: MR Thoracic Spine No Cont (22 @ 10:10) >     3.   Lumbar spine:   acute chance fracture of L2 with comminuted   fragments of the vertebral body with central distraction and edema.   Fractures appear to be present within the pedicles and articular bodies   with edema within the L2-3 interspinous space and disruption of the   ligamentum flavum. This is consistent with an unstable fracture. CT   lumbar spine recommended for complete evaluation. Chronic compression   deformity of L4 with loss of 40% vertebral body height centrally.        < from: Xray Shoulder 2 Views, Left (23 @ 19:19) >  IMPRESSION: Small calcifications adjacent to the greater tuberosity    indicate tendinitis/tendinosis.   No acute/chronic radiographic osseous pathology.    If pain persists despite conservative therapy and soft tissue internal   derangement or occult fracture is clinically suspected follow-up MRI   recommended.

## 2023-04-10 ENCOUNTER — TRANSCRIPTION ENCOUNTER (OUTPATIENT)
Age: 88
End: 2023-04-10

## 2023-04-10 VITALS
RESPIRATION RATE: 18 BRPM | HEART RATE: 62 BPM | OXYGEN SATURATION: 94 % | DIASTOLIC BLOOD PRESSURE: 52 MMHG | TEMPERATURE: 98 F | SYSTOLIC BLOOD PRESSURE: 122 MMHG

## 2023-04-10 LAB — SARS-COV-2 RNA SPEC QL NAA+PROBE: SIGNIFICANT CHANGE UP

## 2023-04-10 PROCEDURE — 99239 HOSP IP/OBS DSCHRG MGMT >30: CPT

## 2023-04-10 RX ORDER — POLYETHYLENE GLYCOL 3350 17 G/17G
17 POWDER, FOR SOLUTION ORAL
Qty: 0 | Refills: 0 | DISCHARGE
Start: 2023-04-10

## 2023-04-10 RX ORDER — BUDESONIDE AND FORMOTEROL FUMARATE DIHYDRATE 160; 4.5 UG/1; UG/1
2 AEROSOL RESPIRATORY (INHALATION)
Refills: 0 | Status: DISCONTINUED | OUTPATIENT
Start: 2023-04-10 | End: 2023-04-10

## 2023-04-10 RX ORDER — LIDOCAINE 4 G/100G
1 CREAM TOPICAL
Qty: 0 | Refills: 0 | DISCHARGE
Start: 2023-04-10

## 2023-04-10 RX ORDER — TIOTROPIUM BROMIDE 18 UG/1
2 CAPSULE ORAL; RESPIRATORY (INHALATION) DAILY
Refills: 0 | Status: DISCONTINUED | OUTPATIENT
Start: 2023-04-10 | End: 2023-04-10

## 2023-04-10 RX ADMIN — POLYETHYLENE GLYCOL 3350 17 GRAM(S): 17 POWDER, FOR SOLUTION ORAL at 09:03

## 2023-04-10 RX ADMIN — Medication 25 MILLIGRAM(S): at 09:03

## 2023-04-10 RX ADMIN — LIDOCAINE 1 PATCH: 4 CREAM TOPICAL at 09:04

## 2023-04-10 RX ADMIN — FAMOTIDINE 20 MILLIGRAM(S): 10 INJECTION INTRAVENOUS at 09:03

## 2023-04-10 RX ADMIN — Medication 650 MILLIGRAM(S): at 09:02

## 2023-04-10 RX ADMIN — Medication 81 MILLIGRAM(S): at 09:03

## 2023-04-10 NOTE — DISCHARGE NOTE NURSING/CASE MANAGEMENT/SOCIAL WORK - PATIENT PORTAL LINK FT
You can access the FollowMyHealth Patient Portal offered by [facility] by registering at the following website: [email or web address]. By joining [employer]’s FollowMyHealth portal, you will also be able to view your health information using other applications (apps) compatible with our system.

## 2023-04-10 NOTE — DISCHARGE NOTE NURSING/CASE MANAGEMENT/SOCIAL WORK - NSDCPEFALRISK_GEN_ALL_CORE
For information on Fall & Injury Prevention, visit: [email or web address] OR  [email or web address] OR  https://www.cdc.gov/steadi/patient.html

## 2023-04-11 LAB
CULTURE RESULTS: SIGNIFICANT CHANGE UP
SPECIMEN SOURCE: SIGNIFICANT CHANGE UP

## 2023-04-11 NOTE — PROGRESS NOTE ADULT - SUBJECTIVE AND OBJECTIVE BOX
Activity as tolerated.   Patient seen and examined at bedside. No acute complaints at this time. Pain well controlled. Denies weakness, numbness or tingling. Denies chest pain, shortness of breath, nausea or vomiting. Patient states he is subjectively feeling better.     PE:  Vital Signs Last 24 Hrs  T(C): 36.3 (02 Apr 2023 05:00), Max: 36.3 (01 Apr 2023 09:18)  T(F): 97.4 (02 Apr 2023 05:00), Max: 97.4 (01 Apr 2023 09:18)  HR: 70 (02 Apr 2023 07:00) (60 - 72)  BP: 144/67 (02 Apr 2023 07:00) (112/50 - 151/56)  BP(mean): 85 (02 Apr 2023 07:00) (63 - 88)  RR: 17 (02 Apr 2023 07:00) (12 - 20)  SpO2: 93% (02 Apr 2023 07:00) (93% - 100%)    Parameters below as of 02 Apr 2023 07:00  Patient On (Oxygen Delivery Method): nasal cannula  O2 Flow (L/min): 2                          14.3   8.88  )-----------( 174      ( 02 Apr 2023 06:59 )             46.1       04-01    134<L>  |  105  |  15  ----------------------------<  94  4.6   |  23  |  0.84    Ca    9.0      01 Apr 2023 06:13    TPro  6.7  /  Alb  2.7<L>  /  TBili  1.1  /  DBili  x   /  AST  14<L>  /  ALT  14  /  AlkPhos  110  03-31              PT/INR - ( 31 Mar 2023 18:00 )   PT: 23.3 sec;   INR: 1.99 ratio         PTT - ( 31 Mar 2023 18:00 )  PTT:38.4 sec        General: NAD, resting comfortably in bed    2+ radial pulses  2+ DP Pulses    Motor:                   C5                C6              C7               C8           T1   R             5/5                5/5            5/5              5/5          5/5  L             5/5                5/5            5/5              5/5          5/5                    L2                  L3             L4              L5            S1  R            5/5                5/5             5/5            5/5          5/5  L             5/5                5/5            5/5            5/5          5/5    Sensory:            C5         C6         C7      C8       T1        (0=absent, 1=impaired, 2=normal, NT=not testable)  R         2            2           2        2         2  L          2            2           2        2         2               L2          L3         L4      L5       S1         (0=absent, 1=impaired, 2=normal, NT=not testable)  R         2            2            2        2        2  L          2            2           2        2         2                                            A/P :   Patient is a 88y Male w/ L2 vertebra plana, Hx Chr T8, 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 Riggs NSx, family instructed 1/2022 possibly unstable and ?abscess however refused further work up per prior documentation)    -Clinical presentation and physical exam are not consistent w/ acute cord compression/cauda equina. Does not demonstrate red flag symptoms such as bowel/bladder incontinence, saddle anesthesia, fevers/chills, or weight loss. Patient does not have acute neurological deficits   -CT of the cervical, thoracic spine, reviewed  - MR LSp w/wo: Hyperintense bilateral sacral ala fractures, likely subacute, evolving fracture deformity L2 with fluid within the fracture cleft, L2-3 Moderate/severe B/L foraminal and moderate central stenosis  - MR Cervical/Thoracic spine pt refused 4/1/23.  -Patient was extensively educated about the signs and symptoms of osteomyelitis, epidural abscess, discitis, acute cord compression. The patient was advised to inform provider/nurse/etc if he develops neurological symptoms such as progressing weakness, numbness/tingling/paresthesias, worsening pain, bowel/bladder incontinence, or fevers/chills.  -Recommend bedrest   -Recommend post void residual --> please bladder scan patient 15-30 min after patient voids and either record in flow sheets, note, or call orthopaedic surgery directly  -Patient has TLSO brace, family to bring to bedside 4/2  -DVT ppx: Will leave up to primary team risk v benefits w/ hx PE 5/22; Pt on home Justina and A81 and has taken since fall >2 weeks prior per family, minimal risk epidural hematoma based upon hx provided per family/pt   -No heavy lifting/twisting  -Multimodal analgesia - recommend low dose opioids, acetaminophen, muscle relaxant as tolerated, would caution polypharmacy in elderly pt; patient comfortable in bed, not c/o pain upon exam   -At present family / patient stating they will not pursue surgical intervention but reporting they are planning a family discussion for final decision; Plan would be T11-L5 decompression, PSF, possible L2 corpectomy   -Tsx mgmt appreciated  -All patient's questions answered. Patient understands and agrees w/ above plan.  -Case discussed w/ Dr. Beaulieu  -Will advise as plan changes, definitive plan pending further imaging Patient seen and examined at bedside. No acute complaints at this time. Pain well controlled. Denies weakness, numbness or tingling. Denies chest pain, shortness of breath, nausea or vomiting. Patient states he is subjectively feeling better.     PE:  Vital Signs Last 24 Hrs  T(C): 36.3 (02 Apr 2023 05:00), Max: 36.3 (01 Apr 2023 09:18)  T(F): 97.4 (02 Apr 2023 05:00), Max: 97.4 (01 Apr 2023 09:18)  HR: 70 (02 Apr 2023 07:00) (60 - 72)  BP: 144/67 (02 Apr 2023 07:00) (112/50 - 151/56)  BP(mean): 85 (02 Apr 2023 07:00) (63 - 88)  RR: 17 (02 Apr 2023 07:00) (12 - 20)  SpO2: 93% (02 Apr 2023 07:00) (93% - 100%)    Parameters below as of 02 Apr 2023 07:00  Patient On (Oxygen Delivery Method): nasal cannula  O2 Flow (L/min): 2                          14.3   8.88  )-----------( 174      ( 02 Apr 2023 06:59 )             46.1       04-01    134<L>  |  105  |  15  ----------------------------<  94  4.6   |  23  |  0.84    Ca    9.0      01 Apr 2023 06:13    TPro  6.7  /  Alb  2.7<L>  /  TBili  1.1  /  DBili  x   /  AST  14<L>  /  ALT  14  /  AlkPhos  110  03-31              PT/INR - ( 31 Mar 2023 18:00 )   PT: 23.3 sec;   INR: 1.99 ratio         PTT - ( 31 Mar 2023 18:00 )  PTT:38.4 sec        General: NAD, resting comfortably in bed    2+ radial pulses  2+ DP Pulses    Motor:                   C5                C6              C7               C8           T1   R             5/5                5/5            5/5              5/5          5/5  L             5/5                5/5            5/5              5/5          5/5                    L2                  L3             L4              L5            S1  R            5/5                5/5             5/5            5/5          5/5  L             5/5                5/5            5/5            5/5          5/5    Sensory:            C5         C6         C7      C8       T1        (0=absent, 1=impaired, 2=normal, NT=not testable)  R         2            2           2        2         2  L          2            2           2        2         2               L2          L3         L4      L5       S1         (0=absent, 1=impaired, 2=normal, NT=not testable)  R         2            2            2        2        2  L          2            2           2        2         2                                        A/P :   Patient is a 88y Male w/ L2 extension distraction fracture    -Patient has TLSO brace, family to bring  -DVT ppx: Would rec holding in event patient needs more urgent surgical intervention pending MRI completion & to determine acuity of injury; Will leave up to primary team risk v benefits w/ hx PE 5/22; Pt on home Justina and A81 and has taken since fall >2 weeks prior per family, minimal risk epidural hematoma based upon hx provided per family/pt   -No heavy lifting/twisting  -Multimodal analgesia - recommend low dose opioids, acetaminophen, muscle relaxant as tolerated, would caution polypharmacy in elderly pt; patient comfortable in bed, not c/o pain upon exam   -All patient's questions answered. Patient understands and agrees w/ above plan.  -Case discussed w/ Dr. Beaulieu  -Will advise as plan changes, definitive plan pending further imaging  -At present family / patient adamantly stating they will not pursue surgical intervention; Plan would be T11-L5 decompression, PSF, possible L2 corpectomy

## 2023-07-12 NOTE — ED PROVIDER NOTE - DATE/TIME 1
Pt spoken to via telephone.    Patient discussed recent issue at work leading to his significant depressive symptoms and anxiety. States that he continues to overthink and second guess himself, struggling with high anxiety, crying intermittently. Pt has meetings scheduled at work tomorrow which he is very nervous about. He denies recent SI/HI. Reports fair sleep and appetite.    Pt is cooperative  Mood is highly anxious  TP is logical, goal-directed; no delusions elicited  Denies current SI/HI or AH/VH  Insight, judgment and impulse control are fair/improving  A&Ox3    Plan:  -Advised pt to take an extra 1/2 tab of Klonopin if needed tomorrow prior to his meeting, or alternatively, take Seroquel 12.5-25mg qAM to help with anxiety  -Continue other meds  -Continue PHP  -Discussed ways to frame thoughts, change negative thought patterns   Pt. evaluated by MD Bloch and code stroke was called. 24-Oct-2021 14:51

## 2023-07-19 NOTE — ED PROVIDER NOTE - OBJECTIVE STATEMENT
In Cardiac Rehab today, noted changes in rhythm strip. Typical rhythm is V-paced with intrinsic beats and rare PVCs including rare couplets.     Today several areas of indeterminate rhythm between PPM spikes, appearing different than usual intrinsic beats. No complaints of any symptoms from patient.    Please review session notes and strips in media tab. See scanning today from 10:55.   Session notes and strips also faxed to Dr. Villeda's clinic at [phone].       82 year old, recent hernia surg March 30th, now presenting with leukocytosis outpatient [place] of 18,000    PMD: Trey Watts ([address] [place] [address] in [facility])  Surg: Cesar Baez 82 year old, recent hernia surg March 30th, now presenting with leukocytosis outpatient [facility] of 18,000. CT from outpatient showing "small focus of gas in left hemiscrotum" "marked fluid in the left inguinal canal tracking into the scrotum, large irregular peripherally enhancing collection concerning for abscess. 'poor visialization of left testical and could represent infarcted necrotic left testicle'. complaining of intermittent chills.     PMD: Trey Watts ([address] [place] [address] in [facility])  Surg: Cesar Baez

## 2023-09-26 NOTE — ED ADULT TRIAGE NOTE - NS ED TRIAGE AVPU SCALE
A catheter was exchanged for a (CATHETER 5FR STRGT 65[facility]ID [facility] catheter. Alert-The patient is alert, awake and responds to voice. The patient is oriented to time, place, and person. The triage nurse is able to obtain subjective information.

## 2023-10-05 NOTE — PROGRESS NOTE ADULT - SUBJECTIVE AND OBJECTIVE BOX
Pt seen and examined at bedside, resting comfortably. Tolerated procedure well without complications. Denies numbness/tingling, chest pain, SOB.    Vital Signs Last 24 Hrs  T(C): 36.2 (18 Jun 2020 21:01), Max: 36.4 (18 Jun 2020 20:25)  T(F): 97.2 (18 Jun 2020 21:01), Max: 97.6 (18 Jun 2020 20:25)  HR: 78 (18 Jun 2020 21:01) (65 - 84)  BP: 141/64 (18 Jun 2020 21:01) (73/53 - 164/78)  BP(mean): --  RR: 15 (18 Jun 2020 21:01) (13 - 20)  SpO2: 97% (18 Jun 2020 21:01) (95% - 98%)    Physical Exam:  Gen: NAD, A&Ox3    RUE:  In shoulder immobilizer  Dressing C/D/I  SILT C5-T1  + Musc/Axillary/Median/Ulnar/Radial/AIN/PIN motor intact  + Radial pulse  Compartments soft, compressible      A/P: 85yMale s/p R rTSA POD1    - Pain control  - PT/OT  - NWB RUE in shoulder immobilizer  - DVT prophylaxis to start today per anticoagulation team  - Encourage incentive spirometry, deep breathing exercises  - DC home today  - Will discuss with attending, Dr. Nuñez and advise if plan changes    Erasmo Modi, DO PGY1  Orthopaedic Surgery Pt seen and examined at bedside, resting comfortably. No acute events overnight. Denies numbness/tingling, chest pain, SOB.    Vital Signs Last 24 Hrs  T(C): 36.2 (18 Jun 2020 21:01), Max: 36.4 (18 Jun 2020 20:25)  T(F): 97.2 (18 Jun 2020 21:01), Max: 97.6 (18 Jun 2020 20:25)  HR: 78 (18 Jun 2020 21:01) (65 - 84)  BP: 141/64 (18 Jun 2020 21:01) (73/53 - 164/78)  BP(mean): --  RR: 15 (18 Jun 2020 21:01) (13 - 20)  SpO2: 97% (18 Jun 2020 21:01) (95% - 98%)    Physical Exam:  Gen: NAD, A&Ox3    RUE:  In shoulder immobilizer  Dressing C/D/I  SILT C5-T1  + Musc/Axillary/Median/Ulnar/Radial/AIN/PIN motor intact  + Radial pulse  Compartments soft, compressible      A/P: 85yMale s/p R rTSA POD1    - Pain control  - PT/OT  - NWB RUE in shoulder immobilizer  - DVT prophylaxis to start today per anticoagulation team  - Encourage incentive spirometry, deep breathing exercises  - DC home today  - Will discuss with attending, Dr. Nuñez and advise if plan changes    Erasmo Modi, DO PGY1  Orthopaedic Surgery Pt seen and examined at bedside, resting comfortably. No acute events overnight. Denies numbness/tingling, chest pain, SOB.    Vital Signs Last 24 Hrs  T(C): 36.2 (18 Jun 2020 21:01), Max: 36.4 (18 Jun 2020 20:25)  T(F): 97.2 (18 Jun 2020 21:01), Max: 97.6 (18 Jun 2020 20:25)  HR: 78 (18 Jun 2020 21:01) (65 - 84)  BP: 141/64 (18 Jun 2020 21:01) (73/53 - 164/78)  BP(mean): --  RR: 15 (18 Jun 2020 21:01) (13 - 20)  SpO2: 97% (18 Jun 2020 21:01) (95% - 98%)    Physical Exam:  Gen: NAD, A&Ox3    RUE:  In shoulder immobilizer  Dressing C/D/I  SILT C5-T1  + Musc/Axillary/Median/Ulnar/Radial/PIN motor intact  - AIN  + Radial pulse  Compartments soft, compressible      A/P: 85yMale s/p R rTSA POD1    - Pain control  - PT/OT  - NWB RUE in shoulder immobilizer  - DVT prophylaxis to start today per anticoagulation team  - Encourage incentive spirometry, deep breathing exercises  - DC home today  - Will discuss with attending, Dr. Nuñez and advise if plan changes    Erasmo Modi, DO PGY1  Orthopaedic Surgery Pt seen and examined at bedside, resting comfortably. No acute events overnight. Denies numbness/tingling, chest pain, SOB.    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 Nuñez and advise if plan changes    Erasmo Modi, DO PGY1  Orthopaedic Surgery Pt seen and examined at bedside, resting comfortably. No acute events overnight. Denies numbness/tingling, chest pain, SOB.    Vital Signs Last 24 Hrs  T(C): 36.2 (18 Jun 2020 21:01), Max: 36.4 (18 Jun 2020 20:25)  T(F): 97.2 (18 Jun 2020 21:01), Max: 97.6 (18 Jun 2020 20:25)  HR: 78 (18 Jun 2020 21:01) (65 - 84)  BP: 141/64 (18 Jun 2020 21:01) (73/53 - 164/78)  BP(mean): --  RR: 15 (18 Jun 2020 21:01) (13 - 20)  SpO2: 97% (18 Jun 2020 21:01) (95% - 98%)    Physical Exam:  Gen: NAD, A&Ox3    RUE:  In shoulder immobilizer  Dressing C/D/I  SILT C5-T1  + Musc/Axillary/Median/Ulnar/Radial motor intact  thumb extension/2nd finger flexion minimal movement  + Radial pulse  Compartments soft, compressible      A/P: 85yMale s/p R rTSA POD1    - 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 Nuñez and advise if plan changes    Erasmo Modi, DO PGY1  Orthopaedic Surgery No (0)

## 2023-10-21 ENCOUNTER — INPATIENT (INPATIENT)
Facility: HOSPITAL | Age: 88
LOS: 4 days | Discharge: HOME | DRG: 871 | End: 2023-10-26
Attending: FAMILY MEDICINE | Admitting: STUDENT IN AN ORGANIZED HEALTH CARE EDUCATION/TRAINING PROGRAM
Payer: MEDICARE

## 2023-10-21 VITALS
HEART RATE: 92 BPM | RESPIRATION RATE: 24 BRPM | DIASTOLIC BLOOD PRESSURE: 64 MMHG | SYSTOLIC BLOOD PRESSURE: 180 MMHG | OXYGEN SATURATION: 94 % | TEMPERATURE: 98 F | WEIGHT: 149.91 LBS

## 2023-10-21 DIAGNOSIS — Z96.642 PRESENCE OF LEFT ARTIFICIAL HIP JOINT: Chronic | ICD-10-CM

## 2023-10-21 DIAGNOSIS — Z95.5 PRESENCE OF CORONARY ANGIOPLASTY IMPLANT AND GRAFT: Chronic | ICD-10-CM

## 2023-10-21 DIAGNOSIS — Z98.49 CATARACT EXTRACTION STATUS, UNSPECIFIED EYE: Chronic | ICD-10-CM

## 2023-10-21 DIAGNOSIS — Z98.890 OTHER SPECIFIED POSTPROCEDURAL STATES: Chronic | ICD-10-CM

## 2023-10-21 DIAGNOSIS — Z90.89 ACQUIRED ABSENCE OF OTHER ORGANS: Chronic | ICD-10-CM

## 2023-10-21 DIAGNOSIS — J44.1 CHRONIC OBSTRUCTIVE PULMONARY DISEASE WITH (ACUTE) EXACERBATION: ICD-10-CM

## 2023-10-21 LAB
ALBUMIN SERPL ELPH-MCNC: 2.9 G/DL — LOW (ref 3.3–5)
ALBUMIN SERPL ELPH-MCNC: 2.9 G/DL — LOW (ref 3.3–5)
ALP SERPL-CCNC: 93 U/L — SIGNIFICANT CHANGE UP (ref 40–120)
ALP SERPL-CCNC: 93 U/L — SIGNIFICANT CHANGE UP (ref 40–120)
ALT FLD-CCNC: 12 U/L — SIGNIFICANT CHANGE UP (ref 12–78)
ALT FLD-CCNC: 12 U/L — SIGNIFICANT CHANGE UP (ref 12–78)
ANION GAP SERPL CALC-SCNC: 5 MMOL/L — SIGNIFICANT CHANGE UP (ref 5–17)
ANION GAP SERPL CALC-SCNC: 5 MMOL/L — SIGNIFICANT CHANGE UP (ref 5–17)
APPEARANCE UR: ABNORMAL
APPEARANCE UR: ABNORMAL
APTT BLD: 32.9 SEC — SIGNIFICANT CHANGE UP (ref 24.5–35.6)
APTT BLD: 32.9 SEC — SIGNIFICANT CHANGE UP (ref 24.5–35.6)
AST SERPL-CCNC: 14 U/L — LOW (ref 15–37)
AST SERPL-CCNC: 14 U/L — LOW (ref 15–37)
BACTERIA # UR AUTO: ABNORMAL /HPF
BACTERIA # UR AUTO: ABNORMAL /HPF
BASOPHILS # BLD AUTO: 0.06 K/UL — SIGNIFICANT CHANGE UP (ref 0–0.2)
BASOPHILS # BLD AUTO: 0.06 K/UL — SIGNIFICANT CHANGE UP (ref 0–0.2)
BASOPHILS NFR BLD AUTO: 0.5 % — SIGNIFICANT CHANGE UP (ref 0–2)
BASOPHILS NFR BLD AUTO: 0.5 % — SIGNIFICANT CHANGE UP (ref 0–2)
BILIRUB SERPL-MCNC: 1.2 MG/DL — SIGNIFICANT CHANGE UP (ref 0.2–1.2)
BILIRUB SERPL-MCNC: 1.2 MG/DL — SIGNIFICANT CHANGE UP (ref 0.2–1.2)
BILIRUB UR-MCNC: ABNORMAL
BILIRUB UR-MCNC: ABNORMAL
BUN SERPL-MCNC: 14 MG/DL — SIGNIFICANT CHANGE UP (ref 7–23)
BUN SERPL-MCNC: 14 MG/DL — SIGNIFICANT CHANGE UP (ref 7–23)
CALCIUM SERPL-MCNC: 8.8 MG/DL — SIGNIFICANT CHANGE UP (ref 8.5–10.1)
CALCIUM SERPL-MCNC: 8.8 MG/DL — SIGNIFICANT CHANGE UP (ref 8.5–10.1)
CAST: 4 /LPF — SIGNIFICANT CHANGE UP (ref 0–4)
CAST: 4 /LPF — SIGNIFICANT CHANGE UP (ref 0–4)
CHLORIDE SERPL-SCNC: 99 MMOL/L — SIGNIFICANT CHANGE UP (ref 96–108)
CHLORIDE SERPL-SCNC: 99 MMOL/L — SIGNIFICANT CHANGE UP (ref 96–108)
CO2 SERPL-SCNC: 28 MMOL/L — SIGNIFICANT CHANGE UP (ref 22–31)
CO2 SERPL-SCNC: 28 MMOL/L — SIGNIFICANT CHANGE UP (ref 22–31)
COLOR SPEC: SIGNIFICANT CHANGE UP
COLOR SPEC: SIGNIFICANT CHANGE UP
CREAT SERPL-MCNC: 1.03 MG/DL — SIGNIFICANT CHANGE UP (ref 0.5–1.3)
CREAT SERPL-MCNC: 1.03 MG/DL — SIGNIFICANT CHANGE UP (ref 0.5–1.3)
DIFF PNL FLD: ABNORMAL
DIFF PNL FLD: ABNORMAL
EGFR: 69 ML/MIN/1.73M2 — SIGNIFICANT CHANGE UP
EGFR: 69 ML/MIN/1.73M2 — SIGNIFICANT CHANGE UP
EOSINOPHIL # BLD AUTO: 0.01 K/UL — SIGNIFICANT CHANGE UP (ref 0–0.5)
EOSINOPHIL # BLD AUTO: 0.01 K/UL — SIGNIFICANT CHANGE UP (ref 0–0.5)
EOSINOPHIL NFR BLD AUTO: 0.1 % — SIGNIFICANT CHANGE UP (ref 0–6)
EOSINOPHIL NFR BLD AUTO: 0.1 % — SIGNIFICANT CHANGE UP (ref 0–6)
GLUCOSE SERPL-MCNC: 128 MG/DL — HIGH (ref 70–99)
GLUCOSE SERPL-MCNC: 128 MG/DL — HIGH (ref 70–99)
GLUCOSE UR QL: NEGATIVE MG/DL — SIGNIFICANT CHANGE UP
GLUCOSE UR QL: NEGATIVE MG/DL — SIGNIFICANT CHANGE UP
HCT VFR BLD CALC: 46.1 % — SIGNIFICANT CHANGE UP (ref 39–50)
HCT VFR BLD CALC: 46.1 % — SIGNIFICANT CHANGE UP (ref 39–50)
HGB BLD-MCNC: 14.9 G/DL — SIGNIFICANT CHANGE UP (ref 13–17)
HGB BLD-MCNC: 14.9 G/DL — SIGNIFICANT CHANGE UP (ref 13–17)
IMM GRANULOCYTES NFR BLD AUTO: 0.6 % — SIGNIFICANT CHANGE UP (ref 0–0.9)
IMM GRANULOCYTES NFR BLD AUTO: 0.6 % — SIGNIFICANT CHANGE UP (ref 0–0.9)
INR BLD: 1.83 RATIO — HIGH (ref 0.85–1.18)
INR BLD: 1.83 RATIO — HIGH (ref 0.85–1.18)
KETONES UR-MCNC: ABNORMAL MG/DL
KETONES UR-MCNC: ABNORMAL MG/DL
LACTATE SERPL-SCNC: 1.9 MMOL/L — SIGNIFICANT CHANGE UP (ref 0.7–2)
LACTATE SERPL-SCNC: 1.9 MMOL/L — SIGNIFICANT CHANGE UP (ref 0.7–2)
LEUKOCYTE ESTERASE UR-ACNC: ABNORMAL
LEUKOCYTE ESTERASE UR-ACNC: ABNORMAL
LYMPHOCYTES # BLD AUTO: 0.84 K/UL — LOW (ref 1–3.3)
LYMPHOCYTES # BLD AUTO: 0.84 K/UL — LOW (ref 1–3.3)
LYMPHOCYTES # BLD AUTO: 6.6 % — LOW (ref 13–44)
LYMPHOCYTES # BLD AUTO: 6.6 % — LOW (ref 13–44)
MCHC RBC-ENTMCNC: 25.3 PG — LOW (ref 27–34)
MCHC RBC-ENTMCNC: 25.3 PG — LOW (ref 27–34)
MCHC RBC-ENTMCNC: 32.3 GM/DL — SIGNIFICANT CHANGE UP (ref 32–36)
MCHC RBC-ENTMCNC: 32.3 GM/DL — SIGNIFICANT CHANGE UP (ref 32–36)
MCV RBC AUTO: 78.1 FL — LOW (ref 80–100)
MCV RBC AUTO: 78.1 FL — LOW (ref 80–100)
MONOCYTES # BLD AUTO: 1.41 K/UL — HIGH (ref 0–0.9)
MONOCYTES # BLD AUTO: 1.41 K/UL — HIGH (ref 0–0.9)
MONOCYTES NFR BLD AUTO: 11.1 % — SIGNIFICANT CHANGE UP (ref 2–14)
MONOCYTES NFR BLD AUTO: 11.1 % — SIGNIFICANT CHANGE UP (ref 2–14)
NEUTROPHILS # BLD AUTO: 10.27 K/UL — HIGH (ref 1.8–7.4)
NEUTROPHILS # BLD AUTO: 10.27 K/UL — HIGH (ref 1.8–7.4)
NEUTROPHILS NFR BLD AUTO: 81.1 % — HIGH (ref 43–77)
NEUTROPHILS NFR BLD AUTO: 81.1 % — HIGH (ref 43–77)
NITRITE UR-MCNC: NEGATIVE — SIGNIFICANT CHANGE UP
NITRITE UR-MCNC: NEGATIVE — SIGNIFICANT CHANGE UP
PH UR: 5 — SIGNIFICANT CHANGE UP (ref 5–8)
PH UR: 5 — SIGNIFICANT CHANGE UP (ref 5–8)
PLATELET # BLD AUTO: 159 K/UL — SIGNIFICANT CHANGE UP (ref 150–400)
PLATELET # BLD AUTO: 159 K/UL — SIGNIFICANT CHANGE UP (ref 150–400)
POTASSIUM SERPL-MCNC: 3.8 MMOL/L — SIGNIFICANT CHANGE UP (ref 3.5–5.3)
POTASSIUM SERPL-MCNC: 3.8 MMOL/L — SIGNIFICANT CHANGE UP (ref 3.5–5.3)
POTASSIUM SERPL-SCNC: 3.8 MMOL/L — SIGNIFICANT CHANGE UP (ref 3.5–5.3)
POTASSIUM SERPL-SCNC: 3.8 MMOL/L — SIGNIFICANT CHANGE UP (ref 3.5–5.3)
PROT SERPL-MCNC: 7.3 GM/DL — SIGNIFICANT CHANGE UP (ref 6–8.3)
PROT SERPL-MCNC: 7.3 GM/DL — SIGNIFICANT CHANGE UP (ref 6–8.3)
PROT UR-MCNC: 100 MG/DL
PROT UR-MCNC: 100 MG/DL
PROTHROM AB SERPL-ACNC: 20.3 SEC — HIGH (ref 9.5–13)
PROTHROM AB SERPL-ACNC: 20.3 SEC — HIGH (ref 9.5–13)
RAPID RVP RESULT: DETECTED
RAPID RVP RESULT: DETECTED
RBC # BLD: 5.9 M/UL — HIGH (ref 4.2–5.8)
RBC # BLD: 5.9 M/UL — HIGH (ref 4.2–5.8)
RBC # FLD: 17.9 % — HIGH (ref 10.3–14.5)
RBC # FLD: 17.9 % — HIGH (ref 10.3–14.5)
RBC CASTS # UR COMP ASSIST: 4 /HPF — SIGNIFICANT CHANGE UP (ref 0–4)
RBC CASTS # UR COMP ASSIST: 4 /HPF — SIGNIFICANT CHANGE UP (ref 0–4)
RV+EV RNA SPEC QL NAA+PROBE: DETECTED
RV+EV RNA SPEC QL NAA+PROBE: DETECTED
SARS-COV-2 RNA SPEC QL NAA+PROBE: SIGNIFICANT CHANGE UP
SARS-COV-2 RNA SPEC QL NAA+PROBE: SIGNIFICANT CHANGE UP
SODIUM SERPL-SCNC: 132 MMOL/L — LOW (ref 135–145)
SODIUM SERPL-SCNC: 132 MMOL/L — LOW (ref 135–145)
SP GR SPEC: 1.02 — SIGNIFICANT CHANGE UP (ref 1–1.03)
SP GR SPEC: 1.02 — SIGNIFICANT CHANGE UP (ref 1–1.03)
SQUAMOUS # UR AUTO: 2 /HPF — SIGNIFICANT CHANGE UP (ref 0–5)
SQUAMOUS # UR AUTO: 2 /HPF — SIGNIFICANT CHANGE UP (ref 0–5)
TROPONIN I, HIGH SENSITIVITY RESULT: 47.67 NG/L — SIGNIFICANT CHANGE UP
TROPONIN I, HIGH SENSITIVITY RESULT: 47.67 NG/L — SIGNIFICANT CHANGE UP
UROBILINOGEN FLD QL: 1 MG/DL — SIGNIFICANT CHANGE UP (ref 0.2–1)
UROBILINOGEN FLD QL: 1 MG/DL — SIGNIFICANT CHANGE UP (ref 0.2–1)
WBC # BLD: 12.66 K/UL — HIGH (ref 3.8–10.5)
WBC # BLD: 12.66 K/UL — HIGH (ref 3.8–10.5)
WBC # FLD AUTO: 12.66 K/UL — HIGH (ref 3.8–10.5)
WBC # FLD AUTO: 12.66 K/UL — HIGH (ref 3.8–10.5)
WBC UR QL: 30 /HPF — HIGH (ref 0–5)
WBC UR QL: 30 /HPF — HIGH (ref 0–5)

## 2023-10-21 PROCEDURE — 71045 X-RAY EXAM CHEST 1 VIEW: CPT | Mod: 26

## 2023-10-21 PROCEDURE — 94760 N-INVAS EAR/PLS OXIMETRY 1: CPT

## 2023-10-21 PROCEDURE — 36415 COLL VENOUS BLD VENIPUNCTURE: CPT

## 2023-10-21 PROCEDURE — 80053 COMPREHEN METABOLIC PANEL: CPT

## 2023-10-21 PROCEDURE — 94640 AIRWAY INHALATION TREATMENT: CPT

## 2023-10-21 PROCEDURE — 71250 CT THORAX DX C-: CPT

## 2023-10-21 PROCEDURE — 99497 ADVNCD CARE PLAN 30 MIN: CPT | Mod: 25

## 2023-10-21 PROCEDURE — 80048 BASIC METABOLIC PNL TOTAL CA: CPT

## 2023-10-21 PROCEDURE — 85027 COMPLETE CBC AUTOMATED: CPT

## 2023-10-21 PROCEDURE — 97116 GAIT TRAINING THERAPY: CPT | Mod: GP

## 2023-10-21 PROCEDURE — 99285 EMERGENCY DEPT VISIT HI MDM: CPT

## 2023-10-21 PROCEDURE — 93010 ELECTROCARDIOGRAM REPORT: CPT

## 2023-10-21 PROCEDURE — 97162 PT EVAL MOD COMPLEX 30 MIN: CPT | Mod: GP

## 2023-10-21 PROCEDURE — 99223 1ST HOSP IP/OBS HIGH 75: CPT

## 2023-10-21 RX ORDER — METOPROLOL TARTRATE 50 MG
25 TABLET ORAL DAILY
Refills: 0 | Status: DISCONTINUED | OUTPATIENT
Start: 2023-10-21 | End: 2023-10-26

## 2023-10-21 RX ORDER — SODIUM CHLORIDE 9 MG/ML
1000 INJECTION INTRAMUSCULAR; INTRAVENOUS; SUBCUTANEOUS ONCE
Refills: 0 | Status: COMPLETED | OUTPATIENT
Start: 2023-10-21 | End: 2023-10-21

## 2023-10-21 RX ORDER — ASPIRIN/CALCIUM CARB/MAGNESIUM 324 MG
81 TABLET ORAL DAILY
Refills: 0 | Status: DISCONTINUED | OUTPATIENT
Start: 2023-10-21 | End: 2023-10-26

## 2023-10-21 RX ORDER — IPRATROPIUM/ALBUTEROL SULFATE 18-103MCG
3 AEROSOL WITH ADAPTER (GRAM) INHALATION ONCE
Refills: 0 | Status: COMPLETED | OUTPATIENT
Start: 2023-10-21 | End: 2023-10-21

## 2023-10-21 RX ORDER — ACETAMINOPHEN 500 MG
1000 TABLET ORAL ONCE
Refills: 0 | Status: COMPLETED | OUTPATIENT
Start: 2023-10-21 | End: 2023-10-21

## 2023-10-21 RX ORDER — ATORVASTATIN CALCIUM 80 MG/1
20 TABLET, FILM COATED ORAL AT BEDTIME
Refills: 0 | Status: DISCONTINUED | OUTPATIENT
Start: 2023-10-21 | End: 2023-10-26

## 2023-10-21 RX ORDER — APIXABAN 2.5 MG/1
5 TABLET, FILM COATED ORAL
Refills: 0 | Status: DISCONTINUED | OUTPATIENT
Start: 2023-10-21 | End: 2023-10-26

## 2023-10-21 RX ORDER — ACETAMINOPHEN 500 MG
2 TABLET ORAL
Qty: 0 | Refills: 0 | DISCHARGE

## 2023-10-21 RX ORDER — LATANOPROST 0.05 MG/ML
1 SOLUTION/ DROPS OPHTHALMIC; TOPICAL AT BEDTIME
Refills: 0 | Status: DISCONTINUED | OUTPATIENT
Start: 2023-10-21 | End: 2023-10-26

## 2023-10-21 RX ORDER — CEFTRIAXONE 500 MG/1
1000 INJECTION, POWDER, FOR SOLUTION INTRAMUSCULAR; INTRAVENOUS ONCE
Refills: 0 | Status: COMPLETED | OUTPATIENT
Start: 2023-10-21 | End: 2023-10-21

## 2023-10-21 RX ORDER — AZITHROMYCIN 500 MG/1
500 TABLET, FILM COATED ORAL ONCE
Refills: 0 | Status: COMPLETED | OUTPATIENT
Start: 2023-10-21 | End: 2023-10-21

## 2023-10-21 RX ORDER — FAMOTIDINE 10 MG/ML
10 INJECTION INTRAVENOUS DAILY
Refills: 0 | Status: DISCONTINUED | OUTPATIENT
Start: 2023-10-21 | End: 2023-10-26

## 2023-10-21 RX ADMIN — Medication 40 MILLIGRAM(S): at 21:38

## 2023-10-21 RX ADMIN — Medication 3 MILLILITER(S): at 19:47

## 2023-10-21 RX ADMIN — CEFTRIAXONE 1000 MILLIGRAM(S): 500 INJECTION, POWDER, FOR SOLUTION INTRAMUSCULAR; INTRAVENOUS at 19:52

## 2023-10-21 RX ADMIN — SODIUM CHLORIDE 1000 MILLILITER(S): 9 INJECTION INTRAMUSCULAR; INTRAVENOUS; SUBCUTANEOUS at 19:47

## 2023-10-21 RX ADMIN — Medication 400 MILLIGRAM(S): at 19:47

## 2023-10-21 RX ADMIN — AZITHROMYCIN 255 MILLIGRAM(S): 500 TABLET, FILM COATED ORAL at 19:57

## 2023-10-21 NOTE — ED ADULT NURSE REASSESSMENT NOTE - NS ED NURSE REASSESS COMMENT FT1
Pt received in bed by AM MILES Johnson. Introduced self to pt and updated pt on plan of care. Pt verbalized understanding. Pt on 97-98% on 3L NC. Pt noted to have productive cough with thick phlegm. Pt's mouth suctioned to prevent choking. Pt lethargic but arousable to voice and light shake. AOx3. BP in low 100s/high 50s. Bed at lowest height, call bell within reach, wife at bedside.

## 2023-10-21 NOTE — H&P ADULT - NSHPPHYSICALEXAM_GEN_ALL_CORE
T(C): 37.7 (10-21-23 @ 23:04), Max: 38.5 (10-21-23 @ 19:02)  HR: 71 (10-21-23 @ 23:04) (71 - 92)  BP: 129/59 (10-21-23 @ 23:04) (129/59 - 180/64)  RR: 18 (10-21-23 @ 23:04) (18 - 24)  SpO2: 96% (10-21-23 @ 23:04) (94% - 97%)    CONSTITUTIONAL: Well groomed, no apparent distress  EYES: PERRLA and symmetric, EOMI, No conjunctival or scleral injection, non-icteric  ENMT: Oral mucosa with moist membranes. Normal dentition; no pharyngeal injection or exudates             NECK: Supple, symmetric and without tracheal deviation   RESP: No respiratory distress, no use of accessory muscles; Bilateral wheezing, on NC oxygen.   CV: RRR, +S1S2, no MRG; no JVD; no peripheral edema  GI: Soft, NT, ND, no rebound, no guarding; no palpable masses;   LYMPH: No cervical LAD or tenderness;  MSK: Unable to assess in detail as patient was sleeping  SKIN: No rashes or ulcers noted;   NEURO: Unable to assess in detail as patient was sleeping  PSYCH: Unable to assess in detail as patient was sleeping

## 2023-10-21 NOTE — H&P ADULT - NSHPREVIEWOFSYSTEMS_GEN_ALL_CORE
Gen: No fever, chills, weakness  ENT: No visual changes or throat pain  Neck: No pain or stiffness  Respiratory: ++cough, ++ wheezing  Cardiovascular: No chest pain or palpitations  Gastrointestinal: No abdominal pain, nausea, vomiting, constipation, or diarrhea  Hematologic: No easy bleeding or bruising  Neurologic: No numbness or focal weakness  Psych: No depression or insomnia  Skin: No rash or itching

## 2023-10-21 NOTE — ED PROVIDER NOTE - PROGRESS NOTE DETAILS
All labs reviewed.  Patient has mild leukocytosis to 12, CMP otherwise nonactionable.  UA appears positive as well.  Lactate normal.  Patient received ceftriaxone and azithromycin which should cover for pneumonia as well as UTI.  Patient's chest x-ray does show few bilateral patchy opacities.  Received nebs and steroids for COPD exacerbation.  Discussed plan for admission with patient and wife at bedside and all questions answered.  Patient endorsed to hospitalist for admission with continuous pulse ox.

## 2023-10-21 NOTE — ED PROVIDER NOTE - OBJECTIVE STATEMENT
89 yo male w/PMHx BPH, CAD, COPD, HLD, and HTN presents to the ED with weakness, SOB, and AECOPD. Pt with O2 sat 86% on RA. Wife at bedside reports pt with worsening weakness. Wife reports they were recently in [place] returning home today, and that the pt spent 3 days in bed while on vacation. Pt with a productive cough producing green phlegm. Pt not on O2 at baseline. Pt recently went to see a pulmonologist and was given a nebulizer to use with albuterol twice a day. Wife reports pt gets dehydrated frequently but has been drinking a lot of Gatorade recently in an attempt to stay hydrated. No urinary complaints. Pt quit smoking a long time ago according to wife.

## 2023-10-21 NOTE — PHARMACOTHERAPY INTERVENTION NOTE - COMMENTS
Medication history complete. Medications and allergies reviewed with patient's wife, Carla and confirmed with .

## 2023-10-21 NOTE — ED PROVIDER NOTE - CLINICAL SUMMARY MEDICAL DECISION MAKING FREE TEXT BOX
89 yo male presents with generalized weakness and malaise with productive cough and recent trip to [place]. Will evaluate for COPD exacerbation, +- PNA. Will need admission given hypoxia. Plan to do blood work, CXR, nebs, and antibiotics as needed.

## 2023-10-21 NOTE — H&P ADULT - HISTORY OF PRESENT ILLNESS
89 yo Male w/PMHx BPH, CAD, COPD, HLD, and HTN presented to the ED with weakness, Shortness of breath and productive cough. Patient was found to be at O2 sat 86% on RA. Spoke with daughter on the phone, reported patient with worsening weakness. Daughter reported they were recently in [place] returning home today, and that the patient spent 3 days in bed while on vacation. Patient with a productive cough producing green phlegm. Patient is not on O2 at baseline. No urinary complaints.

## 2023-10-21 NOTE — H&P ADULT - ASSESSMENT
A/P:    1.   Acute hypoxic respiratory failure  COPD exacerbation  Community-acquired pneumonia  Sepsis  Hyponatremia  -Started on IV Solu-Medrol  -On nasal cannula oxygen  -Albuterol nebulizer  -Started on IV ceftriaxone and Zithromax  -Follow clinically and cultures  -getting IVF slowly to prevent volume overload  -follow Na level after IVF     2.  History of A-fib  -Heart rate controlled  -Continue Eliquis and Metoprolol    3.  Eliquis for DVT ppx    4.  Code status: Spoke with Family member and he is in full code status.

## 2023-10-21 NOTE — ED ADULT NURSE NOTE - OBJECTIVE STATEMENT
Patient presents to ED for SOB, cough, and weakness. Pt has productive cough, with green sputum, wife was concerned that patient has been dehydrated and has been giving pt a lot of gatorade to drink. Pt hx of COPD, is not O2 dependent. Denies chest pain.

## 2023-10-21 NOTE — ED PROVIDER NOTE - PHYSICAL EXAMINATION
GENERAL: A&Ox4, non-toxic appearing, no acute distress, lethargic appearing  HEENT: NCAT, EOMI, oral mucosa moist, normal conjunctiva  RESP: no respiratory distress, speaking in full sentences, hypoxic to 86% on RA, faint rhonci in LL lobe  CV: RRR, no murmurs/rubs/gallops  ABDOMEN: soft, non-tender, non-distended, no guarding, no CVA tenderness  MSK: no visible deformities  NEURO: no focal sensory or motor deficits, CN 2-12 grossly intact  SKIN: warm, normal color, well perfused, no rash  PSYCH: normal affect

## 2023-10-22 LAB
ALBUMIN SERPL ELPH-MCNC: 2.5 G/DL — LOW (ref 3.3–5)
ALBUMIN SERPL ELPH-MCNC: 2.5 G/DL — LOW (ref 3.3–5)
ALP SERPL-CCNC: 75 U/L — SIGNIFICANT CHANGE UP (ref 40–120)
ALP SERPL-CCNC: 75 U/L — SIGNIFICANT CHANGE UP (ref 40–120)
ALT FLD-CCNC: 13 U/L — SIGNIFICANT CHANGE UP (ref 12–78)
ALT FLD-CCNC: 13 U/L — SIGNIFICANT CHANGE UP (ref 12–78)
ANION GAP SERPL CALC-SCNC: 6 MMOL/L — SIGNIFICANT CHANGE UP (ref 5–17)
ANION GAP SERPL CALC-SCNC: 6 MMOL/L — SIGNIFICANT CHANGE UP (ref 5–17)
AST SERPL-CCNC: 13 U/L — LOW (ref 15–37)
AST SERPL-CCNC: 13 U/L — LOW (ref 15–37)
BILIRUB SERPL-MCNC: 0.6 MG/DL — SIGNIFICANT CHANGE UP (ref 0.2–1.2)
BILIRUB SERPL-MCNC: 0.6 MG/DL — SIGNIFICANT CHANGE UP (ref 0.2–1.2)
BUN SERPL-MCNC: 19 MG/DL — SIGNIFICANT CHANGE UP (ref 7–23)
BUN SERPL-MCNC: 19 MG/DL — SIGNIFICANT CHANGE UP (ref 7–23)
CALCIUM SERPL-MCNC: 8.5 MG/DL — SIGNIFICANT CHANGE UP (ref 8.5–10.1)
CALCIUM SERPL-MCNC: 8.5 MG/DL — SIGNIFICANT CHANGE UP (ref 8.5–10.1)
CHLORIDE SERPL-SCNC: 105 MMOL/L — SIGNIFICANT CHANGE UP (ref 96–108)
CHLORIDE SERPL-SCNC: 105 MMOL/L — SIGNIFICANT CHANGE UP (ref 96–108)
CO2 SERPL-SCNC: 27 MMOL/L — SIGNIFICANT CHANGE UP (ref 22–31)
CO2 SERPL-SCNC: 27 MMOL/L — SIGNIFICANT CHANGE UP (ref 22–31)
CREAT SERPL-MCNC: 0.9 MG/DL — SIGNIFICANT CHANGE UP (ref 0.5–1.3)
CREAT SERPL-MCNC: 0.9 MG/DL — SIGNIFICANT CHANGE UP (ref 0.5–1.3)
EGFR: 82 ML/MIN/1.73M2 — SIGNIFICANT CHANGE UP
EGFR: 82 ML/MIN/1.73M2 — SIGNIFICANT CHANGE UP
GLUCOSE SERPL-MCNC: 161 MG/DL — HIGH (ref 70–99)
GLUCOSE SERPL-MCNC: 161 MG/DL — HIGH (ref 70–99)
HCT VFR BLD CALC: 44.1 % — SIGNIFICANT CHANGE UP (ref 39–50)
HCT VFR BLD CALC: 44.1 % — SIGNIFICANT CHANGE UP (ref 39–50)
HGB BLD-MCNC: 13.9 G/DL — SIGNIFICANT CHANGE UP (ref 13–17)
HGB BLD-MCNC: 13.9 G/DL — SIGNIFICANT CHANGE UP (ref 13–17)
MCHC RBC-ENTMCNC: 25 PG — LOW (ref 27–34)
MCHC RBC-ENTMCNC: 25 PG — LOW (ref 27–34)
MCHC RBC-ENTMCNC: 31.5 GM/DL — LOW (ref 32–36)
MCHC RBC-ENTMCNC: 31.5 GM/DL — LOW (ref 32–36)
MCV RBC AUTO: 79.5 FL — LOW (ref 80–100)
MCV RBC AUTO: 79.5 FL — LOW (ref 80–100)
PLATELET # BLD AUTO: 156 K/UL — SIGNIFICANT CHANGE UP (ref 150–400)
PLATELET # BLD AUTO: 156 K/UL — SIGNIFICANT CHANGE UP (ref 150–400)
POTASSIUM SERPL-MCNC: 4.4 MMOL/L — SIGNIFICANT CHANGE UP (ref 3.5–5.3)
POTASSIUM SERPL-MCNC: 4.4 MMOL/L — SIGNIFICANT CHANGE UP (ref 3.5–5.3)
POTASSIUM SERPL-SCNC: 4.4 MMOL/L — SIGNIFICANT CHANGE UP (ref 3.5–5.3)
POTASSIUM SERPL-SCNC: 4.4 MMOL/L — SIGNIFICANT CHANGE UP (ref 3.5–5.3)
PROT SERPL-MCNC: 6.7 GM/DL — SIGNIFICANT CHANGE UP (ref 6–8.3)
PROT SERPL-MCNC: 6.7 GM/DL — SIGNIFICANT CHANGE UP (ref 6–8.3)
RBC # BLD: 5.55 M/UL — SIGNIFICANT CHANGE UP (ref 4.2–5.8)
RBC # BLD: 5.55 M/UL — SIGNIFICANT CHANGE UP (ref 4.2–5.8)
RBC # FLD: 17.3 % — HIGH (ref 10.3–14.5)
RBC # FLD: 17.3 % — HIGH (ref 10.3–14.5)
SODIUM SERPL-SCNC: 138 MMOL/L — SIGNIFICANT CHANGE UP (ref 135–145)
SODIUM SERPL-SCNC: 138 MMOL/L — SIGNIFICANT CHANGE UP (ref 135–145)
WBC # BLD: 8.12 K/UL — SIGNIFICANT CHANGE UP (ref 3.8–10.5)
WBC # BLD: 8.12 K/UL — SIGNIFICANT CHANGE UP (ref 3.8–10.5)
WBC # FLD AUTO: 8.12 K/UL — SIGNIFICANT CHANGE UP (ref 3.8–10.5)
WBC # FLD AUTO: 8.12 K/UL — SIGNIFICANT CHANGE UP (ref 3.8–10.5)

## 2023-10-22 PROCEDURE — 71250 CT THORAX DX C-: CPT | Mod: 26

## 2023-10-22 PROCEDURE — 99233 SBSQ HOSP IP/OBS HIGH 50: CPT

## 2023-10-22 RX ORDER — ACETAMINOPHEN 500 MG
650 TABLET ORAL EVERY 6 HOURS
Refills: 0 | Status: DISCONTINUED | OUTPATIENT
Start: 2023-10-21 | End: 2023-10-26

## 2023-10-22 RX ORDER — SODIUM CHLORIDE 9 MG/ML
1000 INJECTION INTRAMUSCULAR; INTRAVENOUS; SUBCUTANEOUS
Refills: 0 | Status: DISCONTINUED | OUTPATIENT
Start: 2023-10-22 | End: 2023-10-26

## 2023-10-22 RX ORDER — ALBUTEROL 90 UG/1
2.5 AEROSOL, METERED ORAL EVERY 6 HOURS
Refills: 0 | Status: DISCONTINUED | OUTPATIENT
Start: 2023-10-21 | End: 2023-10-26

## 2023-10-22 RX ORDER — AZITHROMYCIN 500 MG/1
500 TABLET, FILM COATED ORAL EVERY 24 HOURS
Refills: 0 | Status: DISCONTINUED | OUTPATIENT
Start: 2023-10-21 | End: 2023-10-26

## 2023-10-22 RX ORDER — LANOLIN ALCOHOL/MO/W.PET/CERES
3 CREAM (GRAM) TOPICAL AT BEDTIME
Refills: 0 | Status: DISCONTINUED | OUTPATIENT
Start: 2023-10-21 | End: 2023-10-26

## 2023-10-22 RX ORDER — ONDANSETRON 8 MG/1
4 TABLET, FILM COATED ORAL EVERY 8 HOURS
Refills: 0 | Status: DISCONTINUED | OUTPATIENT
Start: 2023-10-21 | End: 2023-10-26

## 2023-10-22 RX ORDER — CEFTRIAXONE 500 MG/1
1000 INJECTION, POWDER, FOR SOLUTION INTRAMUSCULAR; INTRAVENOUS EVERY 24 HOURS
Refills: 0 | Status: DISCONTINUED | OUTPATIENT
Start: 2023-10-21 | End: 2023-10-26

## 2023-10-22 RX ADMIN — Medication 25 MILLIGRAM(S): at 10:01

## 2023-10-22 RX ADMIN — Medication 5 MILLIGRAM(S): at 22:48

## 2023-10-22 RX ADMIN — Medication 40 MILLIGRAM(S): at 06:34

## 2023-10-22 RX ADMIN — Medication 5 MILLIGRAM(S): at 10:01

## 2023-10-22 RX ADMIN — AZITHROMYCIN 255 MILLIGRAM(S): 500 TABLET, FILM COATED ORAL at 18:35

## 2023-10-22 RX ADMIN — Medication 40 MILLIGRAM(S): at 22:48

## 2023-10-22 RX ADMIN — LATANOPROST 1 DROP(S): 0.05 SOLUTION/ DROPS OPHTHALMIC; TOPICAL at 22:59

## 2023-10-22 RX ADMIN — ATORVASTATIN CALCIUM 20 MILLIGRAM(S): 80 TABLET, FILM COATED ORAL at 22:47

## 2023-10-22 RX ADMIN — FAMOTIDINE 10 MILLIGRAM(S): 10 INJECTION INTRAVENOUS at 10:02

## 2023-10-22 RX ADMIN — Medication 81 MILLIGRAM(S): at 10:01

## 2023-10-22 RX ADMIN — Medication 1200 MILLIGRAM(S): at 22:48

## 2023-10-22 RX ADMIN — ALBUTEROL 2.5 MILLIGRAM(S): 90 AEROSOL, METERED ORAL at 02:50

## 2023-10-22 RX ADMIN — APIXABAN 5 MILLIGRAM(S): 2.5 TABLET, FILM COATED ORAL at 10:01

## 2023-10-22 RX ADMIN — Medication 40 MILLIGRAM(S): at 13:51

## 2023-10-22 RX ADMIN — APIXABAN 5 MILLIGRAM(S): 2.5 TABLET, FILM COATED ORAL at 22:47

## 2023-10-22 RX ADMIN — ALBUTEROL 2.5 MILLIGRAM(S): 90 AEROSOL, METERED ORAL at 14:43

## 2023-10-22 RX ADMIN — ALBUTEROL 2.5 MILLIGRAM(S): 90 AEROSOL, METERED ORAL at 20:35

## 2023-10-22 RX ADMIN — CEFTRIAXONE 1000 MILLIGRAM(S): 500 INJECTION, POWDER, FOR SOLUTION INTRAMUSCULAR; INTRAVENOUS at 18:35

## 2023-10-22 RX ADMIN — ALBUTEROL 2.5 MILLIGRAM(S): 90 AEROSOL, METERED ORAL at 08:31

## 2023-10-22 RX ADMIN — Medication 1200 MILLIGRAM(S): at 18:49

## 2023-10-22 NOTE — GOALS OF CARE CONVERSATION - ADVANCED CARE PLANNING - CONVERSATION DETAILS
There is no limitation of any treatment.  Patient is in full code status.  Spoke with daughter on the phone in detail.

## 2023-10-22 NOTE — ED ADULT NURSE REASSESSMENT NOTE - NS ED NURSE REASSESS COMMENT FT1
Pt. received from previous RN in stable condition, no s/sx of pain. Aroused by shaking, does not respond to name when called.  Needs anticipated by staff, ej-care provided, pt. inc. of urine.  VSS, low grade temp observed rectally. Safety maintained, and emotional support provided.  Call bell within reach, will continue to monitor. Report given to 5S, awaiting transport.

## 2023-10-22 NOTE — PATIENT PROFILE ADULT - FALL HARM RISK - HARM RISK INTERVENTIONS
Assistance with ambulation/Assistance OOB with selected safe patient handling equipment/Communicate Risk of Fall with Harm to all staff/Discuss with provider need for PT consult/Monitor gait and stability/Reinforce activity limits and safety measures with patient and family/Tailored Fall Risk Interventions/Visual Cue: Yellow wristband and red socks/Bed in lowest position, wheels locked, appropriate side rails in place/Call bell, personal items and telephone in reach/Instruct patient to call for assistance before getting out of bed or chair/Non-slip footwear when patient is out of bed/[place] to call system/Physically safe environment - no spills, clutter or unnecessary equipment/Purposeful Proactive Rounding/Room/bathroom lighting operational, light cord in reach

## 2023-10-22 NOTE — PROGRESS NOTE ADULT - ASSESSMENT
Acute hypoxic respiratory failure  COPD exacerbation  Community-acquired pneumonia  Sepsis  Hyponatremia  -Started on IV Solu-Medrol  -On nasal cannula oxygen  -Albuterol nebulizer  -Started on IV ceftriaxone and Zithromax  -Follow clinically and cultures  -getting IVF slowly to prevent volume overload  -follow Na level after IVF     2.  History of A-fib  -Heart rate controlled  -Continue Eliquis and Metoprolol    3.Eliquis for DVT ppx    4. Code status:  full code  88 yo Male w/PMHx BPH, CAD, COPD, HLD, and HTN admitted for:       Acute hypoxic respiratory failure due to Sepsis Community-acquired pneumonia and Enterovirus.  COPD exacerbation   Monitor pulse ox, supplement O2 via NC  C/w  IV Solu-Medrol, start taper off   C/w  nebulizers  IV ceftriaxone and Zithromax  Start Mucinex and tessalon   F/u BCX and UCX  Completed IVF    Hyponatremia  due to sepsis and poor oral intake  S/p IVF, Na improved  Labs in am       PAFIB    In Sinus   Continue Eliquis and Metoprolol    HTN  Monitor BP  C/w Enalapril and Metoprolol    Eliquis for DVT ppx    Code status:  full code

## 2023-10-22 NOTE — PATIENT PROFILE ADULT - FUNCTIONAL ASSESSMENT - DAILY ACTIVITY 3.
Bed: 18  Expected date:   Expected time:   Means of arrival:   Comments:  1975 Pratibha Parker, RN  07/27/20 [id number] 2 = A lot of assistance

## 2023-10-22 NOTE — PROGRESS NOTE ADULT - SUBJECTIVE AND OBJECTIVE BOX
CC: Pneumonia, COPD exacerbation    HPI: 89 yo Male w/PMHx BPH, CAD, COPD, HLD, and HTN presented to the ED with weakness, Shortness of breath and productive cough. Patient was found to be at O2 sat 86% on RA. Spoke with daughter on the phone, reported patient with worsening weakness. Daughter reported they were recently in [place] returning home today, and that the patient spent 3 days in bed while on vacation. Patient with a productive cough producing green phlegm. Patient is not on O2 at baseline. No urinary complaints.       INTERVAL HPI/OVERNIGHT EVENTS:    Vital Signs Last 24 Hrs  T(C): 36.4 (22 Oct 2023 08:07), Max: 38.5 (21 Oct 2023 19:02)  T(F): 97.5 (22 Oct 2023 08:07), Max: 101.3 (21 Oct 2023 19:02)  HR: 62 (22 Oct 2023 08:07) (62 - 84)  BP: 123/61 (22 Oct 2023 08:07) (115/67 - 160/69)  RR: 18 (22 Oct 2023 08:07) (18 - 22)  SpO2: 97% (22 Oct 2023 08:07) (95% - 97%)    Parameters below as of 22 Oct 2023 08:07  Patient On (Oxygen Delivery Method): nasal cannula  O2 Flow (L/min): 3        REVIEW OF SYSTEMS:  All other review of systems is negative unless indicated above.      PHYSICAL EXAM:    General: Well developed; in no acute distress  Eyes: PERRLA, EOMI; conjunctiva and sclera clear  Head: Normocephalic; atraumatic  ENMT: No nasal discharge; airway clear  Neck: Supple; non tender; no masses  Respiratory: No wheezes, rales or rhonchi  Cardiovascular: Regular rate and rhythm. S1 and S2 Normal; No murmurs, gallops or rubs  Gastrointestinal: Soft non-tender non-distended; Normal bowel sounds  Genitourinary: No  suprapubic  tenderness  Extremities: Normal range of motion, No clubbing, cyanosis or edema  Vascular: Peripheral pulses palpable 2+ bilaterally  Neurological: Alert and oriented x4  Skin: Warm and dry. No acute rash  Lymph Nodes: No acute cervical adenopathy  Musculoskeletal: Normal muscle tone, without deformities  Psychiatric: Cooperative and appropriate    LABS:                         13.9   8.12  )-----------( 156      ( 22 Oct 2023 06:47 )             44.1     22 Oct 2023 06:47    138    |  105    |  19     ----------------------------<  161    4.4     |  27     |  0.90     Ca    8.5        22 Oct 2023 06:47    TPro  6.7    /  Alb  2.5    /  TBili  0.6    /  DBili  x      /  AST  13     /  ALT  13     /  AlkPhos  75     22 Oct 2023 06:47    PT/INR - ( 21 Oct 2023 18:56 )   PT: 20.3 sec;   INR: 1.83 ratio         PTT - ( 21 Oct 2023 18:56 )  PTT:32.9 sec  CAPILLARY BLOOD GLUCOSE        LIVER FUNCTIONS - ( 22 Oct 2023 06:47 )  Alb: 2.5 g/dL / Pro: 6.7 gm/dL / ALK PHOS: 75 U/L / ALT: 13 U/L / AST: 13 U/L / GGT: x           Urinalysis Basic - ( 22 Oct 2023 06:47 )    Color: x / Appearance: x / SG: x / pH: x  Gluc: 161 mg/dL / Ketone: x  / Bili: x / Urobili: x   Blood: x / Protein: x / Nitrite: x   Leuk Esterase: x / RBC: x / WBC x   Sq Epi: x / Non Sq Epi: x / Bacteria: x        MEDICATIONS  (STANDING):  albuterol    0.083% 2.5 milliGRAM(s) Nebulizer every 6 hours  apixaban 5 milliGRAM(s) Oral two times a day  aspirin enteric coated 81 milliGRAM(s) Oral daily  atorvastatin 20 milliGRAM(s) Oral at bedtime  azithromycin  IVPB 500 milliGRAM(s) IV Intermittent every 24 hours  cefTRIAXone Injectable. 1000 milliGRAM(s) IV Push every 24 hours  enalapril 5 milliGRAM(s) Oral two times a day  famotidine    Tablet 10 milliGRAM(s) Oral daily  latanoprost 0.005% Ophthalmic Solution 1 Drop(s) Both EYES at bedtime  methylPREDNISolone sodium succinate Injectable 40 milliGRAM(s) IV Push every 8 hours  metoprolol succinate ER 25 milliGRAM(s) Oral daily  sodium chloride 0.9%. 1000 milliLiter(s) (100 mL/Hr) IV Continuous <Continuous>    MEDICATIONS  (PRN):  acetaminophen     Tablet .. 650 milliGRAM(s) Oral every 6 hours PRN Temp greater or equal to 38C (100.4F), Mild Pain (1 - 3)  aluminum hydroxide/magnesium hydroxide/simethicone Suspension 30 milliLiter(s) Oral every 4 hours PRN Dyspepsia  melatonin 3 milliGRAM(s) Oral at bedtime PRN Insomnia  ondansetron Injectable 4 milliGRAM(s) IV Push every 8 hours PRN Nausea and/or Vomiting      RADIOLOGY & ADDITIONAL TESTS:   CC: Pneumonia, COPD exacerbation    HPI: 88 yo Male w/PMHx BPH, CAD, COPD, HLD, and HTN presented to the ED with weakness, Shortness of breath and productive cough. Patient was found to be at O2 sat 86% on RA. Spoke with daughter on the phone, reported patient with worsening weakness. Daughter reported they were recently in [place] returning home today, and that the patient spent 3 days in bed while on vacation. Patient with a productive cough producing green phlegm. Patient is not on O2 at baseline. No urinary complaints.       INTERVAL HPI/ OVERNIGHT EVENTS: chart reviewed, Pt was seen and examined, confirmed history above, reports cough and SOB better, no chills, ok appetite. POC discussed     Vital Signs Last 24 Hrs  T(C): 36.4 (22 Oct 2023 08:07), Max: 38.5 (21 Oct 2023 19:02)  T(F): 97.5 (22 Oct 2023 08:07), Max: 101.3 (21 Oct 2023 19:02)  HR: 62 (22 Oct 2023 08:07) (62 - 84)  BP: 123/61 (22 Oct 2023 08:07) (115/67 - 160/69)  RR: 18 (22 Oct 2023 08:07) (18 - 22)  SpO2: 97% (22 Oct 2023 08:07) (95% - 97%)    Parameters below as of 22 Oct 2023 08:07  Patient On (Oxygen Delivery Method): nasal cannula  O2 Flow (L/min): 3        REVIEW OF SYSTEMS:  All other review of systems is negative unless indicated above.      PHYSICAL EXAM:    General: frail elderly male,  in no acute distress  Eyes:  EOMI; conjunctiva and sclera clear  Head: Normocephalic; atraumatic  ENMT: No nasal discharge; airway clear  Neck: Supple; no JVD   Respiratory: Decreased BS b/l,  No wheezes  Cardiovascular: Regular rate and rhythm. S1 and S2 Normal;   Gastrointestinal: Soft non-tender non-distended; Normal bowel sounds  Genitourinary: No  suprapubic  tenderness  Extremities: No  edema  Neurological: Alert and oriented x3, non focal   Musculoskeletal: Normal muscle tone, without deformities  Psychiatric: Cooperative and appropriate    LABS:                         13.9   8.12  )-----------( 156      ( 22 Oct 2023 06:47 )             44.1     22 Oct 2023 06:47    138    |  105    |  19     ----------------------------<  161    4.4     |  27     |  0.90     Ca    8.5        22 Oct 2023 06:47    TPro  6.7    /  Alb  2.5    /  TBili  0.6    /  DBili  x      /  AST  13     /  ALT  13     /  AlkPhos  75     22 Oct 2023 06:47    PT/INR - ( 21 Oct 2023 18:56 )   PT: 20.3 sec;   INR: 1.83 ratio         PTT - ( 21 Oct 2023 18:56 )  PTT:32.9 sec  CAPILLARY BLOOD GLUCOSE        LIVER FUNCTIONS - ( 22 Oct 2023 06:47 )  Alb: 2.5 g/dL / Pro: 6.7 gm/dL / ALK PHOS: 75 U/L / ALT: 13 U/L / AST: 13 U/L / GGT: x           Urinalysis Basic - ( 22 Oct 2023 06:47 )    Color: x / Appearance: x / SG: x / pH: x  Gluc: 161 mg/dL / Ketone: x  / Bili: x / Urobili: x   Blood: x / Protein: x / Nitrite: x   Leuk Esterase: x / RBC: x / WBC x   Sq Epi: x / Non Sq Epi: x / Bacteria: x        MEDICATIONS  (STANDING):  albuterol    0.083% 2.5 milliGRAM(s) Nebulizer every 6 hours  apixaban 5 milliGRAM(s) Oral two times a day  aspirin enteric coated 81 milliGRAM(s) Oral daily  atorvastatin 20 milliGRAM(s) Oral at bedtime  azithromycin  IVPB 500 milliGRAM(s) IV Intermittent every 24 hours  cefTRIAXone Injectable. 1000 milliGRAM(s) IV Push every 24 hours  enalapril 5 milliGRAM(s) Oral two times a day  famotidine    Tablet 10 milliGRAM(s) Oral daily  latanoprost 0.005% Ophthalmic Solution 1 Drop(s) Both EYES at bedtime  methylPREDNISolone sodium succinate Injectable 40 milliGRAM(s) IV Push every 8 hours  metoprolol succinate ER 25 milliGRAM(s) Oral daily  sodium chloride 0.9%. 1000 milliLiter(s) (100 mL/Hr) IV Continuous <Continuous>    MEDICATIONS  (PRN):  acetaminophen     Tablet .. 650 milliGRAM(s) Oral every 6 hours PRN Temp greater or equal to 38C (100.4F), Mild Pain (1 - 3)  aluminum hydroxide/magnesium hydroxide/simethicone Suspension 30 milliLiter(s) Oral every 4 hours PRN Dyspepsia  melatonin 3 milliGRAM(s) Oral at bedtime PRN Insomnia  ondansetron Injectable 4 milliGRAM(s) IV Push every 8 hours PRN Nausea and/or Vomiting      RADIOLOGY & ADDITIONAL TESTS:    ACC: [id number] EXAM:  CT CHEST   ORDERED BY: NABOR BARNETT     PROCEDURE DATE:  10/22/2023          INTERPRETATION:  INDICATION: Pneumonia    TECHNIQUE: A volumetric CT acquisition of the chest was obtained from the   thoracic inlet to the upper abdomen without the use of intravenous   contrast. Coronal and sagittal reconstructed images are provided.    COMPARISON: Chest CT 3/31/2023    FINDINGS:    Lungs/Airways/Pleura: The central airways are patent. Trace pleural   effusions with partial lower lobe atelectasis. Emphysema. There are new   patchy areas of consolidation in bilateral posterior upper lobes and   lingula.    Mediastinum/Lymph nodes: No thoracic adenopathy. Small hiatal hernia.    Heart and Vessels: Cardiomegaly. No pericardial effusion. Coronary artery   calcification and stenting. Mild aortic valvular calcification. Enlarged   pulmonary artery. No thoracic aortic aneurysm.    Upper Abdomen: Unremarkable.    Osseous structures and Soft Tissues: Reverse right shoulder arthroplasty.    IMPRESSION:  Multifocal pneumonia involving bilateral upper lobes/lingula.

## 2023-10-23 LAB
ANION GAP SERPL CALC-SCNC: 8 MMOL/L — SIGNIFICANT CHANGE UP (ref 5–17)
ANION GAP SERPL CALC-SCNC: 8 MMOL/L — SIGNIFICANT CHANGE UP (ref 5–17)
BUN SERPL-MCNC: 29 MG/DL — HIGH (ref 7–23)
BUN SERPL-MCNC: 29 MG/DL — HIGH (ref 7–23)
CALCIUM SERPL-MCNC: 8.8 MG/DL — SIGNIFICANT CHANGE UP (ref 8.5–10.1)
CALCIUM SERPL-MCNC: 8.8 MG/DL — SIGNIFICANT CHANGE UP (ref 8.5–10.1)
CHLORIDE SERPL-SCNC: 109 MMOL/L — HIGH (ref 96–108)
CHLORIDE SERPL-SCNC: 109 MMOL/L — HIGH (ref 96–108)
CO2 SERPL-SCNC: 22 MMOL/L — SIGNIFICANT CHANGE UP (ref 22–31)
CO2 SERPL-SCNC: 22 MMOL/L — SIGNIFICANT CHANGE UP (ref 22–31)
CREAT SERPL-MCNC: 0.86 MG/DL — SIGNIFICANT CHANGE UP (ref 0.5–1.3)
CREAT SERPL-MCNC: 0.86 MG/DL — SIGNIFICANT CHANGE UP (ref 0.5–1.3)
CULTURE RESULTS: SIGNIFICANT CHANGE UP
CULTURE RESULTS: SIGNIFICANT CHANGE UP
EGFR: 83 ML/MIN/1.73M2 — SIGNIFICANT CHANGE UP
EGFR: 83 ML/MIN/1.73M2 — SIGNIFICANT CHANGE UP
GLUCOSE SERPL-MCNC: 159 MG/DL — HIGH (ref 70–99)
GLUCOSE SERPL-MCNC: 159 MG/DL — HIGH (ref 70–99)
HCT VFR BLD CALC: 40.2 % — SIGNIFICANT CHANGE UP (ref 39–50)
HCT VFR BLD CALC: 40.2 % — SIGNIFICANT CHANGE UP (ref 39–50)
HGB BLD-MCNC: 12.6 G/DL — LOW (ref 13–17)
HGB BLD-MCNC: 12.6 G/DL — LOW (ref 13–17)
MCHC RBC-ENTMCNC: 24.5 PG — LOW (ref 27–34)
MCHC RBC-ENTMCNC: 24.5 PG — LOW (ref 27–34)
MCHC RBC-ENTMCNC: 31.3 GM/DL — LOW (ref 32–36)
MCHC RBC-ENTMCNC: 31.3 GM/DL — LOW (ref 32–36)
MCV RBC AUTO: 78.1 FL — LOW (ref 80–100)
MCV RBC AUTO: 78.1 FL — LOW (ref 80–100)
PLATELET # BLD AUTO: 164 K/UL — SIGNIFICANT CHANGE UP (ref 150–400)
PLATELET # BLD AUTO: 164 K/UL — SIGNIFICANT CHANGE UP (ref 150–400)
POTASSIUM SERPL-MCNC: 4 MMOL/L — SIGNIFICANT CHANGE UP (ref 3.5–5.3)
POTASSIUM SERPL-MCNC: 4 MMOL/L — SIGNIFICANT CHANGE UP (ref 3.5–5.3)
POTASSIUM SERPL-SCNC: 4 MMOL/L — SIGNIFICANT CHANGE UP (ref 3.5–5.3)
POTASSIUM SERPL-SCNC: 4 MMOL/L — SIGNIFICANT CHANGE UP (ref 3.5–5.3)
RBC # BLD: 5.15 M/UL — SIGNIFICANT CHANGE UP (ref 4.2–5.8)
RBC # BLD: 5.15 M/UL — SIGNIFICANT CHANGE UP (ref 4.2–5.8)
RBC # FLD: 17.2 % — HIGH (ref 10.3–14.5)
RBC # FLD: 17.2 % — HIGH (ref 10.3–14.5)
SODIUM SERPL-SCNC: 139 MMOL/L — SIGNIFICANT CHANGE UP (ref 135–145)
SODIUM SERPL-SCNC: 139 MMOL/L — SIGNIFICANT CHANGE UP (ref 135–145)
SPECIMEN SOURCE: SIGNIFICANT CHANGE UP
SPECIMEN SOURCE: SIGNIFICANT CHANGE UP
WBC # BLD: 12.37 K/UL — HIGH (ref 3.8–10.5)
WBC # BLD: 12.37 K/UL — HIGH (ref 3.8–10.5)
WBC # FLD AUTO: 12.37 K/UL — HIGH (ref 3.8–10.5)
WBC # FLD AUTO: 12.37 K/UL — HIGH (ref 3.8–10.5)

## 2023-10-23 PROCEDURE — 99233 SBSQ HOSP IP/OBS HIGH 50: CPT

## 2023-10-23 RX ORDER — TIOTROPIUM BROMIDE 18 UG/1
2 CAPSULE ORAL; RESPIRATORY (INHALATION) DAILY
Refills: 0 | Status: DISCONTINUED | OUTPATIENT
Start: 2023-10-23 | End: 2023-10-26

## 2023-10-23 RX ORDER — BUDESONIDE AND FORMOTEROL FUMARATE DIHYDRATE 160; 4.5 UG/1; UG/1
2 AEROSOL RESPIRATORY (INHALATION)
Refills: 0 | Status: DISCONTINUED | OUTPATIENT
Start: 2023-10-23 | End: 2023-10-26

## 2023-10-23 RX ADMIN — Medication 40 MILLIGRAM(S): at 22:10

## 2023-10-23 RX ADMIN — APIXABAN 5 MILLIGRAM(S): 2.5 TABLET, FILM COATED ORAL at 22:09

## 2023-10-23 RX ADMIN — ALBUTEROL 2.5 MILLIGRAM(S): 90 AEROSOL, METERED ORAL at 14:22

## 2023-10-23 RX ADMIN — Medication 1200 MILLIGRAM(S): at 09:40

## 2023-10-23 RX ADMIN — APIXABAN 5 MILLIGRAM(S): 2.5 TABLET, FILM COATED ORAL at 09:42

## 2023-10-23 RX ADMIN — LATANOPROST 1 DROP(S): 0.05 SOLUTION/ DROPS OPHTHALMIC; TOPICAL at 22:34

## 2023-10-23 RX ADMIN — FAMOTIDINE 10 MILLIGRAM(S): 10 INJECTION INTRAVENOUS at 09:41

## 2023-10-23 RX ADMIN — ALBUTEROL 2.5 MILLIGRAM(S): 90 AEROSOL, METERED ORAL at 09:03

## 2023-10-23 RX ADMIN — Medication 40 MILLIGRAM(S): at 09:39

## 2023-10-23 RX ADMIN — ALBUTEROL 2.5 MILLIGRAM(S): 90 AEROSOL, METERED ORAL at 20:56

## 2023-10-23 RX ADMIN — Medication 1200 MILLIGRAM(S): at 22:12

## 2023-10-23 RX ADMIN — CEFTRIAXONE 1000 MILLIGRAM(S): 500 INJECTION, POWDER, FOR SOLUTION INTRAMUSCULAR; INTRAVENOUS at 17:46

## 2023-10-23 RX ADMIN — Medication 81 MILLIGRAM(S): at 09:40

## 2023-10-23 RX ADMIN — AZITHROMYCIN 255 MILLIGRAM(S): 500 TABLET, FILM COATED ORAL at 17:47

## 2023-10-23 RX ADMIN — ATORVASTATIN CALCIUM 20 MILLIGRAM(S): 80 TABLET, FILM COATED ORAL at 22:09

## 2023-10-23 NOTE — PROGRESS NOTE ADULT - ASSESSMENT
89 yo Male w/PMHx BPH, CAD, COPD, HLD, and HTN admitted for:       Acute hypoxic respiratory failure due to Sepsis Community-acquired pneumonia and Enterovirus.  COPD exacerbation   Monitor pulse ox, supplement O2 via NC  C/w  IV Solu-Medrol, start taper off   C/w  nebulizers  IV ceftriaxone and Zithromax  Start Mucinex and tessalon   F/u BCX and UCX  Completed IVF    Hyponatremia  due to sepsis and poor oral intake  S/p IVF, Na improved  Labs in am       PAFIB    In Sinus   Continue Eliquis and Metoprolol    HTN  Monitor BP  C/w Enalapril and Metoprolol    Eliquis for DVT ppx    Code status:  full code  89 yo Male w/PMHx BPH, CAD, COPD, HLD, and HTN admitted for:       Acute hypoxic respiratory failure due to Sepsis Community-acquired pneumonia and Enterovirus.  COPD exacerbation   Monitor pulse ox, start taoer off supplemental  O2 via NC  C/w  IV Solu-Medrol,  tapered  off to BID   C/w  nebulizers, add symbicort and spiriva ( pt is on trelegy at home)   IV ceftriaxone and Zithromax  Start Mucinex and tessalon   F/u BCX: BGTD and UCX: contamination   Completed IVF      Hyponatremia, resolved   due to sepsis and poor oral intake  S/p IVF, Na improved  Labs in am       PAFIB    In Sinus   Continue Eliquis and Metoprolol    HTN  Monitor BP  C/w Enalapril and Metoprolol    Eliquis for DVT ppx    Code status:  full code     Dispo; taper Off o2, PT eval fo rd/c planning

## 2023-10-23 NOTE — PROGRESS NOTE ADULT - SUBJECTIVE AND OBJECTIVE BOX
CC: Pneumonia, COPD exacerbation    HPI: 88 yo Male w/PMHx BPH, CAD, COPD, HLD, and HTN presented to the ED with weakness, Shortness of breath and productive cough. Patient was found to be at O2 sat 86% on RA. Spoke with daughter on the phone, reported patient with worsening weakness. Daughter reported they were recently in [place] returning home today, and that the patient spent 3 days in bed while on vacation. Patient with a productive cough producing green phlegm. Patient is not on O2 at baseline. No urinary complaints.       INTERVAL HPI/ OVERNIGHT EVENTS: Pt was seen and examined,     Vital Signs Last 24 Hrs  T(C): 36.5 (23 Oct 2023 07:59), Max: 36.5 (22 Oct 2023 22:00)  T(F): 97.7 (23 Oct 2023 07:59), Max: 97.7 (22 Oct 2023 22:00)  HR: 65 (23 Oct 2023 09:00) (60 - 68)  BP: 123/53 (23 Oct 2023 07:59) (117/65 - 123/53)  BP(mean): --  RR: 18 (23 Oct 2023 07:59) (18 - 18)  SpO2: 97% (23 Oct 2023 09:00) (96% - 97%)    Parameters below as of 23 Oct 2023 09:00  Patient On (Oxygen Delivery Method): nasal cannula            REVIEW OF SYSTEMS:  All other review of systems is negative unless indicated above.      PHYSICAL EXAM:    General: frail elderly male,  in no acute distress  Eyes:  EOMI; conjunctiva and sclera clear  Head: Normocephalic; atraumatic  ENMT: No nasal discharge; airway clear  Neck: Supple; no JVD   Respiratory: Decreased BS b/l,  No wheezes  Cardiovascular: Regular rate and rhythm. S1 and S2 Normal;   Gastrointestinal: Soft non-tender non-distended; Normal bowel sounds  Genitourinary: No  suprapubic  tenderness  Extremities: No  edema  Neurological: Alert and oriented x3, non focal   Musculoskeletal: Normal muscle tone, without deformities  Psychiatric: Cooperative and appropriate    LABS:                         13.9   8.12  )-----------( 156      ( 22 Oct 2023 06:47 )             44.1     22 Oct 2023 06:47    138    |  105    |  19     ----------------------------<  161    4.4     |  27     |  0.90     Ca    8.5        22 Oct 2023 06:47    TPro  6.7    /  Alb  2.5    /  TBili  0.6    /  DBili  x      /  AST  13     /  ALT  13     /  AlkPhos  75     22 Oct 2023 06:47    PT/INR - ( 21 Oct 2023 18:56 )   PT: 20.3 sec;   INR: 1.83 ratio         PTT - ( 21 Oct 2023 18:56 )  PTT:32.9 sec  CAPILLARY BLOOD GLUCOSE        LIVER FUNCTIONS - ( 22 Oct 2023 06:47 )  Alb: 2.5 g/dL / Pro: 6.7 gm/dL / ALK PHOS: 75 U/L / ALT: 13 U/L / AST: 13 U/L / GGT: x           Urinalysis Basic - ( 22 Oct 2023 06:47 )    Color: x / Appearance: x / SG: x / pH: x  Gluc: 161 mg/dL / Ketone: x  / Bili: x / Urobili: x   Blood: x / Protein: x / Nitrite: x   Leuk Esterase: x / RBC: x / WBC x   Sq Epi: x / Non Sq Epi: x / Bacteria: x        MEDICATIONS  (STANDING):  albuterol    0.083% 2.5 milliGRAM(s) Nebulizer every 6 hours  apixaban 5 milliGRAM(s) Oral two times a day  aspirin enteric coated 81 milliGRAM(s) Oral daily  atorvastatin 20 milliGRAM(s) Oral at bedtime  azithromycin  IVPB 500 milliGRAM(s) IV Intermittent every 24 hours  cefTRIAXone Injectable. 1000 milliGRAM(s) IV Push every 24 hours  enalapril 5 milliGRAM(s) Oral two times a day  famotidine    Tablet 10 milliGRAM(s) Oral daily  latanoprost 0.005% Ophthalmic Solution 1 Drop(s) Both EYES at bedtime  methylPREDNISolone sodium succinate Injectable 40 milliGRAM(s) IV Push every 8 hours  metoprolol succinate ER 25 milliGRAM(s) Oral daily  sodium chloride 0.9%. 1000 milliLiter(s) (100 mL/Hr) IV Continuous <Continuous>    MEDICATIONS  (PRN):  acetaminophen     Tablet .. 650 milliGRAM(s) Oral every 6 hours PRN Temp greater or equal to 38C (100.4F), Mild Pain (1 - 3)  aluminum hydroxide/magnesium hydroxide/simethicone Suspension 30 milliLiter(s) Oral every 4 hours PRN Dyspepsia  melatonin 3 milliGRAM(s) Oral at bedtime PRN Insomnia  ondansetron Injectable 4 milliGRAM(s) IV Push every 8 hours PRN Nausea and/or Vomiting      RADIOLOGY & ADDITIONAL TESTS:    ACC: [id number] EXAM:  CT CHEST   ORDERED BY: NABOR BARNETT     PROCEDURE DATE:  10/22/2023          INTERPRETATION:  INDICATION: Pneumonia    TECHNIQUE: A volumetric CT acquisition of the chest was obtained from the   thoracic inlet to the upper abdomen without the use of intravenous   contrast. Coronal and sagittal reconstructed images are provided.    COMPARISON: Chest CT 3/31/2023    FINDINGS:    Lungs/Airways/Pleura: The central airways are patent. Trace pleural   effusions with partial lower lobe atelectasis. Emphysema. There are new   patchy areas of consolidation in bilateral posterior upper lobes and   lingula.    Mediastinum/Lymph nodes: No thoracic adenopathy. Small hiatal hernia.    Heart and Vessels: Cardiomegaly. No pericardial effusion. Coronary artery   calcification and stenting. Mild aortic valvular calcification. Enlarged   pulmonary artery. No thoracic aortic aneurysm.    Upper Abdomen: Unremarkable.    Osseous structures and Soft Tissues: Reverse right shoulder arthroplasty.    IMPRESSION:  Multifocal pneumonia involving bilateral upper lobes/lingula.   CC: Pneumonia, COPD exacerbation    HPI: 88 yo Male w/PMHx BPH, CAD, COPD, HLD, and HTN presented to the ED with weakness, Shortness of breath and productive cough. Patient was found to be at O2 sat 86% on RA. Spoke with daughter on the phone, reported patient with worsening weakness. Daughter reported they were recently in [place] returning home today, and that the patient spent 3 days in bed while on vacation. Patient with a productive cough producing green phlegm. Patient is not on O2 at baseline. No urinary complaints.       INTERVAL HPI/ OVERNIGHT EVENTS: Pt was seen and examined, reports getting better, cough and SOB improved, Will start taper down O2, awaiting for Pt eval     Vital Signs Last 24 Hrs  T(C): 36.5 (23 Oct 2023 07:59), Max: 36.5 (22 Oct 2023 22:00)  T(F): 97.7 (23 Oct 2023 07:59), Max: 97.7 (22 Oct 2023 22:00)  HR: 65 (23 Oct 2023 09:00) (60 - 68)  BP: 123/53 (23 Oct 2023 07:59) (117/65 - 123/53)  RR: 18 (23 Oct 2023 07:59) (18 - 18)  SpO2: 97% (23 Oct 2023 09:00) (96% - 97%)    Parameters below as of 23 Oct 2023 09:00  Patient On (Oxygen Delivery Method): nasal cannula            REVIEW OF SYSTEMS:  All other review of systems is negative unless indicated above.      PHYSICAL EXAM:    General: frail elderly male,  in no acute distress  Eyes:  EOMI; conjunctiva and sclera clear  Head: Normocephalic; atraumatic  ENMT: No nasal discharge; airway clear  Neck: Supple; no JVD   Respiratory: Decreased BS b/l,  No wheezes  Cardiovascular: Regular rate and rhythm. S1 and S2 Normal;   Gastrointestinal: Soft non-tender non-distended; Normal bowel sounds  Genitourinary: No  suprapubic  tenderness  Extremities: No  edema  Neurological: Alert and oriented x3, non focal   Musculoskeletal: Normal muscle tone, without deformities  Psychiatric: Cooperative and appropriate    LABS:                         12.6   12.37 )-----------( 164      ( 23 Oct 2023 06:26 )             40.2     10-23    139  |  109<H>  |  29<H>  ----------------------------<  159<H>  4.0   |  22  |  0.86    Ca    8.8      23 Oct 2023 06:26    TPro  6.7  /  Alb  2.5<L>  /  TBili  0.6  /  DBili  x   /  AST  13<L>  /  ALT  13  /  AlkPhos  75  10-22                            13.9   8.12  )-----------( 156      ( 22 Oct 2023 06:47 )             44.1     22 Oct 2023 06:47    138    |  105    |  19     ----------------------------<  161    4.4     |  27     |  0.90     Ca    8.5        22 Oct 2023 06:47    TPro  6.7    /  Alb  2.5    /  TBili  0.6    /  DBili  x      /  AST  13     /  ALT  13     /  AlkPhos  75     22 Oct 2023 06:47    PT/INR - ( 21 Oct 2023 18:56 )   PT: 20.3 sec;   INR: 1.83 ratio         PTT - ( 21 Oct 2023 18:56 )  PTT:32.9 sec  CAPILLARY BLOOD GLUCOSE        LIVER FUNCTIONS - ( 22 Oct 2023 06:47 )  Alb: 2.5 g/dL / Pro: 6.7 gm/dL / ALK PHOS: 75 U/L / ALT: 13 U/L / AST: 13 U/L / GGT: x           Urinalysis Basic - ( 22 Oct 2023 06:47 )    Color: x / Appearance: x / SG: x / pH: x  Gluc: 161 mg/dL / Ketone: x  / Bili: x / Urobili: x   Blood: x / Protein: x / Nitrite: x   Leuk Esterase: x / RBC: x / WBC x   Sq Epi: x / Non Sq Epi: x / Bacteria: x      MEDICATIONS  (STANDING):  albuterol    0.083% 2.5 milliGRAM(s) Nebulizer every 6 hours  apixaban 5 milliGRAM(s) Oral two times a day  aspirin enteric coated 81 milliGRAM(s) Oral daily  atorvastatin 20 milliGRAM(s) Oral at bedtime  azithromycin  IVPB 500 milliGRAM(s) IV Intermittent every 24 hours  budesonide  80 MICROgram(s)/formoterol 4.5 MICROgram(s) Inhaler 2 Puff(s) Inhalation two times a day  cefTRIAXone Injectable. 1000 milliGRAM(s) IV Push every 24 hours  enalapril 5 milliGRAM(s) Oral two times a day  famotidine    Tablet 10 milliGRAM(s) Oral daily  guaiFENesin ER 1200 milliGRAM(s) Oral every 12 hours  latanoprost 0.005% Ophthalmic Solution 1 Drop(s) Both EYES at bedtime  methylPREDNISolone sodium succinate Injectable 40 milliGRAM(s) IV Push two times a day  metoprolol succinate ER 25 milliGRAM(s) Oral daily  sodium chloride 0.9%. 1000 milliLiter(s) (100 mL/Hr) IV Continuous <Continuous>  tiotropium 2.5 MICROgram(s) Inhaler 2 Puff(s) Inhalation daily    MEDICATIONS  (PRN):  acetaminophen     Tablet .. 650 milliGRAM(s) Oral every 6 hours PRN Temp greater or equal to 38C (100.4F), Mild Pain (1 - 3)  aluminum hydroxide/magnesium hydroxide/simethicone Suspension 30 milliLiter(s) Oral every 4 hours PRN Dyspepsia  benzonatate 100 milliGRAM(s) Oral every 8 hours PRN Cough  melatonin 3 milliGRAM(s) Oral at bedtime PRN Insomnia  ondansetron Injectable 4 milliGRAM(s) IV Push every 8 hours PRN Nausea and/or Vomiting      RADIOLOGY & ADDITIONAL TESTS:    ACC: [id number] EXAM:  CT CHEST   ORDERED BY: NABOR BARNETT     PROCEDURE DATE:  10/22/2023          INTERPRETATION:  INDICATION: Pneumonia    TECHNIQUE: A volumetric CT acquisition of the chest was obtained from the   thoracic inlet to the upper abdomen without the use of intravenous   contrast. Coronal and sagittal reconstructed images are provided.    COMPARISON: Chest CT 3/31/2023    FINDINGS:    Lungs/Airways/Pleura: The central airways are patent. Trace pleural   effusions with partial lower lobe atelectasis. Emphysema. There are new   patchy areas of consolidation in bilateral posterior upper lobes and   lingula.    Mediastinum/Lymph nodes: No thoracic adenopathy. Small hiatal hernia.    Heart and Vessels: Cardiomegaly. No pericardial effusion. Coronary artery   calcification and stenting. Mild aortic valvular calcification. Enlarged   pulmonary artery. No thoracic aortic aneurysm.    Upper Abdomen: Unremarkable.    Osseous structures and Soft Tissues: Reverse right shoulder arthroplasty.    IMPRESSION:  Multifocal pneumonia involving bilateral upper lobes/lingula.

## 2023-10-24 ENCOUNTER — TRANSCRIPTION ENCOUNTER (OUTPATIENT)
Age: 88
End: 2023-10-24

## 2023-10-24 PROCEDURE — 99232 SBSQ HOSP IP/OBS MODERATE 35: CPT

## 2023-10-24 RX ADMIN — LATANOPROST 1 DROP(S): 0.05 SOLUTION/ DROPS OPHTHALMIC; TOPICAL at 22:22

## 2023-10-24 RX ADMIN — Medication 1200 MILLIGRAM(S): at 10:10

## 2023-10-24 RX ADMIN — Medication 40 MILLIGRAM(S): at 10:06

## 2023-10-24 RX ADMIN — BUDESONIDE AND FORMOTEROL FUMARATE DIHYDRATE 2 PUFF(S): 160; 4.5 AEROSOL RESPIRATORY (INHALATION) at 08:53

## 2023-10-24 RX ADMIN — Medication 40 MILLIGRAM(S): at 22:20

## 2023-10-24 RX ADMIN — Medication 5 MILLIGRAM(S): at 10:07

## 2023-10-24 RX ADMIN — ALBUTEROL 2.5 MILLIGRAM(S): 90 AEROSOL, METERED ORAL at 08:51

## 2023-10-24 RX ADMIN — ALBUTEROL 2.5 MILLIGRAM(S): 90 AEROSOL, METERED ORAL at 14:18

## 2023-10-24 RX ADMIN — APIXABAN 5 MILLIGRAM(S): 2.5 TABLET, FILM COATED ORAL at 10:07

## 2023-10-24 RX ADMIN — FAMOTIDINE 10 MILLIGRAM(S): 10 INJECTION INTRAVENOUS at 10:10

## 2023-10-24 RX ADMIN — ATORVASTATIN CALCIUM 20 MILLIGRAM(S): 80 TABLET, FILM COATED ORAL at 22:22

## 2023-10-24 RX ADMIN — AZITHROMYCIN 255 MILLIGRAM(S): 500 TABLET, FILM COATED ORAL at 18:03

## 2023-10-24 RX ADMIN — Medication 5 MILLIGRAM(S): at 22:21

## 2023-10-24 RX ADMIN — ALBUTEROL 2.5 MILLIGRAM(S): 90 AEROSOL, METERED ORAL at 20:47

## 2023-10-24 RX ADMIN — TIOTROPIUM BROMIDE 2 PUFF(S): 18 CAPSULE ORAL; RESPIRATORY (INHALATION) at 14:32

## 2023-10-24 RX ADMIN — Medication 1200 MILLIGRAM(S): at 22:21

## 2023-10-24 RX ADMIN — Medication 81 MILLIGRAM(S): at 10:07

## 2023-10-24 RX ADMIN — BUDESONIDE AND FORMOTEROL FUMARATE DIHYDRATE 2 PUFF(S): 160; 4.5 AEROSOL RESPIRATORY (INHALATION) at 20:48

## 2023-10-24 RX ADMIN — APIXABAN 5 MILLIGRAM(S): 2.5 TABLET, FILM COATED ORAL at 22:21

## 2023-10-24 RX ADMIN — CEFTRIAXONE 1000 MILLIGRAM(S): 500 INJECTION, POWDER, FOR SOLUTION INTRAMUSCULAR; INTRAVENOUS at 18:03

## 2023-10-24 RX ADMIN — Medication 25 MILLIGRAM(S): at 10:07

## 2023-10-24 NOTE — PHYSICAL THERAPY INITIAL EVALUATION ADULT - PERTINENT HX OF CURRENT PROBLEM, REHAB EVAL
Pt admitted to  secondary to generalized weakness, SOB, and cough. In ED pt found to have O2 sat on RA 86%. Pt with recent trip to [place] and was in bed for ~ 3 days while on vacation.
Pt is an 89 year old male presenting with weakness, cough and shortness of breath. Reason for admission: Pneumonia, COPD exacerbation. PMH listed below.

## 2023-10-24 NOTE — DISCHARGE NOTE NURSING/CASE MANAGEMENT/SOCIAL WORK - PATIENT PORTAL LINK FT
You can access the FollowMyHealth Patient Portal offered by [facility] by registering at the following website: [email or web address]. By joining [employer]’s FollowMyHealth portal, you will also be able to view your health information using other applications (apps) compatible with our system.

## 2023-10-24 NOTE — PHYSICAL THERAPY INITIAL EVALUATION ADULT - HEALTH SCREEN CRITERIA
[facility] - [facility]    Report of Consultation    Jovanna Cottrell Patient Status:  Inpatient    3/15/1947 MRN [id number]   Location [facility] 4W/SW/SE Attending Yesenia Suarez MD   Hosp Day # 0 PCP Maria Teresa Lind MD     Date of Admission Facility-Administered Medications:  lactated ringers infusion  Intravenous Continuous   scopolamine (TRANSDERM-SCOP) patch 1 patch Transdermal Once   Rosuvastatin Calcium (CRESTOR) tab 10 mg 10 mg Oral Nightly   FLUoxetine HCl (PROZAC) tab 20 mg 20 mg Oral PRN   [START ON 3/19/2019] apixaban (ELIQUIS) tab 2.5 mg 2.5 mg Oral BID   acetaminophen (TYLENOL) tab 650 mg 650 mg Oral Q4H PRN   Or      HYDROcodone-acetaminophen (NORCO) 7.5-325 MG per tab 1 tablet 1 tablet Oral Q4H PRN   Or      HYDROcodone-acetaminop OTHER (SEE COMMENTS)    Comment:Stomach ulcer  Procainamide            OTHER (SEE COMMENTS)    Comment:Throat swelling  Other                   ITCHING    Comment:[employer]    Review of Systems:    Pertinent items are noted in HPI.     Physical Exam:   B yes 0.65 02/19/2019    BUN 15 02/19/2019     02/19/2019    K 4.0 02/19/2019     02/19/2019    CO2 30.0 02/19/2019    GLU 88 02/19/2019    CA 8.9 02/19/2019    ALB 3.5 02/19/2019    ALKPHO 77 02/19/2019    TP 7.0 02/19/2019    AST 13 (L) 02/19/2019

## 2023-10-24 NOTE — PHYSICAL THERAPY INITIAL EVALUATION ADULT - ADDITIONAL COMMENTS
Info obtained from eval 4/2023.
Pt lives in a  with 2 exterior steps to enter. Bedroom/bathroom located on first floor. Bathroom set-up is a tub, +chair and grab bars. Pt has a commode over the toilet. Pt ambulates with a rolling walker. Spouse assist with ADLs.

## 2023-10-24 NOTE — PROGRESS NOTE ADULT - ASSESSMENT
88 yo Male w/PMHx BPH, CAD, COPD, HLD, and HTN admitted for:       Acute hypoxic respiratory failure due to Sepsis Community-acquired pneumonia and Enterovirus.  COPD exacerbation   Monitor pulse ox, start taoer off supplemental  O2 via NC  C/w  IV Solu-Medrol,  tapered  off to BID   C/w  nebulizers, add symbicort and spiriva ( pt is on trelegy at home)   IV ceftriaxone and Zithromax  Start Mucinex and tessalon   F/u BCX: BGTD and UCX: contamination   Completed IVF      Hyponatremia, resolved   due to sepsis and poor oral intake  S/p IVF, Na improved  Labs in am       PAFIB    In Sinus   Continue Eliquis and Metoprolol    HTN  Monitor BP  C/w Enalapril and Metoprolol    Eliquis for DVT ppx    Code status:  full code     Dispo; taper Off o2, PT eval fo rd/c planning  89 yo Male w/PMHx BPH, CAD, COPD, HLD, and HTN admitted for:     Acute hypoxic respiratory failure due to Sepsis Community-acquired pneumonia and Enterovirus.  COPD exacerbation   Monitor pulse ox, start taper off supplemental  O2 via NC  C/w  IV Solu-Medrol,  tapered  off to BID   C/w  nebulizers,  symbicort and spiriva ( pt is on trelegy at home)   IV ceftriaxone and Zithromax  Start Mucinex and tessalon   F/u BCX: BGTD and UCX: contamination   Completed IVF      Hyponatremia, resolved   due to sepsis and poor oral intake  S/p IVF, Na improved  Labs in am       PAFIB    In Sinus   Continue Eliquis and Metoprolol    HTN  Monitor BP  C/w Enalapril and Metoprolol    Eliquis for DVT ppx    Code status:  full code     Dispo; taper Off o2, c/w IV abxs

## 2023-10-24 NOTE — PROGRESS NOTE ADULT - SUBJECTIVE AND OBJECTIVE BOX
CC: Pneumonia, COPD exacerbation    HPI: 89 yo Male w/PMHx BPH, CAD, COPD, HLD, and HTN presented to the ED with weakness, Shortness of breath and productive cough. Patient was found to be at O2 sat 86% on RA. Spoke with daughter on the phone, reported patient with worsening weakness. Daughter reported they were recently in [place] returning home today, and that the patient spent 3 days in bed while on vacation. Patient with a productive cough producing green phlegm. Patient is not on O2 at baseline. No urinary complaints.       INTERVAL HPI/ OVERNIGHT EVENTS: Pt was seen and examined, reports getting better, cough and SOB improved, Will start taper down O2, awaiting for Pt eval   Vital Signs Last 24 Hrs  T(C): 36.5 (24 Oct 2023 07:34), Max: 36.6 (23 Oct 2023 21:32)  T(F): 97.7 (24 Oct 2023 07:34), Max: 97.9 (23 Oct 2023 21:32)  HR: 92 (24 Oct 2023 10:00) (62 - 92)  BP: 104/70 (24 Oct 2023 10:00) (103/48 - 123/67)  RR: 19 (24 Oct 2023 07:34) (18 - 19)  SpO2: 98% (24 Oct 2023 07:34) (95% - 98%)    Parameters below as of 24 Oct 2023 07:34  Patient On (Oxygen Delivery Method): nasal cannula  O2 Flow (L/min): 2        REVIEW OF SYSTEMS:  All other review of systems is negative unless indicated above.      PHYSICAL EXAM:    General: frail elderly male,  in no acute distress  Eyes:  EOMI; conjunctiva and sclera clear  Head: Normocephalic; atraumatic  ENMT: No nasal discharge; airway clear  Neck: Supple; no JVD   Respiratory: Decreased BS b/l,  No wheezes  Cardiovascular: Regular rate and rhythm. S1 and S2 Normal;   Gastrointestinal: Soft non-tender non-distended; Normal bowel sounds  Genitourinary: No  suprapubic  tenderness  Extremities: No  edema  Neurological: Alert and oriented x3, non focal   Musculoskeletal: Normal muscle tone, without deformities  Psychiatric: Cooperative and appropriate    LABS:                         12.6   12.37 )-----------( 164      ( 23 Oct 2023 06:26 )             40.2     10-23    139  |  109<H>  |  29<H>  ----------------------------<  159<H>  4.0   |  22  |  0.86    Ca    8.8      23 Oct 2023 06:26    TPro  6.7  /  Alb  2.5<L>  /  TBili  0.6  /  DBili  x   /  AST  13<L>  /  ALT  13  /  AlkPhos  75  10-22                            13.9   8.12  )-----------( 156      ( 22 Oct 2023 06:47 )             44.1     22 Oct 2023 06:47    138    |  105    |  19     ----------------------------<  161    4.4     |  27     |  0.90     Ca    8.5        22 Oct 2023 06:47    TPro  6.7    /  Alb  2.5    /  TBili  0.6    /  DBili  x      /  AST  13     /  ALT  13     /  AlkPhos  75     22 Oct 2023 06:47    PT/INR - ( 21 Oct 2023 18:56 )   PT: 20.3 sec;   INR: 1.83 ratio         PTT - ( 21 Oct 2023 18:56 )  PTT:32.9 sec  CAPILLARY BLOOD GLUCOSE        LIVER FUNCTIONS - ( 22 Oct 2023 06:47 )  Alb: 2.5 g/dL / Pro: 6.7 gm/dL / ALK PHOS: 75 U/L / ALT: 13 U/L / AST: 13 U/L / GGT: x           Urinalysis Basic - ( 22 Oct 2023 06:47 )    Color: x / Appearance: x / SG: x / pH: x  Gluc: 161 mg/dL / Ketone: x  / Bili: x / Urobili: x   Blood: x / Protein: x / Nitrite: x   Leuk Esterase: x / RBC: x / WBC x   Sq Epi: x / Non Sq Epi: x / Bacteria: x      MEDICATIONS  (STANDING):  albuterol    0.083% 2.5 milliGRAM(s) Nebulizer every 6 hours  apixaban 5 milliGRAM(s) Oral two times a day  aspirin enteric coated 81 milliGRAM(s) Oral daily  atorvastatin 20 milliGRAM(s) Oral at bedtime  azithromycin  IVPB 500 milliGRAM(s) IV Intermittent every 24 hours  budesonide  80 MICROgram(s)/formoterol 4.5 MICROgram(s) Inhaler 2 Puff(s) Inhalation two times a day  cefTRIAXone Injectable. 1000 milliGRAM(s) IV Push every 24 hours  enalapril 5 milliGRAM(s) Oral two times a day  famotidine    Tablet 10 milliGRAM(s) Oral daily  guaiFENesin ER 1200 milliGRAM(s) Oral every 12 hours  latanoprost 0.005% Ophthalmic Solution 1 Drop(s) Both EYES at bedtime  methylPREDNISolone sodium succinate Injectable 40 milliGRAM(s) IV Push two times a day  metoprolol succinate ER 25 milliGRAM(s) Oral daily  sodium chloride 0.9%. 1000 milliLiter(s) (100 mL/Hr) IV Continuous <Continuous>  tiotropium 2.5 MICROgram(s) Inhaler 2 Puff(s) Inhalation daily    MEDICATIONS  (PRN):  acetaminophen     Tablet .. 650 milliGRAM(s) Oral every 6 hours PRN Temp greater or equal to 38C (100.4F), Mild Pain (1 - 3)  aluminum hydroxide/magnesium hydroxide/simethicone Suspension 30 milliLiter(s) Oral every 4 hours PRN Dyspepsia  benzonatate 100 milliGRAM(s) Oral every 8 hours PRN Cough  melatonin 3 milliGRAM(s) Oral at bedtime PRN Insomnia  ondansetron Injectable 4 milliGRAM(s) IV Push every 8 hours PRN Nausea and/or Vomiting      RADIOLOGY & ADDITIONAL TESTS:    ACC: [id number] EXAM:  CT CHEST   ORDERED BY: NABOR BARNETT     PROCEDURE DATE:  10/22/2023          INTERPRETATION:  INDICATION: Pneumonia    TECHNIQUE: A volumetric CT acquisition of the chest was obtained from the   thoracic inlet to the upper abdomen without the use of intravenous   contrast. Coronal and sagittal reconstructed images are provided.    COMPARISON: Chest CT 3/31/2023    FINDINGS:    Lungs/Airways/Pleura: The central airways are patent. Trace pleural   effusions with partial lower lobe atelectasis. Emphysema. There are new   patchy areas of consolidation in bilateral posterior upper lobes and   lingula.    Mediastinum/Lymph nodes: No thoracic adenopathy. Small hiatal hernia.    Heart and Vessels: Cardiomegaly. No pericardial effusion. Coronary artery   calcification and stenting. Mild aortic valvular calcification. Enlarged   pulmonary artery. No thoracic aortic aneurysm.    Upper Abdomen: Unremarkable.    Osseous structures and Soft Tissues: Reverse right shoulder arthroplasty.    IMPRESSION:  Multifocal pneumonia involving bilateral upper lobes/lingula.   CC: Pneumonia, COPD exacerbation    HPI: 89 yo Male w/PMHx BPH, CAD, COPD, HLD, and HTN presented to the ED with weakness, Shortness of breath and productive cough. Patient was found to be at O2 sat 86% on RA. Spoke with daughter on the phone, reported patient with worsening weakness. Daughter reported they were recently in [place] returning home today, and that the patient spent 3 days in bed while on vacation. Patient with a productive cough producing green phlegm. Patient is not on O2 at baseline. No urinary complaints.       INTERVAL HPI/ OVERNIGHT EVENTS: Pt was seen and examined, reports slowly getting better, still cough, no SOB, on O2. OOB to chair, feels tired. Spouse at bedside, results and plan discussed     Vital Signs Last 24 Hrs  T(C): 36.5 (24 Oct 2023 07:34), Max: 36.6 (23 Oct 2023 21:32)  T(F): 97.7 (24 Oct 2023 07:34), Max: 97.9 (23 Oct 2023 21:32)  HR: 92 (24 Oct 2023 10:00) (62 - 92)  BP: 104/70 (24 Oct 2023 10:00) (103/48 - 123/67)  RR: 19 (24 Oct 2023 07:34) (18 - 19)  SpO2: 98% (24 Oct 2023 07:34) (95% - 98%)    Parameters below as of 24 Oct 2023 07:34  Patient On (Oxygen Delivery Method): nasal cannula  O2 Flow (L/min): 2        REVIEW OF SYSTEMS:  All other review of systems is negative unless indicated above.      PHYSICAL EXAM:  General: frail elderly male,  in no acute distress  Eyes:  EOMI; conjunctiva and sclera clear  Head: Normocephalic; atraumatic  ENMT: No nasal discharge; airway clear  Neck: Supple; no JVD   Respiratory: Decreased BS b/l,  No wheezes  Cardiovascular: Regular rate and rhythm. S1 and S2 Normal;   Gastrointestinal: Soft non-tender non-distended; Normal bowel sounds  Genitourinary: No  suprapubic  tenderness  Extremities: No  edema  Neurological: Alert and oriented x3, non focal   Musculoskeletal: Normal muscle tone, without deformities  Psychiatric: Cooperative and appropriate      LABS:                         12.6   12.37 )-----------( 164      ( 23 Oct 2023 06:26 )             40.2     10-23    139  |  109<H>  |  29<H>  ----------------------------<  159<H>  4.0   |  22  |  0.86    Ca    8.8      23 Oct 2023 06:26        Urinalysis Basic - ( 23 Oct 2023 06:26 )  Color: x / Appearance: x / SG: x / pH: x  Gluc: 159 mg/dL / Ketone: x  / Bili: x / Urobili: x   Blood: x / Protein: x / Nitrite: x   Leuk Esterase: x / RBC: x / WBC x   Sq Epi: x / Non Sq Epi: x / Bacteria: x                              12.6   12.37 )-----------( 164      ( 23 Oct 2023 06:26 )             40.2     10-23    139  |  109<H>  |  29<H>  ----------------------------<  159<H>  4.0   |  22  |  0.86    Ca    8.8      23 Oct 2023 06:26    TPro  6.7  /  Alb  2.5<L>  /  TBili  0.6  /  DBili  x   /  AST  13<L>  /  ALT  13  /  AlkPhos  75  10-22                            13.9   8.12  )-----------( 156      ( 22 Oct 2023 06:47 )             44.1     22 Oct 2023 06:47    138    |  105    |  19     ----------------------------<  161    4.4     |  27     |  0.90     Ca    8.5        22 Oct 2023 06:47    TPro  6.7    /  Alb  2.5    /  TBili  0.6    /  DBili  x      /  AST  13     /  ALT  13     /  AlkPhos  75     22 Oct 2023 06:47    PT/INR - ( 21 Oct 2023 18:56 )   PT: 20.3 sec;   INR: 1.83 ratio         PTT - ( 21 Oct 2023 18:56 )  PTT:32.9 sec  CAPILLARY BLOOD GLUCOSE        LIVER FUNCTIONS - ( 22 Oct 2023 06:47 )  Alb: 2.5 g/dL / Pro: 6.7 gm/dL / ALK PHOS: 75 U/L / ALT: 13 U/L / AST: 13 U/L / GGT: x           Urinalysis Basic - ( 22 Oct 2023 06:47 )    Color: x / Appearance: x / SG: x / pH: x  Gluc: 161 mg/dL / Ketone: x  / Bili: x / Urobili: x   Blood: x / Protein: x / Nitrite: x   Leuk Esterase: x / RBC: x / WBC x   Sq Epi: x / Non Sq Epi: x / Bacteria: x      MEDICATIONS  (STANDING):  albuterol    0.083% 2.5 milliGRAM(s) Nebulizer every 6 hours  apixaban 5 milliGRAM(s) Oral two times a day  aspirin enteric coated 81 milliGRAM(s) Oral daily  atorvastatin 20 milliGRAM(s) Oral at bedtime  azithromycin  IVPB 500 milliGRAM(s) IV Intermittent every 24 hours  budesonide  80 MICROgram(s)/formoterol 4.5 MICROgram(s) Inhaler 2 Puff(s) Inhalation two times a day  cefTRIAXone Injectable. 1000 milliGRAM(s) IV Push every 24 hours  enalapril 5 milliGRAM(s) Oral two times a day  famotidine    Tablet 10 milliGRAM(s) Oral daily  guaiFENesin ER 1200 milliGRAM(s) Oral every 12 hours  latanoprost 0.005% Ophthalmic Solution 1 Drop(s) Both EYES at bedtime  methylPREDNISolone sodium succinate Injectable 40 milliGRAM(s) IV Push two times a day  metoprolol succinate ER 25 milliGRAM(s) Oral daily  sodium chloride 0.9%. 1000 milliLiter(s) (100 mL/Hr) IV Continuous <Continuous>  tiotropium 2.5 MICROgram(s) Inhaler 2 Puff(s) Inhalation daily    MEDICATIONS  (PRN):  acetaminophen     Tablet .. 650 milliGRAM(s) Oral every 6 hours PRN Temp greater or equal to 38C (100.4F), Mild Pain (1 - 3)  aluminum hydroxide/magnesium hydroxide/simethicone Suspension 30 milliLiter(s) Oral every 4 hours PRN Dyspepsia  benzonatate 100 milliGRAM(s) Oral every 8 hours PRN Cough  melatonin 3 milliGRAM(s) Oral at bedtime PRN Insomnia  ondansetron Injectable 4 milliGRAM(s) IV Push every 8 hours PRN Nausea and/or Vomiting      RADIOLOGY & ADDITIONAL TESTS:    ACC: [id number] EXAM:  CT CHEST   ORDERED BY: NABOR BARNETT     PROCEDURE DATE:  10/22/2023          INTERPRETATION:  INDICATION: Pneumonia    TECHNIQUE: A volumetric CT acquisition of the chest was obtained from the   thoracic inlet to the upper abdomen without the use of intravenous   contrast. Coronal and sagittal reconstructed images are provided.    COMPARISON: Chest CT 3/31/2023    FINDINGS:    Lungs/Airways/Pleura: The central airways are patent. Trace pleural   effusions with partial lower lobe atelectasis. Emphysema. There are new   patchy areas of consolidation in bilateral posterior upper lobes and   lingula.    Mediastinum/Lymph nodes: No thoracic adenopathy. Small hiatal hernia.    Heart and Vessels: Cardiomegaly. No pericardial effusion. Coronary artery   calcification and stenting. Mild aortic valvular calcification. Enlarged   pulmonary artery. No thoracic aortic aneurysm.    Upper Abdomen: Unremarkable.    Osseous structures and Soft Tissues: Reverse right shoulder arthroplasty.    IMPRESSION:  Multifocal pneumonia involving bilateral upper lobes/lingula.

## 2023-10-24 NOTE — DISCHARGE NOTE NURSING/CASE MANAGEMENT/SOCIAL WORK - NSFLUVACAGEDISCH_IMM_ALL_CORE
questions answered. Pt voiced understanding. Patient agreed with treatment plan. Follow up as directed. Patient instructed to call for questions or concerns.        Electronically signed by   GEOVANNA Matamoros CNP Adult

## 2023-10-24 NOTE — PHYSICAL THERAPY INITIAL EVALUATION ADULT - PATIENT PROFILE REVIEW, REHAB EVAL
Chart reviewed and contents noted. Please refer to plan of care and A&I for ongoing treatment notes./yes
yes

## 2023-10-24 NOTE — DISCHARGE NOTE NURSING/CASE MANAGEMENT/SOCIAL WORK - NSDCPEFALRISK_GEN_ALL_CORE
For information on Fall & Injury Prevention, visit: [email or web address] OR  [email or web address] OR  https://www.cdc.gov/steadi/patient.html

## 2023-10-25 LAB
ANION GAP SERPL CALC-SCNC: 5 MMOL/L — SIGNIFICANT CHANGE UP (ref 5–17)
ANION GAP SERPL CALC-SCNC: 5 MMOL/L — SIGNIFICANT CHANGE UP (ref 5–17)
BUN SERPL-MCNC: 32 MG/DL — HIGH (ref 7–23)
BUN SERPL-MCNC: 32 MG/DL — HIGH (ref 7–23)
CALCIUM SERPL-MCNC: 7.9 MG/DL — LOW (ref 8.5–10.1)
CALCIUM SERPL-MCNC: 7.9 MG/DL — LOW (ref 8.5–10.1)
CHLORIDE SERPL-SCNC: 108 MMOL/L — SIGNIFICANT CHANGE UP (ref 96–108)
CHLORIDE SERPL-SCNC: 108 MMOL/L — SIGNIFICANT CHANGE UP (ref 96–108)
CO2 SERPL-SCNC: 26 MMOL/L — SIGNIFICANT CHANGE UP (ref 22–31)
CO2 SERPL-SCNC: 26 MMOL/L — SIGNIFICANT CHANGE UP (ref 22–31)
CREAT SERPL-MCNC: 0.82 MG/DL — SIGNIFICANT CHANGE UP (ref 0.5–1.3)
CREAT SERPL-MCNC: 0.82 MG/DL — SIGNIFICANT CHANGE UP (ref 0.5–1.3)
EGFR: 84 ML/MIN/1.73M2 — SIGNIFICANT CHANGE UP
EGFR: 84 ML/MIN/1.73M2 — SIGNIFICANT CHANGE UP
GLUCOSE SERPL-MCNC: 147 MG/DL — HIGH (ref 70–99)
GLUCOSE SERPL-MCNC: 147 MG/DL — HIGH (ref 70–99)
HCT VFR BLD CALC: 41.5 % — SIGNIFICANT CHANGE UP (ref 39–50)
HCT VFR BLD CALC: 41.5 % — SIGNIFICANT CHANGE UP (ref 39–50)
HGB BLD-MCNC: 13.1 G/DL — SIGNIFICANT CHANGE UP (ref 13–17)
HGB BLD-MCNC: 13.1 G/DL — SIGNIFICANT CHANGE UP (ref 13–17)
MCHC RBC-ENTMCNC: 24.8 PG — LOW (ref 27–34)
MCHC RBC-ENTMCNC: 24.8 PG — LOW (ref 27–34)
MCHC RBC-ENTMCNC: 31.6 GM/DL — LOW (ref 32–36)
MCHC RBC-ENTMCNC: 31.6 GM/DL — LOW (ref 32–36)
MCV RBC AUTO: 78.4 FL — LOW (ref 80–100)
MCV RBC AUTO: 78.4 FL — LOW (ref 80–100)
PLATELET # BLD AUTO: 206 K/UL — SIGNIFICANT CHANGE UP (ref 150–400)
PLATELET # BLD AUTO: 206 K/UL — SIGNIFICANT CHANGE UP (ref 150–400)
POTASSIUM SERPL-MCNC: 4.3 MMOL/L — SIGNIFICANT CHANGE UP (ref 3.5–5.3)
POTASSIUM SERPL-MCNC: 4.3 MMOL/L — SIGNIFICANT CHANGE UP (ref 3.5–5.3)
POTASSIUM SERPL-SCNC: 4.3 MMOL/L — SIGNIFICANT CHANGE UP (ref 3.5–5.3)
POTASSIUM SERPL-SCNC: 4.3 MMOL/L — SIGNIFICANT CHANGE UP (ref 3.5–5.3)
RBC # BLD: 5.29 M/UL — SIGNIFICANT CHANGE UP (ref 4.2–5.8)
RBC # BLD: 5.29 M/UL — SIGNIFICANT CHANGE UP (ref 4.2–5.8)
RBC # FLD: 17.5 % — HIGH (ref 10.3–14.5)
RBC # FLD: 17.5 % — HIGH (ref 10.3–14.5)
SODIUM SERPL-SCNC: 139 MMOL/L — SIGNIFICANT CHANGE UP (ref 135–145)
SODIUM SERPL-SCNC: 139 MMOL/L — SIGNIFICANT CHANGE UP (ref 135–145)
WBC # BLD: 10.43 K/UL — SIGNIFICANT CHANGE UP (ref 3.8–10.5)
WBC # BLD: 10.43 K/UL — SIGNIFICANT CHANGE UP (ref 3.8–10.5)
WBC # FLD AUTO: 10.43 K/UL — SIGNIFICANT CHANGE UP (ref 3.8–10.5)
WBC # FLD AUTO: 10.43 K/UL — SIGNIFICANT CHANGE UP (ref 3.8–10.5)

## 2023-10-25 PROCEDURE — 99232 SBSQ HOSP IP/OBS MODERATE 35: CPT

## 2023-10-25 RX ADMIN — ALBUTEROL 2.5 MILLIGRAM(S): 90 AEROSOL, METERED ORAL at 20:58

## 2023-10-25 RX ADMIN — ATORVASTATIN CALCIUM 20 MILLIGRAM(S): 80 TABLET, FILM COATED ORAL at 21:53

## 2023-10-25 RX ADMIN — TIOTROPIUM BROMIDE 2 PUFF(S): 18 CAPSULE ORAL; RESPIRATORY (INHALATION) at 08:07

## 2023-10-25 RX ADMIN — Medication 650 MILLIGRAM(S): at 10:30

## 2023-10-25 RX ADMIN — BUDESONIDE AND FORMOTEROL FUMARATE DIHYDRATE 2 PUFF(S): 160; 4.5 AEROSOL RESPIRATORY (INHALATION) at 22:09

## 2023-10-25 RX ADMIN — APIXABAN 5 MILLIGRAM(S): 2.5 TABLET, FILM COATED ORAL at 21:53

## 2023-10-25 RX ADMIN — Medication 5 MILLIGRAM(S): at 21:52

## 2023-10-25 RX ADMIN — APIXABAN 5 MILLIGRAM(S): 2.5 TABLET, FILM COATED ORAL at 10:28

## 2023-10-25 RX ADMIN — FAMOTIDINE 10 MILLIGRAM(S): 10 INJECTION INTRAVENOUS at 10:31

## 2023-10-25 RX ADMIN — Medication 25 MILLIGRAM(S): at 10:29

## 2023-10-25 RX ADMIN — CEFTRIAXONE 1000 MILLIGRAM(S): 500 INJECTION, POWDER, FOR SOLUTION INTRAMUSCULAR; INTRAVENOUS at 18:32

## 2023-10-25 RX ADMIN — Medication 40 MILLIGRAM(S): at 10:31

## 2023-10-25 RX ADMIN — Medication 1200 MILLIGRAM(S): at 10:30

## 2023-10-25 RX ADMIN — Medication 5 MILLIGRAM(S): at 10:31

## 2023-10-25 RX ADMIN — Medication 81 MILLIGRAM(S): at 10:29

## 2023-10-25 RX ADMIN — ALBUTEROL 2.5 MILLIGRAM(S): 90 AEROSOL, METERED ORAL at 13:47

## 2023-10-25 RX ADMIN — Medication 1200 MILLIGRAM(S): at 21:53

## 2023-10-25 RX ADMIN — BUDESONIDE AND FORMOTEROL FUMARATE DIHYDRATE 2 PUFF(S): 160; 4.5 AEROSOL RESPIRATORY (INHALATION) at 08:07

## 2023-10-25 RX ADMIN — AZITHROMYCIN 255 MILLIGRAM(S): 500 TABLET, FILM COATED ORAL at 18:32

## 2023-10-25 RX ADMIN — ALBUTEROL 2.5 MILLIGRAM(S): 90 AEROSOL, METERED ORAL at 08:05

## 2023-10-25 NOTE — PROGRESS NOTE ADULT - SUBJECTIVE AND OBJECTIVE BOX
CC: Pneumonia, COPD exacerbation    HPI: 89 yo Male w/PMHx BPH, CAD, COPD, HLD, and HTN presented to the ED with weakness, Shortness of breath and productive cough. Patient was found to be at O2 sat 86% on RA. Spoke with daughter on the phone, reported patient with worsening weakness. Daughter reported they were recently in [place] returning home today, and that the patient spent 3 days in bed while on vacation. Patient with a productive cough producing green phlegm. Patient is not on O2 at baseline. No urinary complaints.       INTERVAL HPI/ OVERNIGHT EVENTS: Pt was seen and examined,       Vital Signs Last 24 Hrs  T(C): 36.7 (25 Oct 2023 08:05), Max: 36.8 (24 Oct 2023 22:10)  T(F): 98.1 (25 Oct 2023 08:05), Max: 98.2 (24 Oct 2023 22:10)  HR: 64 (25 Oct 2023 08:05) (60 - 92)  BP: 135/92 (25 Oct 2023 08:05) (104/70 - 135/92)  RR: 18 (25 Oct 2023 08:05) (18 - 18)  SpO2: 94% (25 Oct 2023 08:05) (94% - 95%)    Parameters below as of 25 Oct 2023 08:05  Patient On (Oxygen Delivery Method): nasal cannula  O2 Flow (L/min): 2        REVIEW OF SYSTEMS:  All other review of systems is negative unless indicated above.      PHYSICAL EXAM:  General: frail elderly male,  in no acute distress  Eyes:  EOMI; conjunctiva and sclera clear  Head: Normocephalic; atraumatic  ENMT: No nasal discharge; airway clear  Neck: Supple; no JVD   Respiratory: Decreased BS b/l,  No wheezes  Cardiovascular: Regular rate and rhythm. S1 and S2 Normal;   Gastrointestinal: Soft non-tender non-distended; Normal bowel sounds  Genitourinary: No  suprapubic  tenderness  Extremities: No  edema  Neurological: Alert and oriented x3, non focal   Musculoskeletal: Normal muscle tone, without deformities  Psychiatric: Cooperative and appropriate      LABS:                         13.1   10.43 )-----------( 206      ( 25 Oct 2023 06:25 )             41.5     10-25    139  |  108  |  32<H>  ----------------------------<  147<H>  4.3   |  26  |  0.82    Ca    7.9<L>      25 Oct 2023 06:25                            12.6   12.37 )-----------( 164      ( 23 Oct 2023 06:26 )             40.2     10-23    139  |  109<H>  |  29<H>  ----------------------------<  159<H>  4.0   |  22  |  0.86    Ca    8.8      23 Oct 2023 06:26        Urinalysis Basic - ( 23 Oct 2023 06:26 )  Color: x / Appearance: x / SG: x / pH: x  Gluc: 159 mg/dL / Ketone: x  / Bili: x / Urobili: x   Blood: x / Protein: x / Nitrite: x   Leuk Esterase: x / RBC: x / WBC x   Sq Epi: x / Non Sq Epi: x / Bacteria: x                              12.6   12.37 )-----------( 164      ( 23 Oct 2023 06:26 )             40.2     10-23    139  |  109<H>  |  29<H>  ----------------------------<  159<H>  4.0   |  22  |  0.86    Ca    8.8      23 Oct 2023 06:26    TPro  6.7  /  Alb  2.5<L>  /  TBili  0.6  /  DBili  x   /  AST  13<L>  /  ALT  13  /  AlkPhos  75  10-22                            13.9   8.12  )-----------( 156      ( 22 Oct 2023 06:47 )             44.1     22 Oct 2023 06:47    138    |  105    |  19     ----------------------------<  161    4.4     |  27     |  0.90     Ca    8.5        22 Oct 2023 06:47    TPro  6.7    /  Alb  2.5    /  TBili  0.6    /  DBili  x      /  AST  13     /  ALT  13     /  AlkPhos  75     22 Oct 2023 06:47    PT/INR - ( 21 Oct 2023 18:56 )   PT: 20.3 sec;   INR: 1.83 ratio         PTT - ( 21 Oct 2023 18:56 )  PTT:32.9 sec  CAPILLARY BLOOD GLUCOSE        LIVER FUNCTIONS - ( 22 Oct 2023 06:47 )  Alb: 2.5 g/dL / Pro: 6.7 gm/dL / ALK PHOS: 75 U/L / ALT: 13 U/L / AST: 13 U/L / GGT: x           Urinalysis Basic - ( 22 Oct 2023 06:47 )    Color: x / Appearance: x / SG: x / pH: x  Gluc: 161 mg/dL / Ketone: x  / Bili: x / Urobili: x   Blood: x / Protein: x / Nitrite: x   Leuk Esterase: x / RBC: x / WBC x   Sq Epi: x / Non Sq Epi: x / Bacteria: x      MEDICATIONS  (STANDING):  albuterol    0.083% 2.5 milliGRAM(s) Nebulizer every 6 hours  apixaban 5 milliGRAM(s) Oral two times a day  aspirin enteric coated 81 milliGRAM(s) Oral daily  atorvastatin 20 milliGRAM(s) Oral at bedtime  azithromycin  IVPB 500 milliGRAM(s) IV Intermittent every 24 hours  budesonide  80 MICROgram(s)/formoterol 4.5 MICROgram(s) Inhaler 2 Puff(s) Inhalation two times a day  cefTRIAXone Injectable. 1000 milliGRAM(s) IV Push every 24 hours  enalapril 5 milliGRAM(s) Oral two times a day  famotidine    Tablet 10 milliGRAM(s) Oral daily  guaiFENesin ER 1200 milliGRAM(s) Oral every 12 hours  latanoprost 0.005% Ophthalmic Solution 1 Drop(s) Both EYES at bedtime  methylPREDNISolone sodium succinate Injectable 40 milliGRAM(s) IV Push two times a day  metoprolol succinate ER 25 milliGRAM(s) Oral daily  sodium chloride 0.9%. 1000 milliLiter(s) (100 mL/Hr) IV Continuous <Continuous>  tiotropium 2.5 MICROgram(s) Inhaler 2 Puff(s) Inhalation daily    MEDICATIONS  (PRN):  acetaminophen     Tablet .. 650 milliGRAM(s) Oral every 6 hours PRN Temp greater or equal to 38C (100.4F), Mild Pain (1 - 3)  aluminum hydroxide/magnesium hydroxide/simethicone Suspension 30 milliLiter(s) Oral every 4 hours PRN Dyspepsia  benzonatate 100 milliGRAM(s) Oral every 8 hours PRN Cough  melatonin 3 milliGRAM(s) Oral at bedtime PRN Insomnia  ondansetron Injectable 4 milliGRAM(s) IV Push every 8 hours PRN Nausea and/or Vomiting      RADIOLOGY & ADDITIONAL TESTS:    ACC: [id number] EXAM:  CT CHEST   ORDERED BY: NABOR BARNETT     PROCEDURE DATE:  10/22/2023          INTERPRETATION:  INDICATION: Pneumonia    TECHNIQUE: A volumetric CT acquisition of the chest was obtained from the   thoracic inlet to the upper abdomen without the use of intravenous   contrast. Coronal and sagittal reconstructed images are provided.    COMPARISON: Chest CT 3/31/2023    FINDINGS:    Lungs/Airways/Pleura: The central airways are patent. Trace pleural   effusions with partial lower lobe atelectasis. Emphysema. There are new   patchy areas of consolidation in bilateral posterior upper lobes and   lingula.    Mediastinum/Lymph nodes: No thoracic adenopathy. Small hiatal hernia.    Heart and Vessels: Cardiomegaly. No pericardial effusion. Coronary artery   calcification and stenting. Mild aortic valvular calcification. Enlarged   pulmonary artery. No thoracic aortic aneurysm.    Upper Abdomen: Unremarkable.    Osseous structures and Soft Tissues: Reverse right shoulder arthroplasty.    IMPRESSION:  Multifocal pneumonia involving bilateral upper lobes/lingula.   CC: Pneumonia, COPD exacerbation    HPI: 89 yo Male w/PMHx BPH, CAD, COPD, HLD, and HTN presented to the ED with weakness, Shortness of breath and productive cough. Patient was found to be at O2 sat 86% on RA. Spoke with daughter on the phone, reported patient with worsening weakness. Daughter reported they were recently in [place] returning home today, and that the patient spent 3 days in bed while on vacation. Patient with a productive cough producing green phlegm. Patient is not on O2 at baseline. No urinary complaints.       INTERVAL HPI/ OVERNIGHT EVENTS: Pt was seen and examined, pt reports that slowly getting better, cough muci improved, no SOB, NO CP. Will dc O2 and monitor pulse ox       Vital Signs Last 24 Hrs  T(C): 36.7 (25 Oct 2023 08:05), Max: 36.8 (24 Oct 2023 22:10)  T(F): 98.1 (25 Oct 2023 08:05), Max: 98.2 (24 Oct 2023 22:10)  HR: 64 (25 Oct 2023 08:05) (60 - 92)  BP: 135/92 (25 Oct 2023 08:05) (104/70 - 135/92)  RR: 18 (25 Oct 2023 08:05) (18 - 18)  SpO2: 94% (25 Oct 2023 08:05) (94% - 95%)    Parameters below as of 25 Oct 2023 08:05  Patient On (Oxygen Delivery Method): nasal cannula  O2 Flow (L/min): 2        REVIEW OF SYSTEMS:  All other review of systems is negative unless indicated above.      PHYSICAL EXAM:  General: frail elderly male,  in no acute distress  Eyes:  EOMI; conjunctiva and sclera clear  Head: Normocephalic; atraumatic  ENMT: No nasal discharge; airway clear  Neck: Supple; no JVD   Respiratory: Decreased BS b/l,  No wheezes  Cardiovascular: Regular rate and rhythm. S1 and S2 Normal;   Gastrointestinal: Soft non-tender non-distended; Normal bowel sounds  Genitourinary: No  suprapubic  tenderness  Extremities: No  edema  Neurological: Alert and oriented x3, non focal   Musculoskeletal: Normal muscle tone, without deformities  Psychiatric: Cooperative and appropriate      LABS:                         13.1   10.43 )-----------( 206      ( 25 Oct 2023 06:25 )             41.5     10-25    139  |  108  |  32<H>  ----------------------------<  147<H>  4.3   |  26  |  0.82    Ca    7.9<L>      25 Oct 2023 06:25                            12.6   12.37 )-----------( 164      ( 23 Oct 2023 06:26 )             40.2     10-23    139  |  109<H>  |  29<H>  ----------------------------<  159<H>  4.0   |  22  |  0.86    Ca    8.8      23 Oct 2023 06:26                              12.6   12.37 )-----------( 164      ( 23 Oct 2023 06:26 )             40.2     10-23    139  |  109<H>  |  29<H>  ----------------------------<  159<H>  4.0   |  22  |  0.86    Ca    8.8      23 Oct 2023 06:26    TPro  6.7  /  Alb  2.5<L>  /  TBili  0.6  /  DBili  x   /  AST  13<L>  /  ALT  13  /  AlkPhos  75  10-22                            13.9   8.12  )-----------( 156      ( 22 Oct 2023 06:47 )             44.1     22 Oct 2023 06:47    138    |  105    |  19     ----------------------------<  161    4.4     |  27     |  0.90     Ca    8.5        22 Oct 2023 06:47    TPro  6.7    /  Alb  2.5    /  TBili  0.6    /  DBili  x      /  AST  13     /  ALT  13     /  AlkPhos  75     22 Oct 2023 06:47    PT/INR - ( 21 Oct 2023 18:56 )   PT: 20.3 sec;   INR: 1.83 ratio    PTT - ( 21 Oct 2023 18:56 )  PTT:32.9 sec    LIVER FUNCTIONS - ( 22 Oct 2023 06:47 )  Alb: 2.5 g/dL / Pro: 6.7 gm/dL / ALK PHOS: 75 U/L / ALT: 13 U/L / AST: 13 U/L / GGT: x           Urinalysis Basic - ( 22 Oct 2023 06:47 )    Color: x / Appearance: x / SG: x / pH: x  Gluc: 161 mg/dL / Ketone: x  / Bili: x / Urobili: x   Blood: x / Protein: x / Nitrite: x   Leuk Esterase: x / RBC: x / WBC x   Sq Epi: x / Non Sq Epi: x / Bacteria: x      MEDICATIONS  (STANDING):  albuterol    0.083% 2.5 milliGRAM(s) Nebulizer every 6 hours  apixaban 5 milliGRAM(s) Oral two times a day  aspirin enteric coated 81 milliGRAM(s) Oral daily  atorvastatin 20 milliGRAM(s) Oral at bedtime  azithromycin  IVPB 500 milliGRAM(s) IV Intermittent every 24 hours  budesonide  80 MICROgram(s)/formoterol 4.5 MICROgram(s) Inhaler 2 Puff(s) Inhalation two times a day  cefTRIAXone Injectable. 1000 milliGRAM(s) IV Push every 24 hours  enalapril 5 milliGRAM(s) Oral two times a day  famotidine    Tablet 10 milliGRAM(s) Oral daily  guaiFENesin ER 1200 milliGRAM(s) Oral every 12 hours  latanoprost 0.005% Ophthalmic Solution 1 Drop(s) Both EYES at bedtime  methylPREDNISolone sodium succinate Injectable 40 milliGRAM(s) IV Push two times a day  metoprolol succinate ER 25 milliGRAM(s) Oral daily  sodium chloride 0.9%. 1000 milliLiter(s) (100 mL/Hr) IV Continuous <Continuous>  tiotropium 2.5 MICROgram(s) Inhaler 2 Puff(s) Inhalation daily    MEDICATIONS  (PRN):  acetaminophen     Tablet .. 650 milliGRAM(s) Oral every 6 hours PRN Temp greater or equal to 38C (100.4F), Mild Pain (1 - 3)  aluminum hydroxide/magnesium hydroxide/simethicone Suspension 30 milliLiter(s) Oral every 4 hours PRN Dyspepsia  benzonatate 100 milliGRAM(s) Oral every 8 hours PRN Cough  melatonin 3 milliGRAM(s) Oral at bedtime PRN Insomnia  ondansetron Injectable 4 milliGRAM(s) IV Push every 8 hours PRN Nausea and/or Vomiting    RADIOLOGY & ADDITIONAL TESTS:    ACC: [id number] EXAM:  CT CHEST   ORDERED BY: NABOR BARNETT     PROCEDURE DATE:  10/22/2023          INTERPRETATION:  INDICATION: Pneumonia    TECHNIQUE: A volumetric CT acquisition of the chest was obtained from the   thoracic inlet to the upper abdomen without the use of intravenous   contrast. Coronal and sagittal reconstructed images are provided.    COMPARISON: Chest CT 3/31/2023    FINDINGS:    Lungs/Airways/Pleura: The central airways are patent. Trace pleural   effusions with partial lower lobe atelectasis. Emphysema. There are new   patchy areas of consolidation in bilateral posterior upper lobes and   lingula.    Mediastinum/Lymph nodes: No thoracic adenopathy. Small hiatal hernia.    Heart and Vessels: Cardiomegaly. No pericardial effusion. Coronary artery   calcification and stenting. Mild aortic valvular calcification. Enlarged   pulmonary artery. No thoracic aortic aneurysm.    Upper Abdomen: Unremarkable.    Osseous structures and Soft Tissues: Reverse right shoulder arthroplasty.    IMPRESSION:  Multifocal pneumonia involving bilateral upper lobes/lingula.

## 2023-10-25 NOTE — PROGRESS NOTE ADULT - REASON FOR ADMISSION
Pneumonia, COPD exacerbation

## 2023-10-25 NOTE — PROGRESS NOTE ADULT - ASSESSMENT
88 yo Male w/PMHx BPH, CAD, COPD, HLD, and HTN admitted for:     Acute hypoxic respiratory failure due to Sepsis Community-acquired pneumonia and Enterovirus.  COPD exacerbation   Monitor pulse ox, start taper off supplemental  O2 via NC  C/w  IV Solu-Medrol,  tapered  off to BID   C/w  nebulizers,  symbicort and spiriva ( pt is on trelegy at home)   IV ceftriaxone and Zithromax  Start Mucinex and tessalon   F/u BCX: BGTD and UCX: contamination   Completed IVF      Hyponatremia, resolved   due to sepsis and poor oral intake  S/p IVF, Na improved  Labs in am       PAFIB    In Sinus   Continue Eliquis and Metoprolol    HTN  Monitor BP  C/w Enalapril and Metoprolol    Eliquis for DVT ppx    Code status:  full code     Dispo; taper Off o2, c/w IV abxs  89 yo Male w/PMHx BPH, CAD, COPD, HLD, and HTN admitted for:     Acute hypoxic respiratory failure due to Sepsis Community-acquired pneumonia and Enterovirus.  COPD exacerbation   Monitor pulse ox, dc   O2 via NC  C/w  IV Solu-Medrol,  tapered  off to QD   C/w  nebulizers,  symbicort and spiriva ( pt is on trelegy at home)   IV ceftriaxone and Zithromax  Start Mucinex and tessalon   F/u BCX: BGTD and UCX: contamination   Completed IVF      Hyponatremia, resolved   due to sepsis and poor oral intake  S/p IVF, Na improved  Labs in am       PAFIB    In Sinus   Continue Eliquis and Metoprolol    HTN  Monitor BP  C/w Enalapril and Metoprolol    Eliquis for DVT ppx    Code status:  full code     Dispo; taper Off o2, c/w IV abxs  evaluate for Home O2

## 2023-10-26 ENCOUNTER — TRANSCRIPTION ENCOUNTER (OUTPATIENT)
Age: 88
End: 2023-10-26

## 2023-10-26 VITALS
RESPIRATION RATE: 18 BRPM | TEMPERATURE: 98 F | HEART RATE: 56 BPM | DIASTOLIC BLOOD PRESSURE: 83 MMHG | OXYGEN SATURATION: 98 % | SYSTOLIC BLOOD PRESSURE: 146 MMHG

## 2023-10-26 PROCEDURE — 99239 HOSP IP/OBS DSCHRG MGMT >30: CPT

## 2023-10-26 RX ORDER — CEFUROXIME AXETIL 250 MG
1 TABLET ORAL
Qty: 4 | Refills: 0
Start: 2023-10-26 | End: 2023-10-27

## 2023-10-26 RX ADMIN — TIOTROPIUM BROMIDE 2 PUFF(S): 18 CAPSULE ORAL; RESPIRATORY (INHALATION) at 08:38

## 2023-10-26 RX ADMIN — Medication 40 MILLIGRAM(S): at 11:32

## 2023-10-26 RX ADMIN — ALBUTEROL 2.5 MILLIGRAM(S): 90 AEROSOL, METERED ORAL at 08:35

## 2023-10-26 RX ADMIN — APIXABAN 5 MILLIGRAM(S): 2.5 TABLET, FILM COATED ORAL at 11:32

## 2023-10-26 RX ADMIN — Medication 5 MILLIGRAM(S): at 11:33

## 2023-10-26 RX ADMIN — Medication 25 MILLIGRAM(S): at 11:38

## 2023-10-26 RX ADMIN — FAMOTIDINE 10 MILLIGRAM(S): 10 INJECTION INTRAVENOUS at 11:31

## 2023-10-26 RX ADMIN — Medication 81 MILLIGRAM(S): at 11:32

## 2023-10-26 RX ADMIN — BUDESONIDE AND FORMOTEROL FUMARATE DIHYDRATE 2 PUFF(S): 160; 4.5 AEROSOL RESPIRATORY (INHALATION) at 08:36

## 2023-10-26 RX ADMIN — Medication 1200 MILLIGRAM(S): at 11:32

## 2023-10-26 NOTE — DISCHARGE NOTE PROVIDER - NSDCMRMEDTOKEN_GEN_ALL_CORE_FT
albuterol 2.5 mg/3 mL (0.083%) inhalation solution: 3 milliliter(s) by nebulizer every 6 hours as needed for  aspirin 81 mg oral delayed release tablet: 1 tab(s) orally once a day (at bedtime)  atorvastatin 20 mg oral tablet: 1 tab(s) orally once a day  benzonatate 100 mg oral capsule: 1 cap(s) orally every 8 hours as needed for Cough  cefuroxime 500 mg oral tablet: 1 tab(s) orally 2 times a day  Eliquis 5 mg oral tablet: 1 tab(s) orally 2 times a day  enalapril 5 mg oral tablet: 1 tab(s) orally 2 times a day  famotidine 20 mg oral tablet: 1 tab(s) orally once a day  latanoprost 0.005% ophthalmic solution: 1 drop(s) to each affected eye once a day (at bedtime)  metoprolol succinate 25 mg oral tablet, extended release: 1 tab(s) orally once a day  Mucinex Max Strength 1200 mg oral tablet, extended release: 1 tab(s) orally every 12 hours  predniSONE 10 mg oral tablet: 4 tab(s) orally once a day take 4 tabs PO QD x 2 days, the 3 tabs PO QD x 2 days, then 2 tabs PO QD x 2 days, 1 tab PO QD x 2 days ,  then stop  Trelegy Ellipta 100 mcg-62.5 mcg-25 mcg/inh inhalation powder: 1 puff(s) inhaled once a day  Vitamin D3 25 mcg (1000 intl units) oral tablet: 1 tab(s) orally once a day

## 2023-10-26 NOTE — DISCHARGE NOTE PROVIDER - NSDCCPCAREPLAN_GEN_ALL_CORE_FT
PRINCIPAL DISCHARGE DIAGNOSIS  Diagnosis: COPD exacerbation  Assessment and Plan of Treatment: imoroved  C/w Prednisone taper   C/w Inhalers, use albuterol nabulizer as  needed  F/u with Pulm in 2 weeks      SECONDARY DISCHARGE DIAGNOSES  Diagnosis: Pneumonia  Assessment and Plan of Treatment: improving   Complete 2 days of oral ceftin   F/u with PCP in 1 week    Diagnosis: Acute respiratory failure with hypoxia  Assessment and Plan of Treatment: resolved

## 2023-10-26 NOTE — DISCHARGE NOTE PROVIDER - CARE PROVIDER_API CALL
Trey Watts  Cardiovascular Disease  [address], [address]  [place] [address]  Phone: [phone]  Fax: [phone]  Follow Up Time: 1 week

## 2023-10-26 NOTE — DISCHARGE NOTE PROVIDER - HOSPITAL COURSE
89 yo Male w/PMHx BPH, CAD, COPD, HLD and HTN presented to the ED with weakness, Shortness of breath and productive cough.  As per records  patient was having  worsening weakness. Pt was   in [place], returned on day of admission,  patient spent 3 days in bed while on vacation. +  productive cough producing green phlegm.   In ED: Patient was found to be at O2 sat 86% on RA. Febrile 101.3.  BP elevated.  Labs with leukocytosis and Hyponatremia.   CT chest B/l upper lobe multifocal PNA.  BCX sent. Pt was started on IV abxs and Inhalers.  IV Solumedrol was added due to wheezing and cough. Pts symptoms improved. Tapered off O2. Now on RA  Today Pt reports that has mild residual cough, denies SOB, ambulated to the bathroom and with PT  with no issues. Denies Dizziness. Pt wants to go home.  Pt was evaluated for home O2 and did not qualify. Pulse ox on ambulation 90%. Meds and outPt f/u discussed.   Vital Signs Last 24 Hrs  T(C): 36.4 (26 Oct 2023 08:01), Max: 37.1 (25 Oct 2023 16:14)  T(F): 97.5 (26 Oct 2023 08:01), Max: 98.8 (25 Oct 2023 16:14)  HR: 64 (26 Oct 2023 08:47) (56 - 72)  BP: 146/83 (26 Oct 2023 08:01) (124/64 - 146/83)  BP(mean): 88 (25 Oct 2023 21:49) (88 - 88)  RR: 18 (26 Oct 2023 08:01) (18 - 18)  SpO2: 98% (26 Oct 2023 08:01) (92% - 98%)      PHYSICAL EXAM:  General: frail elderly male,  in no acute distress  Eyes:  EOMI; conjunctiva and sclera clear  Head: Normocephalic; atraumatic  ENMT: No nasal discharge; airway clear  Neck: Supple; no JVD   Respiratory: Decreased BS b/l,  No wheezes  Cardiovascular: Regular rate and rhythm. S1 and S2 Normal;   Gastrointestinal: Soft non-tender non-distended; Normal bowel sounds  Genitourinary: No  suprapubic  tenderness  Extremities: No  edema  Neurological: Alert and oriented x3, non focal   Musculoskeletal: Normal muscle tone, without deformities  Psychiatric: Cooperative and appropriate    A/P: 89 yo Male w/PMHx BPH, CAD, COPD, HLD, and HTN admitted for:     Acute hypoxic respiratory failure due to Sepsis /Community-acquired pneumonia and Enterovirus.  COPD exacerbation   Monitor pulse ox, off O2. Did not qualify for home O2   On   IV Solu-Medrol,  change to PO Prednisone taper   C/w  albuterol PRN, resume    trelegy  On ceftriaxone, change to PO ceftin x 2 more days   Completed 5 days of Zithromax  C/w  Mucinex and tessalon   F/u BCX: BGTD and UCX: contamination   Completed IVF      Hyponatremia, resolved   due to sepsis and poor oral intake  S/p IVF, Na improved  Labs in am       PAFIB    In Sinus   Continue Eliquis and Metoprolol    HTN  Monitor BP  C/w Enalapril and Metoprolol    Eliquis for DVT ppx    Code status:  full code     Dispo: stable for dc hoem with HC  Fax d/c summary to PCP  Total time 40 min

## 2023-10-26 NOTE — PHARMACOTHERAPY INTERVENTION NOTE - COMMENTS
Recommended (on 10/25) to discontinue the azithromycin order since patient has received 5 days of azithromycin therapy.    Alivia Lomeli, PharmD, [facility]  Clinical Pharmacy Specialist, Infectious Diseases  Tele-Antimicrobial Stewardship Program (Tele-ASP)  Tele-ASP Phone: [phone]

## 2023-10-27 LAB
CULTURE RESULTS: SIGNIFICANT CHANGE UP
SPECIMEN SOURCE: SIGNIFICANT CHANGE UP

## 2023-11-01 DIAGNOSIS — E87.1 HYPO-OSMOLALITY AND HYPONATREMIA: ICD-10-CM

## 2023-11-01 DIAGNOSIS — Z79.01 LONG TERM (CURRENT) USE OF ANTICOAGULANTS: ICD-10-CM

## 2023-11-01 DIAGNOSIS — J44.0 CHRONIC OBSTRUCTIVE PULMONARY DISEASE WITH ACUTE LOWER RESPIRATORY INFECTION: ICD-10-CM

## 2023-11-01 DIAGNOSIS — A41.9 SEPSIS, UNSPECIFIED ORGANISM: ICD-10-CM

## 2023-11-01 DIAGNOSIS — I48.0 PAROXYSMAL ATRIAL FIBRILLATION: ICD-10-CM

## 2023-11-01 DIAGNOSIS — Z95.5 PRESENCE OF CORONARY ANGIOPLASTY IMPLANT AND GRAFT: ICD-10-CM

## 2023-11-01 DIAGNOSIS — H40.9 UNSPECIFIED GLAUCOMA: ICD-10-CM

## 2023-11-01 DIAGNOSIS — J96.01 ACUTE RESPIRATORY FAILURE WITH HYPOXIA: ICD-10-CM

## 2023-11-01 DIAGNOSIS — Z96.642 PRESENCE OF LEFT ARTIFICIAL HIP JOINT: ICD-10-CM

## 2023-11-01 DIAGNOSIS — J44.1 CHRONIC OBSTRUCTIVE PULMONARY DISEASE WITH (ACUTE) EXACERBATION: ICD-10-CM

## 2023-11-01 DIAGNOSIS — I25.10 ATHEROSCLEROTIC HEART DISEASE OF NATIVE CORONARY ARTERY WITHOUT ANGINA PECTORIS: ICD-10-CM

## 2023-11-01 DIAGNOSIS — E78.5 HYPERLIPIDEMIA, UNSPECIFIED: ICD-10-CM

## 2023-11-01 DIAGNOSIS — Z79.82 LONG TERM (CURRENT) USE OF ASPIRIN: ICD-10-CM

## 2023-11-01 DIAGNOSIS — N40.0 BENIGN PROSTATIC HYPERPLASIA WITHOUT LOWER URINARY TRACT SYMPTOMS: ICD-10-CM

## 2023-11-01 DIAGNOSIS — J18.9 PNEUMONIA, UNSPECIFIED ORGANISM: ICD-10-CM

## 2023-11-01 DIAGNOSIS — I10 ESSENTIAL (PRIMARY) HYPERTENSION: ICD-10-CM

## 2023-11-18 NOTE — DISCHARGE NOTE NURSING/CASE MANAGEMENT/SOCIAL WORK - WILL THE PATIENT ACCEPT THE PFIZER COVID-19 VACCINE IF ELIGIBLE AND IT IS AVAILABLE?
Reason for Disposition  • MILD constipation  • Over-The-Counter (OTC) medicines for constipation, questions about    Answer Assessment - Initial Assessment Questions  1. STOOL PATTERN OR FREQUENCY: "How often do you pass bowel movements (BMs)?"  (Normal range: tid to q 3 days)  "When was the last BM passed?"        Usually has BM 2 times per day. Last BM was Monday night. Small BM on Friday morning. 2. STRAINING: "Do you have to strain to have a BM?"       Denies. Has no urge to have BM  3. RECTAL PAIN: "Does your rectum hurt when the stool comes out?" If Yes, ask: "Do you have hemorrhoids? How bad is the pain?"  (Scale 1-10; or mild, moderate, severe)      Denies  4. STOOL COMPOSITION: "Are the stools hard?"       Had diarrhea Monday night  5. BLOOD ON STOOLS: "Has there been any blood on the toilet tissue or on the surface of the BM?" If Yes, ask: "When was the last time?"       Denies  6. CHRONIC CONSTIPATION: "Is this a new problem for you?"  If no, ask: "How long have you had this problem?" (days, weeks, months)       Has had issues with constipation in the past but not recently. 7. CHANGES IN DIET OR HYDRATION: "Have there been any recent changes in your diet?" "How much fluids are you drinking consuming on a daily basis?"  "How much have you had to drink today?"      Denies- was hospitalized  8. MEDICATIONS: "Have you been taking any new medications?" "Are you taking any narcotic pain medications?" (e.g., Vicoden, Percocet, morphine, dilaudid)      Changed some psych meds but not narcotic meds  9. LAXATIVES: "Have you been using any stool softeners, laxatives, or enemas?"  If yes, ask "What, how often, and when was the last time?"  10. ACTIVITY:  "How much walking do you do every day? on a daily basis?"  "Has your activity level decreased in the past week?"         Miralax  11. CAUSE: "What do you think is causing the constipation?"         Unsure  12.  OTHER SYMPTOMS: "Do you have any other symptoms?" (e.g., abdominal pain, bloating, fever, vomiting)        Bloating and abd pain (7/10) lower abdomen  13.  MEDICAL HISTORY: "Do you have a history of hemorrhoids, rectal fissures, or rectal surgery or rectal abscess?"          Denies    Protocols used: Constipation-ADULT-AH Not applicable

## 2023-11-20 ENCOUNTER — EMERGENCY (EMERGENCY)
Facility: HOSPITAL | Age: 88
LOS: 0 days | Discharge: HOME | End: 2023-11-20
Attending: EMERGENCY MEDICINE
Payer: MEDICARE

## 2023-11-20 VITALS
OXYGEN SATURATION: 83 % | WEIGHT: 160.06 LBS | RESPIRATION RATE: 28 BRPM | DIASTOLIC BLOOD PRESSURE: 86 MMHG | HEART RATE: 51 BPM | SYSTOLIC BLOOD PRESSURE: 133 MMHG | TEMPERATURE: 98 F

## 2023-11-20 VITALS
TEMPERATURE: 98 F | DIASTOLIC BLOOD PRESSURE: 89 MMHG | SYSTOLIC BLOOD PRESSURE: 149 MMHG | OXYGEN SATURATION: 94 % | HEART RATE: 60 BPM | RESPIRATION RATE: 22 BRPM

## 2023-11-20 DIAGNOSIS — I10 ESSENTIAL (PRIMARY) HYPERTENSION: ICD-10-CM

## 2023-11-20 DIAGNOSIS — J44.9 CHRONIC OBSTRUCTIVE PULMONARY DISEASE, UNSPECIFIED: ICD-10-CM

## 2023-11-20 DIAGNOSIS — Z95.5 PRESENCE OF CORONARY ANGIOPLASTY IMPLANT AND GRAFT: Chronic | ICD-10-CM

## 2023-11-20 DIAGNOSIS — Z95.5 PRESENCE OF CORONARY ANGIOPLASTY IMPLANT AND GRAFT: ICD-10-CM

## 2023-11-20 DIAGNOSIS — Z79.01 LONG TERM (CURRENT) USE OF ANTICOAGULANTS: ICD-10-CM

## 2023-11-20 DIAGNOSIS — Z98.890 OTHER SPECIFIED POSTPROCEDURAL STATES: Chronic | ICD-10-CM

## 2023-11-20 DIAGNOSIS — I25.10 ATHEROSCLEROTIC HEART DISEASE OF NATIVE CORONARY ARTERY WITHOUT ANGINA PECTORIS: ICD-10-CM

## 2023-11-20 DIAGNOSIS — J43.9 EMPHYSEMA, UNSPECIFIED: ICD-10-CM

## 2023-11-20 DIAGNOSIS — Z90.89 ACQUIRED ABSENCE OF OTHER ORGANS: Chronic | ICD-10-CM

## 2023-11-20 DIAGNOSIS — I45.10 UNSPECIFIED RIGHT BUNDLE-BRANCH BLOCK: ICD-10-CM

## 2023-11-20 DIAGNOSIS — Z96.642 PRESENCE OF LEFT ARTIFICIAL HIP JOINT: Chronic | ICD-10-CM

## 2023-11-20 DIAGNOSIS — I44.4 LEFT ANTERIOR FASCICULAR BLOCK: ICD-10-CM

## 2023-11-20 DIAGNOSIS — Z98.49 CATARACT EXTRACTION STATUS, UNSPECIFIED EYE: Chronic | ICD-10-CM

## 2023-11-20 DIAGNOSIS — Z87.430 PERSONAL HISTORY OF PROSTATIC DYSPLASIA: ICD-10-CM

## 2023-11-20 DIAGNOSIS — R06.02 SHORTNESS OF BREATH: ICD-10-CM

## 2023-11-20 DIAGNOSIS — E78.5 HYPERLIPIDEMIA, UNSPECIFIED: ICD-10-CM

## 2023-11-20 DIAGNOSIS — I48.91 UNSPECIFIED ATRIAL FIBRILLATION: ICD-10-CM

## 2023-11-20 DIAGNOSIS — Z86.711 PERSONAL HISTORY OF PULMONARY EMBOLISM: ICD-10-CM

## 2023-11-20 LAB
ALBUMIN SERPL ELPH-MCNC: 2.7 G/DL — LOW (ref 3.3–5)
ALBUMIN SERPL ELPH-MCNC: 2.7 G/DL — LOW (ref 3.3–5)
ALP SERPL-CCNC: 93 U/L — SIGNIFICANT CHANGE UP (ref 40–120)
ALP SERPL-CCNC: 93 U/L — SIGNIFICANT CHANGE UP (ref 40–120)
ALT FLD-CCNC: 15 U/L — SIGNIFICANT CHANGE UP (ref 12–78)
ALT FLD-CCNC: 15 U/L — SIGNIFICANT CHANGE UP (ref 12–78)
ANION GAP SERPL CALC-SCNC: 5 MMOL/L — SIGNIFICANT CHANGE UP (ref 5–17)
ANION GAP SERPL CALC-SCNC: 5 MMOL/L — SIGNIFICANT CHANGE UP (ref 5–17)
APTT BLD: 40.4 SEC — HIGH (ref 24.5–35.6)
APTT BLD: 40.4 SEC — HIGH (ref 24.5–35.6)
AST SERPL-CCNC: 18 U/L — SIGNIFICANT CHANGE UP (ref 15–37)
AST SERPL-CCNC: 18 U/L — SIGNIFICANT CHANGE UP (ref 15–37)
BASOPHILS # BLD AUTO: 0.04 K/UL — SIGNIFICANT CHANGE UP (ref 0–0.2)
BASOPHILS # BLD AUTO: 0.04 K/UL — SIGNIFICANT CHANGE UP (ref 0–0.2)
BASOPHILS NFR BLD AUTO: 0.5 % — SIGNIFICANT CHANGE UP (ref 0–2)
BASOPHILS NFR BLD AUTO: 0.5 % — SIGNIFICANT CHANGE UP (ref 0–2)
BILIRUB SERPL-MCNC: 0.8 MG/DL — SIGNIFICANT CHANGE UP (ref 0.2–1.2)
BILIRUB SERPL-MCNC: 0.8 MG/DL — SIGNIFICANT CHANGE UP (ref 0.2–1.2)
BUN SERPL-MCNC: 11 MG/DL — SIGNIFICANT CHANGE UP (ref 7–23)
BUN SERPL-MCNC: 11 MG/DL — SIGNIFICANT CHANGE UP (ref 7–23)
CALCIUM SERPL-MCNC: 8.8 MG/DL — SIGNIFICANT CHANGE UP (ref 8.5–10.1)
CALCIUM SERPL-MCNC: 8.8 MG/DL — SIGNIFICANT CHANGE UP (ref 8.5–10.1)
CHLORIDE SERPL-SCNC: 107 MMOL/L — SIGNIFICANT CHANGE UP (ref 96–108)
CHLORIDE SERPL-SCNC: 107 MMOL/L — SIGNIFICANT CHANGE UP (ref 96–108)
CO2 SERPL-SCNC: 30 MMOL/L — SIGNIFICANT CHANGE UP (ref 22–31)
CO2 SERPL-SCNC: 30 MMOL/L — SIGNIFICANT CHANGE UP (ref 22–31)
CREAT SERPL-MCNC: 0.93 MG/DL — SIGNIFICANT CHANGE UP (ref 0.5–1.3)
CREAT SERPL-MCNC: 0.93 MG/DL — SIGNIFICANT CHANGE UP (ref 0.5–1.3)
EGFR: 78 ML/MIN/1.73M2 — SIGNIFICANT CHANGE UP
EGFR: 78 ML/MIN/1.73M2 — SIGNIFICANT CHANGE UP
EOSINOPHIL # BLD AUTO: 0.29 K/UL — SIGNIFICANT CHANGE UP (ref 0–0.5)
EOSINOPHIL # BLD AUTO: 0.29 K/UL — SIGNIFICANT CHANGE UP (ref 0–0.5)
EOSINOPHIL NFR BLD AUTO: 3.9 % — SIGNIFICANT CHANGE UP (ref 0–6)
EOSINOPHIL NFR BLD AUTO: 3.9 % — SIGNIFICANT CHANGE UP (ref 0–6)
GLUCOSE SERPL-MCNC: 83 MG/DL — SIGNIFICANT CHANGE UP (ref 70–99)
GLUCOSE SERPL-MCNC: 83 MG/DL — SIGNIFICANT CHANGE UP (ref 70–99)
HCT VFR BLD CALC: 46.6 % — SIGNIFICANT CHANGE UP (ref 39–50)
HCT VFR BLD CALC: 46.6 % — SIGNIFICANT CHANGE UP (ref 39–50)
HGB BLD-MCNC: 14.2 G/DL — SIGNIFICANT CHANGE UP (ref 13–17)
HGB BLD-MCNC: 14.2 G/DL — SIGNIFICANT CHANGE UP (ref 13–17)
IMM GRANULOCYTES NFR BLD AUTO: 0.3 % — SIGNIFICANT CHANGE UP (ref 0–0.9)
IMM GRANULOCYTES NFR BLD AUTO: 0.3 % — SIGNIFICANT CHANGE UP (ref 0–0.9)
INR BLD: 1.89 RATIO — HIGH (ref 0.85–1.18)
INR BLD: 1.89 RATIO — HIGH (ref 0.85–1.18)
LYMPHOCYTES # BLD AUTO: 1.44 K/UL — SIGNIFICANT CHANGE UP (ref 1–3.3)
LYMPHOCYTES # BLD AUTO: 1.44 K/UL — SIGNIFICANT CHANGE UP (ref 1–3.3)
LYMPHOCYTES # BLD AUTO: 19.3 % — SIGNIFICANT CHANGE UP (ref 13–44)
LYMPHOCYTES # BLD AUTO: 19.3 % — SIGNIFICANT CHANGE UP (ref 13–44)
MCHC RBC-ENTMCNC: 24.7 PG — LOW (ref 27–34)
MCHC RBC-ENTMCNC: 24.7 PG — LOW (ref 27–34)
MCHC RBC-ENTMCNC: 30.5 GM/DL — LOW (ref 32–36)
MCHC RBC-ENTMCNC: 30.5 GM/DL — LOW (ref 32–36)
MCV RBC AUTO: 81 FL — SIGNIFICANT CHANGE UP (ref 80–100)
MCV RBC AUTO: 81 FL — SIGNIFICANT CHANGE UP (ref 80–100)
MONOCYTES # BLD AUTO: 0.93 K/UL — HIGH (ref 0–0.9)
MONOCYTES # BLD AUTO: 0.93 K/UL — HIGH (ref 0–0.9)
MONOCYTES NFR BLD AUTO: 12.5 % — SIGNIFICANT CHANGE UP (ref 2–14)
MONOCYTES NFR BLD AUTO: 12.5 % — SIGNIFICANT CHANGE UP (ref 2–14)
NEUTROPHILS # BLD AUTO: 4.74 K/UL — SIGNIFICANT CHANGE UP (ref 1.8–7.4)
NEUTROPHILS # BLD AUTO: 4.74 K/UL — SIGNIFICANT CHANGE UP (ref 1.8–7.4)
NEUTROPHILS NFR BLD AUTO: 63.5 % — SIGNIFICANT CHANGE UP (ref 43–77)
NEUTROPHILS NFR BLD AUTO: 63.5 % — SIGNIFICANT CHANGE UP (ref 43–77)
PLATELET # BLD AUTO: 179 K/UL — SIGNIFICANT CHANGE UP (ref 150–400)
PLATELET # BLD AUTO: 179 K/UL — SIGNIFICANT CHANGE UP (ref 150–400)
POTASSIUM SERPL-MCNC: 4.2 MMOL/L — SIGNIFICANT CHANGE UP (ref 3.5–5.3)
POTASSIUM SERPL-MCNC: 4.2 MMOL/L — SIGNIFICANT CHANGE UP (ref 3.5–5.3)
POTASSIUM SERPL-SCNC: 4.2 MMOL/L — SIGNIFICANT CHANGE UP (ref 3.5–5.3)
POTASSIUM SERPL-SCNC: 4.2 MMOL/L — SIGNIFICANT CHANGE UP (ref 3.5–5.3)
PROT SERPL-MCNC: 7.1 GM/DL — SIGNIFICANT CHANGE UP (ref 6–8.3)
PROT SERPL-MCNC: 7.1 GM/DL — SIGNIFICANT CHANGE UP (ref 6–8.3)
PROTHROM AB SERPL-ACNC: 21 SEC — HIGH (ref 9.5–13)
PROTHROM AB SERPL-ACNC: 21 SEC — HIGH (ref 9.5–13)
RBC # BLD: 5.75 M/UL — SIGNIFICANT CHANGE UP (ref 4.2–5.8)
RBC # BLD: 5.75 M/UL — SIGNIFICANT CHANGE UP (ref 4.2–5.8)
RBC # FLD: 19.8 % — HIGH (ref 10.3–14.5)
RBC # FLD: 19.8 % — HIGH (ref 10.3–14.5)
SODIUM SERPL-SCNC: 142 MMOL/L — SIGNIFICANT CHANGE UP (ref 135–145)
SODIUM SERPL-SCNC: 142 MMOL/L — SIGNIFICANT CHANGE UP (ref 135–145)
TROPONIN I, HIGH SENSITIVITY RESULT: 27.31 NG/L — SIGNIFICANT CHANGE UP
TROPONIN I, HIGH SENSITIVITY RESULT: 27.31 NG/L — SIGNIFICANT CHANGE UP
WBC # BLD: 7.46 K/UL — SIGNIFICANT CHANGE UP (ref 3.8–10.5)
WBC # BLD: 7.46 K/UL — SIGNIFICANT CHANGE UP (ref 3.8–10.5)
WBC # FLD AUTO: 7.46 K/UL — SIGNIFICANT CHANGE UP (ref 3.8–10.5)
WBC # FLD AUTO: 7.46 K/UL — SIGNIFICANT CHANGE UP (ref 3.8–10.5)

## 2023-11-20 PROCEDURE — 84484 ASSAY OF TROPONIN QUANT: CPT

## 2023-11-20 PROCEDURE — 93005 ELECTROCARDIOGRAM TRACING: CPT

## 2023-11-20 PROCEDURE — 85025 COMPLETE CBC W/AUTO DIFF WBC: CPT

## 2023-11-20 PROCEDURE — 85610 PROTHROMBIN TIME: CPT

## 2023-11-20 PROCEDURE — 80053 COMPREHEN METABOLIC PANEL: CPT

## 2023-11-20 PROCEDURE — 93010 ELECTROCARDIOGRAM REPORT: CPT

## 2023-11-20 PROCEDURE — 99285 EMERGENCY DEPT VISIT HI MDM: CPT

## 2023-11-20 PROCEDURE — 36415 COLL VENOUS BLD VENIPUNCTURE: CPT

## 2023-11-20 PROCEDURE — 85730 THROMBOPLASTIN TIME PARTIAL: CPT

## 2023-11-20 PROCEDURE — 71045 X-RAY EXAM CHEST 1 VIEW: CPT | Mod: 26

## 2023-11-20 PROCEDURE — 99285 EMERGENCY DEPT VISIT HI MDM: CPT | Mod: 25

## 2023-11-20 PROCEDURE — 71045 X-RAY EXAM CHEST 1 VIEW: CPT

## 2023-11-20 NOTE — ED ADULT NURSE NOTE - NSFALLRISKINTERV_ED_ALL_ED
Assistance with ambulation/Communicate fall risk and risk factors to all staff, patient, and family/Monitor gait and stability/Provide patient with walking aids/Provide visual cue: yellow wristband, yellow gown, etc/Reinforce activity limits and safety measures with patient and family/Call bell, personal items and telephone in reach/Instruct patient to call for assistance before getting out of bed/chair/stretcher/Non-slip footwear applied when patient is off stretcher/[place] to call system/Physically safe environment - no spills, clutter or unnecessary equipment/Purposeful Proactive Rounding/Room/bathroom lighting operational, light cord in reach

## 2023-11-20 NOTE — ED ADULT TRIAGE NOTE - MODE OF ARRIVAL
Ambulance EMS Infliximab Counseling:  I discussed with the patient the risks of infliximab including but not limited to myelosuppression, immunosuppression, autoimmune hepatitis, demyelinating diseases, lymphoma, and serious infections.  The patient understands that monitoring is required including a PPD at baseline and must alert us or the primary physician if symptoms of infection or other concerning signs are noted.

## 2023-11-20 NOTE — ED ADULT NURSE REASSESSMENT NOTE - NS ED NURSE REASSESS COMMENT FT1
Patient ambulated with walker oxygen saturation 88-90%  Patient with no complaints of shortness of breath.  Patient returned to % when returned to sitting position.  Family expressing concerns about needing home oxygen.  MD Mary beckford.
Patient wife states he got onto ambulance with a rollator.  No Rollator arrived to ED with patient.  [facility] [employer] called awaiting return call.
Patient resting at present CM a-fib with episodes of bradycardia.  Patient oxygen on room air is 92-98%  he has desaturations with bradycardic episodes.  MD Hernandez aware.

## 2023-11-20 NOTE — ED PROVIDER NOTE - PROGRESS NOTE DETAILS
Arslan Campo: Patient observed w/ intermittent halina arrythmia @ 11:25 and 11:27. Spoke w/ PCP Mac who stated this is not unknown for the patient. Arslan Campo: Pt ambulated w/ walker and RN assistance, O2 SAT observed to be 90%. Arslan Campo: Pt ambulated w/ walker and RN assistance, O2 SAT observed to be 90%. Discussed w Dr Watts, pt stable for out pt follow up, no need for O2 at present. Discuss w pt and family, they will follow as planned

## 2023-11-20 NOTE — ED ADULT NURSE NOTE - OBJECTIVE STATEMENT
Sent to ED with decreased oxygen saturation as per EMS.  Patient 95-98 on room air while awake and 92 -94 when sleeping. Mild Edema in lower legs greater in right than in left.

## 2023-11-20 NOTE — ED ADULT TRIAGE NOTE - CHIEF COMPLAINT QUOTE
bib ems for sob. pt o2 in field 98% no difficulty breathing. as per ems " pt states my dr told me to go to hospital if my o2 goes below 90 and it said it was 90% so we called ems. I would like home o2" denies cp/sob/n/v/d/fever/chills. pmh: cad, 6 cardiac stents on eliquis, copd. NKDA. ekg in triage. pt o2 in triage 83%.

## 2023-11-20 NOTE — ED PROVIDER NOTE - PATIENT PORTAL LINK FT
You can access the FollowMyHealth Patient Portal offered by [facility] [place] by registering at the following website: [email or web address]. By joining [facility]’s FollowMyHealth portal, you will also be able to view your health information using other applications (apps) compatible with our system.

## 2023-11-20 NOTE — ED PROVIDER NOTE - MUSCULOSKELETAL, MLM
Vascular insuffiencey in the b/l ankles w/ mild swelling. Spine appears normal, range of motion is not limited, no muscle or joint tenderness

## 2023-11-20 NOTE — ED PROVIDER NOTE - OBJECTIVE STATEMENT
Pt is an 89y male with a PMH of HTN, HLD, CAD on Eliquis, emphysema/COPD, glaucoma, BPH, h/o PE, s/p cardiac stents x6, L hip surgery s/p fracture, s/p tonsillectomy, s/p hernia repair presents to the ED BIBEMS c/o hypoxia. Pt states, "I want O2 at home." Pt reports taking his O2 at home with his wife and found to have a low O2 level, spurring concern. Pt states, "My doctor told me to go to the ED if my O2 goes below 90%." In triage, patient's SpO2 83% on RA, placed on 2L NC w/ improvement to 100% in ED. Denies CP, SOB, n/v/d, fevers, chills. Catheter stent in place. NKA.

## 2023-12-10 NOTE — PATIENT PROFILE ADULT - HISTORY OF COVID-19 VACCINATION
PAST SURGICAL HISTORY:  H/O fracture of leg     H/O vascular surgery left leg    History of partial amputation of toe of left foot     S/P arteriovenous (AV) fistula creation     S/P debridement     S/P femoral-popliteal bypass surgery      Yes

## 2023-12-11 ENCOUNTER — INPATIENT (INPATIENT)
Facility: HOSPITAL | Age: 88
LOS: 1 days | Discharge: HOME | DRG: 603 | End: 2023-12-13
Attending: INTERNAL MEDICINE | Admitting: FAMILY MEDICINE
Payer: MEDICARE

## 2023-12-11 VITALS — HEIGHT: 72 IN | WEIGHT: 154.98 LBS

## 2023-12-11 DIAGNOSIS — Z98.49 CATARACT EXTRACTION STATUS, UNSPECIFIED EYE: Chronic | ICD-10-CM

## 2023-12-11 DIAGNOSIS — Z98.890 OTHER SPECIFIED POSTPROCEDURAL STATES: Chronic | ICD-10-CM

## 2023-12-11 DIAGNOSIS — Z96.642 PRESENCE OF LEFT ARTIFICIAL HIP JOINT: Chronic | ICD-10-CM

## 2023-12-11 DIAGNOSIS — Z90.89 ACQUIRED ABSENCE OF OTHER ORGANS: Chronic | ICD-10-CM

## 2023-12-11 DIAGNOSIS — L03.90 CELLULITIS, UNSPECIFIED: ICD-10-CM

## 2023-12-11 DIAGNOSIS — Z95.5 PRESENCE OF CORONARY ANGIOPLASTY IMPLANT AND GRAFT: Chronic | ICD-10-CM

## 2023-12-11 LAB
ALBUMIN SERPL ELPH-MCNC: 2.8 G/DL — LOW (ref 3.3–5)
ALBUMIN SERPL ELPH-MCNC: 2.8 G/DL — LOW (ref 3.3–5)
ALP SERPL-CCNC: 95 U/L — SIGNIFICANT CHANGE UP (ref 40–120)
ALP SERPL-CCNC: 95 U/L — SIGNIFICANT CHANGE UP (ref 40–120)
ALT FLD-CCNC: 16 U/L — SIGNIFICANT CHANGE UP (ref 12–78)
ALT FLD-CCNC: 16 U/L — SIGNIFICANT CHANGE UP (ref 12–78)
ANION GAP SERPL CALC-SCNC: 8 MMOL/L — SIGNIFICANT CHANGE UP (ref 5–17)
ANION GAP SERPL CALC-SCNC: 8 MMOL/L — SIGNIFICANT CHANGE UP (ref 5–17)
APPEARANCE UR: CLEAR — SIGNIFICANT CHANGE UP
APPEARANCE UR: CLEAR — SIGNIFICANT CHANGE UP
APTT BLD: 38 SEC — HIGH (ref 24.5–35.6)
APTT BLD: 38 SEC — HIGH (ref 24.5–35.6)
AST SERPL-CCNC: 28 U/L — SIGNIFICANT CHANGE UP (ref 15–37)
AST SERPL-CCNC: 28 U/L — SIGNIFICANT CHANGE UP (ref 15–37)
BACTERIA # UR AUTO: NEGATIVE /HPF — SIGNIFICANT CHANGE UP
BACTERIA # UR AUTO: NEGATIVE /HPF — SIGNIFICANT CHANGE UP
BASOPHILS # BLD AUTO: 0.09 K/UL — SIGNIFICANT CHANGE UP (ref 0–0.2)
BASOPHILS # BLD AUTO: 0.09 K/UL — SIGNIFICANT CHANGE UP (ref 0–0.2)
BASOPHILS NFR BLD AUTO: 0.8 % — SIGNIFICANT CHANGE UP (ref 0–2)
BASOPHILS NFR BLD AUTO: 0.8 % — SIGNIFICANT CHANGE UP (ref 0–2)
BILIRUB SERPL-MCNC: 0.7 MG/DL — SIGNIFICANT CHANGE UP (ref 0.2–1.2)
BILIRUB SERPL-MCNC: 0.7 MG/DL — SIGNIFICANT CHANGE UP (ref 0.2–1.2)
BILIRUB UR-MCNC: NEGATIVE — SIGNIFICANT CHANGE UP
BILIRUB UR-MCNC: NEGATIVE — SIGNIFICANT CHANGE UP
BUN SERPL-MCNC: 12 MG/DL — SIGNIFICANT CHANGE UP (ref 7–23)
BUN SERPL-MCNC: 12 MG/DL — SIGNIFICANT CHANGE UP (ref 7–23)
CALCIUM SERPL-MCNC: 8.5 MG/DL — SIGNIFICANT CHANGE UP (ref 8.5–10.1)
CALCIUM SERPL-MCNC: 8.5 MG/DL — SIGNIFICANT CHANGE UP (ref 8.5–10.1)
CAST: 0 /LPF — SIGNIFICANT CHANGE UP (ref 0–4)
CAST: 0 /LPF — SIGNIFICANT CHANGE UP (ref 0–4)
CHLORIDE SERPL-SCNC: 105 MMOL/L — SIGNIFICANT CHANGE UP (ref 96–108)
CHLORIDE SERPL-SCNC: 105 MMOL/L — SIGNIFICANT CHANGE UP (ref 96–108)
CO2 SERPL-SCNC: 22 MMOL/L — SIGNIFICANT CHANGE UP (ref 22–31)
CO2 SERPL-SCNC: 22 MMOL/L — SIGNIFICANT CHANGE UP (ref 22–31)
COLOR SPEC: YELLOW — SIGNIFICANT CHANGE UP
COLOR SPEC: YELLOW — SIGNIFICANT CHANGE UP
CREAT SERPL-MCNC: 1.18 MG/DL — SIGNIFICANT CHANGE UP (ref 0.5–1.3)
CREAT SERPL-MCNC: 1.18 MG/DL — SIGNIFICANT CHANGE UP (ref 0.5–1.3)
DIFF PNL FLD: ABNORMAL
DIFF PNL FLD: ABNORMAL
EGFR: 59 ML/MIN/1.73M2 — LOW
EGFR: 59 ML/MIN/1.73M2 — LOW
EOSINOPHIL # BLD AUTO: 0.16 K/UL — SIGNIFICANT CHANGE UP (ref 0–0.5)
EOSINOPHIL # BLD AUTO: 0.16 K/UL — SIGNIFICANT CHANGE UP (ref 0–0.5)
EOSINOPHIL NFR BLD AUTO: 1.5 % — SIGNIFICANT CHANGE UP (ref 0–6)
EOSINOPHIL NFR BLD AUTO: 1.5 % — SIGNIFICANT CHANGE UP (ref 0–6)
FLUAV AG NPH QL: SIGNIFICANT CHANGE UP
FLUAV AG NPH QL: SIGNIFICANT CHANGE UP
FLUBV AG NPH QL: SIGNIFICANT CHANGE UP
FLUBV AG NPH QL: SIGNIFICANT CHANGE UP
GLUCOSE SERPL-MCNC: 99 MG/DL — SIGNIFICANT CHANGE UP (ref 70–99)
GLUCOSE SERPL-MCNC: 99 MG/DL — SIGNIFICANT CHANGE UP (ref 70–99)
GLUCOSE UR QL: NEGATIVE MG/DL — SIGNIFICANT CHANGE UP
GLUCOSE UR QL: NEGATIVE MG/DL — SIGNIFICANT CHANGE UP
HCT VFR BLD CALC: 42.7 % — SIGNIFICANT CHANGE UP (ref 39–50)
HCT VFR BLD CALC: 42.7 % — SIGNIFICANT CHANGE UP (ref 39–50)
HGB BLD-MCNC: 13.1 G/DL — SIGNIFICANT CHANGE UP (ref 13–17)
HGB BLD-MCNC: 13.1 G/DL — SIGNIFICANT CHANGE UP (ref 13–17)
IMM GRANULOCYTES NFR BLD AUTO: 0.4 % — SIGNIFICANT CHANGE UP (ref 0–0.9)
IMM GRANULOCYTES NFR BLD AUTO: 0.4 % — SIGNIFICANT CHANGE UP (ref 0–0.9)
INR BLD: 2.12 RATIO — HIGH (ref 0.85–1.18)
INR BLD: 2.12 RATIO — HIGH (ref 0.85–1.18)
KETONES UR-MCNC: ABNORMAL MG/DL
KETONES UR-MCNC: ABNORMAL MG/DL
LACTATE SERPL-SCNC: 1.6 MMOL/L — SIGNIFICANT CHANGE UP (ref 0.7–2)
LACTATE SERPL-SCNC: 1.6 MMOL/L — SIGNIFICANT CHANGE UP (ref 0.7–2)
LACTATE SERPL-SCNC: 2.8 MMOL/L — HIGH (ref 0.7–2)
LACTATE SERPL-SCNC: 2.8 MMOL/L — HIGH (ref 0.7–2)
LEUKOCYTE ESTERASE UR-ACNC: NEGATIVE — SIGNIFICANT CHANGE UP
LEUKOCYTE ESTERASE UR-ACNC: NEGATIVE — SIGNIFICANT CHANGE UP
LYMPHOCYTES # BLD AUTO: 1.4 K/UL — SIGNIFICANT CHANGE UP (ref 1–3.3)
LYMPHOCYTES # BLD AUTO: 1.4 K/UL — SIGNIFICANT CHANGE UP (ref 1–3.3)
LYMPHOCYTES # BLD AUTO: 12.8 % — LOW (ref 13–44)
LYMPHOCYTES # BLD AUTO: 12.8 % — LOW (ref 13–44)
MCHC RBC-ENTMCNC: 24.3 PG — LOW (ref 27–34)
MCHC RBC-ENTMCNC: 24.3 PG — LOW (ref 27–34)
MCHC RBC-ENTMCNC: 30.7 GM/DL — LOW (ref 32–36)
MCHC RBC-ENTMCNC: 30.7 GM/DL — LOW (ref 32–36)
MCV RBC AUTO: 79.1 FL — LOW (ref 80–100)
MCV RBC AUTO: 79.1 FL — LOW (ref 80–100)
MONOCYTES # BLD AUTO: 0.78 K/UL — SIGNIFICANT CHANGE UP (ref 0–0.9)
MONOCYTES # BLD AUTO: 0.78 K/UL — SIGNIFICANT CHANGE UP (ref 0–0.9)
MONOCYTES NFR BLD AUTO: 7.1 % — SIGNIFICANT CHANGE UP (ref 2–14)
MONOCYTES NFR BLD AUTO: 7.1 % — SIGNIFICANT CHANGE UP (ref 2–14)
NEUTROPHILS # BLD AUTO: 8.47 K/UL — HIGH (ref 1.8–7.4)
NEUTROPHILS # BLD AUTO: 8.47 K/UL — HIGH (ref 1.8–7.4)
NEUTROPHILS NFR BLD AUTO: 77.4 % — HIGH (ref 43–77)
NEUTROPHILS NFR BLD AUTO: 77.4 % — HIGH (ref 43–77)
NITRITE UR-MCNC: NEGATIVE — SIGNIFICANT CHANGE UP
NITRITE UR-MCNC: NEGATIVE — SIGNIFICANT CHANGE UP
PH UR: 5.5 — SIGNIFICANT CHANGE UP (ref 5–8)
PH UR: 5.5 — SIGNIFICANT CHANGE UP (ref 5–8)
PLATELET # BLD AUTO: 240 K/UL — SIGNIFICANT CHANGE UP (ref 150–400)
PLATELET # BLD AUTO: 240 K/UL — SIGNIFICANT CHANGE UP (ref 150–400)
POTASSIUM SERPL-MCNC: 4.8 MMOL/L — SIGNIFICANT CHANGE UP (ref 3.5–5.3)
POTASSIUM SERPL-MCNC: 4.8 MMOL/L — SIGNIFICANT CHANGE UP (ref 3.5–5.3)
POTASSIUM SERPL-SCNC: 4.8 MMOL/L — SIGNIFICANT CHANGE UP (ref 3.5–5.3)
POTASSIUM SERPL-SCNC: 4.8 MMOL/L — SIGNIFICANT CHANGE UP (ref 3.5–5.3)
PROT SERPL-MCNC: 7.4 GM/DL — SIGNIFICANT CHANGE UP (ref 6–8.3)
PROT SERPL-MCNC: 7.4 GM/DL — SIGNIFICANT CHANGE UP (ref 6–8.3)
PROT UR-MCNC: SIGNIFICANT CHANGE UP MG/DL
PROT UR-MCNC: SIGNIFICANT CHANGE UP MG/DL
PROTHROM AB SERPL-ACNC: 23.4 SEC — HIGH (ref 9.5–13)
PROTHROM AB SERPL-ACNC: 23.4 SEC — HIGH (ref 9.5–13)
RBC # BLD: 5.4 M/UL — SIGNIFICANT CHANGE UP (ref 4.2–5.8)
RBC # BLD: 5.4 M/UL — SIGNIFICANT CHANGE UP (ref 4.2–5.8)
RBC # FLD: 18.9 % — HIGH (ref 10.3–14.5)
RBC # FLD: 18.9 % — HIGH (ref 10.3–14.5)
RBC CASTS # UR COMP ASSIST: 4 /HPF — SIGNIFICANT CHANGE UP (ref 0–4)
RBC CASTS # UR COMP ASSIST: 4 /HPF — SIGNIFICANT CHANGE UP (ref 0–4)
RSV RNA NPH QL NAA+NON-PROBE: SIGNIFICANT CHANGE UP
RSV RNA NPH QL NAA+NON-PROBE: SIGNIFICANT CHANGE UP
SARS-COV-2 RNA SPEC QL NAA+PROBE: SIGNIFICANT CHANGE UP
SARS-COV-2 RNA SPEC QL NAA+PROBE: SIGNIFICANT CHANGE UP
SODIUM SERPL-SCNC: 135 MMOL/L — SIGNIFICANT CHANGE UP (ref 135–145)
SODIUM SERPL-SCNC: 135 MMOL/L — SIGNIFICANT CHANGE UP (ref 135–145)
SP GR SPEC: 1.03 — SIGNIFICANT CHANGE UP (ref 1–1.03)
SP GR SPEC: 1.03 — SIGNIFICANT CHANGE UP (ref 1–1.03)
SQUAMOUS # UR AUTO: 0 /HPF — SIGNIFICANT CHANGE UP (ref 0–5)
SQUAMOUS # UR AUTO: 0 /HPF — SIGNIFICANT CHANGE UP (ref 0–5)
TROPONIN I, HIGH SENSITIVITY RESULT: 20.39 NG/L — SIGNIFICANT CHANGE UP
TROPONIN I, HIGH SENSITIVITY RESULT: 20.39 NG/L — SIGNIFICANT CHANGE UP
UROBILINOGEN FLD QL: 0.2 MG/DL — SIGNIFICANT CHANGE UP (ref 0.2–1)
UROBILINOGEN FLD QL: 0.2 MG/DL — SIGNIFICANT CHANGE UP (ref 0.2–1)
WBC # BLD: 10.94 K/UL — HIGH (ref 3.8–10.5)
WBC # BLD: 10.94 K/UL — HIGH (ref 3.8–10.5)
WBC # FLD AUTO: 10.94 K/UL — HIGH (ref 3.8–10.5)
WBC # FLD AUTO: 10.94 K/UL — HIGH (ref 3.8–10.5)
WBC UR QL: 0 /HPF — SIGNIFICANT CHANGE UP (ref 0–5)
WBC UR QL: 0 /HPF — SIGNIFICANT CHANGE UP (ref 0–5)

## 2023-12-11 PROCEDURE — 80053 COMPREHEN METABOLIC PANEL: CPT

## 2023-12-11 PROCEDURE — 94760 N-INVAS EAR/PLS OXIMETRY 1: CPT

## 2023-12-11 PROCEDURE — 36415 COLL VENOUS BLD VENIPUNCTURE: CPT

## 2023-12-11 PROCEDURE — 73590 X-RAY EXAM OF LOWER LEG: CPT | Mod: 26,RT

## 2023-12-11 PROCEDURE — 80061 LIPID PANEL: CPT

## 2023-12-11 PROCEDURE — 83036 HEMOGLOBIN GLYCOSYLATED A1C: CPT

## 2023-12-11 PROCEDURE — 99285 EMERGENCY DEPT VISIT HI MDM: CPT

## 2023-12-11 PROCEDURE — 93926 LOWER EXTREMITY STUDY: CPT | Mod: 26,RT

## 2023-12-11 PROCEDURE — 93010 ELECTROCARDIOGRAM REPORT: CPT

## 2023-12-11 PROCEDURE — 85730 THROMBOPLASTIN TIME PARTIAL: CPT

## 2023-12-11 PROCEDURE — 85025 COMPLETE CBC W/AUTO DIFF WBC: CPT

## 2023-12-11 PROCEDURE — 85610 PROTHROMBIN TIME: CPT

## 2023-12-11 PROCEDURE — 71045 X-RAY EXAM CHEST 1 VIEW: CPT | Mod: 26

## 2023-12-11 PROCEDURE — 93971 EXTREMITY STUDY: CPT | Mod: 26,RT

## 2023-12-11 RX ORDER — PIPERACILLIN AND TAZOBACTAM 4; .5 G/20ML; G/20ML
3.38 INJECTION, POWDER, LYOPHILIZED, FOR SOLUTION INTRAVENOUS ONCE
Refills: 0 | Status: COMPLETED | OUTPATIENT
Start: 2023-12-11 | End: 2023-12-11

## 2023-12-11 RX ORDER — APIXABAN 2.5 MG/1
1 TABLET, FILM COATED ORAL
Refills: 0 | DISCHARGE

## 2023-12-11 RX ORDER — FLUTICASONE FUROATE, UMECLIDINIUM BROMIDE AND VILANTEROL TRIFENATATE 200; 62.5; 25 UG/1; UG/1; UG/1
1 POWDER RESPIRATORY (INHALATION)
Qty: 0 | Refills: 0 | DISCHARGE

## 2023-12-11 RX ORDER — ASPIRIN/CALCIUM CARB/MAGNESIUM 324 MG
1 TABLET ORAL
Qty: 0 | Refills: 0 | DISCHARGE

## 2023-12-11 RX ORDER — FAMOTIDINE 10 MG/ML
1 INJECTION INTRAVENOUS
Qty: 0 | Refills: 0 | DISCHARGE

## 2023-12-11 RX ORDER — VANCOMYCIN HCL 1 G
1000 VIAL (EA) INTRAVENOUS ONCE
Refills: 0 | Status: COMPLETED | OUTPATIENT
Start: 2023-12-11 | End: 2023-12-11

## 2023-12-11 RX ORDER — TAMSULOSIN HYDROCHLORIDE 0.4 MG/1
1 CAPSULE ORAL
Refills: 0 | DISCHARGE

## 2023-12-11 RX ORDER — CHOLECALCIFEROL (VITAMIN D3) 125 MCG
1 CAPSULE ORAL
Qty: 0 | Refills: 0 | DISCHARGE

## 2023-12-11 RX ORDER — ATORVASTATIN CALCIUM 80 MG/1
1 TABLET, FILM COATED ORAL
Refills: 0 | DISCHARGE

## 2023-12-11 RX ORDER — ALBUTEROL 90 UG/1
3 AEROSOL, METERED ORAL
Refills: 0 | DISCHARGE

## 2023-12-11 RX ADMIN — Medication 250 MILLIGRAM(S): at 16:36

## 2023-12-11 RX ADMIN — PIPERACILLIN AND TAZOBACTAM 200 GRAM(S): 4; .5 INJECTION, POWDER, LYOPHILIZED, FOR SOLUTION INTRAVENOUS at 15:59

## 2023-12-11 NOTE — ED PROVIDER NOTE - PHYSICAL EXAMINATION
Constitutional: NAD AAOx3  Eyes: PERRLA EOMI  Head: Normocephalic atraumatic  Mouth: MMM  Cardiac: regular rate   Resp: unlabored breathing  GI: Abd s/nt/nd  Neuro: grossly normal and intact  Skin: No visible rashes. 2 large wounds to RLE laterally with mild surrounding erythema. No fluctuance or crepitus. Compartments soft. Constitutional: NAD AAOx3  Eyes: PERRLA EOMI  Head: Normocephalic atraumatic  Mouth: MMM  Cardiac: regular rate   Resp: unlabored breathing clear b/l   GI: Abd s/nt/nd  Neuro: grossly normal and intact  Skin: No visible rashes. 2 large wounds to RLE laterally with mild surrounding erythema. No fluctuance or crepitus. Compartments soft. normal pulses

## 2023-12-11 NOTE — ED ADULT NURSE NOTE - NSFALLHARMRISKINTERV_ED_ALL_ED
Communicate risk of Fall with Harm to all staff, patient, and family/Provide visual cue: red socks, yellow wristband, yellow gown, etc/Reinforce activity limits and safety measures with patient and family/Bed in lowest position, wheels locked, appropriate side rails in place/Call bell, personal items and telephone in reach/Instruct patient to call for assistance before getting out of bed/chair/stretcher/Non-slip footwear applied when patient is off stretcher/[place] to call system/Physically safe environment - no spills, clutter or unnecessary equipment/Purposeful Proactive Rounding/Room/bathroom lighting operational, light cord in reach Communicate risk of Fall with Harm to all staff, patient, and family/Provide visual cue: red socks, yellow wristband, yellow gown, etc/Reinforce activity limits and safety measures with patient and family/Bed in lowest position, wheels locked, appropriate side rails in place/Call bell, personal items and telephone in reach/Instruct patient to call for assistance before getting out of bed/chair/stretcher/Non-slip footwear applied when patient is off stretcher/[place] to call system/Physically safe environment - no spills, clutter or unnecessary equipment/Purposeful Proactive Rounding/Room/bathroom lighting operational, light cord in reach

## 2023-12-11 NOTE — ED PROVIDER NOTE - SHIFT CHANGE DETAILS
pt signed out to Dr. Tehodore pending US and reassess pt signed out to Dr. Theodore pending US and reassess

## 2023-12-11 NOTE — ED ADULT NURSE REASSESSMENT NOTE - NS ED NURSE REASSESS COMMENT FT1
Pt found to have increased work of breathing with O2 saturation of 88%. MD Theodore notified and Pt placed on 2LNC with O2 saturation of 94%

## 2023-12-11 NOTE — PHARMACOTHERAPY INTERVENTION NOTE - COMMENTS
Medication history complete, reviewed medication with patients wife Carla at [phone] and confirmed with [facility]. Medication history complete, reviewed medication with patients wife Carla at [phone] and confirmed with [facility].

## 2023-12-11 NOTE — ED ADULT NURSE NOTE - OBJECTIVE STATEMENT
Pt presents to ED sent by MD for IV abx for R leg/foot wound that has not healed.  Pt was prescribed abx at home last week with no response to medications. Pt denies, pain, numbness/tingling, fever, HA, weakness/fatigue. + chills for a few days. Labs obtained, wife at bedside, pt has no other concerns at this time.     Pt is A&O3 and follows commands w even and unlabored breathing, R leg wound with drainage and dried scabs.

## 2023-12-11 NOTE — ED ADULT TRIAGE NOTE - CHIEF COMPLAINT QUOTE
Pt presents to ED sent by MD for IV antibiotics for a wound to right leg. Pt also c/o shortness of breath- states he has history of COPD and they are trying to get O2 at home but have not been successful- SpO2 in triage is 92% in room air, which is patient baseline. Respirations even and unlabored, able to speak in full complete sentences, airway patent in triage.

## 2023-12-11 NOTE — ED PROVIDER NOTE - OBJECTIVE STATEMENT
88 y/o male with a PMHx of BPH, CAD, COPD, Cdiff, glaucoma, [place], HTN, humerus fracture, HLD presents to the ED for right leg wound. Pt states he hit his leg in the kitchen 2 weeks ago, had cellulitis, was placed on Doxycyline x5 days then Bactrim x5 days. Pt saw Dr. Watts today and was sent for IV abx as wound not healing. Denies fevers, chills. No other complaints at this time. 88 y/o male with a PMHx of BPH, CAD, COPD, Cdiff, glaucoma, [place], HTN, humerus fracture, HLD presents to the ED for right leg wound. Pt states he hit his leg in the kitchen 2 weeks ago, had cellulitis, was placed on Doxycyline x5 days then Bactrim x5 days. Pt saw Dr. Watts today and was sent for IV abx as wound not healing. Denies fevers, chills. No other complaints at this time.

## 2023-12-11 NOTE — ED PROVIDER NOTE - NS_ ATTENDINGSCRIBEDETAILS _ED_A_ED_FT
I, Hussein Castañeda MD,  performed the initial face to face bedside interview with this patient regarding history of present illness, review of symptoms and relevant past medical, social and family history.  I completed an independent physical examination.  I was the initial provider who evaluated this patient.   I personally saw the patient and performed a substantive portion of the visit including all aspects of the medical decision making.  The history, relevant review of systems, past medical and surgical history, medical decision making, and physical examination was documented by the scribe in my presence and I attest to the accuracy of the documentation.

## 2023-12-11 NOTE — ED ADULT NURSE NOTE - PAIN: PRESENCE, MLM
9/5/2017              [facility]        [place] [address]         To whom it may concern,    Please excuse Karlo Justice from work 9/1/17-9/5/17. He may return 9/6/17 without restrictions. Please call with any questions. denies pain/discomfort (Rating = 0)

## 2023-12-11 NOTE — ED PROVIDER NOTE - CLINICAL SUMMARY MEDICAL DECISION MAKING FREE TEXT BOX
88 y/o male with a PMHx of BPH, CAD, COPD, Cdiff, glaucoma, [place], HTN, humerus fracture, HLD presents to the ED for right leg wound. Pt states he hit his leg in the kitchen 2 weeks ago, had cellulitis, was placed on Doxycyline x5 days then Bactrim x5 days. Pt saw Dr. Watts today and was sent for IV abx as wound not healing. Denies fevers, chills. Exam with 2 large wounds to RLE laterally with mild surrounding erythema. No fluctuance or crepitus. Compartments soft. Will workup for cellulitis. Also pt likely with PVD. Plan: labs, likely admit. 88 y/o male with a PMHx of BPH, CAD, COPD, Cdiff, glaucoma, [place], HTN, humerus fracture, HLD presents to the ED for right leg wound. Pt states he hit his leg in the kitchen 2 weeks ago, had cellulitis, was placed on Doxycyline x5 days then Bactrim x5 days. Pt saw Dr. Watts today and was sent for IV abx as wound not healing. Denies fevers, chills. Exam with 2 large wounds to RLE laterally with mild surrounding erythema. No fluctuance or crepitus. Compartments soft. Will workup for cellulitis. Also pt likely with PVD. Plan: labs, likely admit. 88 y/o male with a PMHx of BPH, CAD, COPD, Cdiff, glaucoma, [place], HTN, humerus fracture, HLD presents to the ED for right leg wound. Pt states he hit his leg in the kitchen 2 weeks ago, had cellulitis, was placed on Doxycyline x5 days then Bactrim x5 days. Pt saw Dr. Watts today and was sent for IV abx as wound not healing. Denies fevers, chills. Exam with 2 large wounds to RLE laterally with mild surrounding erythema. No fluctuance or crepitus. Compartments soft. Will workup for cellulitis. Also pt likely with PVD. Plan: labs, US likely admit. 88 y/o male with a PMHx of BPH, CAD, COPD, Cdiff, glaucoma, [place], HTN, humerus fracture, HLD presents to the ED for right leg wound. Pt states he hit his leg in the kitchen 2 weeks ago, had cellulitis, was placed on Doxycyline x5 days then Bactrim x5 days. Pt saw Dr. Watts today and was sent for IV abx as wound not healing. Denies fevers, chills. Exam with 2 large wounds to RLE laterally with mild surrounding erythema. No fluctuance or crepitus. Compartments soft. Will workup for cellulitis. Also pt likely with PVD. Plan: labs, US likely admit.

## 2023-12-12 ENCOUNTER — TRANSCRIPTION ENCOUNTER (OUTPATIENT)
Age: 88
End: 2023-12-12

## 2023-12-12 LAB
A1C WITH ESTIMATED AVERAGE GLUCOSE RESULT: 5.8 % — HIGH (ref 4–5.6)
A1C WITH ESTIMATED AVERAGE GLUCOSE RESULT: 5.8 % — HIGH (ref 4–5.6)
ALBUMIN SERPL ELPH-MCNC: 2.5 G/DL — LOW (ref 3.3–5)
ALBUMIN SERPL ELPH-MCNC: 2.5 G/DL — LOW (ref 3.3–5)
ALP SERPL-CCNC: 84 U/L — SIGNIFICANT CHANGE UP (ref 40–120)
ALP SERPL-CCNC: 84 U/L — SIGNIFICANT CHANGE UP (ref 40–120)
ALT FLD-CCNC: 19 U/L — SIGNIFICANT CHANGE UP (ref 12–78)
ALT FLD-CCNC: 19 U/L — SIGNIFICANT CHANGE UP (ref 12–78)
ANION GAP SERPL CALC-SCNC: 4 MMOL/L — LOW (ref 5–17)
ANION GAP SERPL CALC-SCNC: 4 MMOL/L — LOW (ref 5–17)
APTT BLD: 37.1 SEC — HIGH (ref 24.5–35.6)
APTT BLD: 37.1 SEC — HIGH (ref 24.5–35.6)
AST SERPL-CCNC: 20 U/L — SIGNIFICANT CHANGE UP (ref 15–37)
AST SERPL-CCNC: 20 U/L — SIGNIFICANT CHANGE UP (ref 15–37)
BASOPHILS # BLD AUTO: 0.08 K/UL — SIGNIFICANT CHANGE UP (ref 0–0.2)
BASOPHILS # BLD AUTO: 0.08 K/UL — SIGNIFICANT CHANGE UP (ref 0–0.2)
BASOPHILS NFR BLD AUTO: 1 % — SIGNIFICANT CHANGE UP (ref 0–2)
BASOPHILS NFR BLD AUTO: 1 % — SIGNIFICANT CHANGE UP (ref 0–2)
BILIRUB SERPL-MCNC: 0.7 MG/DL — SIGNIFICANT CHANGE UP (ref 0.2–1.2)
BILIRUB SERPL-MCNC: 0.7 MG/DL — SIGNIFICANT CHANGE UP (ref 0.2–1.2)
BUN SERPL-MCNC: 13 MG/DL — SIGNIFICANT CHANGE UP (ref 7–23)
BUN SERPL-MCNC: 13 MG/DL — SIGNIFICANT CHANGE UP (ref 7–23)
CALCIUM SERPL-MCNC: 8.5 MG/DL — SIGNIFICANT CHANGE UP (ref 8.5–10.1)
CALCIUM SERPL-MCNC: 8.5 MG/DL — SIGNIFICANT CHANGE UP (ref 8.5–10.1)
CHLORIDE SERPL-SCNC: 108 MMOL/L — SIGNIFICANT CHANGE UP (ref 96–108)
CHLORIDE SERPL-SCNC: 108 MMOL/L — SIGNIFICANT CHANGE UP (ref 96–108)
CHOLEST SERPL-MCNC: 100 MG/DL — SIGNIFICANT CHANGE UP
CHOLEST SERPL-MCNC: 100 MG/DL — SIGNIFICANT CHANGE UP
CO2 SERPL-SCNC: 25 MMOL/L — SIGNIFICANT CHANGE UP (ref 22–31)
CO2 SERPL-SCNC: 25 MMOL/L — SIGNIFICANT CHANGE UP (ref 22–31)
CREAT SERPL-MCNC: 1.05 MG/DL — SIGNIFICANT CHANGE UP (ref 0.5–1.3)
CREAT SERPL-MCNC: 1.05 MG/DL — SIGNIFICANT CHANGE UP (ref 0.5–1.3)
EGFR: 68 ML/MIN/1.73M2 — SIGNIFICANT CHANGE UP
EGFR: 68 ML/MIN/1.73M2 — SIGNIFICANT CHANGE UP
EOSINOPHIL # BLD AUTO: 0.21 K/UL — SIGNIFICANT CHANGE UP (ref 0–0.5)
EOSINOPHIL # BLD AUTO: 0.21 K/UL — SIGNIFICANT CHANGE UP (ref 0–0.5)
EOSINOPHIL NFR BLD AUTO: 2.6 % — SIGNIFICANT CHANGE UP (ref 0–6)
EOSINOPHIL NFR BLD AUTO: 2.6 % — SIGNIFICANT CHANGE UP (ref 0–6)
ESTIMATED AVERAGE GLUCOSE: 120 MG/DL — HIGH (ref 68–114)
ESTIMATED AVERAGE GLUCOSE: 120 MG/DL — HIGH (ref 68–114)
GLUCOSE SERPL-MCNC: 74 MG/DL — SIGNIFICANT CHANGE UP (ref 70–99)
GLUCOSE SERPL-MCNC: 74 MG/DL — SIGNIFICANT CHANGE UP (ref 70–99)
HCT VFR BLD CALC: 42 % — SIGNIFICANT CHANGE UP (ref 39–50)
HCT VFR BLD CALC: 42 % — SIGNIFICANT CHANGE UP (ref 39–50)
HDLC SERPL-MCNC: 41 MG/DL — SIGNIFICANT CHANGE UP
HDLC SERPL-MCNC: 41 MG/DL — SIGNIFICANT CHANGE UP
HGB BLD-MCNC: 12.8 G/DL — LOW (ref 13–17)
HGB BLD-MCNC: 12.8 G/DL — LOW (ref 13–17)
IMM GRANULOCYTES NFR BLD AUTO: 0.4 % — SIGNIFICANT CHANGE UP (ref 0–0.9)
IMM GRANULOCYTES NFR BLD AUTO: 0.4 % — SIGNIFICANT CHANGE UP (ref 0–0.9)
INR BLD: 1.77 RATIO — HIGH (ref 0.85–1.18)
INR BLD: 1.77 RATIO — HIGH (ref 0.85–1.18)
LIPID PNL WITH DIRECT LDL SERPL: 48 MG/DL — SIGNIFICANT CHANGE UP
LIPID PNL WITH DIRECT LDL SERPL: 48 MG/DL — SIGNIFICANT CHANGE UP
LYMPHOCYTES # BLD AUTO: 1.32 K/UL — SIGNIFICANT CHANGE UP (ref 1–3.3)
LYMPHOCYTES # BLD AUTO: 1.32 K/UL — SIGNIFICANT CHANGE UP (ref 1–3.3)
LYMPHOCYTES # BLD AUTO: 16.1 % — SIGNIFICANT CHANGE UP (ref 13–44)
LYMPHOCYTES # BLD AUTO: 16.1 % — SIGNIFICANT CHANGE UP (ref 13–44)
MCHC RBC-ENTMCNC: 23.9 PG — LOW (ref 27–34)
MCHC RBC-ENTMCNC: 23.9 PG — LOW (ref 27–34)
MCHC RBC-ENTMCNC: 30.5 GM/DL — LOW (ref 32–36)
MCHC RBC-ENTMCNC: 30.5 GM/DL — LOW (ref 32–36)
MCV RBC AUTO: 78.5 FL — LOW (ref 80–100)
MCV RBC AUTO: 78.5 FL — LOW (ref 80–100)
MONOCYTES # BLD AUTO: 0.62 K/UL — SIGNIFICANT CHANGE UP (ref 0–0.9)
MONOCYTES # BLD AUTO: 0.62 K/UL — SIGNIFICANT CHANGE UP (ref 0–0.9)
MONOCYTES NFR BLD AUTO: 7.6 % — SIGNIFICANT CHANGE UP (ref 2–14)
MONOCYTES NFR BLD AUTO: 7.6 % — SIGNIFICANT CHANGE UP (ref 2–14)
NEUTROPHILS # BLD AUTO: 5.92 K/UL — SIGNIFICANT CHANGE UP (ref 1.8–7.4)
NEUTROPHILS # BLD AUTO: 5.92 K/UL — SIGNIFICANT CHANGE UP (ref 1.8–7.4)
NEUTROPHILS NFR BLD AUTO: 72.3 % — SIGNIFICANT CHANGE UP (ref 43–77)
NEUTROPHILS NFR BLD AUTO: 72.3 % — SIGNIFICANT CHANGE UP (ref 43–77)
NON HDL CHOLESTEROL: 59 MG/DL — SIGNIFICANT CHANGE UP
NON HDL CHOLESTEROL: 59 MG/DL — SIGNIFICANT CHANGE UP
PLATELET # BLD AUTO: 218 K/UL — SIGNIFICANT CHANGE UP (ref 150–400)
PLATELET # BLD AUTO: 218 K/UL — SIGNIFICANT CHANGE UP (ref 150–400)
POTASSIUM SERPL-MCNC: 4.8 MMOL/L — SIGNIFICANT CHANGE UP (ref 3.5–5.3)
POTASSIUM SERPL-MCNC: 4.8 MMOL/L — SIGNIFICANT CHANGE UP (ref 3.5–5.3)
POTASSIUM SERPL-SCNC: 4.8 MMOL/L — SIGNIFICANT CHANGE UP (ref 3.5–5.3)
POTASSIUM SERPL-SCNC: 4.8 MMOL/L — SIGNIFICANT CHANGE UP (ref 3.5–5.3)
PROT SERPL-MCNC: 6.7 GM/DL — SIGNIFICANT CHANGE UP (ref 6–8.3)
PROT SERPL-MCNC: 6.7 GM/DL — SIGNIFICANT CHANGE UP (ref 6–8.3)
PROTHROM AB SERPL-ACNC: 19.7 SEC — HIGH (ref 9.5–13)
PROTHROM AB SERPL-ACNC: 19.7 SEC — HIGH (ref 9.5–13)
RBC # BLD: 5.35 M/UL — SIGNIFICANT CHANGE UP (ref 4.2–5.8)
RBC # BLD: 5.35 M/UL — SIGNIFICANT CHANGE UP (ref 4.2–5.8)
RBC # FLD: 18.6 % — HIGH (ref 10.3–14.5)
RBC # FLD: 18.6 % — HIGH (ref 10.3–14.5)
SODIUM SERPL-SCNC: 137 MMOL/L — SIGNIFICANT CHANGE UP (ref 135–145)
SODIUM SERPL-SCNC: 137 MMOL/L — SIGNIFICANT CHANGE UP (ref 135–145)
TRIGL SERPL-MCNC: 37 MG/DL — SIGNIFICANT CHANGE UP
TRIGL SERPL-MCNC: 37 MG/DL — SIGNIFICANT CHANGE UP
WBC # BLD: 8.18 K/UL — SIGNIFICANT CHANGE UP (ref 3.8–10.5)
WBC # BLD: 8.18 K/UL — SIGNIFICANT CHANGE UP (ref 3.8–10.5)
WBC # FLD AUTO: 8.18 K/UL — SIGNIFICANT CHANGE UP (ref 3.8–10.5)
WBC # FLD AUTO: 8.18 K/UL — SIGNIFICANT CHANGE UP (ref 3.8–10.5)

## 2023-12-12 PROCEDURE — 99222 1ST HOSP IP/OBS MODERATE 55: CPT

## 2023-12-12 RX ORDER — ALBUTEROL 90 UG/1
2 AEROSOL, METERED ORAL EVERY 6 HOURS
Refills: 0 | Status: DISCONTINUED | OUTPATIENT
Start: 2023-12-12 | End: 2023-12-13

## 2023-12-12 RX ORDER — FAMOTIDINE 10 MG/ML
10 INJECTION INTRAVENOUS DAILY
Refills: 0 | Status: DISCONTINUED | OUTPATIENT
Start: 2023-12-12 | End: 2023-12-13

## 2023-12-12 RX ORDER — METOPROLOL TARTRATE 50 MG
25 TABLET ORAL DAILY
Refills: 0 | Status: DISCONTINUED | OUTPATIENT
Start: 2023-12-12 | End: 2023-12-13

## 2023-12-12 RX ORDER — PIPERACILLIN AND TAZOBACTAM 4; .5 G/20ML; G/20ML
3.38 INJECTION, POWDER, LYOPHILIZED, FOR SOLUTION INTRAVENOUS EVERY 8 HOURS
Refills: 0 | Status: DISCONTINUED | OUTPATIENT
Start: 2023-12-12 | End: 2023-12-12

## 2023-12-12 RX ORDER — CHOLECALCIFEROL (VITAMIN D3) 125 MCG
1000 CAPSULE ORAL DAILY
Refills: 0 | Status: DISCONTINUED | OUTPATIENT
Start: 2023-12-12 | End: 2023-12-13

## 2023-12-12 RX ORDER — CEFTRIAXONE 500 MG/1
1000 INJECTION, POWDER, FOR SOLUTION INTRAMUSCULAR; INTRAVENOUS EVERY 24 HOURS
Refills: 0 | Status: DISCONTINUED | OUTPATIENT
Start: 2023-12-12 | End: 2023-12-13

## 2023-12-12 RX ORDER — APIXABAN 2.5 MG/1
5 TABLET, FILM COATED ORAL EVERY 12 HOURS
Refills: 0 | Status: DISCONTINUED | OUTPATIENT
Start: 2023-12-12 | End: 2023-12-13

## 2023-12-12 RX ORDER — LATANOPROST 0.05 MG/ML
1 SOLUTION/ DROPS OPHTHALMIC; TOPICAL AT BEDTIME
Refills: 0 | Status: DISCONTINUED | OUTPATIENT
Start: 2023-12-12 | End: 2023-12-13

## 2023-12-12 RX ORDER — CEFTRIAXONE 500 MG/1
1000 INJECTION, POWDER, FOR SOLUTION INTRAMUSCULAR; INTRAVENOUS EVERY 24 HOURS
Refills: 0 | Status: DISCONTINUED | OUTPATIENT
Start: 2023-12-12 | End: 2023-12-12

## 2023-12-12 RX ORDER — ACETAMINOPHEN 500 MG
650 TABLET ORAL EVERY 6 HOURS
Refills: 0 | Status: DISCONTINUED | OUTPATIENT
Start: 2023-12-12 | End: 2023-12-13

## 2023-12-12 RX ORDER — TAMSULOSIN HYDROCHLORIDE 0.4 MG/1
0.4 CAPSULE ORAL AT BEDTIME
Refills: 0 | Status: DISCONTINUED | OUTPATIENT
Start: 2023-12-12 | End: 2023-12-13

## 2023-12-12 RX ORDER — ASPIRIN/CALCIUM CARB/MAGNESIUM 324 MG
81 TABLET ORAL DAILY
Refills: 0 | Status: DISCONTINUED | OUTPATIENT
Start: 2023-12-12 | End: 2023-12-13

## 2023-12-12 RX ORDER — ATORVASTATIN CALCIUM 80 MG/1
20 TABLET, FILM COATED ORAL AT BEDTIME
Refills: 0 | Status: DISCONTINUED | OUTPATIENT
Start: 2023-12-12 | End: 2023-12-13

## 2023-12-12 RX ORDER — VANCOMYCIN HCL 1 G
1000 VIAL (EA) INTRAVENOUS EVERY 24 HOURS
Refills: 0 | Status: DISCONTINUED | OUTPATIENT
Start: 2023-12-12 | End: 2023-12-13

## 2023-12-12 RX ADMIN — Medication 81 MILLIGRAM(S): at 14:13

## 2023-12-12 RX ADMIN — TAMSULOSIN HYDROCHLORIDE 0.4 MILLIGRAM(S): 0.4 CAPSULE ORAL at 21:30

## 2023-12-12 RX ADMIN — LATANOPROST 1 DROP(S): 0.05 SOLUTION/ DROPS OPHTHALMIC; TOPICAL at 21:33

## 2023-12-12 RX ADMIN — ATORVASTATIN CALCIUM 20 MILLIGRAM(S): 80 TABLET, FILM COATED ORAL at 21:29

## 2023-12-12 RX ADMIN — PIPERACILLIN AND TAZOBACTAM 25 GRAM(S): 4; .5 INJECTION, POWDER, LYOPHILIZED, FOR SOLUTION INTRAVENOUS at 06:54

## 2023-12-12 RX ADMIN — Medication 250 MILLIGRAM(S): at 18:33

## 2023-12-12 RX ADMIN — Medication 100 MILLIGRAM(S): at 14:14

## 2023-12-12 RX ADMIN — CEFTRIAXONE 1000 MILLIGRAM(S): 500 INJECTION, POWDER, FOR SOLUTION INTRAMUSCULAR; INTRAVENOUS at 14:20

## 2023-12-12 RX ADMIN — Medication 5 MILLIGRAM(S): at 14:14

## 2023-12-12 RX ADMIN — APIXABAN 5 MILLIGRAM(S): 2.5 TABLET, FILM COATED ORAL at 21:29

## 2023-12-12 RX ADMIN — FAMOTIDINE 10 MILLIGRAM(S): 10 INJECTION INTRAVENOUS at 14:13

## 2023-12-12 RX ADMIN — Medication 25 MILLIGRAM(S): at 14:13

## 2023-12-12 RX ADMIN — APIXABAN 5 MILLIGRAM(S): 2.5 TABLET, FILM COATED ORAL at 14:13

## 2023-12-12 RX ADMIN — Medication 1000 UNIT(S): at 14:14

## 2023-12-12 NOTE — DISCHARGE NOTE NURSING/CASE MANAGEMENT/SOCIAL WORK - PATIENT PORTAL LINK FT
You can access the FollowMyHealth Patient Portal offered by [facility] by registering at the following website: [email or web address]. By joining [facility]’s FollowMyHealth portal, you will also be able to view your health information using other applications (apps) compatible with our system. You can access the FollowMyHealth Patient Portal offered by [facility] by registering at the following website: [email or web address]. By joining [facility]’s FollowMyHealth portal, you will also be able to view your health information using other applications (apps) compatible with our system.

## 2023-12-12 NOTE — CONSULT NOTE ADULT - SUBJECTIVE AND OBJECTIVE BOX
Patient is a 89y old  Male who presents with a chief complaint of RLE wounds / cellulitis (12 Dec 2023 01:29)    HPI:  88 y/o M w/ PMH of BPH, CAD, COPD, C.diff, glaucoma, HTN, dyslipidemia, p/w RLE cellulitis. Patient states that about 2 weeks ago he hit his leg and injured it. Patient took doxycycline for 5 days, and then took bactrim for another 5 days, and he took last dose on Sunday. Patient went to see Dr. Watts and was sent to ED for IV abx for RLE cellulitis. Patient denies fever, chills, nausea, vomiting, abdominal pain, CP, SOB, cough, runny nose, sore throat.       PSH: B/L inguinal hernia repair, tonsillectomy, total hip replacement, cataract surgery, PCI   PMH: as above    Meds: per reconciliation sheet, noted below  MEDICATIONS  (STANDING):  apixaban 5 milliGRAM(s) Oral every 12 hours  aspirin enteric coated 81 milliGRAM(s) Oral daily  atorvastatin 20 milliGRAM(s) Oral at bedtime  benzonatate 100 milliGRAM(s) Oral <User Schedule>  cefTRIAXone   IVPB 1000 milliGRAM(s) IV Intermittent every 24 hours  cholecalciferol 1000 Unit(s) Oral daily  enalapril 5 milliGRAM(s) Oral two times a day  famotidine    Tablet 10 milliGRAM(s) Oral daily  latanoprost 0.005% Ophthalmic Solution 1 Drop(s) Both EYES at bedtime  metoprolol succinate ER 25 milliGRAM(s) Oral daily  tamsulosin 0.4 milliGRAM(s) Oral at bedtime  vancomycin  IVPB 1000 milliGRAM(s) IV Intermittent every 24 hours    Allergies    No Known Allergies    Intolerances      Social: no smoking, no alcohol, no illegal drugs; no recent travel, no exposure to TB  FAMILY HISTORY:  Family history of hypertension in mother       no history of premature cardiovascular disease in first degree relatives    ROS: the patient denies fever, no chills, no HA, no dizziness, no sore throat, no blurry vision, no CP, no palpitations, no SOB, no cough, no abdominal pain, no diarrhea, no N/V, no dysuria, no leg pain, no claudication, no rash, no joint aches, no rectal pain or bleeding, no night sweats    All other systems reviewed and are negative    Vital Signs Last 24 Hrs  T(C): 36.5 (12 Dec 2023 07:38), Max: 36.7 (11 Dec 2023 23:59)  T(F): 97.7 (12 Dec 2023 07:38), Max: 98 (11 Dec 2023 23:59)  HR: 66 (12 Dec 2023 07:38) (61 - 87)  BP: 130/58 (12 Dec 2023 07:38) (117/69 - 154/86)  BP(mean): 64 (11 Dec 2023 23:59) (64 - 85)  RR: 18 (12 Dec 2023 07:38) (18 - 20)  SpO2: 97% (12 Dec 2023 07:38) (90% - 100%)    Parameters below as of 12 Dec 2023 07:38  Patient On (Oxygen Delivery Method): nasal cannula  O2 Flow (L/min): 2    Daily Height in cm: 182.88 (11 Dec 2023 12:37)    Daily     PE:  Constitutional: frail looking  HEENT: NC/AT, EOMI, PERRLA, conjunctivae clear; ears and nose atraumatic; pharynx benign  Neck: supple; thyroid not palpable  Back: no tenderness  Respiratory: respiratory effort normal; clear to auscultation  Cardiovascular: S1S2 regular, no murmurs  Abdomen: soft, not tender, not distended, positive BS; liver and spleen WNL  Genitourinary: no suprapubic tenderness  Lymphatic: no LN palpable  Musculoskeletal: no muscle tenderness, no joint swelling or tenderness  Extremities: no pedal edema RLE resolving warmth tenderness   Neurological/ Psychiatric: AxOx3, Judgement and insight normal;  moving all extremities  Skin: no rashes; no palpable lesions    Labs: all available labs reviewed                        12.8   8.18  )-----------( 218      ( 12 Dec 2023 09:05 )             42.0     12-12    137  |  108  |  13  ----------------------------<  74  4.8   |  25  |  1.05    Ca    8.5      12 Dec 2023 09:05    TPro  6.7  /  Alb  2.5<L>  /  TBili  0.7  /  DBili  x   /  AST  20  /  ALT  19  /  AlkPhos  84  12-12     LIVER FUNCTIONS - ( 12 Dec 2023 09:05 )  Alb: 2.5 g/dL / Pro: 6.7 gm/dL / ALK PHOS: 84 U/L / ALT: 19 U/L / AST: 20 U/L / GGT: x           Urinalysis Basic - ( 12 Dec 2023 09:05 )    Color: x / Appearance: x / SG: x / pH: x  Gluc: 74 mg/dL / Ketone: x  / Bili: x / Urobili: x   Blood: x / Protein: x / Nitrite: x   Leuk Esterase: x / RBC: x / WBC x   Sq Epi: x / Non Sq Epi: x / Bacteria: x          Radiology: all available radiological tests reviewed  < from: US Duplex Arterial Lower Ext Ltd, Right (12.11.23 @ 15:39) >    ACC: [id number] EXAM:  US DPLX LWR EXT ARTS LTD RT   ORDERED BY: LUANN GARCÍA     PROCEDURE DATE:  12/11/2023          INTERPRETATION:  CLINICAL INFORMATION: Right lower extremity pain,   swelling and erythema.    COMPARISON: None available.    TECHNIQUE: Grayscale, color and spectral Doppler imaging was performed at   the arteries of the RIGHT lower extremity.    FINDINGS:    Plaque Description: Extensive coarsely calcified plaque.    Waveform Morphology: Monophasic arterial waveforms.    Stenosis/Occlusion: The arteries are patent. No disturbed color-flow or   elevated blood flow velocities are encountered.    The peak systolic velocities of the RIGHT lower extremity are as follows:    Common femoral artery: 97 cm/s  Superficial femoral artery: 69 cm/s proximal,  84 cm/s mid, 138 cm/s   distal  Popliteal artery: 40 cm/s  Tibioperoneal trunk: 40 cm/s  Posterior tibial artery: 15 cm/s  Peroneal artery: 16 cm/s  Anterior tibial artery: 28 cm/s  Dorsalis pedis artery: 38 cm/s    IMPRESSION:    Extensive calcified plaque without duplex evidence of hemodynamically   significant stenosis or occlusion in the arteries of the right lower   extremity.        ACC: [id number] EXAM:  XR CHEST PORTABLE IMMED 1V   ORDERED BY: JUDE SMILEY     PROCEDURE DATE:  12/11/2023          INTERPRETATION:  CLINICAL HISTORY:  Sepsis    Chest.    Single frontal radiograph of the chest obtained by portable technique   demonstrates the lungs to be free of focal infiltrates.  There is some interstitial prominence and some perivascular haziness in   the mid and lower lung zones. This may represent vascular congestion in a   patient with chronic obstructive pulmonarydisease.. The costophrenic   angles are clear. Heart size cannot adequately be assessed on this   portable study, however, some cardiac enlargement is present. There is   tortuosity and calcification of the thoracic aorta.. No gross hilar mass   is noted. There is no evidence of destructive bony lesion. A right   shoulder prosthesis is noted in position. As compared to a previous study   dated 11/20/2023, there has been no significant change in the lung fields.    IMPRESSION:    Findings suspicious for vascular congestion in a patient with underlying   chronic obstructive pulmonary disease.          Advanced directives addressed: full resuscitation Patient is a 89y old  Male who presents with a chief complaint of RLE wounds / cellulitis (12 Dec 2023 01:29)    HPI:  89 y/o M w/ PMH of BPH, CAD, COPD, C.diff, glaucoma, HTN, dyslipidemia, p/w RLE cellulitis. Patient states that about 2 weeks ago he hit his leg and injured it. Patient took doxycycline for 5 days, and then took bactrim for another 5 days, and he took last dose on Sunday. Patient went to see Dr. Watts and was sent to ED for IV abx for RLE cellulitis. Patient denies fever, chills, nausea, vomiting, abdominal pain, CP, SOB, cough, runny nose, sore throat.       PSH: B/L inguinal hernia repair, tonsillectomy, total hip replacement, cataract surgery, PCI   PMH: as above    Meds: per reconciliation sheet, noted below  MEDICATIONS  (STANDING):  apixaban 5 milliGRAM(s) Oral every 12 hours  aspirin enteric coated 81 milliGRAM(s) Oral daily  atorvastatin 20 milliGRAM(s) Oral at bedtime  benzonatate 100 milliGRAM(s) Oral <User Schedule>  cefTRIAXone   IVPB 1000 milliGRAM(s) IV Intermittent every 24 hours  cholecalciferol 1000 Unit(s) Oral daily  enalapril 5 milliGRAM(s) Oral two times a day  famotidine    Tablet 10 milliGRAM(s) Oral daily  latanoprost 0.005% Ophthalmic Solution 1 Drop(s) Both EYES at bedtime  metoprolol succinate ER 25 milliGRAM(s) Oral daily  tamsulosin 0.4 milliGRAM(s) Oral at bedtime  vancomycin  IVPB 1000 milliGRAM(s) IV Intermittent every 24 hours    Allergies    No Known Allergies    Intolerances      Social: no smoking, no alcohol, no illegal drugs; no recent travel, no exposure to TB  FAMILY HISTORY:  Family history of hypertension in mother       no history of premature cardiovascular disease in first degree relatives    ROS: the patient denies fever, no chills, no HA, no dizziness, no sore throat, no blurry vision, no CP, no palpitations, no SOB, no cough, no abdominal pain, no diarrhea, no N/V, no dysuria, no leg pain, no claudication, no rash, no joint aches, no rectal pain or bleeding, no night sweats    All other systems reviewed and are negative    Vital Signs Last 24 Hrs  T(C): 36.5 (12 Dec 2023 07:38), Max: 36.7 (11 Dec 2023 23:59)  T(F): 97.7 (12 Dec 2023 07:38), Max: 98 (11 Dec 2023 23:59)  HR: 66 (12 Dec 2023 07:38) (61 - 87)  BP: 130/58 (12 Dec 2023 07:38) (117/69 - 154/86)  BP(mean): 64 (11 Dec 2023 23:59) (64 - 85)  RR: 18 (12 Dec 2023 07:38) (18 - 20)  SpO2: 97% (12 Dec 2023 07:38) (90% - 100%)    Parameters below as of 12 Dec 2023 07:38  Patient On (Oxygen Delivery Method): nasal cannula  O2 Flow (L/min): 2    Daily Height in cm: 182.88 (11 Dec 2023 12:37)    Daily     PE:  Constitutional: frail looking  HEENT: NC/AT, EOMI, PERRLA, conjunctivae clear; ears and nose atraumatic; pharynx benign  Neck: supple; thyroid not palpable  Back: no tenderness  Respiratory: respiratory effort normal; clear to auscultation  Cardiovascular: S1S2 regular, no murmurs  Abdomen: soft, not tender, not distended, positive BS; liver and spleen WNL  Genitourinary: no suprapubic tenderness  Lymphatic: no LN palpable  Musculoskeletal: no muscle tenderness, no joint swelling or tenderness  Extremities: no pedal edema RLE resolving warmth tenderness   Neurological/ Psychiatric: AxOx3, Judgement and insight normal;  moving all extremities  Skin: no rashes; no palpable lesions    Labs: all available labs reviewed                        12.8   8.18  )-----------( 218      ( 12 Dec 2023 09:05 )             42.0     12-12    137  |  108  |  13  ----------------------------<  74  4.8   |  25  |  1.05    Ca    8.5      12 Dec 2023 09:05    TPro  6.7  /  Alb  2.5<L>  /  TBili  0.7  /  DBili  x   /  AST  20  /  ALT  19  /  AlkPhos  84  12-12     LIVER FUNCTIONS - ( 12 Dec 2023 09:05 )  Alb: 2.5 g/dL / Pro: 6.7 gm/dL / ALK PHOS: 84 U/L / ALT: 19 U/L / AST: 20 U/L / GGT: x           Urinalysis Basic - ( 12 Dec 2023 09:05 )    Color: x / Appearance: x / SG: x / pH: x  Gluc: 74 mg/dL / Ketone: x  / Bili: x / Urobili: x   Blood: x / Protein: x / Nitrite: x   Leuk Esterase: x / RBC: x / WBC x   Sq Epi: x / Non Sq Epi: x / Bacteria: x          Radiology: all available radiological tests reviewed  < from: US Duplex Arterial Lower Ext Ltd, Right (12.11.23 @ 15:39) >    ACC: [id number] EXAM:  US DPLX LWR EXT ARTS LTD RT   ORDERED BY: LUANN GARCÍA     PROCEDURE DATE:  12/11/2023          INTERPRETATION:  CLINICAL INFORMATION: Right lower extremity pain,   swelling and erythema.    COMPARISON: None available.    TECHNIQUE: Grayscale, color and spectral Doppler imaging was performed at   the arteries of the RIGHT lower extremity.    FINDINGS:    Plaque Description: Extensive coarsely calcified plaque.    Waveform Morphology: Monophasic arterial waveforms.    Stenosis/Occlusion: The arteries are patent. No disturbed color-flow or   elevated blood flow velocities are encountered.    The peak systolic velocities of the RIGHT lower extremity are as follows:    Common femoral artery: 97 cm/s  Superficial femoral artery: 69 cm/s proximal,  84 cm/s mid, 138 cm/s   distal  Popliteal artery: 40 cm/s  Tibioperoneal trunk: 40 cm/s  Posterior tibial artery: 15 cm/s  Peroneal artery: 16 cm/s  Anterior tibial artery: 28 cm/s  Dorsalis pedis artery: 38 cm/s    IMPRESSION:    Extensive calcified plaque without duplex evidence of hemodynamically   significant stenosis or occlusion in the arteries of the right lower   extremity.        ACC: [id number] EXAM:  XR CHEST PORTABLE IMMED 1V   ORDERED BY: JUDE SMILEY     PROCEDURE DATE:  12/11/2023          INTERPRETATION:  CLINICAL HISTORY:  Sepsis    Chest.    Single frontal radiograph of the chest obtained by portable technique   demonstrates the lungs to be free of focal infiltrates.  There is some interstitial prominence and some perivascular haziness in   the mid and lower lung zones. This may represent vascular congestion in a   patient with chronic obstructive pulmonarydisease.. The costophrenic   angles are clear. Heart size cannot adequately be assessed on this   portable study, however, some cardiac enlargement is present. There is   tortuosity and calcification of the thoracic aorta.. No gross hilar mass   is noted. There is no evidence of destructive bony lesion. A right   shoulder prosthesis is noted in position. As compared to a previous study   dated 11/20/2023, there has been no significant change in the lung fields.    IMPRESSION:    Findings suspicious for vascular congestion in a patient with underlying   chronic obstructive pulmonary disease.          Advanced directives addressed: full resuscitation

## 2023-12-12 NOTE — H&P ADULT - ASSESSMENT
88 y/o M w/ PMH of BPH, CAD, COPD, C.diff, glaucoma, HTN, dyslipidemia, p/w RLE cellulitis    *RLE Cellulitis / wounds   -Vanco / zosyn (patient has a h/o C.diff, will need to monitor closely)   -F/u cultures  -ID consult   -Wound consult  -Lactic acidosis now improved 2.8 -> 1.6   -U/S negative for DVT     *H/o BPH / CAD / C.diff / COPD / glaucoma / HTN / dyslipidemia  -C/w home meds and f/u outpatient for further management     *DVT ppx   -On Eliquis     55 mins spent on this admission   89 y/o M w/ PMH of BPH, CAD, COPD, C.diff, glaucoma, HTN, dyslipidemia, p/w RLE cellulitis    *RLE Cellulitis / wounds   -Vanco / zosyn (patient has a h/o C.diff, will need to monitor closely)   -F/u cultures  -ID consult   -Wound consult  -Lactic acidosis now improved 2.8 -> 1.6   -U/S negative for DVT     *H/o BPH / CAD / C.diff / COPD / glaucoma / HTN / dyslipidemia  -C/w home meds and f/u outpatient for further management     *DVT ppx   -On Eliquis     55 mins spent on this admission

## 2023-12-12 NOTE — DISCHARGE NOTE NURSING/CASE MANAGEMENT/SOCIAL WORK - NSDCPEFALRISK_GEN_ALL_CORE
For information on Fall & Injury Prevention, visit: [email or web address] OR  [email or web address] OR  https://www.cdc.gov/steadi/patient.html For information on Fall & Injury Prevention, visit: [email or web address] OR  [email or web address] OR  https://www.cdc.gov/steadi/patient.html

## 2023-12-12 NOTE — PATIENT PROFILE ADULT - FALL HARM RISK - HARM RISK INTERVENTIONS
Assistance with ambulation/Assistance OOB with selected safe patient handling equipment/Communicate Risk of Fall with Harm to all staff/Discuss with provider need for PT consult/Monitor gait and stability/Provide patient with walking aids - walker, cane, crutches/Reinforce activity limits and safety measures with patient and family/Tailored Fall Risk Interventions/Visual Cue: Yellow wristband and red socks/Bed in lowest position, wheels locked, appropriate side rails in place/Call bell, personal items and telephone in reach/Instruct patient to call for assistance before getting out of bed or chair/Non-slip footwear when patient is out of bed/[place] to call system/Physically safe environment - no spills, clutter or unnecessary equipment/Purposeful Proactive Rounding/Room/bathroom lighting operational, light cord in reach Assistance with ambulation/Assistance OOB with selected safe patient handling equipment/Communicate Risk of Fall with Harm to all staff/Discuss with provider need for PT consult/Monitor gait and stability/Provide patient with walking aids - walker, cane, crutches/Reinforce activity limits and safety measures with patient and family/Tailored Fall Risk Interventions/Visual Cue: Yellow wristband and red socks/Bed in lowest position, wheels locked, appropriate side rails in place/Call bell, personal items and telephone in reach/Instruct patient to call for assistance before getting out of bed or chair/Non-slip footwear when patient is out of bed/[place] to call system/Physically safe environment - no spills, clutter or unnecessary equipment/Purposeful Proactive Rounding/Room/bathroom lighting operational, light cord in reach

## 2023-12-12 NOTE — CONSULT NOTE ADULT - ASSESSMENT
89 y/o M w/ PMH of BPH, CAD, COPD, C.diff, glaucoma, HTN, dyslipidemia, p/w RLE cellulitis. Patient states that about 2 weeks ago he hit his leg and injured it. Patient took doxycycline for 5 days, and then took bactrim for another 5 days, and he took last dose on Sunday. Patient went to see Dr. Watts and was sent to ED for IV abx for RLE cellulitis. Patient denies fever, chills, nausea, vomiting, abdominal pain, CP, SOB, cough, runny nose, sore throat.     1. RLE cellulitis. s/p trauma  - imaging reviewed  - improving   - on vancomycin [id number] check trough prior to 4th dose  - change zosyn to rocephin 1gm daily  - continue with abx coverage  - f/u cultures  - dc with po ceftin complete 7 day course  - fu cbc  - tolerating abx well so far; no side effects noted  - reason for abx use and side effects reviewed with patient  - supportive care    2. other issues - care per medicine  88 y/o M w/ PMH of BPH, CAD, COPD, C.diff, glaucoma, HTN, dyslipidemia, p/w RLE cellulitis. Patient states that about 2 weeks ago he hit his leg and injured it. Patient took doxycycline for 5 days, and then took bactrim for another 5 days, and he took last dose on Sunday. Patient went to see Dr. Watts and was sent to ED for IV abx for RLE cellulitis. Patient denies fever, chills, nausea, vomiting, abdominal pain, CP, SOB, cough, runny nose, sore throat.     1. RLE cellulitis. s/p trauma  - imaging reviewed  - improving   - on vancomycin [id number] check trough prior to 4th dose  - change zosyn to rocephin 1gm daily  - continue with abx coverage  - f/u cultures  - dc with po ceftin complete 7 day course  - fu cbc  - tolerating abx well so far; no side effects noted  - reason for abx use and side effects reviewed with patient  - supportive care    2. other issues - care per medicine

## 2023-12-12 NOTE — PATIENT PROFILE ADULT - FUNCTIONAL ASSESSMENT - BASIC MOBILITY 6.
3-calculated by average/Not able to assess (calculate score using [facility] averaging method)  3-calculated by average/Not able to assess (calculate score using [facility] averaging method)

## 2023-12-12 NOTE — H&P ADULT - HISTORY OF PRESENT ILLNESS
88 y/o M w/ PMH of BPH, CAD, COPD, C.diff, glaucoma, HTN, dyslipidemia, p/w RLE cellulitis. Patient states that about 2 weeks ago he hit his leg and injured it. Patient took doxycycline for 5 days, and then took bactrim for another 5 days, and he took last dose on Sunday. Patient went to see Dr. Watts and was sent to ED for IV abx for RLE cellulitis. Patient denies fever, chills, nausea, vomiting, abdominal pain, CP, SOB, cough, runny nose, sore throat       PSH: B/L inguinal hernia repair, tonsillectomy, total hip replacement, cataract surgery, PCI     Social Hx: Former smoker, denies etoh / drugs     Family Hx: Mother - HTN    89 y/o M w/ PMH of BPH, CAD, COPD, C.diff, glaucoma, HTN, dyslipidemia, p/w RLE cellulitis. Patient states that about 2 weeks ago he hit his leg and injured it. Patient took doxycycline for 5 days, and then took bactrim for another 5 days, and he took last dose on Sunday. Patient went to see Dr. Watts and was sent to ED for IV abx for RLE cellulitis. Patient denies fever, chills, nausea, vomiting, abdominal pain, CP, SOB, cough, runny nose, sore throat       PSH: B/L inguinal hernia repair, tonsillectomy, total hip replacement, cataract surgery, PCI     Social Hx: Former smoker, denies etoh / drugs     Family Hx: Mother - HTN

## 2023-12-13 ENCOUNTER — TRANSCRIPTION ENCOUNTER (OUTPATIENT)
Age: 88
End: 2023-12-13

## 2023-12-13 VITALS
TEMPERATURE: 97 F | OXYGEN SATURATION: 95 % | SYSTOLIC BLOOD PRESSURE: 119 MMHG | DIASTOLIC BLOOD PRESSURE: 65 MMHG | RESPIRATION RATE: 18 BRPM | HEART RATE: 66 BPM

## 2023-12-13 PROCEDURE — 99239 HOSP IP/OBS DSCHRG MGMT >30: CPT

## 2023-12-13 RX ORDER — CEFUROXIME AXETIL 250 MG
1 TABLET ORAL
Qty: 10 | Refills: 0
Start: 2023-12-13 | End: 2023-12-17

## 2023-12-13 RX ADMIN — FAMOTIDINE 10 MILLIGRAM(S): 10 INJECTION INTRAVENOUS at 09:01

## 2023-12-13 RX ADMIN — Medication 25 MILLIGRAM(S): at 09:00

## 2023-12-13 RX ADMIN — Medication 5 MILLIGRAM(S): at 09:01

## 2023-12-13 RX ADMIN — Medication 1000 UNIT(S): at 09:00

## 2023-12-13 RX ADMIN — Medication 100 MILLIGRAM(S): at 09:00

## 2023-12-13 RX ADMIN — Medication 81 MILLIGRAM(S): at 09:00

## 2023-12-13 RX ADMIN — APIXABAN 5 MILLIGRAM(S): 2.5 TABLET, FILM COATED ORAL at 09:00

## 2023-12-13 NOTE — DISCHARGE NOTE PROVIDER - NSDCCPCAREPLAN_GEN_ALL_CORE_FT
PRINCIPAL DISCHARGE DIAGNOSIS  Diagnosis: Cellulitis  Assessment and Plan of Treatment: Continue ceftin as ordered. Follow up with your PMD

## 2023-12-13 NOTE — DISCHARGE NOTE PROVIDER - CARE PROVIDER_API CALL
Trey Watts  Cardiovascular Disease  [address], [address]  [place] [address]  Phone: [phone]  Fax: [phone]  Follow Up Time:    Trey Watts  Cardiovascular Disease  [address] [facility] [facility], [facility] [address]  [place] [address]  Phone: [phone]  Fax: [phone]  Follow Up Time:

## 2023-12-13 NOTE — DISCHARGE NOTE PROVIDER - HOSPITAL COURSE
89 y/o M w/ PMH of BPH, CAD, COPD, C.diff, glaucoma, HTN, dyslipidemia, p/w RLE cellulitis. Patient states that about 2 weeks ago he hit his leg and injured it. Patient took doxycycline for 5 days, and then took bactrim for another 5 days, and he took last dose on Sunday. Patient went to see Dr. Watts and was sent to ED for IV abx for RLE cellulitis. Patient denies fever, chills, nausea, vomiting, abdominal pain, CP, SOB, cough, runny nose, sore throat     He was admitted to medical floor. He ws started on IV antibiotics. He was seen by ID Dr Dao and changed to IV rocephin  He was continued with his home medications. His leg redness improving significantly with IV antibiotic.   Discussed with ID, can switch to po ceftin and d/c home.       · Constitutional no distress  · Eyes PERRL; EOMI  · ENMT neck  · Neck supple; symmetric  · Respiratory clear to auscultation bilaterally; no wheezes; no rales; no rhonchi  · Cardiovascular regular rate and rhythm; S1 S2 present; no gallops; no rub  · Gastrointestinal soft; nontender; nondistended; normal active bowel sounds  · Neurological cranial nerves II-XII intact; sensation intact; responds to pain  · Musculoskeletal Comments +RLE wounds and surrounding erythema  · Psychiatric normal affect; normal behavior      Assessment:  · Assessment      89 y/o M w/ PMH of BPH, CAD, COPD, C.diff, glaucoma, HTN, dyslipidemia, p/w RLE cellulitis    *RLE Cellulitis / wounds   -Vanco / zosyn #1, switch to IV rocephin  Change to po ceftin on discharge  -ID consult appreciated  -Lactic acidosis now improved 2.8 -> 1.6   -U/S negative for DVT     *H/o BPH / CAD / C.diff / COPD / glaucoma / HTN / dyslipidemia  -C/w home meds and f/u outpatient for further management     *DVT ppx   -On Eliquis    Home today  Spent more than 30 min to prepare the discharge                       88 y/o M w/ PMH of BPH, CAD, COPD, C.diff, glaucoma, HTN, dyslipidemia, p/w RLE cellulitis. Patient states that about 2 weeks ago he hit his leg and injured it. Patient took doxycycline for 5 days, and then took bactrim for another 5 days, and he took last dose on Sunday. Patient went to see Dr. Watts and was sent to ED for IV abx for RLE cellulitis. Patient denies fever, chills, nausea, vomiting, abdominal pain, CP, SOB, cough, runny nose, sore throat     He was admitted to medical floor. He ws started on IV antibiotics. He was seen by ID Dr Dao and changed to IV rocephin  He was continued with his home medications. His leg redness improving significantly with IV antibiotic.   Discussed with ID, can switch to po ceftin and d/c home.       · Constitutional no distress  · Eyes PERRL; EOMI  · ENMT neck  · Neck supple; symmetric  · Respiratory clear to auscultation bilaterally; no wheezes; no rales; no rhonchi  · Cardiovascular regular rate and rhythm; S1 S2 present; no gallops; no rub  · Gastrointestinal soft; nontender; nondistended; normal active bowel sounds  · Neurological cranial nerves II-XII intact; sensation intact; responds to pain  · Musculoskeletal Comments +RLE wounds and surrounding erythema  · Psychiatric normal affect; normal behavior      Assessment:  · Assessment      88 y/o M w/ PMH of BPH, CAD, COPD, C.diff, glaucoma, HTN, dyslipidemia, p/w RLE cellulitis    *RLE Cellulitis / wounds   -Vanco / zosyn #1, switch to IV rocephin  Change to po ceftin on discharge  -ID consult appreciated  -Lactic acidosis now improved 2.8 -> 1.6   -U/S negative for DVT     *H/o BPH / CAD / C.diff / COPD / glaucoma / HTN / dyslipidemia  -C/w home meds and f/u outpatient for further management     *DVT ppx   -On Eliquis    Home today  Spent more than 30 min to prepare the discharge                       89 y/o M w/ PMH of BPH, CAD, COPD, C.diff, glaucoma, HTN, dyslipidemia, p/w RLE cellulitis. Patient states that about 2 weeks ago he hit his leg and injured it. Patient took doxycycline for 5 days, and then took bactrim for another 5 days, and he took last dose on Sunday. Patient went to see Dr. Watts and was sent to ED for IV abx for RLE cellulitis. Patient denies fever, chills, nausea, vomiting, abdominal pain, CP, SOB, cough, runny nose, sore throat     He was admitted to medical floor. He ws started on IV antibiotics. He was seen by ID Dr Dao and changed to IV rocephin  He was continued with his home medications. His leg redness improving significantly with IV antibiotic.   Discussed with ID, can switch to po ceftin and d/c home.       · Constitutional no distress  · Eyes PERRL; EOMI  · ENMT neck  · Neck supple; symmetric  · Respiratory clear to auscultation bilaterally; no wheezes; no rales; no rhonchi  · Cardiovascular regular rate and rhythm; S1 S2 present; no gallops; no rub  · Gastrointestinal soft; nontender; nondistended; normal active bowel sounds  · Neurological cranial nerves II-XII intact; sensation intact; responds to pain  · Musculoskeletal Comments +RLE wounds and surrounding erythema  · Psychiatric normal affect; normal behavior      Assessment:  · Assessment      89 y/o M w/ PMH of BPH, CAD, COPD, C.diff, glaucoma, HTN, dyslipidemia, p/w RLE cellulitis    *RLE Cellulitis / wounds   -Vanco / zosyn #1, switch to IV rocephin  Change to po ceftin on discharge  -ID consult appreciated  -Lactic acidosis now improved 2.8 -> 1.6   -U/S negative for DVT     *H/o BPH / CAD / C.diff / COPD / glaucoma / HTN / dyslipidemia  -C/w home meds and f/u outpatient for further management     *DVT ppx   -On Eliquis    Home today  Spent more than 30 min to prepare the discharge        Home O2 requirement: qualifies for home O2  Pulse ox (SpO2) on room air at rest:	88 %  Pulse ox (SpO2) on	3 L/min  O2 with exertion:	93 %             88 y/o M w/ PMH of BPH, CAD, COPD, C.diff, glaucoma, HTN, dyslipidemia, p/w RLE cellulitis. Patient states that about 2 weeks ago he hit his leg and injured it. Patient took doxycycline for 5 days, and then took bactrim for another 5 days, and he took last dose on Sunday. Patient went to see Dr. Watts and was sent to ED for IV abx for RLE cellulitis. Patient denies fever, chills, nausea, vomiting, abdominal pain, CP, SOB, cough, runny nose, sore throat     He was admitted to medical floor. He ws started on IV antibiotics. He was seen by ID Dr Dao and changed to IV rocephin  He was continued with his home medications. His leg redness improving significantly with IV antibiotic.   Discussed with ID, can switch to po ceftin and d/c home.       · Constitutional no distress  · Eyes PERRL; EOMI  · ENMT neck  · Neck supple; symmetric  · Respiratory clear to auscultation bilaterally; no wheezes; no rales; no rhonchi  · Cardiovascular regular rate and rhythm; S1 S2 present; no gallops; no rub  · Gastrointestinal soft; nontender; nondistended; normal active bowel sounds  · Neurological cranial nerves II-XII intact; sensation intact; responds to pain  · Musculoskeletal Comments +RLE wounds and surrounding erythema  · Psychiatric normal affect; normal behavior      Assessment:  · Assessment      88 y/o M w/ PMH of BPH, CAD, COPD, C.diff, glaucoma, HTN, dyslipidemia, p/w RLE cellulitis    *RLE Cellulitis / wounds   -Vanco / zosyn #1, switch to IV rocephin  Change to po ceftin on discharge  -ID consult appreciated  -Lactic acidosis now improved 2.8 -> 1.6   -U/S negative for DVT     *H/o BPH / CAD / C.diff / COPD / glaucoma / HTN / dyslipidemia  -C/w home meds and f/u outpatient for further management     *DVT ppx   -On Eliquis    Home today  Spent more than 30 min to prepare the discharge        Home O2 requirement: qualifies for home O2  Pulse ox (SpO2) on room air at rest:	88 %  Pulse ox (SpO2) on	3 L/min  O2 with exertion:	93 %

## 2023-12-13 NOTE — DISCHARGE NOTE PROVIDER - NSDCMRMEDTOKEN_GEN_ALL_CORE_FT
albuterol 2.5 mg/3 mL (0.083%) inhalation solution: 3 milliliter(s) by nebulizer 2 times a day  aspirin 81 mg oral delayed release tablet: 1 tab(s) orally once a day (at bedtime)  atorvastatin 20 mg oral tablet: 1 tab(s) orally once a day (at bedtime)  benzonatate 100 mg oral capsule: 1 cap(s) orally once a day  cefuroxime 250 mg oral tablet: 1 tab(s) orally 2 times a day  Eliquis 5 mg oral tablet: 1 tab(s) orally 2 times a day  enalapril 5 mg oral tablet: 1 tab(s) orally 2 times a day  famotidine 20 mg oral tablet: 1 tab(s) orally once a day  latanoprost 0.005% ophthalmic solution: 1 drop(s) to each affected eye once a day (at bedtime)  metoprolol succinate 25 mg oral tablet, extended release: 1 tab(s) orally once a day  tamsulosin 0.4 mg oral capsule: 1 cap(s) orally once a day  Trelegy Ellipta 100 mcg-62.5 mcg-25 mcg/inh inhalation powder: 1 puff(s) inhaled once a day  Vitamin D3 25 mcg (1000 intl units) oral tablet: 1 tab(s) orally once a day

## 2023-12-15 LAB
-  AMOXICILLIN/CLAVULANIC ACID: SIGNIFICANT CHANGE UP
-  AMOXICILLIN/CLAVULANIC ACID: SIGNIFICANT CHANGE UP
-  AMPICILLIN/SULBACTAM: SIGNIFICANT CHANGE UP
-  AMPICILLIN/SULBACTAM: SIGNIFICANT CHANGE UP
-  AMPICILLIN: SIGNIFICANT CHANGE UP
-  AMPICILLIN: SIGNIFICANT CHANGE UP
-  AZTREONAM: SIGNIFICANT CHANGE UP
-  AZTREONAM: SIGNIFICANT CHANGE UP
-  CEFAZOLIN: SIGNIFICANT CHANGE UP
-  CEFAZOLIN: SIGNIFICANT CHANGE UP
-  CEFEPIME: SIGNIFICANT CHANGE UP
-  CEFEPIME: SIGNIFICANT CHANGE UP
-  CEFTRIAXONE: SIGNIFICANT CHANGE UP
-  CEFTRIAXONE: SIGNIFICANT CHANGE UP
-  CEFUROXIME: SIGNIFICANT CHANGE UP
-  CEFUROXIME: SIGNIFICANT CHANGE UP
-  CIPROFLOXACIN: SIGNIFICANT CHANGE UP
-  CIPROFLOXACIN: SIGNIFICANT CHANGE UP
-  ERTAPENEM: SIGNIFICANT CHANGE UP
-  ERTAPENEM: SIGNIFICANT CHANGE UP
-  GENTAMICIN: SIGNIFICANT CHANGE UP
-  GENTAMICIN: SIGNIFICANT CHANGE UP
-  LEVOFLOXACIN: SIGNIFICANT CHANGE UP
-  LEVOFLOXACIN: SIGNIFICANT CHANGE UP
-  MEROPENEM: SIGNIFICANT CHANGE UP
-  MEROPENEM: SIGNIFICANT CHANGE UP
-  NITROFURANTOIN: SIGNIFICANT CHANGE UP
-  NITROFURANTOIN: SIGNIFICANT CHANGE UP
-  PIPERACILLIN/TAZOBACTAM: SIGNIFICANT CHANGE UP
-  PIPERACILLIN/TAZOBACTAM: SIGNIFICANT CHANGE UP
-  TOBRAMYCIN: SIGNIFICANT CHANGE UP
-  TOBRAMYCIN: SIGNIFICANT CHANGE UP
-  TRIMETHOPRIM/SULFAMETHOXAZOLE: SIGNIFICANT CHANGE UP
-  TRIMETHOPRIM/SULFAMETHOXAZOLE: SIGNIFICANT CHANGE UP
CULTURE RESULTS: ABNORMAL
CULTURE RESULTS: ABNORMAL
METHOD TYPE: SIGNIFICANT CHANGE UP
METHOD TYPE: SIGNIFICANT CHANGE UP
ORGANISM # SPEC MICROSCOPIC CNT: ABNORMAL
ORGANISM # SPEC MICROSCOPIC CNT: ABNORMAL
ORGANISM # SPEC MICROSCOPIC CNT: SIGNIFICANT CHANGE UP
ORGANISM # SPEC MICROSCOPIC CNT: SIGNIFICANT CHANGE UP
SPECIMEN SOURCE: SIGNIFICANT CHANGE UP
SPECIMEN SOURCE: SIGNIFICANT CHANGE UP

## 2023-12-16 LAB
CULTURE RESULTS: SIGNIFICANT CHANGE UP
SPECIMEN SOURCE: SIGNIFICANT CHANGE UP

## 2023-12-17 DIAGNOSIS — E78.5 HYPERLIPIDEMIA, UNSPECIFIED: ICD-10-CM

## 2023-12-17 DIAGNOSIS — Z95.5 PRESENCE OF CORONARY ANGIOPLASTY IMPLANT AND GRAFT: ICD-10-CM

## 2023-12-17 DIAGNOSIS — Z96.642 PRESENCE OF LEFT ARTIFICIAL HIP JOINT: ICD-10-CM

## 2023-12-17 DIAGNOSIS — Z79.84 LONG TERM (CURRENT) USE OF ORAL HYPOGLYCEMIC DRUGS: ICD-10-CM

## 2023-12-17 DIAGNOSIS — J44.9 CHRONIC OBSTRUCTIVE PULMONARY DISEASE, UNSPECIFIED: ICD-10-CM

## 2023-12-17 DIAGNOSIS — Z87.891 PERSONAL HISTORY OF NICOTINE DEPENDENCE: ICD-10-CM

## 2023-12-17 DIAGNOSIS — L03.115 CELLULITIS OF RIGHT LOWER LIMB: ICD-10-CM

## 2023-12-17 DIAGNOSIS — I10 ESSENTIAL (PRIMARY) HYPERTENSION: ICD-10-CM

## 2023-12-17 DIAGNOSIS — Z98.42 CATARACT EXTRACTION STATUS, LEFT EYE: ICD-10-CM

## 2023-12-17 DIAGNOSIS — N40.0 BENIGN PROSTATIC HYPERPLASIA WITHOUT LOWER URINARY TRACT SYMPTOMS: ICD-10-CM

## 2023-12-17 DIAGNOSIS — Z79.02 LONG TERM (CURRENT) USE OF ANTITHROMBOTICS/ANTIPLATELETS: ICD-10-CM

## 2023-12-17 DIAGNOSIS — H40.9 UNSPECIFIED GLAUCOMA: ICD-10-CM

## 2023-12-17 DIAGNOSIS — I25.10 ATHEROSCLEROTIC HEART DISEASE OF NATIVE CORONARY ARTERY WITHOUT ANGINA PECTORIS: ICD-10-CM

## 2023-12-17 DIAGNOSIS — Z98.41 CATARACT EXTRACTION STATUS, RIGHT EYE: ICD-10-CM

## 2024-02-05 NOTE — PHYSICAL THERAPY INITIAL EVALUATION ADULT - PATIENT PROFILE REVIEW, REHAB EVAL
yes
PAST MEDICAL HISTORY:  Atrial fibrillation     H/O temporal arteritis     History of temporal arteritis     History of temporal artery biopsy     HTN (hypertension)     Osteoporosis

## 2024-02-15 NOTE — PATIENT PROFILE ADULT - FALL HARM RISK - FACTORS NURSING JUDGEMENT
[facility] - [facility] (2nd Fl)  Initial Discharge Assessment       Primary Care Provider: Alphonse Boothe III, MD    Admission Diagnosis: Hip fx, left, closed, with delayed healing, subsequent encounter [S72.002G]    Admission Date: 2/14/2024  Expected Discharge Date: 2/19/2024         Payor: [employer] MANAGED MEDICARE / Plan: [employer] MEDICARE PLAN PPO / Product Type: Medicare Advantage /     Extended Emergency Contact Information  Primary Emergency Contact: Otoniel Ray  Address: [id number] [place] [address]  Home Phone: [phone]  Mobile Phone: [phone]  Relation: Spouse  Secondary Emergency Contact: SAHRA MCGUIRE  Mobile Phone: [phone]  Relation: Son    Discharge Plan A: Skilled Nursing Facility  Discharge Plan B: Home with family, Home Health      [facility]  [address]  [facility] [address]  Phone: [phone] Fax: [phone]    [id number] - [place] - [address]  [address]  [place] [address]  Phone: [phone] Fax: [phone]      Initial Assessment (most recent)       Adult Discharge Assessment - 02/15/24 1522          Discharge Assessment    Assessment Type Discharge Planning Assessment     Confirmed/corrected address, phone number and insurance Yes     Confirmed Demographics Correct on Facesheet     Source of Information patient     Does patient/caregiver understand observation status Yes     Communicated BAILEY with patient/caregiver Yes     People in Home spouse;child(shavon), adult     Do you expect to return to your current living situation? No     Do you have help at home or someone to help you manage your care at home? Yes     Who are your caregiver(s) and their phone number(s)? Otoniel Langge (Spouse) [phone]     Prior to hospitilization cognitive status: Alert/Oriented     Current cognitive status: Alert/Oriented     Walking or Climbing Stairs Difficulty no     Dressing/Bathing Difficulty no      Equipment Currently Used at Home rollator;walker, rolling;wheelchair     Readmission within 30 days? No     Patient currently being followed by outpatient case management? No     Do you currently have service(s) that help you manage your care at home? Yes     Name and Contact number of agency [facility]     Is the pt/caregiver preference to resume services with current agency Yes     Do you take prescription medications? Yes     Do you have any problems affording any of your prescribed medications? No     Who is going to help you get home at discharge? SNF anticipated     Are you on dialysis? No     Do you take coumadin? No     Discharge Plan A Skilled Nursing Facility     Discharge Plan B Home with family;Home Health        Physical Activity    On average, how many days per week do you engage in moderate to strenuous exercise (like a brisk walk)? 0 days     On average, how many minutes do you engage in exercise at this level? 0 min        Financial Resource Strain    How hard is it for you to pay for the very basics like food, housing, medical care, and heating? Not hard at all        Housing Stability    In the last 12 months, was there a time when you were not able to pay the mortgage or rent on time? No     In the last 12 months, how many places have you lived? 1     In the last 12 months, was there a time when you did not have a steady place to sleep or slept in a shelter (including now)? No        Transportation Needs    In the past 12 months, has lack of transportation kept you from medical appointments or from getting medications? No     In the past 12 months, has lack of transportation kept you from meetings, work, or from getting things needed for daily living? No        Food Insecurity    Within the past 12 months, you worried that your food would run out before you got the money to buy more. Never true     Within the past 12 months, the food you bought just didn't last and you didn't  have money to get more. Never true        Stress    Do you feel stress - tense, restless, nervous, or anxious, or unable to sleep at night because your mind is troubled all the time - these days? Not at all        Social Connections    In a typical week, how many times do you talk on the phone with family, friends, or neighbors? More than three times a week     How often do you get together with friends or relatives? More than three times a week     How often do you attend Amish or Adventist services? Never     Do you belong to any clubs or organizations such as Amish groups, unions, fraternal or athletic groups, or school groups? No     How often do you attend meetings of the clubs or organizations you belong to? Never     Are you , , , , never , or living with a partner?         Alcohol Use    Q1: How often do you have a drink containing alcohol? Never     Q2: How many drinks containing alcohol do you have on a typical day when you are drinking? Patient does not drink     Q3: How often do you have six or more drinks on one occasion? Never                     Patient leaving for OR at this time. Spoke with Patient's spouse at bedside to complete d/c planning assessment. Patient lives with spouse and adult son. Home is single story with ramp access. Patient has a rollator, rolling walker and wheelchair in home. Verified PCP, Pharmacy and health insurance. Patient was receiving HH from [facility] and infusion services from [facility] prior to fall. PT/OT to eval in am for post-acute needs. Will continue to follow. Discharge Plan A and Plan B have been determined by review of patient's clinical status, future medical and therapeutic needs, and coverage/benefits for post-acute care in coordination with multidisciplinary team members.        Kaye Lan RNCM  Case Management  [facility]  [phone]             Yes

## 2024-02-26 NOTE — H&P ADULT - RESPIRATORY
Peripheral IV    Performed by: Casey Ponce MD  Authorized by: Casey Ponce MD    Size:  16 G  Laterality:  Right  Location:  Hand  Site Prep:  Alcohol  Attempts:  1  Securement: Stabilization device and Transparent dressing       detailed exam

## 2024-04-17 NOTE — PHYSICAL THERAPY INITIAL EVALUATION ADULT - GAIT DISTANCE, PT EVAL
Quality 226: Preventive Care And Screening: Tobacco Use: Screening And Cessation Intervention: Patient screened for tobacco use and is an ex/non-smoker
Detail Level: Detailed
Quality 431: Preventive Care And Screening: Unhealthy Alcohol Use - Screening: Patient not identified as an unhealthy alcohol user when screened for unhealthy alcohol use using a systematic screening method
10 feet

## 2025-03-05 NOTE — PHYSICAL THERAPY INITIAL EVALUATION ADULT - FOLLOWS COMMANDS/ANSWERS QUESTIONS, REHAB EVAL
Dangers of cigarette smoking were reviewed with patient in detail. Patient was Counseled for 3-10 minutes. Nicotine replacement options were discussed. Nicotine replacement was discussed- prescribed   100% of the time